# Patient Record
Sex: FEMALE | Race: BLACK OR AFRICAN AMERICAN | Employment: OTHER | URBAN - METROPOLITAN AREA
[De-identification: names, ages, dates, MRNs, and addresses within clinical notes are randomized per-mention and may not be internally consistent; named-entity substitution may affect disease eponyms.]

---

## 2017-01-17 ENCOUNTER — OFFICE VISIT (OUTPATIENT)
Dept: INTERNAL MEDICINE CLINIC | Age: 77
End: 2017-01-17

## 2017-01-17 VITALS
WEIGHT: 132 LBS | BODY MASS INDEX: 21.99 KG/M2 | OXYGEN SATURATION: 98 % | DIASTOLIC BLOOD PRESSURE: 68 MMHG | HEART RATE: 64 BPM | RESPIRATION RATE: 16 BRPM | SYSTOLIC BLOOD PRESSURE: 123 MMHG | HEIGHT: 65 IN | TEMPERATURE: 97.9 F

## 2017-01-17 DIAGNOSIS — G89.4 CHRONIC PAIN SYNDROME: ICD-10-CM

## 2017-01-17 DIAGNOSIS — I87.2 VENOUS INSUFFICIENCY OF BOTH LOWER EXTREMITIES: ICD-10-CM

## 2017-01-17 DIAGNOSIS — I48.0 PAROXYSMAL ATRIAL FIBRILLATION (HCC): Primary | ICD-10-CM

## 2017-01-17 DIAGNOSIS — I51.89 DIASTOLIC DYSFUNCTION: ICD-10-CM

## 2017-01-17 LAB
INR BLD: 2
PT POC: 23.9 SEC
VALID INTERNAL CONTROL?: YES

## 2017-01-17 RX ORDER — HYDROCODONE BITARTRATE AND ACETAMINOPHEN 5; 325 MG/1; MG/1
1 TABLET ORAL
Qty: 60 TAB | Refills: 0 | Status: SHIPPED | OUTPATIENT
Start: 2017-01-17 | End: 2017-03-13 | Stop reason: SDUPTHER

## 2017-01-17 NOTE — PROGRESS NOTES
Chief Complaint   Patient presents with    Anticoagulation     Coumadin 1 mg Monday-Saturday and 2 mg on Sunday    Headache     began early this AM     1. Have you been to the ER, urgent care clinic since your last visit? Hospitalized since your last visit? No    2. Have you seen or consulted any other health care providers outside of the [address] [place] [address] since your last visit? Include any pap smears or colon screening. No  \"Reviewed record in preparation for visit and have obtained necessary documentation. \"  Results for orders placed or performed in visit on 01/17/17   AMB POC PT/INR   Result Value Ref Range    VALID INTERNAL CONTROL POC Yes     Prothrombin time (POC) 23.9 sec    INR POC 2.0

## 2017-01-17 NOTE — PROGRESS NOTES
[facility]  Bismark Fernandez MD, [address] [address]  Phone:  [phone]  Fax: [phone]      Chief Complaint   Patient presents with    Anticoagulation     Coumadin 1 mg Monday-Saturday and 2 mg on Sunday    Headache     began early this AM         SUBECTIVE:    Arsalan Gamez is a 67 y.o. female Patient returns today alert and appropriate and has the capacity to give an accurate history. She is ambulatory with a known history of atrial fibrillation, anemia, chronic pain, diastolic dysfunction, prediabetes, mitral valve replacement and is seen for evaluation. She remains on Coumadin. Patient comes in today complaining of ankle swelling. The swelling was of such magnitude that she became frightened and started taking the fluid pill on a daily basis. The swelling is better this morning and it is usually down in the morning when she first gets up but as the day progresses the swelling gets worse. It also becomes somewhat painful. Patient denies other specific complaints and is seen for evaluation. Current Outpatient Prescriptions   Medication Sig Dispense Refill    HYDROcodone-acetaminophen (NORCO) 5-325 mg per tablet Take 1 Tab by mouth every six (6) hours as needed for Pain. Max Daily Amount: 4 Tabs. 60 Tab 0    pravastatin (PRAVACHOL) 20 mg tablet TAKE ONE TABLET BY MOUTH NIGHTLY 30 Tab 10    furosemide (LASIX) 20 mg tablet TAKE 1 TABLET BY MOUTH EVERY MONDAY, WEDNESDAY AND FRIDAY (Patient taking differently: taking only as needed for swelling ) 45 Tab 11    CARTIA  mg ER capsule TAKE ONE CAPSULE BY MOUTH DAILY 30 Cap 9    metoprolol (LOPRESSOR) 25 mg tablet Take 0.5 Tabs by mouth two (2) times a day.  180 Tab 2    warfarin (COUMADIN) 2 mg tablet TAKE ONE TABLET BY MOUTH DAILY (Patient taking differently: takes 2 mg on Sun only and 1mg all other days ) 90 Tab 2    omeprazole (PRILOSEC) 40 mg capsule Take 1 capsule by mouth daily. 30 capsule 3     Past Medical History   Diagnosis Date    AF (atrial fibrillation) (HCC)      RHEUMATIC FEVER AS CHILD, AFIB    Anemia     Arthritis     Chronic pain     Coagulation defects      COUMADIN    Diastolic dysfunction      grade 4    Encounter for Hemoccult screening 2016    H/O colonoscopy     Hand pain, right     Prediabetes     PUD (peptic ulcer disease)     Rheumatic fever     S/P MVR (mitral valve replacement)     Shoulder pain, right     SOB (shortness of breath)     Thyroid disease     Venous insufficiency of both lower extremities 2017    Warfarin-induced coagulopathy ([facility] [address]      Past Surgical History   Procedure Laterality Date    Hx heart catheterization      Hx  section      Hx sergio and bso      Hx cataract removal       BILATERAL    Hx colonoscopy      Hx hysterectomy       No Known Allergies    REVIEW OF SYSTEMS:   No chest pain or shortness of breath.          Social History     Social History    Marital status: SINGLE     Spouse name: N/A    Number of children: N/A    Years of education: N/A     Social History Main Topics    Smoking status: Former Smoker     Packs/day: 0.50     Years: 30.00     Quit date: 2012    Smokeless tobacco: Never Used    Alcohol use No    Drug use: No    Sexual activity: Not Asked     Other Topics Concern    None     Social History Narrative   r  Family History   Problem Relation Age of Onset    Heart Attack Sister     Hypertension Sister     Kidney Disease Mother     Hypertension Mother     Hypertension Father     Cancer Brother     Heart Failure Brother     Arthritis-osteo Sister     Cancer Sister     Cancer Brother      PROSTATE       OBJECTIVE:  Visit Vitals    /68 (BP 1 Location: Right arm, BP Patient Position: Sitting)    Pulse 64    Temp 97.9 °F (36.6 °C) (Oral)    Resp 16    Ht 5' 5\" (1.651 m)    Wt 132 lb (59.9 kg)    SpO2 98%    BMI 21.97 kg/m2     ENT: whit chambers intact  NECK: supple. Thyroid normal  CHEST: clear to ascultation and percussion   HEART: regular rate and rhythm  ABD: soft, bowel sounds active  EXTREMITIES: no edema, pulse 1+     Office Visit on 01/17/2017   Component Date Value Ref Range Status    VALID INTERNAL CONTROL POC 01/17/2017 Yes   Final    Prothrombin time (POC) 01/17/2017 23.9  sec Final    INR POC 01/17/2017 2.0   Final   Clinical Support on 12/27/2016   Component Date Value Ref Range Status    VALID INTERNAL CONTROL POC 12/27/2016 Yes   Final    Prothrombin time (POC) 12/27/2016 27.2  sec Final    INR POC 12/27/2016 2.3   Final   Clinical Support on 12/06/2016   Component Date Value Ref Range Status    VALID INTERNAL CONTROL POC 12/06/2016 Yes   Final    Prothrombin time (POC) 12/06/2016 35.0  sec Final    INR POC 12/06/2016 2.9   Final   Clinical Support on 11/15/2016   Component Date Value Ref Range Status    VALID INTERNAL CONTROL POC 11/15/2016 Yes   Final    Prothrombin time (POC) 11/15/2016 35.7  sec Final    INR POC 11/15/2016 3.0   Final          ASSESSMENT:  1. Paroxysmal atrial fibrillation (HCC)    2. Venous insufficiency of both lower extremities    3. Diastolic dysfunction    4. Chronic pain syndrome      The findings are consistent with chronic venous insufficiency. We do not suggest she continue the Lasix three times daily. I suggest she go back to basics, Monday, Wednesday and Friday. If the swelling becomes worse again however she can start taking it daily until it subsides and then when it subsides she will go back to Monday, Wednesday and Friday. We will check electrolytes today. INR is 2.0 which is okay. She complains of headaches and had the same complaint on her last visit. We are reluctant to give her analgesics because of the Coumadin. She takes Hydrocodone for pain periodically. That does not work very well for headaches however. BMI is at ideal body weight. Blood pressure control is adequate. She will return to the office in three weeks for a PT check. PLAN:  .  Orders Placed This Encounter    CBC WITH AUTOMATED DIFF    METABOLIC PANEL, BASIC    BNP    AMB POC PT/INR    HYDROcodone-acetaminophen (NORCO) 5-325 mg per tablet       Follow-up Disposition:  Return in about 4 weeks (around 2/14/2017) for bp check, pt/inr. ATTENTION:   This medical record was transcribed using an electronic medical records system. Although proofread, it may and can contain electronic and spelling errors. Other human spelling and other errors may be present. Corrections may be executed at a later time. Please feel free to contact us for any clarifications as needed.

## 2017-01-17 NOTE — MR AVS SNAPSHOT
Visit Information Date & Time Provider Department Dept. Phone Encounter #  
 1/17/2017  9:30 AM Jhonathan Chowrichardlauryn Page [facility] [address] [id number] Follow-up Instructions Return in about 4 weeks (around 2/14/2017) for bp check, pt/inr. Follow-up and Disposition History Your Appointments 4/3/2017  2:00 PM  
ESTABLISHED PATIENT with Kiley Cotto MD  
[facility] ([facility]) Appt Note: 6 month follow up  
 [phone]  
[address] [address] Upcoming Health Maintenance Date Due  
 GLAUCOMA SCREENING Q2Y 4/21/2017 Pneumococcal 65+ Low/Medium Risk (2 of 2 - PPSV23) 5/7/2017 MEDICARE YEARLY EXAM 6/14/2017 DTaP/Tdap/Td series (2 - Td) 1/17/2027 Allergies as of 1/17/2017  Review Complete On: 1/17/2017 By: Brandon Rendon MD  
 No Known Allergies Current Immunizations  Reviewed on 5/4/2012 Name Date Pneumococcal Vaccine (Unspecified Type) 5/7/2012  8:12 AM  
  
 Not reviewed this visit You Were Diagnosed With   
  
 Codes Comments Paroxysmal atrial fibrillation (HCC)    -  Primary ICD-10-CM: I48.0 ICD-9-CM: 427.31 Venous insufficiency of both lower extremities     ICD-10-CM: I87.2 ICD-9-CM: 459.81 Diastolic dysfunction     AJT-58-ME: I51.9 ICD-9-CM: 429.9 Chronic pain syndrome     ICD-10-CM: G89.4 ICD-9-CM: 338. 4 Vitals BP Pulse Temp Resp Height(growth percentile) Weight(growth percentile) 123/68 (BP 1 Location: Right arm, BP Patient Position: Sitting) 64 97.9 °F (36.6 °C) (Oral) 16 5' 5\" (1.651 m) 132 lb (59.9 kg) SpO2 BMI OB Status Smoking Status 98% 21.97 kg/m2 Hysterectomy Former Smoker Vitals History BMI and BSA Data Body Mass Index Body Surface Area  
 21.97 kg/m 2 1.66 m 2 Preferred Pharmacy Pharmacy Name Phone [facility] [address] [facility] [phone] Your Updated Medication List  
  
   
This list is accurate as of: 1/17/17 10:47 AM.  Always use your most recent med list.  
  
  
  
  
 CARTIA  mg ER capsule Generic drug:  dilTIAZem CD  
TAKE ONE CAPSULE BY MOUTH DAILY  
  
 furosemide 20 mg tablet Commonly known as:  LASIX TAKE 1 TABLET BY MOUTH EVERY MONDAY, [address] [facility] HYDROcodone-acetaminophen 5-325 mg per tablet Commonly known as:  Daniel Maxim Take 1 Tab by mouth every six (6) hours as needed for Pain. Max Daily Amount: 4 Tabs. metoprolol tartrate 25 mg tablet Commonly known as:  LOPRESSOR Take 0.5 Tabs by mouth two (2) times a day. omeprazole 40 mg capsule Commonly known as:  PRILOSEC Take 1 capsule by mouth daily. pravastatin 20 mg tablet Commonly known as:  PRAVACHOL  
TAKE ONE TABLET BY MOUTH NIGHTLY  
  
 warfarin 2 mg tablet Commonly known as:  COUMADIN  
TAKE ONE TABLET BY MOUTH DAILY Prescriptions Printed Refills HYDROcodone-acetaminophen (NORCO) 5-325 mg per tablet 0 Sig: Take 1 Tab by mouth every six (6) hours as needed for Pain. Max Daily Amount: 4 Tabs. Class: Print Route: Oral  
  
We Performed the Following AMB POC PT/INR [64944 CPT(R)] BNP [id number] CPT(R)] CBC WITH AUTOMATED DIFF [22327 CPT(R)] METABOLIC PANEL, BASIC [35221 CPT(R)] Follow-up Instructions Return in about 4 weeks (around 2/14/2017) for bp check, pt/inr. Introducing [facility] & HEALTH SERVICES! [employer] Kimbrough introduces [employer] patient portal. Now you can access parts of your medical record, email your doctor's office, and request medication refills online. 1. In your internet browser, go to [email or web address] 2. Click on the First Time User? Click Here link in the Sign In box. You will see the New Member Sign Up page. 3. Enter your [employer] Access Code exactly as it appears below. You will not need to use this code after youve completed the sign-up process. If you do not sign up before the expiration date, you must request a new code. · [employer] Access Code: [email or web address] Expires: 2/13/2017 11:29 AM 
 
4. Enter the last four digits of your Social Security Number (xxxx) and Date of Birth (mm/dd/yyyy) as indicated and click Submit. You will be taken to the next sign-up page. 5. Create a [employer] ID. This will be your [employer] login ID and cannot be changed, so think of one that is secure and easy to remember. 6. Create a [employer] password. You can change your password at any time. 7. Enter your Password Reset Question and Answer. This can be used at a later time if you forget your password. 8. Enter your e-mail address. You will receive e-mail notification when new information is available in [address] 9. Click Sign Up. You can now view and download portions of your medical record. 10. Click the Download Summary menu link to download a portable copy of your medical information. If you have questions, please visit the Frequently Asked Questions section of the [employer] website. Remember, [employer] is NOT to be used for urgent needs. For medical emergencies, dial 911. Now available from your iPhone and Android! Please provide this summary of care documentation to your next provider. Your primary care clinician is listed as Josi Ponce. If you have any questions after today's visit, please call [phone].

## 2017-01-18 LAB
BASOPHILS # BLD AUTO: 0 X10E3/UL (ref 0–0.2)
BASOPHILS NFR BLD AUTO: 0 %
BNP SERPL-MCNC: 303.1 PG/ML (ref 0–100)
BUN SERPL-MCNC: 17 MG/DL (ref 8–27)
BUN/CREAT SERPL: 17 (ref 11–26)
CALCIUM SERPL-MCNC: 9.7 MG/DL (ref 8.7–10.3)
CHLORIDE SERPL-SCNC: 104 MMOL/L (ref 96–106)
CO2 SERPL-SCNC: 22 MMOL/L (ref 18–29)
CREAT SERPL-MCNC: 0.98 MG/DL (ref 0.57–1)
EOSINOPHIL # BLD AUTO: 0.1 X10E3/UL (ref 0–0.4)
EOSINOPHIL NFR BLD AUTO: 2 %
ERYTHROCYTE [DISTWIDTH] IN BLOOD BY AUTOMATED COUNT: 14.6 % (ref 12.3–15.4)
GLUCOSE SERPL-MCNC: 95 MG/DL (ref 65–99)
HCT VFR BLD AUTO: 37.7 % (ref 34–46.6)
HGB BLD-MCNC: 13 G/DL (ref 11.1–15.9)
IMM GRANULOCYTES # BLD: 0 X10E3/UL (ref 0–0.1)
IMM GRANULOCYTES NFR BLD: 0 %
LYMPHOCYTES # BLD AUTO: 2.9 X10E3/UL (ref 0.7–3.1)
LYMPHOCYTES NFR BLD AUTO: 41 %
MCH RBC QN AUTO: 29.1 PG (ref 26.6–33)
MCHC RBC AUTO-ENTMCNC: 34.5 G/DL (ref 31.5–35.7)
MCV RBC AUTO: 84 FL (ref 79–97)
MONOCYTES # BLD AUTO: 0.6 X10E3/UL (ref 0.1–0.9)
MONOCYTES NFR BLD AUTO: 9 %
NEUTROPHILS # BLD AUTO: 3.5 X10E3/UL (ref 1.4–7)
NEUTROPHILS NFR BLD AUTO: 48 %
PLATELET # BLD AUTO: 316 X10E3/UL (ref 150–379)
POTASSIUM SERPL-SCNC: 5.4 MMOL/L (ref 3.5–5.2)
RBC # BLD AUTO: 4.47 X10E6/UL (ref 3.77–5.28)
SODIUM SERPL-SCNC: 144 MMOL/L (ref 134–144)
WBC # BLD AUTO: 7.1 X10E3/UL (ref 3.4–10.8)

## 2017-01-19 ENCOUNTER — TELEPHONE (OUTPATIENT)
Dept: INTERNAL MEDICINE CLINIC | Age: 77
End: 2017-01-19

## 2017-01-24 ENCOUNTER — LAB ONLY (OUTPATIENT)
Dept: INTERNAL MEDICINE CLINIC | Age: 77
End: 2017-01-24

## 2017-01-24 DIAGNOSIS — I10 ESSENTIAL HYPERTENSION, BENIGN: Primary | ICD-10-CM

## 2017-01-26 LAB
BUN SERPL-MCNC: 19 MG/DL (ref 8–27)
BUN/CREAT SERPL: 21 (ref 11–26)
CALCIUM SERPL-MCNC: 10.3 MG/DL (ref 8.7–10.3)
CHLORIDE SERPL-SCNC: 101 MMOL/L (ref 96–106)
CO2 SERPL-SCNC: 24 MMOL/L (ref 18–29)
CREAT SERPL-MCNC: 0.91 MG/DL (ref 0.57–1)
GLUCOSE SERPL-MCNC: 147 MG/DL (ref 65–99)
POTASSIUM SERPL-SCNC: 4.8 MMOL/L (ref 3.5–5.2)
SODIUM SERPL-SCNC: 143 MMOL/L (ref 134–144)

## 2017-02-14 ENCOUNTER — OFFICE VISIT (OUTPATIENT)
Dept: INTERNAL MEDICINE CLINIC | Age: 77
End: 2017-02-14

## 2017-02-14 DIAGNOSIS — I48.91 ATRIAL FIBRILLATION, UNSPECIFIED TYPE (HCC): Primary | ICD-10-CM

## 2017-02-14 LAB
INR BLD: 2.6
PT POC: 31.3 SEC
VALID INTERNAL CONTROL?: YES

## 2017-02-14 NOTE — MR AVS SNAPSHOT
Visit Information Date & Time Provider Department Dept. Phone Encounter #  
 2/14/2017 10:15 AM Moises Em  [facility] and [address] [id number] Your Appointments 2/14/2017 10:15 AM  
Any with Moises Em MD  
[address] [facility] [address] [facility]) Appt Note: 1 month for pt check [address] [place]  
  
   
 [address] [phone]  
  
    
 4/3/2017  2:00 PM  
ESTABLISHED PATIENT with Abdi Sewell MD  
[facility] ([address]) Appt Note: 6 month follow up  
 [phone]  
[address] [address] Upcoming Health Maintenance Date Due  
 GLAUCOMA SCREENING Q2Y 4/21/2017 Pneumococcal 65+ Low/Medium Risk (2 of 2 - PPSV23) 5/7/2017 MEDICARE YEARLY EXAM 6/14/2017 DTaP/Tdap/Td series (2 - Td) 1/17/2027 Allergies as of 2/14/2017  Review Complete On: 1/17/2017 By: Moises Em MD  
 No Known Allergies Current Immunizations  Reviewed on 5/4/2012 Name Date Pneumococcal Vaccine (Unspecified Type) 5/7/2012  8:12 AM  
  
 Not reviewed this visit Vitals OB Status Smoking Status Hysterectomy Former Smoker Preferred Pharmacy Pharmacy Name Phone Jarvis Garcia [address] [facility] [phone] Your Updated Medication List  
  
   
This list is accurate as of: 2/14/17 10:04 AM.  Always use your most recent med list.  
  
  
  
  
 CARTIA  mg ER capsule Generic drug:  dilTIAZem CD  
TAKE ONE CAPSULE BY MOUTH DAILY  
  
 furosemide 20 mg tablet Commonly known as:  LASIX TAKE 1 TABLET BY MOUTH EVERY MONDAY, [facility] HYDROcodone-acetaminophen 5-325 mg per tablet Commonly known as:  Allison Oyster Take 1 Tab by mouth every six (6) hours as needed for Pain. Max Daily Amount: 4 Tabs. metoprolol tartrate 25 mg tablet Commonly known as:  LOPRESSOR Take 0.5 Tabs by mouth two (2) times a day. omeprazole 40 mg capsule Commonly known as:  PRILOSEC Take 1 capsule by mouth daily. pravastatin 20 mg tablet Commonly known as:  PRAVACHOL  
TAKE ONE TABLET BY MOUTH NIGHTLY  
  
 warfarin 2 mg tablet Commonly known as:  COUMADIN  
TAKE ONE TABLET BY MOUTH DAILY Introducing [facility] & HEALTH SERVICES! [employer] introduces [employer] patient portal. Now you can access parts of your medical record, email your doctor's office, and request medication refills online. 1. In your internet browser, go to [email or web address] 2. Click on the First Time User? Click Here link in the Sign In box. You will see the New Member Sign Up page. 3. Enter your [employer] Access Code exactly as it appears below. You will not need to use this code after youve completed the sign-up process. If you do not sign up before the expiration date, you must request a new code. · [employer] Access Code: [email or web address] Expires: 5/15/2017  9:31 AM 
 
4. Enter the last four digits of your Social Security Number (xxxx) and Date of Birth (mm/dd/yyyy) as indicated and click Submit. You will be taken to the next sign-up page. 5. Create a [employer] ID. This will be your [employer] login ID and cannot be changed, so think of one that is secure and easy to remember. 6. Create a [employer] password. You can change your password at any time. 7. Enter your Password Reset Question and Answer. This can be used at a later time if you forget your password. 8. Enter your e-mail address. You will receive e-mail notification when new information is available in [address] 9. Click Sign Up. You can now view and download portions of your medical record. 10. Click the Download Summary menu link to download a portable copy of your medical information. If you have questions, please visit the Frequently Asked Questions section of the [employer] website. Remember, [employer] is NOT to be used for urgent needs. For medical emergencies, dial 911. Now available from your iPhone and Android! Please provide this summary of care documentation to your next provider. Your primary care clinician is listed as Wendy Finley. If you have any questions after today's visit, please call [phone].

## 2017-02-14 NOTE — PROGRESS NOTES
Patient comes into the office for pt/inr check up she states she is taking Coumadin 2 mg 1/2 tablet daily and 1 tablet on Sundays. Per Dr. Gilma Crenshaw patient is to stay on same dose of medication and return to the office in 1 month for pt/inr check up.  Pt 31.3 inr 2.6

## 2017-03-02 ENCOUNTER — HOSPITAL ENCOUNTER (OUTPATIENT)
Age: 77
Setting detail: OBSERVATION
Discharge: HOME | End: 2017-03-03
Attending: STUDENT IN AN ORGANIZED HEALTH CARE EDUCATION/TRAINING PROGRAM | Admitting: INTERNAL MEDICINE
Payer: MEDICARE

## 2017-03-02 ENCOUNTER — APPOINTMENT (OUTPATIENT)
Dept: ULTRASOUND IMAGING | Age: 77
End: 2017-03-02
Attending: STUDENT IN AN ORGANIZED HEALTH CARE EDUCATION/TRAINING PROGRAM
Payer: MEDICARE

## 2017-03-02 ENCOUNTER — APPOINTMENT (OUTPATIENT)
Dept: GENERAL RADIOLOGY | Age: 77
End: 2017-03-02
Attending: STUDENT IN AN ORGANIZED HEALTH CARE EDUCATION/TRAINING PROGRAM
Payer: MEDICARE

## 2017-03-02 DIAGNOSIS — N17.9 ACUTE KIDNEY INJURY (HCC): Primary | ICD-10-CM

## 2017-03-02 DIAGNOSIS — M79.89 LEG SWELLING: ICD-10-CM

## 2017-03-02 PROBLEM — I82.4Y9 DVT, LOWER EXTREMITY, PROXIMAL, ACUTE (HCC): Status: ACTIVE | Noted: 2017-03-02

## 2017-03-02 PROBLEM — I82.511 CHRONIC DEEP VEIN THROMBOSIS (DVT) OF FEMORAL VEIN OF RIGHT LOWER EXTREMITY (HCC): Status: ACTIVE | Noted: 2017-03-02

## 2017-03-02 LAB
ALBUMIN SERPL BCP-MCNC: 3.5 G/DL (ref 3.5–5)
ALBUMIN/GLOB SERPL: 0.8 {RATIO} (ref 1.1–2.2)
ALP SERPL-CCNC: 77 U/L (ref 45–117)
ALT SERPL-CCNC: 16 U/L (ref 12–78)
ANION GAP BLD CALC-SCNC: 8 MMOL/L (ref 5–15)
APPEARANCE UR: CLEAR
AST SERPL W P-5'-P-CCNC: 8 U/L (ref 15–37)
ATRIAL RATE: 65 BPM
BACTERIA URNS QL MICRO: NEGATIVE /HPF
BASOPHILS # BLD AUTO: 0 K/UL (ref 0–0.1)
BASOPHILS # BLD: 0 % (ref 0–1)
BILIRUB SERPL-MCNC: 0.3 MG/DL (ref 0.2–1)
BILIRUB UR QL: NEGATIVE
BNP SERPL-MCNC: 81 PG/ML (ref 0–100)
BUN SERPL-MCNC: 36 MG/DL (ref 6–20)
BUN/CREAT SERPL: 13 (ref 12–20)
CALCIUM SERPL-MCNC: 9.2 MG/DL (ref 8.5–10.1)
CALCULATED P AXIS, ECG09: 33 DEGREES
CALCULATED R AXIS, ECG10: 51 DEGREES
CALCULATED T AXIS, ECG11: 54 DEGREES
CHLORIDE SERPL-SCNC: 104 MMOL/L (ref 97–108)
CO2 SERPL-SCNC: 28 MMOL/L (ref 21–32)
COLOR UR: ABNORMAL
CREAT SERPL-MCNC: 2.68 MG/DL (ref 0.55–1.02)
DIAGNOSIS, 93000: NORMAL
EOSINOPHIL # BLD: 0.3 K/UL (ref 0–0.4)
EOSINOPHIL NFR BLD: 4 % (ref 0–7)
EPITH CASTS URNS QL MICRO: ABNORMAL /LPF
ERYTHROCYTE [DISTWIDTH] IN BLOOD BY AUTOMATED COUNT: 14.5 % (ref 11.5–14.5)
GLOBULIN SER CALC-MCNC: 4.4 G/DL (ref 2–4)
GLUCOSE SERPL-MCNC: 108 MG/DL (ref 65–100)
GLUCOSE UR STRIP.AUTO-MCNC: NEGATIVE MG/DL
HCT VFR BLD AUTO: 32.8 % (ref 35–47)
HGB BLD-MCNC: 11.2 G/DL (ref 11.5–16)
HGB UR QL STRIP: NEGATIVE
HYALINE CASTS URNS QL MICRO: ABNORMAL /LPF (ref 0–5)
INR PPP: 2.4 (ref 0.9–1.1)
KETONES UR QL STRIP.AUTO: NEGATIVE MG/DL
LEUKOCYTE ESTERASE UR QL STRIP.AUTO: ABNORMAL
LYMPHOCYTES # BLD AUTO: 34 % (ref 12–49)
LYMPHOCYTES # BLD: 2.4 K/UL (ref 0.8–3.5)
MCH RBC QN AUTO: 28.8 PG (ref 26–34)
MCHC RBC AUTO-ENTMCNC: 34.1 G/DL (ref 30–36.5)
MCV RBC AUTO: 84.3 FL (ref 80–99)
MONOCYTES # BLD: 0.6 K/UL (ref 0–1)
MONOCYTES NFR BLD AUTO: 9 % (ref 5–13)
NEUTS SEG # BLD: 3.7 K/UL (ref 1.8–8)
NEUTS SEG NFR BLD AUTO: 53 % (ref 32–75)
NITRITE UR QL STRIP.AUTO: NEGATIVE
P-R INTERVAL, ECG05: 158 MS
PH UR STRIP: 5 [PH] (ref 5–8)
PLATELET # BLD AUTO: 252 K/UL (ref 150–400)
POTASSIUM SERPL-SCNC: 3.8 MMOL/L (ref 3.5–5.1)
PROT SERPL-MCNC: 7.9 G/DL (ref 6.4–8.2)
PROT UR STRIP-MCNC: NEGATIVE MG/DL
PROTHROMBIN TIME: 24.1 SEC (ref 9–11.1)
Q-T INTERVAL, ECG07: 424 MS
QRS DURATION, ECG06: 82 MS
QTC CALCULATION (BEZET), ECG08: 440 MS
RBC # BLD AUTO: 3.89 M/UL (ref 3.8–5.2)
RBC #/AREA URNS HPF: ABNORMAL /HPF (ref 0–5)
SODIUM SERPL-SCNC: 140 MMOL/L (ref 136–145)
SP GR UR REFRACTOMETRY: 1.01 (ref 1–1.03)
UROBILINOGEN UR QL STRIP.AUTO: 0.2 EU/DL (ref 0.2–1)
VENTRICULAR RATE, ECG03: 65 BPM
WBC # BLD AUTO: 7 K/UL (ref 3.6–11)
WBC URNS QL MICRO: ABNORMAL /HPF (ref 0–4)

## 2017-03-02 PROCEDURE — 93971 EXTREMITY STUDY: CPT

## 2017-03-02 PROCEDURE — 93005 ELECTROCARDIOGRAM TRACING: CPT

## 2017-03-02 PROCEDURE — 76770 US EXAM ABDO BACK WALL COMP: CPT

## 2017-03-02 PROCEDURE — 96361 HYDRATE IV INFUSION ADD-ON: CPT

## 2017-03-02 PROCEDURE — 81001 URINALYSIS AUTO W/SCOPE: CPT

## 2017-03-02 PROCEDURE — 71010 XR CHEST PORT: CPT

## 2017-03-02 PROCEDURE — 65390000012 HC CONDITION CODE 44 OBSERVATION

## 2017-03-02 PROCEDURE — 85610 PROTHROMBIN TIME: CPT

## 2017-03-02 PROCEDURE — 65660000000 HC RM CCU STEPDOWN

## 2017-03-02 PROCEDURE — 80053 COMPREHEN METABOLIC PANEL: CPT | Performed by: STUDENT IN AN ORGANIZED HEALTH CARE EDUCATION/TRAINING PROGRAM

## 2017-03-02 PROCEDURE — 36415 COLL VENOUS BLD VENIPUNCTURE: CPT

## 2017-03-02 PROCEDURE — 83880 ASSAY OF NATRIURETIC PEPTIDE: CPT | Performed by: STUDENT IN AN ORGANIZED HEALTH CARE EDUCATION/TRAINING PROGRAM

## 2017-03-02 PROCEDURE — 74011250636 HC RX REV CODE- 250/636: Performed by: STUDENT IN AN ORGANIZED HEALTH CARE EDUCATION/TRAINING PROGRAM

## 2017-03-02 PROCEDURE — 85025 COMPLETE CBC W/AUTO DIFF WBC: CPT | Performed by: STUDENT IN AN ORGANIZED HEALTH CARE EDUCATION/TRAINING PROGRAM

## 2017-03-02 PROCEDURE — 74011250637 HC RX REV CODE- 250/637: Performed by: INTERNAL MEDICINE

## 2017-03-02 PROCEDURE — 74011250636 HC RX REV CODE- 250/636: Performed by: INTERNAL MEDICINE

## 2017-03-02 PROCEDURE — 96360 HYDRATION IV INFUSION INIT: CPT

## 2017-03-02 PROCEDURE — 99284 EMERGENCY DEPT VISIT MOD MDM: CPT

## 2017-03-02 RX ORDER — LOSARTAN POTASSIUM 50 MG/1
50 TABLET ORAL DAILY
Status: DISCONTINUED | OUTPATIENT
Start: 2017-03-03 | End: 2017-03-03

## 2017-03-02 RX ORDER — SODIUM CHLORIDE 0.9 % (FLUSH) 0.9 %
5-10 SYRINGE (ML) INJECTION EVERY 8 HOURS
Status: DISCONTINUED | OUTPATIENT
Start: 2017-03-02 | End: 2017-03-03 | Stop reason: HOSPADM

## 2017-03-02 RX ORDER — SODIUM CHLORIDE 9 MG/ML
150 INJECTION, SOLUTION INTRAVENOUS ONCE
Status: COMPLETED | OUTPATIENT
Start: 2017-03-02 | End: 2017-03-03

## 2017-03-02 RX ORDER — DILTIAZEM HYDROCHLORIDE 120 MG/1
120 CAPSULE, COATED, EXTENDED RELEASE ORAL DAILY
Status: DISCONTINUED | OUTPATIENT
Start: 2017-03-03 | End: 2017-03-03 | Stop reason: HOSPADM

## 2017-03-02 RX ORDER — SODIUM CHLORIDE 9 MG/ML
50 INJECTION, SOLUTION INTRAVENOUS CONTINUOUS
Status: DISCONTINUED | OUTPATIENT
Start: 2017-03-02 | End: 2017-03-03 | Stop reason: HOSPADM

## 2017-03-02 RX ORDER — HYDROCODONE BITARTRATE AND ACETAMINOPHEN 5; 325 MG/1; MG/1
1 TABLET ORAL
Status: DISCONTINUED | OUTPATIENT
Start: 2017-03-02 | End: 2017-03-03 | Stop reason: HOSPADM

## 2017-03-02 RX ORDER — METOPROLOL TARTRATE 25 MG/1
12.5 TABLET, FILM COATED ORAL 2 TIMES DAILY
Status: DISCONTINUED | OUTPATIENT
Start: 2017-03-03 | End: 2017-03-03 | Stop reason: HOSPADM

## 2017-03-02 RX ORDER — LOSARTAN POTASSIUM 50 MG/1
50 TABLET ORAL DAILY
COMMUNITY
End: 2017-04-04 | Stop reason: SDUPTHER

## 2017-03-02 RX ORDER — ACETAMINOPHEN 325 MG/1
650 TABLET ORAL
Status: DISCONTINUED | OUTPATIENT
Start: 2017-03-02 | End: 2017-03-03 | Stop reason: HOSPADM

## 2017-03-02 RX ORDER — GUAIFENESIN 100 MG/5ML
81 LIQUID (ML) ORAL DAILY
Status: DISCONTINUED | OUTPATIENT
Start: 2017-03-03 | End: 2017-03-03 | Stop reason: HOSPADM

## 2017-03-02 RX ORDER — SODIUM CHLORIDE 0.9 % (FLUSH) 0.9 %
5-10 SYRINGE (ML) INJECTION AS NEEDED
Status: DISCONTINUED | OUTPATIENT
Start: 2017-03-02 | End: 2017-03-03 | Stop reason: HOSPADM

## 2017-03-02 RX ORDER — PRAVASTATIN SODIUM 20 MG/1
20 TABLET ORAL
Status: DISCONTINUED | OUTPATIENT
Start: 2017-03-02 | End: 2017-03-03 | Stop reason: HOSPADM

## 2017-03-02 RX ORDER — FUROSEMIDE 20 MG/1
20 TABLET ORAL DAILY
Status: DISCONTINUED | OUTPATIENT
Start: 2017-03-03 | End: 2017-03-03

## 2017-03-02 RX ORDER — GUAIFENESIN 100 MG/5ML
81 LIQUID (ML) ORAL DAILY
COMMUNITY

## 2017-03-02 RX ORDER — ACETAMINOPHEN 500 MG
500-1000 TABLET ORAL
COMMUNITY
End: 2017-03-13 | Stop reason: SDUPTHER

## 2017-03-02 RX ORDER — WARFARIN 1 MG/1
1 TABLET ORAL
Status: COMPLETED | OUTPATIENT
Start: 2017-03-02 | End: 2017-03-02

## 2017-03-02 RX ADMIN — SODIUM CHLORIDE 150 ML/HR: 900 INJECTION, SOLUTION INTRAVENOUS at 16:22

## 2017-03-02 RX ADMIN — WARFARIN SODIUM 1 MG: 1 TABLET ORAL at 23:17

## 2017-03-02 RX ADMIN — PRAVASTATIN SODIUM 20 MG: 20 TABLET ORAL at 23:17

## 2017-03-02 RX ADMIN — Medication 10 ML: at 23:17

## 2017-03-02 RX ADMIN — HYDROCODONE BITARTRATE AND ACETAMINOPHEN 1 TABLET: 5; 325 TABLET ORAL at 23:17

## 2017-03-02 RX ADMIN — SODIUM CHLORIDE 50 ML/HR: 900 INJECTION, SOLUTION INTRAVENOUS at 23:17

## 2017-03-02 NOTE — PROGRESS NOTES
Admission Medication Reconciliation:    Information obtained from:  Patient/RxQuery    Comments/Recommendations: Updated PTA meds/reviewed patient's allergies. 1)  Patient is very clear about how she takes warfarin. Patient splits her 2mg \"purple tablet\" and takes 0.5 tabs (1 mg) daily, except 2 mg on Sundays. She takes these in the PM; last dose yesterday. Indication:  AFib w/MVR    2)  Added Losartan. Patient reports that her normal MD was on maternity leave and her covering MD took the patient off Losartan. She felt \"lousy\" for 2 days and then self-restarted. 3)  Added ASA 81 mg daily and PRN APAP    4)  Removed old Rx for omeprazole       Significant PMH/Disease States:   Past Medical History:   Diagnosis Date    AF (atrial fibrillation) (HCC)     RHEUMATIC FEVER AS CHILD, AFIB    Anemia     Arthritis     Chronic pain     Coagulation defects     COUMADIN    Diastolic dysfunction     grade 4    Encounter for Hemoccult screening 06/22/2016    H/O colonoscopy 2007    Hand pain, right     Heart failure (HCC)     Prediabetes     PUD (peptic ulcer disease)     Rheumatic fever     S/P MVR (mitral valve replacement)     Shoulder pain, right     SOB (shortness of breath)     Thyroid disease     Venous insufficiency of both lower extremities 01/17/2017    Warfarin-induced coagulopathy [facility])      Chief Complaint for this Admission:    Chief Complaint   Patient presents with    Leg Swelling     Allergies:  Review of patient's allergies indicates no known allergies. Prior to Admission Medications:   Prior to Admission Medications   Prescriptions Last Dose Informant Patient Reported? Taking? CARTIA  mg ER capsule 3/2/2017 at Unknown time  No Yes   Sig: TAKE ONE CAPSULE BY MOUTH DAILY   HYDROcodone-acetaminophen (NORCO) 5-325 mg per tablet 3/1/2017 at Unknown time  No Yes   Sig: Take 1 Tab by mouth every six (6) hours as needed for Pain. Max Daily Amount: 4 Tabs.    acetaminophen (TYLENOL) 500 mg tablet   Yes Yes   Sig: Take 500-1,000 mg by mouth every six (6) hours as needed for Pain. aspirin 81 mg chewable tablet 3/2/2017 at Unknown time  Yes Yes   Sig: Take 81 mg by mouth daily. furosemide (LASIX) 20 mg tablet 3/2/2017 at Unknown time  No Yes   Sig: TAKE 1 TABLET BY MOUTH EVERY MONDAY, WEDNESDAY AND FRIDAY   losartan (COZAAR) 50 mg tablet 3/2/2017 at Unknown time  Yes Yes   Sig: Take 50 mg by mouth daily. metoprolol (LOPRESSOR) 25 mg tablet 3/2/2017 at Unknown time  No Yes   Sig: Take 0.5 Tabs by mouth two (2) times a day.    pravastatin (PRAVACHOL) 20 mg tablet 3/1/2017 at Unknown time  No Yes   Sig: TAKE ONE TABLET BY MOUTH NIGHTLY   warfarin (COUMADIN) 2 mg tablet 3/1/2017 at 1 mg PM  No Yes   Sig: TAKE ONE TABLET BY MOUTH DAILY   Patient taking differently: takes 2 mg on Sun only and 1mg all other days       Facility-Administered Medications: None

## 2017-03-02 NOTE — ED PROVIDER NOTES
HPI Comments: 65 y.o. female with extensive past medical history, please see list, significant for anemia, PUD, a-fib, chronic pain, diastolic dysfunction, heart failure, and is s/p MVR, and heart catheterization who presents from home via private vehicle with chief complaint of leg swelling. Pt reports intermittent right leg swelling > left leg swelling for the last week. Niece reports that the pt's gait is more labored than normal and that the pt lives alone in a house with multiple levels. Pt denies any injuries or falls. Pt takes 20 mg furosemide 3 days per week (M/W/) and is on coumadin s/p valve replacement. Pt has had no recent changes to her medications. No h/o blood clots or problems with kidney function in the past. Pt reports that she feels well other than having the leg swelling. There are no other acute medical concerns at this time. Social hx: Former smoker (Quit ). No alcohol use. PCP: Thien Johnson MD    Note written by Elsi Lane, as dictated by Familia Pedraza MD 3:02 PM    The history is provided by the patient and a relative.         Past Medical History:   Diagnosis Date    AF (atrial fibrillation) (HCC)     RHEUMATIC FEVER AS CHILD, AFIB    Anemia     Arthritis     Chronic pain     Coagulation defects     COUMADIN    Diastolic dysfunction     grade 4    Encounter for Hemoccult screening 2016    H/O colonoscopy     Hand pain, right     Heart failure (HCC)     Prediabetes     PUD (peptic ulcer disease)     Rheumatic fever     S/P MVR (mitral valve replacement)     Shoulder pain, right     SOB (shortness of breath)     Thyroid disease     Venous insufficiency of both lower extremities 2017    Warfarin-induced coagulopathy ([facility] [address]        Past Surgical History:   Procedure Laterality Date    HX CATARACT REMOVAL      BILATERAL    HX  SECTION      HX COLONOSCOPY      HX HEART CATHETERIZATION      HX HYSTERECTOMY      HX MITRAL VALVE REPLACEMENT      HX LUIS ENRIQUE AND BSO           Family History:   Problem Relation Age of Onset    Heart Attack Sister     Hypertension Sister     Kidney Disease Mother     Hypertension Mother     Hypertension Father     Cancer Brother     Heart Failure Brother     Arthritis-osteo Sister     Cancer Sister     Cancer Brother      PROSTATE       Social History     Social History    Marital status: SINGLE     Spouse name: N/A    Number of children: N/A    Years of education: N/A     Occupational History    Not on file. Social History Main Topics    Smoking status: Former Smoker     Packs/day: 0.50     Years: 30.00     Quit date: 2012    Smokeless tobacco: Never Used    Alcohol use No    Drug use: No    Sexual activity: Not on file     Other Topics Concern    Not on file     Social History Narrative    Family History: Mother:  80 yrs, High Blood PressureFather:  52 yrs, High Blood PressureSister(s): alive, High Blood    Pressure, colon cancer, type II diabetesBrother(s): , High Blood Pressure, colon cancer, type II diabetesSon(s): alive 48 yrs1 sister(s)    . 1 son(s) . Social History: Alcohol Use Patient does not use alcohol. Smoking Status Patient is a former smoker. Marital Status: Single. Lives w ith:    alone. Children: sons. Occupation/W ork: retired - for [employer]. Education/School: has highschool diploma. Worship: [address] [employer].         ALLERGIES: Review of patient's allergies indicates no known allergies. Review of Systems   Constitutional: Negative for chills and fever. Respiratory: Negative for cough and shortness of breath. Cardiovascular: Positive for leg swelling. Negative for chest pain. Gastrointestinal: Negative for diarrhea, nausea and vomiting. Musculoskeletal: Negative for back pain and neck pain. All other systems reviewed and are negative.       Vitals:    17 1321   BP: 120/60   Pulse: 76   Resp: 16   Temp: 98.2 °F (36.8 °C)   SpO2: 99%   Weight: 60.9 kg (134 lb 4 oz)   Height: 5' 5\" (1.651 m)            Physical Exam   Constitutional: She is oriented to person, place, and time. She appears well-developed and well-nourished. HENT:   Head: Normocephalic and atraumatic. Eyes: Conjunctivae and EOM are normal. Pupils are equal, round, and reactive to light. Neck: Normal range of motion. Neck supple. Cardiovascular: Normal rate, regular rhythm and normal heart sounds. No murmur heard. Pulmonary/Chest: Effort normal and breath sounds normal. No respiratory distress. Abdominal: Soft. Bowel sounds are normal. She exhibits no distension. There is no tenderness. There is no rebound. Musculoskeletal: Normal range of motion. 1+ pitting edema RLE. Trace edema LLE. Neurological: She is alert and oriented to person, place, and time. No cranial nerve deficit. She exhibits normal muscle tone. Coordination normal.   Skin: Skin is warm and dry. No rash noted. Psychiatric: She has a normal mood and affect. Her behavior is normal.   Nursing note and vitals reviewed. Note written by Elsi Crocker, as dictated by Javier Deutsch MD 3:09 PM        MDM  Number of Diagnoses or Management Options  Acute kidney injury [facility]): new and requires workup  Leg swelling: new and requires workup     Amount and/or Complexity of Data Reviewed  Clinical lab tests: ordered and reviewed  Tests in the radiology section of CPT®: ordered and reviewed    Risk of Complications, Morbidity, and/or Mortality  Presenting problems: moderate  Diagnostic procedures: moderate  Management options: moderate    Patient Progress  Patient progress: stable    ED Course   EKG at 1619 shows normal sinus rhythm, rate 65, no stenting, no acute ischemia, normal axis, normal intervals. Procedures      CONSULT NOTE:  3:23 PM Javier Deutsch MD spoke with Dr. Gema Glez, Consult for PCP.   Discussed available diagnostic tests and clinical findings. He is in agreement with care plans as outlined. Dr. Tamar Acosta agrees with admission.

## 2017-03-02 NOTE — IP AVS SNAPSHOT
Current Discharge Medication List  
  
Take these medications at their scheduled times Dose & Instructions Dispensing Information Comments Morning Noon Evening Bedtime  
 aspirin 81 mg chewable tablet Your next dose is: Today, Tomorrow Other:  ____________ Dose:  81 mg Take 81 mg by mouth daily. Refills:  0  
     
   
   
   
  
 losartan 50 mg tablet Commonly known as:  COZAAR Your next dose is: Today, Tomorrow Other:  ____________ Dose:  50 mg Take 50 mg by mouth daily. Refills:  0  
     
   
   
   
  
 metoprolol tartrate 25 mg tablet Commonly known as:  LOPRESSOR Your next dose is: Today, Tomorrow Other:  ____________ Dose:  12.5 mg Take 0.5 Tabs by mouth two (2) times a day. Quantity:  180 Tab Refills:  2 Take these medications as needed Dose & Instructions Dispensing Information Comments Morning Noon Evening Bedtime  
 acetaminophen 500 mg tablet Commonly known as:  TYLENOL Your next dose is: Today, Tomorrow Other:  ____________ Dose:  500-1000 mg Take 500-1,000 mg by mouth every six (6) hours as needed for Pain. Refills:  0 HYDROcodone-acetaminophen 5-325 mg per tablet Commonly known as:  Lynnetta Tatum Your next dose is: Today, Tomorrow Other:  ____________ Dose:  1 Tab Take 1 Tab by mouth every six (6) hours as needed for Pain. Max Daily Amount: 4 Tabs. Quantity:  60 Tab Refills:  0 Take these medications as directed Dose & Instructions Dispensing Information Comments Morning Noon Evening Bedtime CARTIA  mg ER capsule Generic drug:  dilTIAZem CD Your next dose is: Today, Tomorrow Other:  ____________ TAKE ONE CAPSULE BY MOUTH DAILY Quantity:  30 Cap Refills:  9 pravastatin 20 mg tablet Commonly known as:  PRAVACHOL Your next dose is: Today, Tomorrow Other:  ____________ TAKE ONE TABLET BY MOUTH NIGHTLY Quantity:  30 Tab Refills:  10  
     
   
   
   
  
 warfarin 2 mg tablet Commonly known as:  COUMADIN Your next dose is: Today, Tomorrow Other:  ____________ TAKE ONE TABLET BY MOUTH DAILY Quantity:  90 Tab Refills:  2

## 2017-03-02 NOTE — IP AVS SNAPSHOT
[address] 
[phone] Patient: Danish Fregoso MRN: [id number] ALB:[birth year] You are allergic to the following No active allergies Recent Documentation Height Weight BMI OB Status Smoking Status 1.651 m 59 kg 21.64 kg/m2 Hysterectomy Former Smoker Emergency Contacts Name Discharge Info Relation Home Work Mobile Mercedes Angulo (Niece)  Other Relative [6] [phone] Kelly Silva  Sister [23] [phone] Kelly Silva  Other Relative [6] [phone] About your hospitalization You were admitted on:  March 2, 2017 You last received care in the:  [facility] 4 SURG/BARIATRICS You were discharged on:  March 3, 2017 Unit phone number:  [phone] Why you were hospitalized Your primary diagnosis was:  Chronic Deep Vein Thrombosis (Dvt) Of Femoral Vein Of Right Lower Extremity (Hcc) Your diagnoses also included:  Af (Atrial Fibrillation) (Hcc), Coronary Atherosclerosis Of Native Coronary Artery, Diastolic Dysfunction, Essential Hypertension, Benign, Mitral Valve Disorder, Hypothyroid, Prediabetes, Pud (Peptic Ulcer Disease), Pulmonary Hypertension (Hcc), Warfarin-Induced Coagulopathy (Hcc) Providers Seen During Your Hospitalizations Provider Role Specialty Primary office phone Jagjit Gill MD Attending Provider Emergency Medicine [phone] Amador Littlejohn MD Attending Provider Internal Medicine [phone] Rosalinda Kahn MD Attending Provider Internal Medicine [phone] Your Primary Care Physician (PCP) Primary Care Physician Office Phone Office Fax [phone] Follow-up Information Follow up With Details Comments Contact Info Rosalinda Kahn MD   [facility] [address] [place] [address] 
[phone] In 3 days Your Appointments Wednesday March 08, 2017  9:30 AM EST Any with Ayaka Edmond MD  
[address] [place] [address] and Primary Care [facility]) Jyoti Jones [phone]  
[phone] Tuesday March 21, 2017 10:00 AM EDT Nurse Visit with Ayaka Edmond MD  
[address] [place] [address] and Primary Care [facility]) [phone]  
[phone] Monday April 03, 2017  2:00 PM EDT  
ESTABLISHED PATIENT with Devon De La Rosa MD  
[facility] ([facility]) [address]  [address] [place] [address]  
[phone] Current Discharge Medication List  
  
CONTINUE these medications which have CHANGED Dose & Instructions Dispensing Information Comments Morning Noon Evening Bedtime  
 warfarin 2 mg tablet Commonly known as:  COUMADIN What changed:  See the new instructions. Your next dose is: Today, Tomorrow Other:  _________ TAKE ONE TABLET BY MOUTH DAILY Quantity:  90 Tab Refills:  2 CONTINUE these medications which have NOT CHANGED Dose & Instructions Dispensing Information Comments Morning Noon Evening Bedtime  
 acetaminophen 500 mg tablet Commonly known as:  TYLENOL Your next dose is: Today, Tomorrow Other:  _________ Dose:  500-1000 mg Take 500-1,000 mg by mouth every six (6) hours as needed for Pain. Refills:  0  
     
   
   
   
  
 aspirin 81 mg chewable tablet Your next dose is: Today, Tomorrow Other:  _________ Dose:  81 mg Take 81 mg by mouth daily. Refills:  0  
     
   
   
   
  
 CARTIA  mg ER capsule Generic drug:  dilTIAZem CD Your next dose is: Today, Tomorrow Other:  _________ TAKE ONE CAPSULE BY MOUTH DAILY Quantity:  30 Cap Refills:  9 HYDROcodone-acetaminophen 5-325 mg per tablet Commonly known as:  Lenon Gauze Your next dose is: Today, Tomorrow Other:  _________ Dose:  1 Tab Take 1 Tab by mouth every six (6) hours as needed for Pain. Max Daily Amount: 4 Tabs. Quantity:  60 Tab Refills:  0  
     
   
   
   
  
 losartan 50 mg tablet Commonly known as:  COZAAR Your next dose is: Today, Tomorrow Other:  _________ Dose:  50 mg Take 50 mg by mouth daily. Refills:  0  
     
   
   
   
  
 metoprolol tartrate 25 mg tablet Commonly known as:  LOPRESSOR Your next dose is: Today, Tomorrow Other:  _________ Dose:  12.5 mg Take 0.5 Tabs by mouth two (2) times a day. Quantity:  180 Tab Refills:  2  
     
   
   
   
  
 pravastatin 20 mg tablet Commonly known as:  PRAVACHOL Your next dose is: Today, Tomorrow Other:  _________ TAKE ONE TABLET BY MOUTH NIGHTLY Quantity:  30 Tab Refills:  10 STOP taking these medications   
 furosemide 20 mg tablet Commonly known as:  LASIX Discharge Instructions Patient Discharge Instructions Wyatt Wise / [id number] : 1940 Admitted 3/2/2017 Discharged: 3/3/2017 Patient Active Problem List  
Diagnosis Code  Chest pain R07.9  Other dyspnea and respiratory abnormality R06.09, R09.89  
 Mitral valve disorder I05.9  Palpitations R00.2  Atrial fibrillation (HCC) I48.91  
 Essential hypertension, benign I5  
 Hypothyroid E03.9  Tricuspid regurgitation I07.1  Rheumatic heart disease I09.9  S/P MVR (mitral valve replacement) Z95.2  
 S/P Maze operation for atrial fibrillation Z98.890, Z86.79  
 Anemia associated with acute blood loss D62  Atrial flutter with controlled response (HCC) I48.92  
 Aortic valve disorders I35.9  Pulmonary hypertension (HCC) I27.2  Neck pain M54.2  Coronary atherosclerosis of native coronary artery I25.10  Warfarin-induced coagulopathy ([facility] [address] T45.511A, D68.9  Arthritis M19.90  Prediabetes R73.03  
 Arrhythmia I49.9  PUD (peptic ulcer disease) K27.9  Anemia D64.9  Thyroid disease E07.9  Rheumatic fever I00  AF (atrial fibrillation) ([facility]) I48.91  
 H/O colonoscopy Z98.890  Chronic pain G89.29  Shoulder pain, right M25.511  
 Hand pain, right M79.641  ACP (advance care planning) Z71.89  
 SOB (shortness of breath) I76.75  
 Diastolic dysfunction J63.7  
 Encounter for Hemoccult screening Z12.11  Venous insufficiency of both lower extremities I87.2  Chronic deep vein thrombosis (DVT) of femoral vein of right lower extremity ([facility]) I82.511 Take Home Medications · It is important that you take the medication exactly as they are prescribed. · Keep your medication in the bottles provided by the pharmacist and keep a list of the medication names, dosages, and times to be taken in your wallet. · Do not take other medications without consulting your doctor. What to do at [facility] Recommended diet: Cardiac Diet, Recommended activity: Activity as tolerated, Follow-up with Reddy Donnelly MD  in 3 day Information obtained by : 
I understand that if any problems occur once I am at home I am to contact my physician. I understand and acknowledge receipt of the instructions indicated above. Physician's or R.N.'s Signature                                                                  Date/Time Patient or Representative Signature                                                          Date/Time Discharge Orders None TrueView Announcement We are excited to announce that we are making your provider's discharge notes available to you in TrueView. You will see these notes when they are completed and signed by the physician that discharged you from your recent hospital stay. If you have any questions or concerns about any information you see in TrueView, please call the Health Information Department where you were seen or reach out to your Primary Care Provider for more information about your plan of care. Introducing [facility]! Sorayatera Soliman introduces TrueView patient portal. Now you can access parts of your medical record, email your doctor's office, and request medication refills online. 1. In your internet browser, go to [email or web address] 2. Click on the First Time User? Click Here link in the Sign In box. You will see the New Member Sign Up page. 3. Enter your TrueView Access Code exactly as it appears below. You will not need to use this code after youve completed the sign-up process. If you do not sign up before the expiration date, you must request a new code. · TrueView Access Code: [email or web address] Expires: 5/15/2017  9:31 AM 
 
4. Enter the last four digits of your Social Security Number (xxxx) and Date of Birth (mm/dd/yyyy) as indicated and click Submit. You will be taken to the next sign-up page. 5. Create a TrueView ID. This will be your TrueView login ID and cannot be changed, so think of one that is secure and easy to remember. 6. Create a TrueView password. You can change your password at any time. 7. Enter your Password Reset Question and Answer. This can be used at a later time if you forget your password. 8. Enter your e-mail address. You will receive e-mail notification when new information is available in [address] 9. Click Sign Up. You can now view and download portions of your medical record. 10. Click the Download Summary menu link to download a portable copy of your medical information. If you have questions, please visit the Frequently Asked Questions section of the [employer] website. Remember, MyChart is NOT to be used for urgent needs. For medical emergencies, dial 911. Now available from your iPhone and Android! General Information Please provide this summary of care documentation to your next provider. Patient Signature:  ____________________________________________________________ Date:  ____________________________________________________________  
  
[facility] Provider Signature:  ____________________________________________________________ Date:  ____________________________________________________________

## 2017-03-02 NOTE — ED TRIAGE NOTES
Pt states that her right leg is swollen, pt states that it has been swollen for about 4-5 days. Pt states that she has a hx of this, but usually the swelling will go down this time it has not. Pt denies chest pain, SOB, and NVD.

## 2017-03-02 NOTE — PROCEDURES
[facility]  *** FINAL REPORT ***    Name: Chantelle Davis  MRN: [id number]  : 1940  HIS Order #: [id number]  [address] [facility] Visit #: [id number]  Date: 02 Mar 2017    TYPE OF TEST: Peripheral Venous Testing    REASON FOR TEST  Pain in limb, Limb swelling    Right Leg:-  Deep venous thrombosis:           Yes  Proximal extent of thrombus:      Deep Femoral  Superficial venous thrombosis:    No  Deep venous insufficiency:        Not examined  Superficial venous insufficiency: Not examined      INTERPRETATION/FINDINGS  PROCEDURE:  Color duplex ultrasound imaging of lower extremity veins. FINDINGS:       Right:Consistent with thrombosis involving the deep femoral vein  as demonstrated by vein partial compressibility, and by a narrowing or   occlusion of the flow channel on color Doppler imaging. The common  femoral, femoral, popliteal, posterior tibial, peroneal, and great  saphenous are patent and without evidence of thrombus; each is is  fully compressible and there is no narrowing of the flow channel on  color Doppler imaging. Pulsatile  flow is observed in the common  femoral vein. Left:   The common femoral vein is patent and without evidence of   thrombus. Phasic flow is observed. This extremity was not otherwise   evaluated. IMPRESSION:  There is evidence of vein thrombosis, as described above. The ultrasound appearance is more consistent with a chronic than an  acute process. ADDITIONAL COMMENTS    I have personally reviewed the data relevant to the interpretation of  this  study.     TECHNOLOGIST: Franchesca Jones RVT  Signed: 2017 04:23 PM    PHYSICIAN: Tanna Perez MD  Signed: 2017 12:34 PM

## 2017-03-03 ENCOUNTER — APPOINTMENT (OUTPATIENT)
Dept: INTERVENTIONAL RADIOLOGY/VASCULAR | Age: 77
End: 2017-03-03
Attending: INTERNAL MEDICINE
Payer: MEDICARE

## 2017-03-03 VITALS
OXYGEN SATURATION: 96 % | HEIGHT: 65 IN | BODY MASS INDEX: 21.67 KG/M2 | DIASTOLIC BLOOD PRESSURE: 64 MMHG | TEMPERATURE: 97.6 F | HEART RATE: 65 BPM | RESPIRATION RATE: 16 BRPM | WEIGHT: 130.07 LBS | SYSTOLIC BLOOD PRESSURE: 153 MMHG

## 2017-03-03 LAB
ANION GAP BLD CALC-SCNC: 8 MMOL/L (ref 5–15)
BASOPHILS # BLD AUTO: 0 K/UL (ref 0–0.1)
BASOPHILS # BLD: 0 % (ref 0–1)
BUN SERPL-MCNC: 27 MG/DL (ref 6–20)
BUN/CREAT SERPL: 19 (ref 12–20)
CALCIUM SERPL-MCNC: 8.5 MG/DL (ref 8.5–10.1)
CHLORIDE SERPL-SCNC: 108 MMOL/L (ref 97–108)
CO2 SERPL-SCNC: 25 MMOL/L (ref 21–32)
CREAT SERPL-MCNC: 1.41 MG/DL (ref 0.55–1.02)
CREAT UR-MCNC: 79.8 MG/DL
DIFFERENTIAL METHOD BLD: ABNORMAL
EOSINOPHIL # BLD: 0.2 K/UL (ref 0–0.4)
EOSINOPHIL #/AREA URNS HPF: NEGATIVE /[HPF]
EOSINOPHIL NFR BLD: 3 % (ref 0–7)
ERYTHROCYTE [DISTWIDTH] IN BLOOD BY AUTOMATED COUNT: 14.3 % (ref 11.5–14.5)
GLUCOSE SERPL-MCNC: 98 MG/DL (ref 65–100)
HCT VFR BLD AUTO: 29 % (ref 35–47)
HGB BLD-MCNC: 10 G/DL (ref 11.5–16)
INR PPP: 2.5 (ref 0.9–1.1)
LYMPHOCYTES # BLD AUTO: 52 % (ref 12–49)
LYMPHOCYTES # BLD: 2.7 K/UL (ref 0.8–3.5)
MAGNESIUM SERPL-MCNC: 2.2 MG/DL (ref 1.6–2.4)
MCH RBC QN AUTO: 28.6 PG (ref 26–34)
MCHC RBC AUTO-ENTMCNC: 34.5 G/DL (ref 30–36.5)
MCV RBC AUTO: 82.9 FL (ref 80–99)
MONOCYTES # BLD: 0.4 K/UL (ref 0–1)
MONOCYTES NFR BLD AUTO: 7 % (ref 5–13)
NEUTS SEG # BLD: 2.1 K/UL (ref 1.8–8)
NEUTS SEG NFR BLD AUTO: 38 % (ref 32–75)
PHOSPHATE SERPL-MCNC: 2.9 MG/DL (ref 2.6–4.7)
PLATELET # BLD AUTO: 222 K/UL (ref 150–400)
PLATELET COMMENTS,PCOM: ABNORMAL
POTASSIUM SERPL-SCNC: 4 MMOL/L (ref 3.5–5.1)
PROT UR-MCNC: 9 MG/DL (ref 0–11.9)
PROTHROMBIN TIME: 26 SEC (ref 9–11.1)
RBC # BLD AUTO: 3.5 M/UL (ref 3.8–5.2)
RBC MORPH BLD: ABNORMAL
RBC MORPH BLD: ABNORMAL
SODIUM SERPL-SCNC: 141 MMOL/L (ref 136–145)
SODIUM UR-SCNC: 83 MMOL/L
WBC # BLD AUTO: 5.4 K/UL (ref 3.6–11)
WBC MORPH BLD: ABNORMAL

## 2017-03-03 PROCEDURE — 96361 HYDRATE IV INFUSION ADD-ON: CPT

## 2017-03-03 PROCEDURE — 99218 HC RM OBSERVATION: CPT

## 2017-03-03 PROCEDURE — 80048 BASIC METABOLIC PNL TOTAL CA: CPT | Performed by: INTERNAL MEDICINE

## 2017-03-03 PROCEDURE — 84100 ASSAY OF PHOSPHORUS: CPT | Performed by: INTERNAL MEDICINE

## 2017-03-03 PROCEDURE — 84156 ASSAY OF PROTEIN URINE: CPT | Performed by: INTERNAL MEDICINE

## 2017-03-03 PROCEDURE — 84300 ASSAY OF URINE SODIUM: CPT | Performed by: INTERNAL MEDICINE

## 2017-03-03 PROCEDURE — 87205 SMEAR GRAM STAIN: CPT | Performed by: INTERNAL MEDICINE

## 2017-03-03 PROCEDURE — 83735 ASSAY OF MAGNESIUM: CPT | Performed by: INTERNAL MEDICINE

## 2017-03-03 PROCEDURE — 85610 PROTHROMBIN TIME: CPT | Performed by: INTERNAL MEDICINE

## 2017-03-03 PROCEDURE — 36415 COLL VENOUS BLD VENIPUNCTURE: CPT | Performed by: INTERNAL MEDICINE

## 2017-03-03 PROCEDURE — 74011250637 HC RX REV CODE- 250/637: Performed by: INTERNAL MEDICINE

## 2017-03-03 PROCEDURE — 82570 ASSAY OF URINE CREATININE: CPT | Performed by: INTERNAL MEDICINE

## 2017-03-03 PROCEDURE — 65390000012 HC CONDITION CODE 44 OBSERVATION

## 2017-03-03 PROCEDURE — 85025 COMPLETE CBC W/AUTO DIFF WBC: CPT | Performed by: INTERNAL MEDICINE

## 2017-03-03 RX ORDER — LIDOCAINE HYDROCHLORIDE 20 MG/ML
20 INJECTION, SOLUTION INFILTRATION; PERINEURAL
Status: CANCELLED | OUTPATIENT
Start: 2017-03-03 | End: 2017-03-03

## 2017-03-03 RX ORDER — WARFARIN 1 MG/1
1 TABLET ORAL ONCE
Status: COMPLETED | OUTPATIENT
Start: 2017-03-03 | End: 2017-03-03

## 2017-03-03 RX ADMIN — HYDROCODONE BITARTRATE AND ACETAMINOPHEN 1 TABLET: 5; 325 TABLET ORAL at 16:15

## 2017-03-03 RX ADMIN — WARFARIN SODIUM 1 MG: 1 TABLET ORAL at 16:16

## 2017-03-03 RX ADMIN — ASPIRIN 81 MG: 81 TABLET, CHEWABLE ORAL at 08:31

## 2017-03-03 RX ADMIN — METOPROLOL TARTRATE 12.5 MG: 25 TABLET ORAL at 08:30

## 2017-03-03 RX ADMIN — NIFEDIPINE 120 MG: 10 CAPSULE ORAL at 08:37

## 2017-03-03 RX ADMIN — METOPROLOL TARTRATE 12.5 MG: 25 TABLET ORAL at 18:07

## 2017-03-03 RX ADMIN — Medication 10 ML: at 05:50

## 2017-03-03 RX ADMIN — HYDROCODONE BITARTRATE AND ACETAMINOPHEN 1 TABLET: 5; 325 TABLET ORAL at 07:22

## 2017-03-03 NOTE — PROGRESS NOTES
CM informed of patient's status being downgraded to observation. CM met with patient to explain the reason for the Code 40 Letter. Patient's questions answered. Code 40 Letter signed and placed in patient's chart. Patient will be discharged today.  Luetta Aase, MSA, RN, CRM

## 2017-03-03 NOTE — H&P
Sonia Reece M.D. [place]: [phone]  Call physician on-call through the  7pm-7am        Date of admission:  3/2/2017    Primary Care Provider: Boaz Zaapta MD  Source of Information: Patient and chart review    History of Presenting Illness:   Chantal Luciano is a 65 y.o. female who presents with chief complaint of R leg swelling and painful spasms. Patient has an extensive past medical history of HTN, chronic Atrial Fibrillation, on chronic anticoagulation with Coumadin, valvular disorders, HTN, chronic CHF with diastolic dysfunction, s/p MVR, who reports intermittent right leg swelling for the past week. The swelling is associated with pain and difficulty ambulating. Patient is compliant with her medications regimen and follows-up with Dr. Lobo Sandoval on a regular basis to check her INR. She says her INR is usually therapeutic. She may occasionally skip a dose (if she is not at home that evening, she forgets to take it), but usually takes it as prescribed. Patient denies any recent hospitalizations, recent travel, fevers, chest pain, SOB/ DEMARCO or heart palpitations. She has not noticed any episodes of bleeding or bruising. She denies any abdominal pain, n/ v/ d or any urinary sx. No recent changes to her medications. No h/o blood clots. No prior history of renal disease. Review of Systems:  A comprehensive review of systems was negative.      Past Medical History:   Diagnosis Date    AF (atrial fibrillation) (HCC)     RHEUMATIC FEVER AS CHILD, AFIB    Anemia     Arthritis     Chronic pain     Coagulation defects     COUMADIN    Diastolic dysfunction     grade 4    Encounter for Hemoccult screening 06/22/2016    H/O colonoscopy 2007    Hand pain, right     Heart failure (HCC)     Prediabetes     PUD (peptic ulcer disease)     Rheumatic fever     S/P MVR (mitral valve replacement)     Shoulder pain, right     SOB (shortness of breath)     Thyroid disease     Venous insufficiency of both lower extremities 2017    Warfarin-induced coagulopathy ([facility] [address]       Past Surgical History:   Procedure Laterality Date    HX CATARACT REMOVAL      BILATERAL    HX  SECTION      HX COLONOSCOPY      HX HEART CATHETERIZATION      HX HYSTERECTOMY      HX MITRAL VALVE REPLACEMENT      HX LUIS ENRIQUE AND BSO       Prior to Admission medications    Medication Sig Start Date End Date Taking? Authorizing Provider   losartan (COZAAR) 50 mg tablet Take 50 mg by mouth daily. Yes Historical Provider   aspirin 81 mg chewable tablet Take 81 mg by mouth daily. Yes Historical Provider   acetaminophen (TYLENOL) 500 mg tablet Take 500-1,000 mg by mouth every six (6) hours as needed for Pain. Yes Historical Provider   HYDROcodone-acetaminophen (NORCO) 5-325 mg per tablet Take 1 Tab by mouth every six (6) hours as needed for Pain. Max Daily Amount: 4 Tabs. 17  Yes Kathryn Melchor MD   pravastatin (PRAVACHOL) 20 mg tablet TAKE ONE TABLET BY MOUTH NIGHTLY 16  Yes Kathryn Melchor MD   furosemide (LASIX) 20 mg tablet TAKE 1 TABLET BY MOUTH EVERY MONDAY, [facility] AND 16  Yes Kathryn Melchor MD   CARTIA  mg ER capsule TAKE ONE CAPSULE BY MOUTH DAILY 16  Yes Kathryn Melchor MD   metoprolol (LOPRESSOR) 25 mg tablet Take 0.5 Tabs by mouth two (2) times a day.  16  Yes Samuel Jose MD   warfarin (COUMADIN) 2 mg tablet TAKE ONE TABLET BY MOUTH DAILY  Patient taking differently: takes 2 mg on Sun only and 1mg all other days  16  Yes Kathryn Melchor MD     No Known Allergies   Family History   Problem Relation Age of Onset    Heart Attack Sister     Hypertension Sister     Kidney Disease Mother     Hypertension Mother     Hypertension Father     Cancer Brother     Heart Failure Brother     Arthritis-osteo Sister     Cancer Sister     Cancer Brother      PROSTATE        SOCIAL HISTORY:  Patient resides:  Independently x   Assisted Living    SNF    With family care       Smoking history:   None x   Former    Chronic      Alcohol history:   None x   Social    Chronic      Ambulates:   Independently x   w/cane    w/walker    w/wc    CODE STATUS:  DNR    Full x   Other      Objective:     Physical Exam:     Visit Vitals    /64 (BP 1 Location: Right arm, BP Patient Position: At rest)    Pulse 72    Temp 98.2 °F (36.8 °C)    Resp 16    Ht 5' 5\" (1.651 m)    Wt 60.9 kg (134 lb 4 oz)    SpO2 100%    BMI 22.34 kg/m2      O2 Device: Room air    General:  Alert, cooperative, no distress, appears stated age. Head:  Normocephalic, without obvious abnormality, atraumatic. Eyes:  Conjunctivae/corneas clear. EOMs intact. Neck: Supple, symmetrical, trachea midline, no JVD. Lungs:   Clear to auscultation bilaterally. Heart:  Irregularly irregular rate and rhythm, S1, S2 normal, no murmur, click, rub or gallop. Abdomen:   Soft, non-tender. Bowel sounds normal.    Extremities: R LE edema. Skin: Skin color, texture, turgor normal. No rashes or lesions   Neurologic: CNII-XII intact. Data Review:     Recent Days:  Recent Labs      03/02/17   1343   WBC  7.0   HGB  11.2*   HCT  32.8*   PLT  252     Recent Labs      03/02/17   1343   NA  140   K  3.8   CL  104   CO2  28   GLU  108*   BUN  36*   CREA  2.68*   CA  9.2   ALB  3.5   SGOT  8*   ALT  16   INR  2.4*     No results for input(s): PH, PCO2, PO2, HCO3, FIO2 in the last 72 hours.     24 Hour Results:  Recent Results (from the past 24 hour(s))   CBC WITH AUTOMATED DIFF    Collection Time: 03/02/17  1:43 PM   Result Value Ref Range    WBC 7.0 3.6 - 11.0 K/uL    RBC 3.89 3.80 - 5.20 M/uL    HGB 11.2 (L) 11.5 - 16.0 g/dL    HCT 32.8 (L) 35.0 - 47.0 %    MCV 84.3 80.0 - 99.0 FL    MCH 28.8 26.0 - 34.0 PG    MCHC 34.1 30.0 - 36.5 g/dL    RDW 14.5 11.5 - 14.5 %    PLATELET 685 038 - 134 K/uL    NEUTROPHILS 53 32 - 75 %    LYMPHOCYTES 34 12 - 49 %    MONOCYTES 9 5 - 13 %    EOSINOPHILS 4 0 - 7 %    BASOPHILS 0 0 - 1 %    ABS. NEUTROPHILS 3.7 1.8 - 8.0 K/UL    ABS. LYMPHOCYTES 2.4 0.8 - 3.5 K/UL    ABS. MONOCYTES 0.6 0.0 - 1.0 K/UL    ABS. EOSINOPHILS 0.3 0.0 - 0.4 K/UL    ABS. BASOPHILS 0.0 0.0 - 0.1 K/UL   METABOLIC PANEL, COMPREHENSIVE    Collection Time: 03/02/17  1:43 PM   Result Value Ref Range    Sodium 140 136 - 145 mmol/L    Potassium 3.8 3.5 - 5.1 mmol/L    Chloride 104 97 - 108 mmol/L    CO2 28 21 - 32 mmol/L    Anion gap 8 5 - 15 mmol/L    Glucose 108 (H) 65 - 100 mg/dL    BUN 36 (H) 6 - 20 MG/DL    Creatinine 2.68 (H) 0.55 - 1.02 MG/DL    BUN/Creatinine ratio 13 12 - 20      GFR est AA 21 (L) >60 ml/min/1.73m2    GFR est non-AA 17 (L) >60 ml/min/1.73m2    Calcium 9.2 8.5 - 10.1 MG/DL    Bilirubin, total 0.3 0.2 - 1.0 MG/DL    ALT (SGPT) 16 12 - 78 U/L    AST (SGOT) 8 (L) 15 - 37 U/L    Alk.  phosphatase 77 45 - 117 U/L    Protein, total 7.9 6.4 - 8.2 g/dL    Albumin 3.5 3.5 - 5.0 g/dL    Globulin 4.4 (H) 2.0 - 4.0 g/dL    A-G Ratio 0.8 (L) 1.1 - 2.2     BNP    Collection Time: 03/02/17  1:43 PM   Result Value Ref Range    BNP 81 0 - 100 pg/mL   PROTHROMBIN TIME + INR    Collection Time: 03/02/17  1:43 PM   Result Value Ref Range    INR 2.4 (H) 0.9 - 1.1      Prothrombin time 24.1 (H) 9.0 - 11.1 sec   EKG, 12 LEAD, INITIAL    Collection Time: 03/02/17  4:19 PM   Result Value Ref Range    Ventricular Rate 65 BPM    Atrial Rate 65 BPM    P-R Interval 158 ms    QRS Duration 82 ms    Q-T Interval 424 ms    QTC Calculation (Bezet) 440 ms    Calculated P Axis 33 degrees    Calculated R Axis 51 degrees    Calculated T Axis 54 degrees    Diagnosis       Normal sinus rhythm  When compared with ECG of 26-MAY-2014 12:29,  No significant change was found  Confirmed by Mansoor Stoner MD, Martha Ascencio ([id number]) on 3/2/2017 4:38:52 PM     URINALYSIS W/ RFLX MICROSCOPIC    Collection Time: 03/02/17  5:33 PM   Result Value Ref Range    Color YELLOW/STRAW      Appearance CLEAR CLEAR      Specific gravity 1.011 1.003 - 1.030      pH (UA) 5.0 5.0 - 8.0      Protein NEGATIVE  NEG mg/dL    Glucose NEGATIVE  NEG mg/dL    Ketone NEGATIVE  NEG mg/dL    Bilirubin NEGATIVE  NEG      Blood NEGATIVE  NEG      Urobilinogen 0.2 0.2 - 1.0 EU/dL    Nitrites NEGATIVE  NEG      Leukocyte Esterase TRACE (A) NEG      WBC 0-4 0 - 4 /hpf    RBC 0-5 0 - 5 /hpf    Epithelial cells FEW FEW /lpf    Bacteria NEGATIVE  NEG /hpf    Hyaline cast 0-2 0 - 5 /lpf         Imaging:   Renal US:   Stable chronic cortical thinning in the upper right kidney. Otherwise, normal  appearance of the kidneys without hydronephrosis. Normal urinary bladder. Doppler Lower extremities:   Right:  Consistent with thrombosis involving the deep femoral vein  as demonstrated by vein partial compressibility, and by a narrowing or  occlusion of the flow channel on color Doppler imaging. The common  femoral, femoral, popliteal, posterior tibial, peroneal, and great  saphenous are patent and without evidence of thrombus; each is is  fully compressible and there is no narrowing of the flow channel on  color Doppler imaging. Pulsatile flow is observed in the common  femoral vein. The ultrasound appearance is more consistent with a chronic than an  acute process. Assessment and Plan:     DVT, lower extremity, proximal, chronic ([address] (3/2/2017):  - patient on chronic anticoagulation with Coumadin for Atrial Fibrillation;   - INR on admission 2.4;  - may need to switch to Lovenox. - Pharmacy consulted for Coumadin dosing;   - Consulted IR for a possible IVC filter;   - ? Hematology consult: will defer to PCP. Acute Kidney Injury:  - Cr on admission 2.68, up from 0.91 on 1/25/17;   - start gentle hydration with IVFs;  - holding Lasix and Cozaar.  - Renal US: no obstruction.   - consulted Nephrology;     HTN:  - continue home medications; Hypothyroidism:  - continue Synthroid; Extensive coronary history (CAD) and valvular disorders:  - continue home medications. Chronic Diastolic CHF:  - compensated on admission, no evidence of fluid overload; Anemia of chronic disease:  - monitor H&H.     DVT prophylaxis: patient fully anticoagulated with Coumadin.               Signed By: Audi Franco MD     March 2, 2017

## 2017-03-03 NOTE — PROGRESS NOTES
Hospital Progress Note    NAME:  Rosmery Guerra   :   1940   MRN:  [id number]     Date/Time:  3/3/2017 8:00 AM    Plan:   1. Home later today if renal agrees  2.  Continue coumadin  Risk of Deterioration: Low  [x]           Moderate  []           High  []                 Assessment:   Principal Problem:    Chronic deep vein thrombosis (DVT) of femoral vein of right lower extremity (HCC) (3/2/2017) - discussed with vascular this am - no change in current treatment recommended    Active Problems:    Mitral valve disorder (2012)      Essential hypertension, benign (2012)      Hypothyroid (2012)      Pulmonary hypertension ([address] (10/19/2012)      Coronary atherosclerosis of native coronary artery (2013)      Warfarin-induced coagulopathy (HCC) ()      Prediabetes ()      PUD (peptic ulcer disease) ()      AF (atrial fibrillation) (HCC) ()      Overview: RHEUMATIC FEVER AS CHILD, AFIB      Diastolic dysfunction ()      Overview: grade 4      Pre renal azotemia likely diuretic induced - renal fct improving - will plan d/c this pm if renal agrees     Subjective:     Feeling better - swelling improved  11 Point Review of Systems:   Negative except no cp/sob    []            Unable to obtain ROS due to:       []            mental status change []            sedated []            intubated     Social History   Substance Use Topics    Smoking status: Former Smoker     Packs/day: 0.50     Years: 30.00     Quit date: 2012    Smokeless tobacco: Never Used    Alcohol use No     Medications reviewed:  Current Facility-Administered Medications   Medication Dose Route Frequency    sodium chloride (NS) flush 5-10 mL  5-10 mL IntraVENous Q8H    sodium chloride (NS) flush 5-10 mL  5-10 mL IntraVENous PRN    acetaminophen (TYLENOL) tablet 650 mg  650 mg Oral Q4H PRN    metoprolol tartrate (LOPRESSOR) tablet 12.5 mg  12.5 mg Oral BID    dilTIAZem CD (CARDIZEM CD) capsule 120 mg  120 mg Oral DAILY  pravastatin (PRAVACHOL) tablet 20 mg  20 mg Oral QHS    HYDROcodone-acetaminophen (NORCO) 5-325 mg per tablet 1 Tab  1 Tab Oral Q4H PRN    aspirin chewable tablet 81 mg  81 mg Oral DAILY    0.9% sodium chloride infusion  50 mL/hr IntraVENous CONTINUOUS    Warfarin- pharmacy to dose   Other Rx Dosing/Monitoring        Objective:   Vitals:  Visit Vitals    /62 (BP 1 Location: Left arm, BP Patient Position: At rest)    Pulse 68    Temp 97.5 °F (36.4 °C)    Resp 14    Ht 5' 5\" (1.651 m)    Wt 130 lb 1.1 oz (59 kg)    SpO2 100%    BMI 21.64 kg/m2     Temp (24hrs), Av.1 °F (36.7 °C), Min:97.5 °F (36.4 °C), Max:98.4 °F (36.9 °C)      O2 Device: Room air    Last 24hr Input/Output:    Intake/Output Summary (Last 24 hours) at 17 0800  Last data filed at 17 0302   Gross per 24 hour   Intake            427.5 ml   Output              550 ml   Net           -122.5 ml        PHYSICAL EXAM:  General:    Alert, cooperative, no distress, appears stated age. Head:   Normocephalic, without obvious abnormality, atraumatic. Eyes:   Conjunctivae/corneas clear. PERRLA  Nose:  Nares normal. No drainage or sinus tenderness. Throat:    Lips, mucosa, and tongue normal.  No Thrush  Neck:  Supple, symmetrical,  no adenopathy, thyroid: non tender    no carotid bruit and no JVD. Back:    Symmetric,  No CVA tenderness. Lungs:   Clear to auscultation bilaterally. No Wheezing or Rhonchi. No rales. Chest wall:  No tenderness or deformity. No Accessory muscle use. Heart:   Regular rate and rhythm,  no murmur, rub or gallop. Abdomen:   Soft, non-tender. Not distended.   Bowel sounds normal. No masses        Lab Data Reviewed:    Recent Labs      17   0352  17   1343   WBC  5.4  7.0   HGB  10.0*  11.2*   HCT  29.0*  32.8*   PLT  222  252     Recent Labs      17   0352  17   1343   NA  141  140   K  4.0  3.8   CL  108  104   CO2  25  28   GLU  98  108*   BUN  27*  36*   CREA  1.41* 2.68*   CA  8.5  9.2   MG  2.2   --    PHOS  2.9   --    ALB   --   3.5   TBILI   --   0.3   SGOT   --   8*   ALT   --   16   INR  2.5*  2.4*     Lab Results   Component Value Date/Time    Glucose (POC) 106 05/07/2012 06:33 AM    Glucose (POC) 146 05/06/2012 09:27 PM    Glucose (POC) 119 05/06/2012 05:19 PM    Glucose (POC) 185 05/06/2012 11:41 AM    Glucose (POC) 112 05/06/2012 07:12 AM       Recent Labs      03/03/17   0352  03/02/17   1343   INR  2.5*  2.4*     ___________________________________________________  ___________________________________________________    Attending Physician: Sita Argueta MD

## 2017-03-03 NOTE — PROGRESS NOTES
Problem: Falls - Risk of  Goal: *Absence of falls  Outcome: Progressing Towards Goal  Call bell is with in reach, side rails are up x 3, bed wheels are locked and bed is in its lowest position. Hourly rounds performed for patient safety. Goal: *Knowledge of fall prevention  Outcome: Progressing Towards Goal  Call bell is with in reach, side rails are up x 3, bed wheels are locked and bed is in its lowest position. Hourly rounds performed for patient safety. Problem: Pressure Ulcer - Risk of  Goal: *Prevention of pressure ulcer  Outcome: Progressing Towards Goal  Will continue to monitor skin integrity during shift and encourage patient to turn every two hours.

## 2017-03-03 NOTE — PROGRESS NOTES
CM reviewed chart. Patient was admitted with complaints of right leg swelling and painful spasms for the past week. She has a past medical history of HTN, chronic Atrial Fibrillation, on chronic anticoagulation with Coumadin, valvular disorders, HTN, chronic CHF with diastolic dysfunction, s/p MVR. CM met with patient to discuss discharge planning. She lives in her own home with a male friend. She is independent without any assistive devices. She sees Dr. Sugey Don regularly for lab work for her INR as she is on Coumadin. Patient uses her local [address] [place] [address] for prescriptions. Her sister or niece will provide transportation home. She did not voice any discharge barriers. Patient will be discharged home in care of her male friend and family. CM will continue to follow. Rosalva Gilliland MSA, RN, CRM. Care Management Interventions  PCP Verified by CM: Yes (Dr. Samia Ervin)  Mode of Transport at Discharge: Other (see comment)  Transition of Care Consult (CM Consult): Discharge Planning  MyChart Signup: No  Discharge Durable Medical Equipment: No  Health Maintenance Reviewed: Yes  Physical Therapy Consult: No  Occupational Therapy Consult: No  Speech Therapy Consult: No  Current Support Network:  Other (Lives with a male friend)  Confirm Follow Up Transport: Family  Plan discussed with Pt/Family/Caregiver: Yes  Discharge Location  Discharge Placement: Home with family assistance

## 2017-03-03 NOTE — DISCHARGE INSTRUCTIONS
Patient Discharge Instructions    Catherine Ricardo / [id number] : 1940    Admitted 3/2/2017 Discharged: 3/3/2017     Patient Active Problem List   Diagnosis Code    Chest pain R07.9    Other dyspnea and respiratory abnormality R06.09, R09.89    Mitral valve disorder I05.9    Palpitations R00.2    Atrial fibrillation (HCC) I48.91    Essential hypertension, benign I10    Hypothyroid E03.9    Tricuspid regurgitation I07.1    Rheumatic heart disease I09.9    S/P MVR (mitral valve replacement) Z95.2    S/P Maze operation for atrial fibrillation Z98.890, Z86.79    Anemia associated with acute blood loss D62    Atrial flutter with controlled response (HCC) I48.92    Aortic valve disorders I35.9    Pulmonary hypertension (HCC) I27.2    Neck pain M54.2    Coronary atherosclerosis of native coronary artery I25.10    Warfarin-induced coagulopathy ([facility] [address] T45.511A, D68.9    Arthritis M19.90    Prediabetes R73.03    Arrhythmia I49.9    PUD (peptic ulcer disease) K27.9    Anemia D64.9    Thyroid disease E07.9    Rheumatic fever I00    AF (atrial fibrillation) ([facility] [address] I48.91    H/O colonoscopy Z98.890    Chronic pain G89.29    Shoulder pain, right M25.511    Hand pain, right M79.641    ACP (advance care planning) Z71.89    SOB (shortness of breath) J98.67    Diastolic dysfunction F76.5    Encounter for Hemoccult screening Z12.11    Venous insufficiency of both lower extremities I87.2    Chronic deep vein thrombosis (DVT) of femoral vein of right lower extremity (HCC) I82.511       Take Home Medications        · It is important that you take the medication exactly as they are prescribed. · Keep your medication in the bottles provided by the pharmacist and keep a list of the medication names, dosages, and times to be taken in your wallet. · Do not take other medications without consulting your doctor.        What to do at Home    Recommended diet: Cardiac Diet,   Recommended activity: Activity as tolerated,     Follow-up with Lydia Murphy MD  in 3 day        Information obtained by :  I understand that if any problems occur once I am at home I am to contact my physician. I understand and acknowledge receipt of the instructions indicated above.                                                                                                                                            Physician's or R.N.'s Signature                                                                  Date/Time                                                                                                                                              Patient or Representative Signature                                                          Date/Time

## 2017-03-03 NOTE — CONSULTS
[address] [address], [address] [place], [place] [address]   0 [facility]       Name:  Demetria Elaine   MR#:  [id number]   :  1940   Account #:  [de-identified]    Date of Consultation:  2017   Date of Adm:  2017       REFERRING PHYSICIAN: Dr. Roby Tilley. REASON FOR CONSULTATION: Increased serum creatinine. HISTORY OF PRESENT ILLNESS: The patient is a very pleasant 65  year-old [facility] [employer] woman who has extensive past medical   history including chronic DVT and mitral valve replacement. The   patient is on Coumadin. She skipped an occasional few doses and she   noticed that her right leg became swollen. It was not tender. She did   not have any other problems. The patient denies any knowledge of   kidney disease. She denies any dysuric symptoms. She was never   diagnosed with kidney stones. The patient is not taking nonsteroidal   anti-inflammatory medications. At the time of her admission, her   creatinine was 2.68. Her baseline creatinine is 1. By the time when I   saw her, it was already 1.41. The patient again denies any symptoms,   she was told that if she is cleared by our service, she can go home. The patient is eager to go home. PAST MEDICAL HISTORY: Rheumatic heart disease, mitral   regurgitation, replacement of mitral valve, coagulopathy, arthritis,   anemia, atrial fibrillation, pre-diabetes, diastolic heart failure, venous   insufficiency. PAST SURGICAL HISTORY: Bilateral cataract removal,    section, colonoscopy, hysterectomy, mitral valve replacement. MEDICATIONS PRIOR TO THIS ADMISSION: Include   1. Cozaar 50 once a day. 2. Aspirin 81 once a day. 3. Tylenol 500.   4. Norco.   5. Pravachol. 6. Lasix 20 mg.   7. Cartia 120.   8. Lopressor 25.   9. Coumadin 2. FAMILY HISTORY: Sister with hypertension, mother with kidney   disease and hypertension, brother with heart failure and cancer. Sister   with cancer.      SOCIAL HISTORY: The patient lives independently. She denies   alcohol, tobacco or illicit drug abuse. REVIEW OF SYSTEMS   Negative at present. PHYSICAL EXAMINATION   GENERAL: Pleasant, well-appearing, elderly black woman who is   awake, alert, oriented. She has no evidence of any acute distress. She   is sitting up in chair and having lunch. VITAL SIGNS: Blood pressure is 120/37, heart rate is 62. Temperature   is 97.9. HEENT: Eyes with anicteric sclerae. Mouth with moist oral mucosa. NECK: Supple with no increased JVP, no carotid bruit or thyromegaly. LUNGS: Clear to auscultation with vesicular breath sounds. HEART: With S1 and S2. Regular rate and rhythm. ABDOMEN: Flat. EXTREMITIES: Right leg has nonpitting edema. Left leg with no   edema. NEUROLOGIC: Nonfocal.    LABORATORY DATA: Today sodium is 141, potassium is 4, CO2 is   25, BUN is 27, creatinine is 1.4, down from 2.68. The patient is   nonoliguric. IMPRESSION: Acute kidney injury, unclear etiology. The patient's GFR   is already recovering. She has no fluid overload. Her blood pressure is   well controlled. The patient has no electrolyte or acid-based disorders. RECOMMENDATIONS: There is no need for prolonging the patient's   hospitalization for a kidney workup. The patient's renal function is   already recovering. Should she need any further attention to her kidney   status, all that can be done as an outpatient. The patient is cleared to   be discharged from renal standpoint. She is advised to follow up with   Dr. Krista Badillo and if her creatinine remains abnormal. the patient can   be sent to our office for further evaluation and management. Thank you very much for the opportunity to be part of this patient's   care.         MD Winston Acuna / [facility]   D:  03/03/2017   12:04   T:  03/03/2017   12:33   Job #:  [id number]

## 2017-03-03 NOTE — PROGRESS NOTES
64 Dr. Rin Coon called patient and unit Advising he did not want the patient to have the IVC filter; Dr. Rin White has spoken with Vascular. Patient is to be discharged home. Stephen Villanueva RN    16:01 spoke with Hospitalist advising the Team the order procedure Dr. Tom Mtz has been discontinued by Dr. Rin White and will be discharged home today.   Jaret Vasquez

## 2017-03-03 NOTE — PROGRESS NOTES
Pharmacist Note  Warfarin Dosing  Consult provided for this 65 y. o.female to manage warfarin for Venous Thrombosis    INR Goal: 2 - 3    Home regimen/ tablet size: 2 mg on Sunday; 1 mg all other days of the week    Drugs that may increase INR: None  Drugs that may decrease INR: None  Other current anticoagulants/ drugs that may increase bleeding risk: None  Risk factors: Age > 65  Daily INR ordered: YES    Recent Labs      03/03/17   0352  03/02/17   1343   HGB  10.0*  11.2*   INR  2.5*  2.4*     Date               INR                  Dose  3/2                  2.4                    1 mg   3/3            2.5   1 mg                                                                                Assessment/ Plan: Will order warfarin 1 mg PO x 1 dose. Pharmacy will continue to monitor daily and adjust therapy as indicated. Please contact the pharmacist at k [phone] or  for outpatient recommendations if needed.      Merrill Lima, PharmD

## 2017-03-03 NOTE — ED NOTES
Paged Dr Malia Booth office for admission orders. Dr Wesley Arrington responded stating they don't admit and the hospitalist needs to be consulted.  MD made aware and will consult hospitalist.

## 2017-03-03 NOTE — PROGRESS NOTES
[phone] patient discharge instruction given; medications reviewed; follow up appointments reviewed. Patient  Niece and Niece's son here to  patient.   Bethel Wisdom

## 2017-03-03 NOTE — ADVANCED PRACTICE NURSE
Patient seen by Dr. Henderson Betters this AM, full note to follow  Hx of MVR, AFib on Coumadin, INR 2.4  Admitted with DVT  Continue with Coumadin    Liang Murphy NP

## 2017-03-03 NOTE — PROGRESS NOTES
Spiritual Care Partner Volunteer visited patient in Surg/ Bariatircs unit on 3/3/17.   Documented by:  Sophia Francisco [address] [place] ([id number])

## 2017-03-03 NOTE — ROUTINE PROCESS
Bedside shift change report given to Paresh Zaragoza (oncoming nurse) by Armando Chen (offgoing nurse). Report included the following information SBAR, Kardex, ED Summary, Procedure Summary, Intake/Output, MAR and Recent Results.

## 2017-03-03 NOTE — CONSULTS
Patient was examined, chart reviewed. Full consult- dictated. Patient is stable from kidney stand point to be discharged.

## 2017-03-03 NOTE — DISCHARGE SUMMARY
[address], [address] [place] [address] SUMMARY       Name:  Felecia Ferreira   MR#:  [id number]   :  1940   Account #:  [de-identified]        Date of Adm:  2017                HISTORY: A 76-year-old black female presented complaining of right   leg swelling and painful spasm. She had no progressive increasing   swelling over the past week and now difficulty with walking, presented   to the emergency room where she was evaluated and subsequently   admitted by the hospitalist for care. PAST MEDICAL HISTORY, REVIEW OF SYSTEMS, AND PHYSICAL   EXAMINATION: Are noted in the HPI. CONSULTATIONS   1. Ward Navarrete MD, Nephrology. Impression: Acute kidney injury. Her GFR is already in recovery, no fluid overload. Blood pressure is   well controlled in the patient, no electrolyte or acid bases started. There is no need for prolonging this hospital stay for a kidney workup. Her renal function is already recovering. Should she need any further   attention to her kidney status, this can be done as an outpatient. 2. Mai Tristan, Cardiology. Assessment: Coronary artery   disease, 100% distal RCA disease,   not amenable to PCI or bypass,   no exertional chest pain, but continue aspirin and pravastatin. American Heart disease, status post mitral valve disease, status post   replacement, 2012 by di Zamudio. Recommendations were   followed. RESULTS REVIEW: Hemoglobin 10, hematocrit 29.0, WBC 5.4. Urinalysis unremarkable. Sodium 141, potassium 4.0, chloride 108,   CO2 25, glucose 98, BUN 27, creatinine 1.41. On admission, the   creatinine was 2.68 and BUN 36. IMAGING STUDIES: Chest x-ray: No acute process. Doppler exam:   There is evidence of vein thrombosis consistent with chronic   thrombosis involving the deep femoral vein on the right. HOSPITAL COURSE: The patient was admitted as stated above.  She   was found to have progressive kidney disease and was found to be   prerenal. She was treated with IV fluids and at the time of discharge,   her renal function is improving and approaching baseline. This will be   continued as an outpatient. The diuretic will be discontinued. She was found to have chronic deep venous thrombosis on Doppler   exam. Discussion with Vascular Surgery suggested no further   evaluation, particularly since the patient was on Coumadin. She will be   maintained on therapeutic doses of Coumadin for the DVT, but   predominantly for mitral valve disease. Blood pressure control remained adequate during the hospital stay. She was seen in consultation by Cardiology, whose recommendations   were followed. All medical issues have been addressed. The patient's medical status   is now stable for discharge and she is being discharged home   ambulatory, in satisfactory condition, improved. FINAL DIAGNOSES   1. Acute renal failure. 2. Prerenal azotemia. 3. Coronary artery disease (100% distal RCA disease  not amenable   to PCI or bypass). 4. Rheumatic heart disease. 5. Status post mitral valve replacement, 03/2012. 6. Atrial fibrillation, status post Cryomaze and AA closure at time of   MVR. 7. Primary hypertension. 8. Aortic atherosclerosis. 9. Mild pulmonary artery hypertension. 10. Chronic obstructive pulmonary disease. 11. Pre-diabetes. DISPOSITION: Discharged to home ambulatory. DISCHARGE MEDICATIONS   1. Warfarin 2 mg daily. 2. Acetaminophen 500, 24-hours p.r.n.   3. Aspirin 81 mg daily. 4. Diltiazem  daily. 5. Hydrocodone 5/325 q. 6h hours p.r.n.   6. Losartan 50 mg daily. 7. Metoprolol 25 mg b.i.d.   8. Pravastatin 20 mg daily. FOLLOWUP: Return to the office in 3-5 days for followup care. Up as   tolerated. DIET: Heart healthy diet.         Michael Seo MD      [facility] / Kerri Saenz   D:  03/03/2017   15:44   T:  03/03/2017   16:41   Job #:  [id number]

## 2017-03-03 NOTE — PROGRESS NOTES
Problem: Falls - Risk of  Goal: *Absence of falls  Outcome: Progressing Towards Goal  Patient is a one person assist; she has DVT in the RLE;      Problem: Patient Education: Go to Patient Education Activity  Goal: Patient/Family Education  Outcome: Progressing Towards Goal  Patient demonstrates proper usage of call bell    Problem: Pressure Ulcer - Risk of  Goal: *Prevention of pressure ulcer  Outcome: Progressing Towards Goal  Patient shows no signs of skin breakdown from pressure

## 2017-03-03 NOTE — PROGRESS NOTES
[facility] [place]: Heather Funes  [phone]    HPI: Ej Wallace is a 65y.o. year-old female who presents for follow up regarding her complex valve disease, CAD, Atrial Fib and HTN. She complains of a week of worsening le edema. Took extra diuretics without relief. No dyspnea, no chest pain. Says that she did mis her coumadin a few times but did not think it was enough to do this. Valve sounds crisp today. Edema noted. INR is therapeutic, continue coumadin given afib, MVR. A/P:  1. CAD- 100% distal RCA disease on cath 3/11 not amenable to PCI or bypass, no exertional chest pain, continue aspirin and pravastatin  2. Rheumatic heart disease/MV disease- s/p replacement 3/12 Dr. Elliot Swain, di   3. Afib- s/p cryomaze and ALIYAH closure at time of MVR, on Coumadin, rate controlled with Metoprolol and Diltiazem  -Aflutter and afib returned post-op, seen by Dr. Linda Williamson and offered aflutter ablation if it persisted, but gone, NSR now. Occasional palpitations now. Not bad, does not want further treatment at this time. 4. HTN- at goal today on metoprolol and diltiazem, losartan  5. Thyroid- off meds now s/p methimazole  6. Aortic Atherosclerosis- Aspirin and Pravastatin   7. Mild PAHTN-will reassess with TTE next appt  8. Tobacco use- continue cessation, stopped completely  9. COPD - seen on CT chest, not on inhalers at this time, denies progression of dyspnea  10. Right sided chest pain/back pain/neck pain - continue Percocet PRN pain, as per Dr. Marcel De Los Santos  11. DVT on coumadin, continue. 11/2015 Echo EF 65%, mild TR, PAP 45, mild PI, gr4dd. Echo 10/14 EF 65%, NAVI, nl functioning MVR bioprosthesis, mild-mod TR PAP 45  12/13 EF 60% mildly increased MV gradient, no MR.    Echo 2/12: EF55% mildCLVH, mod MR, sev MS 4.5/15 peak gradient, LAE, mild AS, mild PAHTN, mod Tr  Echo 7/10 EF 55%, gr1DD, mod AS, JUAN 1.1, Mod MS, mild TR, mild paHTN PA 40  Severe MR cath 4/10, with mild CAD    Fhx 2bro w CHF, +Dm +HTN  Soc quit tobacco    She  has a past medical history of AF (atrial fibrillation) ([facility] [address] Anemia; Arthritis; Chronic pain; Coagulation defects; Diastolic dysfunction; Encounter for Hemoccult screening (06/22/2016); H/O colonoscopy (2007); Hand pain, right; [facility] Prediabetes; PUD (peptic ulcer disease); Rheumatic fever; S/P MVR (mitral valve replacement); Shoulder pain, right; SOB (shortness of breath); Thyroid disease; Venous insufficiency of both lower extremities (01/17/2017); and Warfarin-induced coagulopathy ([facility] Cardiovascular ROS: positive for chest pain, negative for progression of dyspnea or palpitations   Respiratory ROS: positive for - shortness of breath  Neurological ROS: no TIA or stroke symptoms  All other systems negative except as above. PE  Vitals:    03/03/17 0030 03/03/17 0303 03/03/17 0711 03/03/17 0831   BP:  141/56 153/62    Pulse:  64 68    Resp:  14 14    Temp:  98.4 °F (36.9 °C) 97.5 °F (36.4 °C)    SpO2:  100% 100%    Weight: 130 lb 1.1 oz (59 kg)   130 lb 1.1 oz (59 kg)   Height:    5' 5\" (1.651 m)    Body mass index is 21.64 kg/(m^2).    General appearance - alert, well appearing, and in no distress  Mental status - affect appropriate to mood  Eyes - sclera anicteric, moist mucous membranes  Neck - supple, no significant adenopathy  Lymphatics - no  lymphadenopathy  Chest - clear to auscultation, no wheezes, rales or rhonchi  Heart - normal rate, regular rhythm, normal S1, S2, 2/6 BUTCH   Abdomen - soft, nontender, nondistended, no masses or organomegaly  Back exam - full range of motion, no tenderness  Neurological - cranial nerves II through XII grossly intact, no focal deficit  Musculoskeletal - no muscular tenderness noted, normal strength  Extremities - peripheral pulses normal, no pedal edema  Skin - normal coloration  no rashes    12 lead ECG: NSR 60bpm    Recent Labs:  Lab Results   Component Value Date/Time    Cholesterol, total 153 06/13/2016 01:39 PM No results found for: NINA  Lab Results   Component Value Date/Time    BUN 27 03/03/2017 03:52 AM     Lab Results   Component Value Date/Time    Potassium 4.0 03/03/2017 03:52 AM     Lab Results   Component Value Date/Time    Hemoglobin A1c 5.9 06/13/2016 01:39 PM     No components found for: HGBI,  IHGB,  HGB,  HGBP,  WBHGB  Lab Results   Component Value Date/Time    PLATELET 535 38/94/5309 03:52 AM       Reviewed:  Past Medical History:   Diagnosis Date    AF (atrial fibrillation) (HCC)     RHEUMATIC FEVER AS CHILD, AFIB    Anemia     Arthritis     Chronic pain     Coagulation defects     COUMADIN    Diastolic dysfunction     grade 4    Encounter for Hemoccult screening 06/22/2016    H/O colonoscopy 2007    Hand pain, right     Heart failure (HCC)     Prediabetes     PUD (peptic ulcer disease)     Rheumatic fever     S/P MVR (mitral valve replacement)     Shoulder pain, right     SOB (shortness of breath)     Thyroid disease     Venous insufficiency of both lower extremities 01/17/2017    Warfarin-induced coagulopathy ([facility] [facility]      History   Smoking Status    Former Smoker    Packs/day: 0.50    Years: 30.00    Quit date: 1/20/2012   Smokeless Tobacco    Never Used     History   Alcohol Use No     No Known Allergies    Current Facility-Administered Medications   Medication Dose Route Frequency    sodium chloride (NS) flush 5-10 mL  5-10 mL IntraVENous Q8H    sodium chloride (NS) flush 5-10 mL  5-10 mL IntraVENous PRN    acetaminophen (TYLENOL) tablet 650 mg  650 mg Oral Q4H PRN    metoprolol tartrate (LOPRESSOR) tablet 12.5 mg  12.5 mg Oral BID    dilTIAZem CD (CARDIZEM CD) capsule 120 mg  120 mg Oral DAILY    pravastatin (PRAVACHOL) tablet 20 mg  20 mg Oral QHS    HYDROcodone-acetaminophen (NORCO) 5-325 mg per tablet 1 Tab  1 Tab Oral Q4H PRN    aspirin chewable tablet 81 mg  81 mg Oral DAILY    0.9% sodium chloride infusion  50 mL/hr IntraVENous CONTINUOUS    Warfarin- pharmacy to dose   Other Rx Dosing/Monitoring       MD [facility] [place]  [address] [facility] [address]  [facility], [address]

## 2017-03-03 NOTE — PROGRESS NOTES
Primary Nurse Quinton Nicole RN and Connie Mcrae RN performed a dual skin assessment on this patient No impairment noted

## 2017-03-03 NOTE — ROUTINE PROCESS
TRANSFER - OUT REPORT:    Verbal report given to Enma RN(name) on Virgen Arias  being transferred to [facility](unit) for routine progression of care       Report consisted of patients Situation, Background, Assessment and   Recommendations(SBAR). Information from the following report(s) SBAR, ED Summary, Intake/Output, MAR and Recent Results was reviewed with the receiving nurse. Lines:   Peripheral IV 03/02/17 Right Antecubital (Active)   Site Assessment Clean, dry, & intact 3/2/2017  1:47 PM   Phlebitis Assessment 0 3/2/2017  1:47 PM   Infiltration Assessment 0 3/2/2017  1:47 PM   Dressing Status Clean, dry, & intact 3/2/2017  1:47 PM   Dressing Type Transparent 3/2/2017  1:47 PM   Hub Color/Line Status Pink;Capped;Flushed;Patent 3/2/2017  1:47 PM   Action Taken Blood drawn 3/2/2017  1:47 PM   Alcohol Cap Used Yes 3/2/2017  1:47 PM        Opportunity for questions and clarification was provided.       Patient transported with:   transport

## 2017-03-03 NOTE — ED NOTES
I was asked by the nursing staff to consult the hospitalist for admission. It was unclear if they had been paged previously for this patient. Spoke with Dr. Ulices Correia and he will have the patient seen and admitted.

## 2017-03-08 ENCOUNTER — OFFICE VISIT (OUTPATIENT)
Dept: INTERNAL MEDICINE CLINIC | Age: 77
End: 2017-03-08

## 2017-03-08 VITALS
BODY MASS INDEX: 22.14 KG/M2 | SYSTOLIC BLOOD PRESSURE: 157 MMHG | OXYGEN SATURATION: 98 % | TEMPERATURE: 97.8 F | WEIGHT: 132.9 LBS | HEIGHT: 65 IN | HEART RATE: 65 BPM | DIASTOLIC BLOOD PRESSURE: 78 MMHG

## 2017-03-08 DIAGNOSIS — I10 ESSENTIAL HYPERTENSION, BENIGN: ICD-10-CM

## 2017-03-08 DIAGNOSIS — T45.515A WARFARIN-INDUCED COAGULOPATHY (HCC): ICD-10-CM

## 2017-03-08 DIAGNOSIS — I82.511 CHRONIC DEEP VEIN THROMBOSIS (DVT) OF FEMORAL VEIN OF RIGHT LOWER EXTREMITY (HCC): ICD-10-CM

## 2017-03-08 DIAGNOSIS — Z95.2 S/P MVR (MITRAL VALVE REPLACEMENT): Primary | ICD-10-CM

## 2017-03-08 DIAGNOSIS — D68.32 WARFARIN-INDUCED COAGULOPATHY (HCC): ICD-10-CM

## 2017-03-08 DIAGNOSIS — I51.89 DIASTOLIC DYSFUNCTION: ICD-10-CM

## 2017-03-08 NOTE — PROGRESS NOTES
SPORTS MEDICINE AND PRIMARY CARE  Samia Ervin MD, [address] [address]  Phone:  [phone]  Fax: [phone]      Chief Complaint   Patient presents with   [facility] Follow Up     swelling in R leg         SUBECTIVE:    Danish Fregoso is a 65 y.o. female Patient returns today ambulatory, alert and appropriate and has the capacity to give an accurate history. She has a known history of atrial fibrillation, diastolic dysfunction, Warfarin induced coagulopathy, chronic venous insufficiency of both legs, status-post mitral valve replacement on Coumadin. Patient returns today following admission to [facility] on 3/2 and a discharge on 3/3 where she presented with acute renal failure secondary to prerenal azotemia related to diuresis and the question of acute DVT was raised during the hospital stay because the Doppler was positive. However we reviewed the Doppler exam with Dr. Lay Shields and there was indeed evidence of vein thrombosis consistent with chronic thrombosis involving the deep femoral vein on the right. No other recommendations were made except to continue the Coumadin. Patient is advised that that was a contribution to her swelling. We discontinue the diuretic and her renal function was returning towards normal at the time of discharge. We ask her to come in today to we can follow-up with her renal function with repeat BMP. Other new complaints are denied. She still notes a trace of swelling on the right leg but no pain and patient is seen for evaluation. Current Outpatient Prescriptions   Medication Sig Dispense Refill    losartan (COZAAR) 50 mg tablet Take 50 mg by mouth daily.  aspirin 81 mg chewable tablet Take 81 mg by mouth daily.  acetaminophen (TYLENOL) 500 mg tablet Take 500-1,000 mg by mouth every six (6) hours as needed for Pain.       HYDROcodone-acetaminophen (NORCO) 5-325 mg per tablet Take 1 Tab by mouth every six (6) hours as needed for Pain. Max Daily Amount: 4 Tabs. 60 Tab 0    pravastatin (PRAVACHOL) 20 mg tablet TAKE ONE TABLET BY MOUTH NIGHTLY 30 Tab 10    CARTIA  mg ER capsule TAKE ONE CAPSULE BY MOUTH DAILY 30 Cap 9    metoprolol (LOPRESSOR) 25 mg tablet Take 0.5 Tabs by mouth two (2) times a day. 180 Tab 2    warfarin (COUMADIN) 2 mg tablet TAKE ONE TABLET BY MOUTH DAILY (Patient taking differently: takes 2 mg on Sun only and 1mg all other days ) 90 Tab 2     Past Medical History:   Diagnosis Date    AF (atrial fibrillation) (HCC)     RHEUMATIC FEVER AS CHILD, AFIB    Anemia     Arthritis     Chronic pain     Coagulation defects     COUMADIN    Diastolic dysfunction     grade 4    Encounter for Hemoccult screening 2016    H/O colonoscopy     Hand pain, right     Heart failure (HCC)     Prediabetes     PUD (peptic ulcer disease)     Rheumatic fever     S/P MVR (mitral valve replacement)     Shoulder pain, right     SOB (shortness of breath)     Thyroid disease     Venous insufficiency of both lower extremities 2017    Warfarin-induced coagulopathy ([facility] [address]      Past Surgical History:   Procedure Laterality Date    HX CATARACT REMOVAL      BILATERAL    HX  SECTION      HX COLONOSCOPY      HX HEART CATHETERIZATION      HX HYSTERECTOMY      HX MITRAL VALVE REPLACEMENT      HX LUIS ENRIQUE AND BSO       No Known Allergies    REVIEW OF SYSTEMS:   No chest pain. No shortness of breath. Social History     Social History    Marital status: SINGLE     Spouse name: N/A    Number of children: N/A    Years of education: N/A     Social History Main Topics    Smoking status: Former Smoker     Packs/day: 0.50     Years: 30.00     Quit date: 2012    Smokeless tobacco: Never Used    Alcohol use No    Drug use: No    Sexual activity: No     Other Topics Concern    None     Social History Narrative    Family History:  Mother:  80 yrs, High Blood PressureFather:  52 yrs, High Blood PressureSister(s): alive, High Blood    Pressure, colon cancer, type II diabetesBrother(s): , High Blood Pressure, colon cancer, type II diabetesSon(s): alive 48 yrs1 sister(s)    . 1 son(s) . Social History: Alcohol Use Patient does not use alcohol. Smoking Status Patient is a former smoker. Marital Status: Single. Lives w ith:    alone. Children: sons. Occupation/W ork: retired - for [employer]. Education/School: has highschool diploma. Rastafari: [address] [employer].   r  Family History   Problem Relation Age of Onset    Heart Attack Sister     Hypertension Sister     Kidney Disease Mother     Hypertension Mother     Hypertension Father     Cancer Brother     Heart Failure Brother     Arthritis-osteo Sister     Cancer Sister     Cancer Brother      PROSTATE       OBJECTIVE:  Visit Vitals    /78 (BP 1 Location: Left arm, BP Patient Position: Sitting)    Pulse 65    Temp 97.8 °F (36.6 °C) (Oral)    Ht 5' 5\" (1.651 m)    Wt 132 lb 14.4 oz (60.3 kg)    SpO2 98%    BMI 22.12 kg/m2     ENT: perrla,  eom intact  NECK: supple.  Thyroid normal  CHEST: clear to ascultation and percussion   HEART: regular rate and rhythm  ABD: soft, bowel sounds active  EXTREMITIES: no edema, pulse 1+     Admission on 2017, Discharged on 2017   Component Date Value Ref Range Status    WBC 2017 7.0  3.6 - 11.0 K/uL Final    RBC 2017 3.89  3.80 - 5.20 M/uL Final    HGB 2017 11.2* 11.5 - 16.0 g/dL Final    HCT 2017 32.8* 35.0 - 47.0 % Final    MCV 2017 84.3  80.0 - 99.0 FL Final    MCH 2017 28.8  26.0 - 34.0 PG Final    MCHC 2017 34.1  30.0 - 36.5 g/dL Final    RDW 2017 14.5  11.5 - 14.5 % Final    PLATELET  020  150 - 400 K/uL Final    NEUTROPHILS 2017 53  32 - 75 % Final    LYMPHOCYTES 2017 34  12 - 49 % Final    MONOCYTES 2017 9  5 - 13 % Final    EOSINOPHILS 03/02/2017 4  0 - 7 % Final    BASOPHILS 03/02/2017 0  0 - 1 % Final    ABS. NEUTROPHILS 03/02/2017 3.7  1.8 - 8.0 K/UL Final    ABS. LYMPHOCYTES 03/02/2017 2.4  0.8 - 3.5 K/UL Final    ABS. MONOCYTES 03/02/2017 0.6  0.0 - 1.0 K/UL Final    ABS. EOSINOPHILS 03/02/2017 0.3  0.0 - 0.4 K/UL Final    ABS. BASOPHILS 03/02/2017 0.0  0.0 - 0.1 K/UL Final    Sodium 03/02/2017 140  136 - 145 mmol/L Final    Potassium 03/02/2017 3.8  3.5 - 5.1 mmol/L Final    Chloride 03/02/2017 104  97 - 108 mmol/L Final    CO2 03/02/2017 28  21 - 32 mmol/L Final    Anion gap 03/02/2017 8  5 - 15 mmol/L Final    Glucose 03/02/2017 108* 65 - 100 mg/dL Final    BUN 03/02/2017 36* 6 - 20 MG/DL Final    Creatinine 03/02/2017 2.68* 0.55 - 1.02 MG/DL Final    BUN/Creatinine ratio 03/02/2017 13  12 - 20   Final    GFR est AA 03/02/2017 21* >60 ml/min/1.73m2 Final    GFR est non-AA 03/02/2017 17* >60 ml/min/1.73m2 Final    Comment: Estimated GFR is calculated using the IDMS-traceable Modification of Diet in Renal Disease (MDRD) Study equation, reported for both  Americans (GFRAA) and non- Americans (GFRNA), and normalized to 1.73m2 body surface area. The physician must decide which value applies to the patient. The MDRD study equation should only be used in individuals age 25 or older. It has not been validated for the following: pregnant women, patients with serious comorbid conditions, or on certain medications, or persons with extremes of body size, muscle mass, or nutritional status.  Calcium 03/02/2017 9.2  8.5 - 10.1 MG/DL Final    Bilirubin, total 03/02/2017 0.3  0.2 - 1.0 MG/DL Final    ALT (SGPT) 03/02/2017 16  12 - 78 U/L Final    AST (SGOT) 03/02/2017 8* 15 - 37 U/L Final    Alk.  phosphatase 03/02/2017 77  45 - 117 U/L Final    Protein, total 03/02/2017 7.9  6.4 - 8.2 g/dL Final    Albumin 03/02/2017 3.5  3.5 - 5.0 g/dL Final    Globulin 03/02/2017 4.4* 2.0 - 4.0 g/dL Final    A-G Ratio 03/02/2017 0.8* 1.1 - 2.2   Final    BNP 03/02/2017 81  0 - 100 pg/mL Final    INR 03/02/2017 2.4* 0.9 - 1.1   Final    A single therapeutic range for Vit K antagonists may not be optimal for all indications - see June, 2008 issue of Chest, American College of Chest Physicians Evidence-Based Clinical Practice Guidelines, 8th Edition.  Prothrombin time 03/02/2017 24.1* 9.0 - 11.1 sec Final    Ventricular Rate 03/02/2017 65  BPM Final    Atrial Rate 03/02/2017 65  BPM Final    P-R Interval 03/02/2017 158  ms Final    QRS Duration 03/02/2017 82  ms Final    Q-T Interval 03/02/2017 424  ms Final    QTC Calculation (Bezet) 03/02/2017 440  ms Final    Calculated P Axis 03/02/2017 33  degrees Final    Calculated R Axis 03/02/2017 51  degrees Final    Calculated T Axis 03/02/2017 54  degrees Final    Diagnosis 03/02/2017    Final                    Value:Normal sinus rhythm  When compared with ECG of 26-MAY-2014 12:29,  No significant change was found  Confirmed by Brooke Martinez MD, [place] ([id number]) on 3/2/2017 4:38:52 PM      Color 03/02/2017 YELLOW/STRAW    Final    Color Reference Range: Straw, Yellow or Dark Yellow    Appearance 03/02/2017 CLEAR  CLEAR   Final    Specific gravity 03/02/2017 1.011  1.003 - 1.030   Final    pH (UA) 03/02/2017 5.0  5.0 - 8.0   Final    Protein 03/02/2017 NEGATIVE   NEG mg/dL Final    Glucose 03/02/2017 NEGATIVE   NEG mg/dL Final    Ketone 03/02/2017 NEGATIVE   NEG mg/dL Final    Bilirubin 03/02/2017 NEGATIVE   NEG   Final    Blood 03/02/2017 NEGATIVE   NEG   Final    Urobilinogen 03/02/2017 0.2  0.2 - 1.0 EU/dL Final    Nitrites 03/02/2017 NEGATIVE   NEG   Final    Leukocyte Esterase 03/02/2017 TRACE* NEG   Final    WBC 03/02/2017 0-4  0 - 4 /hpf Final    RBC 03/02/2017 0-5  0 - 5 /hpf Final    Epithelial cells 03/02/2017 FEW  FEW /lpf Final    Epithelial cell category consists of squamous cells and /or transitional urothelial cells.  Renal tubular cells, if present, are separately identified as such.  Bacteria 03/02/2017 NEGATIVE   NEG /hpf Final    Hyaline cast 03/02/2017 0-2  0 - 5 /lpf Final    Magnesium 03/03/2017 2.2  1.6 - 2.4 mg/dL Final    Phosphorus 03/03/2017 2.9  2.6 - 4.7 MG/DL Final    WBC 03/03/2017 5.4  3.6 - 11.0 K/uL Final    RBC 03/03/2017 3.50* 3.80 - 5.20 M/uL Final    HGB 03/03/2017 10.0* 11.5 - 16.0 g/dL Final    HCT 03/03/2017 29.0* 35.0 - 47.0 % Final    MCV 03/03/2017 82.9  80.0 - 99.0 FL Final    MCH 03/03/2017 28.6  26.0 - 34.0 PG Final    MCHC 03/03/2017 34.5  30.0 - 36.5 g/dL Final    RDW 03/03/2017 14.3  11.5 - 14.5 % Final    PLATELET 84/10/9318 703  150 - 400 K/uL Final    NEUTROPHILS 03/03/2017 38  32 - 75 % Final    LYMPHOCYTES 03/03/2017 52* 12 - 49 % Final    MONOCYTES 03/03/2017 7  5 - 13 % Final    EOSINOPHILS 03/03/2017 3  0 - 7 % Final    BASOPHILS 03/03/2017 0  0 - 1 % Final    ABS. NEUTROPHILS 03/03/2017 2.1  1.8 - 8.0 K/UL Final    ABS. LYMPHOCYTES 03/03/2017 2.7  0.8 - 3.5 K/UL Final    ABS. MONOCYTES 03/03/2017 0.4  0.0 - 1.0 K/UL Final    ABS. EOSINOPHILS 03/03/2017 0.2  0.0 - 0.4 K/UL Final    ABS.  BASOPHILS 03/03/2017 0.0  0.0 - 0.1 K/UL Final    DF 03/03/2017 MANUAL    Final    PLATELET COMMENTS 40/31/8159 LARGE PLATELETS    Final    PRESENT    RBC COMMENTS 03/03/2017     Final                    Value:TARGET CELLS  PRESENT      RBC COMMENTS 03/03/2017     Final                    Value:ANISOCYTOSIS  1+      WBC COMMENTS 03/03/2017 ATYPICAL LYMPHOCYTES PRESENT    Final    Sodium 03/03/2017 141  136 - 145 mmol/L Final    Potassium 03/03/2017 4.0  3.5 - 5.1 mmol/L Final    Chloride 03/03/2017 108  97 - 108 mmol/L Final    CO2 03/03/2017 25  21 - 32 mmol/L Final    Anion gap 03/03/2017 8  5 - 15 mmol/L Final    Glucose 03/03/2017 98  65 - 100 mg/dL Final    BUN 03/03/2017 27* 6 - 20 MG/DL Final    Creatinine 03/03/2017 1.41* 0.55 - 1.02 MG/DL Final    INVESTIGATED PER DELTA CHECK PROTOCOL    BUN/Creatinine ratio 03/03/2017 19  12 - 20   Final    GFR est AA 03/03/2017 44* >60 ml/min/1.73m2 Final    GFR est non-AA 03/03/2017 36* >60 ml/min/1.73m2 Final    Calcium 03/03/2017 8.5  8.5 - 10.1 MG/DL Final    INR 03/03/2017 2.5* 0.9 - 1.1   Final    A single therapeutic range for Vit K antagonists may not be optimal for all indications - see June, 2008 issue of Chest, American College of Chest Physicians Evidence-Based Clinical Practice Guidelines, 8th Edition.  Prothrombin time 03/03/2017 26.0* 9.0 - 11.1 sec Final    Sodium urine, random 03/03/2017 83  MMOL/L Final    No reference range has been established.  Creatinine, urine 03/03/2017 79.80  mg/dL Final    No reference range has been established.     Protein, urine random 03/03/2017 9  0.0 - 11.9 mg/dL Final    Eosinophils,urine 03/03/2017 NEGATIVE     Final   Office Visit on 02/14/2017   Component Date Value Ref Range Status    VALID INTERNAL CONTROL POC 02/14/2017 Yes   Final    Prothrombin time (POC) 02/14/2017 31.3  sec Final    INR POC 02/14/2017 2.6   Final   Lab Only on 01/24/2017   Component Date Value Ref Range Status    Glucose 01/25/2017 147* 65 - 99 mg/dL Final    BUN 01/25/2017 19  8 - 27 mg/dL Final    Creatinine 01/25/2017 0.91  0.57 - 1.00 mg/dL Final    GFR est non-AA 01/25/2017 61  >59 mL/min/1.73 Final    GFR est AA 01/25/2017 70  >59 mL/min/1.73 Final    BUN/Creatinine ratio 01/25/2017 21  11 - 26 Final    Sodium 01/25/2017 143  134 - 144 mmol/L Final    Potassium 01/25/2017 4.8  3.5 - 5.2 mmol/L Final    Chloride 01/25/2017 101  96 - 106 mmol/L Final    CO2 01/25/2017 24  18 - 29 mmol/L Final    Calcium 01/25/2017 10.3  8.7 - 10.3 mg/dL Final   Office Visit on 01/17/2017   Component Date Value Ref Range Status    VALID INTERNAL CONTROL POC 01/17/2017 Yes   Final    Prothrombin time (POC) 01/17/2017 23.9  sec Final    INR POC 01/17/2017 2.0   Final    WBC 01/17/2017 7.1  3.4 - 10.8 x10E3/uL Final    RBC 01/17/2017 4.47  3.77 - 5.28 x10E6/uL Final    HGB 01/17/2017 13.0  11.1 - 15.9 g/dL Final    HCT 01/17/2017 37.7  34.0 - 46.6 % Final    MCV 01/17/2017 84  79 - 97 fL Final    MCH 01/17/2017 29.1  26.6 - 33.0 pg Final    MCHC 01/17/2017 34.5  31.5 - 35.7 g/dL Final    RDW 01/17/2017 14.6  12.3 - 15.4 % Final    PLATELET 15/20/1549 094  150 - 379 x10E3/uL Final    NEUTROPHILS 01/17/2017 48  % Final    Lymphocytes 01/17/2017 41  % Final    MONOCYTES 01/17/2017 9  % Final    EOSINOPHILS 01/17/2017 2  % Final    BASOPHILS 01/17/2017 0  % Final    ABS. NEUTROPHILS 01/17/2017 3.5  1.4 - 7.0 x10E3/uL Final    Abs Lymphocytes 01/17/2017 2.9  0.7 - 3.1 x10E3/uL Final    ABS. MONOCYTES 01/17/2017 0.6  0.1 - 0.9 x10E3/uL Final    ABS. EOSINOPHILS 01/17/2017 0.1  0.0 - 0.4 x10E3/uL Final    ABS. BASOPHILS 01/17/2017 0.0  0.0 - 0.2 x10E3/uL Final    IMMATURE GRANULOCYTES 01/17/2017 0  % Final    ABS. IMM. GRANS. 01/17/2017 0.0  0.0 - 0.1 x10E3/uL Final    Glucose 01/17/2017 95  65 - 99 mg/dL Final    BUN 01/17/2017 17  8 - 27 mg/dL Final    Creatinine 01/17/2017 0.98  0.57 - 1.00 mg/dL Final    GFR est non-AA 01/17/2017 56* >59 mL/min/1.73 Final    GFR est AA 01/17/2017 64  >59 mL/min/1.73 Final    BUN/Creatinine ratio 01/17/2017 17  11 - 26 Final    Sodium 01/17/2017 144  134 - 144 mmol/L Final    Potassium 01/17/2017 5.4* 3.5 - 5.2 mmol/L Final    Chloride 01/17/2017 104  96 - 106 mmol/L Final    CO2 01/17/2017 22  18 - 29 mmol/L Final    Calcium 01/17/2017 9.7  8.7 - 10.3 mg/dL Final    B-type Natriuretic Peptide 01/17/2017 303.1* 0.0 - 100.0 pg/mL Final   Clinical Support on 12/27/2016   Component Date Value Ref Range Status    VALID INTERNAL CONTROL POC 12/27/2016 Yes   Final    Prothrombin time (POC) 12/27/2016 27.2  sec Final    INR POC 12/27/2016 2.3   Final          ASSESSMENT:  1. S/P MVR (mitral valve replacement)    2. Diastolic dysfunction    3. Warfarin-induced coagulopathy ([facility] [address]    4. Chronic deep vein thrombosis (DVT) of femoral vein of right lower extremity (HCC)    5. Essential hypertension, benign      Patient has chronic deep venous thrombosis of the right lower extremity for which no other recommendations will be made. She will continue her current dose of Coumadin. We will confirm that with a PT/INR today. Blood pressure is a little higher than we would like to see it and we will wait until her next PT check before change is made. I suspect the prerenal azotemia has resolved now and we will confirm that with a BMP. She will return to the office for a PT check on a regular basis and she will see me in the next four months. PLAN:  .  Orders Placed This Encounter    CBC WITH AUTOMATED DIFF    METABOLIC PANEL, BASIC    REFERRAL TO OPHTHALMOLOGY       Follow-up Disposition:  Return in about 3 days (around 3/11/2017) for bp check, pt/inr. ATTENTION:   This medical record was transcribed using an electronic medical records system. Although proofread, it may and can contain electronic and spelling errors. Other human spelling and other errors may be present. Corrections may be executed at a later time. Please feel free to contact us for any clarifications as needed.

## 2017-03-08 NOTE — PROGRESS NOTES
Chief Complaint   Patient presents with   [facility] Follow Up     swelling in R leg   1. Have you been to the ER, urgent care clinic since your last visit? Hospitalized since your last visit? Yes Reason for visit: swelling in leg    2. Have you seen or consulted any other health care providers outside of the [address] [place] [address] since your last visit? Include any pap smears or colon screening.  No

## 2017-03-08 NOTE — MR AVS SNAPSHOT
Visit Information Date & Time Provider Department Dept. Phone Encounter #  
 3/8/2017  9:30 AM Ayaz James [facility] [phone] [id number] Follow-up Instructions Return in about 3 days (around 3/11/2017) for bp check, pt/inr. Follow-up and Disposition History Your Appointments 3/21/2017 10:00 AM  
Nurse Visit with Renato Duenas MD  
[address] [place] [address] and Primary Care [facility]) Appt Note: pt check [address] [facility] [place]  
  
   
 [address] [phone]  
  
    
 4/3/2017  2:00 PM  
ESTABLISHED PATIENT with Donna Davis MD  
[facility] ([facility]) Appt Note: 6 month follow up  
 [address]  
[address] [facility] [address] Upcoming Health Maintenance Date Due  
 GLAUCOMA SCREENING Q2Y 4/21/2017 Pneumococcal 65+ Low/Medium Risk (2 of 2 - PPSV23) 5/7/2017 MEDICARE YEARLY EXAM 6/14/2017 DTaP/Tdap/Td series (2 - Td) 1/17/2027 Allergies as of 3/8/2017  Review Complete On: 3/8/2017 By: Renato Duenas MD  
 No Known Allergies Current Immunizations  Reviewed on 5/4/2012 Name Date Pneumococcal Vaccine (Unspecified Type) 5/7/2012  8:12 AM  
  
 Not reviewed this visit You Were Diagnosed With   
  
 Codes Comments S/P MVR (mitral valve replacement)    -  Primary ICD-10-CM: F48.3 ICD-9-CM: V43.3 Diastolic dysfunction     IMG-02-EJ: I51.9 ICD-9-CM: 429.9 Warfarin-induced coagulopathy (HCC)     ICD-10-CM: T45.511A, D68.9 ICD-9-CM: 286.9, R973.0 Chronic deep vein thrombosis (DVT) of femoral vein of right lower extremity (HCC)     ICD-10-CM: L87.659 ICD-9-CM: 453.51 Essential hypertension, benign     ICD-10-CM: I10 
ICD-9-CM: 401.1 Vitals BP Pulse Temp Height(growth percentile) Weight(growth percentile) SpO2  
 157/78 (BP 1 Location: Left arm, BP Patient Position: Sitting) 65 97.8 °F (36.6 °C) (Oral) 5' 5\" (1.651 m) 132 lb 14.4 oz (60.3 kg) 98% BMI OB Status Smoking Status 22.12 kg/m2 Hysterectomy Former Smoker BMI and BSA Data Body Mass Index Body Surface Area  
 22.12 kg/m 2 1.66 m 2 Preferred Pharmacy Pharmacy Name Phone [facility] [address] [facility], [phone] Your Updated Medication List  
  
   
This list is accurate as of: 3/8/17 10:54 AM.  Always use your most recent med list.  
  
  
  
  
 acetaminophen 500 mg tablet Commonly known as:  TYLENOL Take 500-1,000 mg by mouth every six (6) hours as needed for Pain. aspirin 81 mg chewable tablet Take 81 mg by mouth daily. CARTIA  mg ER capsule Generic drug:  dilTIAZem CD  
TAKE ONE CAPSULE BY MOUTH DAILY HYDROcodone-acetaminophen 5-325 mg per tablet Commonly known as:  Julaine Kava Take 1 Tab by mouth every six (6) hours as needed for Pain. Max Daily Amount: 4 Tabs. losartan 50 mg tablet Commonly known as:  COZAAR Take 50 mg by mouth daily. metoprolol tartrate 25 mg tablet Commonly known as:  LOPRESSOR Take 0.5 Tabs by mouth two (2) times a day. pravastatin 20 mg tablet Commonly known as:  PRAVACHOL  
TAKE ONE TABLET BY MOUTH NIGHTLY  
  
 warfarin 2 mg tablet Commonly known as:  COUMADIN  
TAKE ONE TABLET BY MOUTH DAILY We Performed the Following CBC WITH AUTOMATED DIFF [20866 CPT(R)] METABOLIC PANEL, BASIC [34909 CPT(R)] REFERRAL TO OPHTHALMOLOGY [REF57 Custom] Follow-up Instructions Return in about 3 days (around 3/11/2017) for bp check, pt/inr. Introducing [facility]!    
 [place] [facility] introduces [employer] patient portal. Now you can access parts of your medical record, email your doctor's office, and request medication refills online. 1. In your internet browser, go to [email or web address]. [email or web address] 2. Click on the First Time User? Click Here link in the Sign In box. You will see the New Member Sign Up page. 3. Enter your [employer] Access Code exactly as it appears below. You will not need to use this code after youve completed the sign-up process. If you do not sign up before the expiration date, you must request a new code. · [employer] Access Code: [email or web address] Expires: 5/15/2017  9:31 AM 
 
4. Enter the last four digits of your Social Security Number (xxxx) and Date of Birth (mm/dd/yyyy) as indicated and click Submit. You will be taken to the next sign-up page. 5. Create a [employer] ID. This will be your [employer] login ID and cannot be changed, so think of one that is secure and easy to remember. 6. Create a [employer] password. You can change your password at any time. 7. Enter your Password Reset Question and Answer. This can be used at a later time if you forget your password. 8. Enter your e-mail address. You will receive e-mail notification when new information is available in [address] 9. Click Sign Up. You can now view and download portions of your medical record. 10. Click the Download Summary menu link to download a portable copy of your medical information. If you have questions, please visit the Frequently Asked Questions section of the [employer] website. Remember, [employer] is NOT to be used for urgent needs. For medical emergencies, dial 911. Now available from your iPhone and Android! Please provide this summary of care documentation to your next provider. Your primary care clinician is listed as Dustin Simons. If you have any questions after today's visit, please call [phone].

## 2017-03-09 LAB
BASOPHILS # BLD AUTO: 0 X10E3/UL (ref 0–0.2)
BASOPHILS NFR BLD AUTO: 0 %
BUN SERPL-MCNC: 16 MG/DL (ref 8–27)
BUN/CREAT SERPL: 16 (ref 11–26)
CALCIUM SERPL-MCNC: 9.6 MG/DL (ref 8.7–10.3)
CHLORIDE SERPL-SCNC: 101 MMOL/L (ref 96–106)
CO2 SERPL-SCNC: 24 MMOL/L (ref 18–29)
CREAT SERPL-MCNC: 0.97 MG/DL (ref 0.57–1)
EOSINOPHIL # BLD AUTO: 0.2 X10E3/UL (ref 0–0.4)
EOSINOPHIL NFR BLD AUTO: 3 %
ERYTHROCYTE [DISTWIDTH] IN BLOOD BY AUTOMATED COUNT: 14.9 % (ref 12.3–15.4)
GLUCOSE SERPL-MCNC: 122 MG/DL (ref 65–99)
HCT VFR BLD AUTO: 36.6 % (ref 34–46.6)
HGB BLD-MCNC: 12.3 G/DL (ref 11.1–15.9)
IMM GRANULOCYTES # BLD: 0 X10E3/UL (ref 0–0.1)
IMM GRANULOCYTES NFR BLD: 0 %
LYMPHOCYTES # BLD AUTO: 1.9 X10E3/UL (ref 0.7–3.1)
LYMPHOCYTES NFR BLD AUTO: 31 %
MCH RBC QN AUTO: 28.7 PG (ref 26.6–33)
MCHC RBC AUTO-ENTMCNC: 33.6 G/DL (ref 31.5–35.7)
MCV RBC AUTO: 85 FL (ref 79–97)
MONOCYTES # BLD AUTO: 0.3 X10E3/UL (ref 0.1–0.9)
MONOCYTES NFR BLD AUTO: 6 %
NEUTROPHILS # BLD AUTO: 3.6 X10E3/UL (ref 1.4–7)
NEUTROPHILS NFR BLD AUTO: 60 %
PLATELET # BLD AUTO: 292 X10E3/UL (ref 150–379)
POTASSIUM SERPL-SCNC: 4.3 MMOL/L (ref 3.5–5.2)
RBC # BLD AUTO: 4.29 X10E6/UL (ref 3.77–5.28)
SODIUM SERPL-SCNC: 141 MMOL/L (ref 134–144)
WBC # BLD AUTO: 6 X10E3/UL (ref 3.4–10.8)

## 2017-03-13 ENCOUNTER — HOSPITAL ENCOUNTER (OUTPATIENT)
Dept: CT IMAGING | Age: 77
Discharge: HOME | End: 2017-03-13
Attending: INTERNAL MEDICINE
Payer: MEDICARE

## 2017-03-13 ENCOUNTER — OFFICE VISIT (OUTPATIENT)
Dept: INTERNAL MEDICINE CLINIC | Age: 77
End: 2017-03-13

## 2017-03-13 VITALS
HEIGHT: 65 IN | BODY MASS INDEX: 21.96 KG/M2 | SYSTOLIC BLOOD PRESSURE: 138 MMHG | HEART RATE: 66 BPM | TEMPERATURE: 98.3 F | OXYGEN SATURATION: 96 % | RESPIRATION RATE: 17 BRPM | DIASTOLIC BLOOD PRESSURE: 72 MMHG | WEIGHT: 131.8 LBS

## 2017-03-13 DIAGNOSIS — T45.515A WARFARIN-INDUCED COAGULOPATHY (HCC): Primary | ICD-10-CM

## 2017-03-13 DIAGNOSIS — S09.8XXA BLUNT HEAD TRAUMA, INITIAL ENCOUNTER: ICD-10-CM
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
## 2017-03-13 DIAGNOSIS — I48.0 PAROXYSMAL ATRIAL FIBRILLATION (HCC): ICD-10-CM

## 2017-03-13 DIAGNOSIS — D68.32 WARFARIN-INDUCED COAGULOPATHY (HCC): Primary | ICD-10-CM

## 2017-03-13 LAB
INR BLD: 3.2
PT POC: 38.3 SEC
VALID INTERNAL CONTROL?: YES

## 2017-03-13 PROCEDURE — 70450 CT HEAD/BRAIN W/O DYE: CPT

## 2017-03-13 RX ORDER — HYDROCODONE BITARTRATE AND ACETAMINOPHEN 5; 325 MG/1; MG/1
1 TABLET ORAL
Qty: 60 TAB | Refills: 0 | Status: SHIPPED | OUTPATIENT
Start: 2017-03-13 | End: 2017-04-17 | Stop reason: SDUPTHER

## 2017-03-13 RX ORDER — FUROSEMIDE 20 MG/1
TABLET ORAL AS NEEDED
COMMUNITY
Start: 2017-03-02 | End: 2018-07-06 | Stop reason: SDUPTHER

## 2017-03-13 NOTE — PROGRESS NOTES
SPORTS MEDICINE AND PRIMARY CARE  Sunil Enrique MD, [facility] [address] [address]  Phone:  [phone]  Fax: [phone]      Chief Complaint   Patient presents with    Follow-up     Fall this morning          SUBECTIVE:    Kirstie Garcia is a 65 y.o. female Patient returns today ambulatory, alert and appropriate and has the capacity to give an accurate history. She has a known history of atrial fibrillation, diastolic dysfunction, Warfarin induced coagulopathy, chronic venous insufficiency status-post mitral valve replacement on Coumadin. Patient returns today stating that this morning she fell off the sofa. She was sleeping and had a bad dream and fell on the floor. She hit her face and noted swelling of her right leg. Patient comes in for its evaluation. Current Outpatient Prescriptions   Medication Sig Dispense Refill    furosemide (LASIX) 20 mg tablet       losartan (COZAAR) 50 mg tablet Take 50 mg by mouth daily.  aspirin 81 mg chewable tablet Take 81 mg by mouth daily.  acetaminophen (TYLENOL) 500 mg tablet Take 500-1,000 mg by mouth every six (6) hours as needed for Pain.  HYDROcodone-acetaminophen (NORCO) 5-325 mg per tablet Take 1 Tab by mouth every six (6) hours as needed for Pain. Max Daily Amount: 4 Tabs. 60 Tab 0    pravastatin (PRAVACHOL) 20 mg tablet TAKE ONE TABLET BY MOUTH NIGHTLY 30 Tab 10    CARTIA  mg ER capsule TAKE ONE CAPSULE BY MOUTH DAILY 30 Cap 9    metoprolol (LOPRESSOR) 25 mg tablet Take 0.5 Tabs by mouth two (2) times a day.  180 Tab 2    warfarin (COUMADIN) 2 mg tablet TAKE ONE TABLET BY MOUTH DAILY (Patient taking differently: takes 2 mg on Sun only and 1mg all other days ) 90 Tab 2     Past Medical History:   Diagnosis Date    AF (atrial fibrillation) (HCC)     RHEUMATIC FEVER AS CHILD, AFIB    Anemia     Arthritis     Blunt head trauma 03/13/2017    Chronic pain     Coagulation defects     COUMADIN    Diastolic dysfunction     grade 4    Encounter for Hemoccult screening 2016    Fall 2017    H/O colonoscopy     Hand pain, right     Heart failure (HCC)     Prediabetes     PUD (peptic ulcer disease)     Rheumatic fever     S/P MVR (mitral valve replacement)     Shoulder pain, right     SOB (shortness of breath)     Thyroid disease     Venous insufficiency of both lower extremities 2017    Warfarin-induced coagulopathy ([facility] [address]      Past Surgical History:   Procedure Laterality Date    HX CATARACT REMOVAL      BILATERAL    HX  SECTION      HX COLONOSCOPY      HX HEART CATHETERIZATION      HX HYSTERECTOMY      HX MITRAL VALVE REPLACEMENT      HX LUIS ENRIQUE AND BSO       No Known Allergies    REVIEW OF SYSTEMS:   No known loss of consciousness. Social History     Social History    Marital status: SINGLE     Spouse name: N/A    Number of children: N/A    Years of education: N/A     Social History Main Topics    Smoking status: Former Smoker     Packs/day: 0.50     Years: 30.00     Quit date: 2012    Smokeless tobacco: Never Used    Alcohol use No    Drug use: No    Sexual activity: No     Other Topics Concern    None     Social History Narrative    Family History: Mother:  80 yrs, High Blood PressureFather:  52 yrs, High Blood PressureSister(s): alive, High Blood    Pressure, colon cancer, type II diabetesBrother(s): , High Blood Pressure, colon cancer, type II diabetesSon(s): alive 48 yrs1 sister(s)    . 1 son(s) . Social History: Alcohol Use Patient does not use alcohol. Smoking Status Patient is a former smoker. Marital Status: Single. Lives w ith:    alone. Children: sons. Occupation/W ork: retired - for [employer]. Education/School: has highschool diploma.     Congregation: [address] [employer].   r  Family History   Problem Relation Age of Onset    Heart Attack Sister     Hypertension Sister    Bernardo [place] Kidney Disease Mother     Hypertension Mother     Hypertension Father     Cancer Brother     Heart Failure Brother     Arthritis-osteo Sister     Cancer Sister     Cancer Brother      PROSTATE       OBJECTIVE:  Visit Vitals    /72 (BP 1 Location: Right arm, BP Patient Position: Sitting)    Pulse 66    Temp 98.3 °F (36.8 °C) (Oral)    Resp 17    Ht 5' 5\" (1.651 m)    Wt 131 lb 12.8 oz (59.8 kg)    SpO2 96%    BMI 21.93 kg/m2     ENT: perrla,  eom intact  NECK: supple. Thyroid normal  CHEST: clear to ascultation and percussion   HEART: regular rate and rhythm  ABD: soft, bowel sounds active  EXTREMITIES: no edema, pulse 1+     Office Visit on 03/13/2017   Component Date Value Ref Range Status    VALID INTERNAL CONTROL POC 03/13/2017 Yes   Final    Prothrombin time (POC) 03/13/2017 38.3  sec Final    INR POC 03/13/2017 3.2   Final   Office Visit on 03/08/2017   Component Date Value Ref Range Status    WBC 03/08/2017 6.0  3.4 - 10.8 x10E3/uL Final    RBC 03/08/2017 4.29  3.77 - 5.28 x10E6/uL Final    HGB 03/08/2017 12.3  11.1 - 15.9 g/dL Final    HCT 03/08/2017 36.6  34.0 - 46.6 % Final    MCV 03/08/2017 85  79 - 97 fL Final    MCH 03/08/2017 28.7  26.6 - 33.0 pg Final    MCHC 03/08/2017 33.6  31.5 - 35.7 g/dL Final    RDW 03/08/2017 14.9  12.3 - 15.4 % Final    PLATELET 94/91/7262 648  150 - 379 x10E3/uL Final    NEUTROPHILS 03/08/2017 60  % Final    Lymphocytes 03/08/2017 31  % Final    MONOCYTES 03/08/2017 6  % Final    EOSINOPHILS 03/08/2017 3  % Final    BASOPHILS 03/08/2017 0  % Final    ABS. NEUTROPHILS 03/08/2017 3.6  1.4 - 7.0 x10E3/uL Final    Abs Lymphocytes 03/08/2017 1.9  0.7 - 3.1 x10E3/uL Final    ABS. MONOCYTES 03/08/2017 0.3  0.1 - 0.9 x10E3/uL Final    ABS. EOSINOPHILS 03/08/2017 0.2  0.0 - 0.4 x10E3/uL Final    ABS. BASOPHILS 03/08/2017 0.0  0.0 - 0.2 x10E3/uL Final    IMMATURE GRANULOCYTES 03/08/2017 0  % Final    ABS. IMM.  GRANS. 03/08/2017 0.0  0.0 - 0.1 x10E3/uL Final    Glucose 03/08/2017 122* 65 - 99 mg/dL Final    BUN 03/08/2017 16  8 - 27 mg/dL Final    Creatinine 03/08/2017 0.97  0.57 - 1.00 mg/dL Final    GFR est non-AA 03/08/2017 56* >59 mL/min/1.73 Final    GFR est AA 03/08/2017 65  >59 mL/min/1.73 Final    BUN/Creatinine ratio 03/08/2017 16  11 - 26 Final    Sodium 03/08/2017 141  134 - 144 mmol/L Final    Potassium 03/08/2017 4.3  3.5 - 5.2 mmol/L Final    Chloride 03/08/2017 101  96 - 106 mmol/L Final    CO2 03/08/2017 24  18 - 29 mmol/L Final    Calcium 03/08/2017 9.6  8.7 - 10.3 mg/dL Final   Admission on 03/02/2017, Discharged on 03/03/2017   Component Date Value Ref Range Status    WBC 03/02/2017 7.0  3.6 - 11.0 K/uL Final    RBC 03/02/2017 3.89  3.80 - 5.20 M/uL Final    HGB 03/02/2017 11.2* 11.5 - 16.0 g/dL Final    HCT 03/02/2017 32.8* 35.0 - 47.0 % Final    MCV 03/02/2017 84.3  80.0 - 99.0 FL Final    MCH 03/02/2017 28.8  26.0 - 34.0 PG Final    MCHC 03/02/2017 34.1  30.0 - 36.5 g/dL Final    RDW 03/02/2017 14.5  11.5 - 14.5 % Final    PLATELET 56/64/2500 495  150 - 400 K/uL Final    NEUTROPHILS 03/02/2017 53  32 - 75 % Final    LYMPHOCYTES 03/02/2017 34  12 - 49 % Final    MONOCYTES 03/02/2017 9  5 - 13 % Final    EOSINOPHILS 03/02/2017 4  0 - 7 % Final    BASOPHILS 03/02/2017 0  0 - 1 % Final    ABS. NEUTROPHILS 03/02/2017 3.7  1.8 - 8.0 K/UL Final    ABS. LYMPHOCYTES 03/02/2017 2.4  0.8 - 3.5 K/UL Final    ABS. MONOCYTES 03/02/2017 0.6  0.0 - 1.0 K/UL Final    ABS. EOSINOPHILS 03/02/2017 0.3  0.0 - 0.4 K/UL Final    ABS.  BASOPHILS 03/02/2017 0.0  0.0 - 0.1 K/UL Final    Sodium 03/02/2017 140  136 - 145 mmol/L Final    Potassium 03/02/2017 3.8  3.5 - 5.1 mmol/L Final    Chloride 03/02/2017 104  97 - 108 mmol/L Final    CO2 03/02/2017 28  21 - 32 mmol/L Final    Anion gap 03/02/2017 8  5 - 15 mmol/L Final    Glucose 03/02/2017 108* 65 - 100 mg/dL Final    BUN 03/02/2017 36* 6 - 20 MG/DL Final    Creatinine 03/02/2017 2.68* 0.55 - 1.02 MG/DL Final    BUN/Creatinine ratio 03/02/2017 13  12 - 20   Final    GFR est AA 03/02/2017 21* >60 ml/min/1.73m2 Final    GFR est non-AA 03/02/2017 17* >60 ml/min/1.73m2 Final    Comment: Estimated GFR is calculated using the IDMS-traceable Modification of Diet in Renal Disease (MDRD) Study equation, reported for both  Americans (GFRAA) and non- Americans (GFRNA), and normalized to 1.73m2 body surface area. The physician must decide which value applies to the patient. The MDRD study equation should only be used in individuals age 25 or older. It has not been validated for the following: pregnant women, patients with serious comorbid conditions, or on certain medications, or persons with extremes of body size, muscle mass, or nutritional status.  Calcium 03/02/2017 9.2  8.5 - 10.1 MG/DL Final    Bilirubin, total 03/02/2017 0.3  0.2 - 1.0 MG/DL Final    ALT (SGPT) 03/02/2017 16  12 - 78 U/L Final    AST (SGOT) 03/02/2017 8* 15 - 37 U/L Final    Alk. phosphatase 03/02/2017 77  45 - 117 U/L Final    Protein, total 03/02/2017 7.9  6.4 - 8.2 g/dL Final    Albumin 03/02/2017 3.5  3.5 - 5.0 g/dL Final    Globulin 03/02/2017 4.4* 2.0 - 4.0 g/dL Final    A-G Ratio 03/02/2017 0.8* 1.1 - 2.2   Final    BNP 03/02/2017 81  0 - 100 pg/mL Final    INR 03/02/2017 2.4* 0.9 - 1.1   Final    A single therapeutic range for Vit K antagonists may not be optimal for all indications - see June, 2008 issue of Chest, American College of Chest Physicians Evidence-Based Clinical Practice Guidelines, 8th Edition.     Prothrombin time 03/02/2017 24.1* 9.0 - 11.1 sec Final    Ventricular Rate 03/02/2017 65  BPM Final    Atrial Rate 03/02/2017 65  BPM Final    P-R Interval 03/02/2017 158  ms Final    QRS Duration 03/02/2017 82  ms Final    Q-T Interval 03/02/2017 424  ms Final    QTC Calculation (Bezet) 03/02/2017 440  ms Final    Calculated P Axis 03/02/2017 33  degrees Final  Calculated R Axis 03/02/2017 51  degrees Final    Calculated T Axis 03/02/2017 54  degrees Final    Diagnosis 03/02/2017    Final                    Value:Normal sinus rhythm  When compared with ECG of 26-MAY-2014 12:29,  No significant change was found  Confirmed by Wesley Krishnamurthy MD, Eugene Wise ([id number]) on 3/2/2017 4:38:52 PM      Color 03/02/2017 YELLOW/STRAW    Final    Color Reference Range: Straw, Yellow or Dark Yellow    Appearance 03/02/2017 CLEAR  CLEAR   Final    Specific gravity 03/02/2017 1.011  1.003 - 1.030   Final    pH (UA) 03/02/2017 5.0  5.0 - 8.0   Final    Protein 03/02/2017 NEGATIVE   NEG mg/dL Final    Glucose 03/02/2017 NEGATIVE   NEG mg/dL Final    Ketone 03/02/2017 NEGATIVE   NEG mg/dL Final    Bilirubin 03/02/2017 NEGATIVE   NEG   Final    Blood 03/02/2017 NEGATIVE   NEG   Final    Urobilinogen 03/02/2017 0.2  0.2 - 1.0 EU/dL Final    Nitrites 03/02/2017 NEGATIVE   NEG   Final    Leukocyte Esterase 03/02/2017 TRACE* NEG   Final    WBC 03/02/2017 0-4  0 - 4 /hpf Final    RBC 03/02/2017 0-5  0 - 5 /hpf Final    Epithelial cells 03/02/2017 FEW  FEW /lpf Final    Epithelial cell category consists of squamous cells and /or transitional urothelial cells. Renal tubular cells, if present, are separately identified as such.     Bacteria 03/02/2017 NEGATIVE   NEG /hpf Final    Hyaline cast 03/02/2017 0-2  0 - 5 /lpf Final    Magnesium 03/03/2017 2.2  1.6 - 2.4 mg/dL Final    Phosphorus 03/03/2017 2.9  2.6 - 4.7 MG/DL Final    WBC 03/03/2017 5.4  3.6 - 11.0 K/uL Final    RBC 03/03/2017 3.50* 3.80 - 5.20 M/uL Final    HGB 03/03/2017 10.0* 11.5 - 16.0 g/dL Final    HCT 03/03/2017 29.0* 35.0 - 47.0 % Final    MCV 03/03/2017 82.9  80.0 - 99.0 FL Final    MCH 03/03/2017 28.6  26.0 - 34.0 PG Final    MCHC 03/03/2017 34.5  30.0 - 36.5 g/dL Final    RDW 03/03/2017 14.3  11.5 - 14.5 % Final    PLATELET 17/64/6688 221  150 - 400 K/uL Final    NEUTROPHILS 03/03/2017 38  32 - 75 % Final  LYMPHOCYTES 03/03/2017 52* 12 - 49 % Final    MONOCYTES 03/03/2017 7  5 - 13 % Final    EOSINOPHILS 03/03/2017 3  0 - 7 % Final    BASOPHILS 03/03/2017 0  0 - 1 % Final    ABS. NEUTROPHILS 03/03/2017 2.1  1.8 - 8.0 K/UL Final    ABS. LYMPHOCYTES 03/03/2017 2.7  0.8 - 3.5 K/UL Final    ABS. MONOCYTES 03/03/2017 0.4  0.0 - 1.0 K/UL Final    ABS. EOSINOPHILS 03/03/2017 0.2  0.0 - 0.4 K/UL Final    ABS. BASOPHILS 03/03/2017 0.0  0.0 - 0.1 K/UL Final    DF 03/03/2017 MANUAL    Final    PLATELET COMMENTS 74/77/4524 LARGE PLATELETS    Final    PRESENT    RBC COMMENTS 03/03/2017     Final                    Value:TARGET CELLS  PRESENT      RBC COMMENTS 03/03/2017     Final                    Value:ANISOCYTOSIS  1+      WBC COMMENTS 03/03/2017 ATYPICAL LYMPHOCYTES PRESENT    Final    Sodium 03/03/2017 141  136 - 145 mmol/L Final    Potassium 03/03/2017 4.0  3.5 - 5.1 mmol/L Final    Chloride 03/03/2017 108  97 - 108 mmol/L Final    CO2 03/03/2017 25  21 - 32 mmol/L Final    Anion gap 03/03/2017 8  5 - 15 mmol/L Final    Glucose 03/03/2017 98  65 - 100 mg/dL Final    BUN 03/03/2017 27* 6 - 20 MG/DL Final    Creatinine 03/03/2017 1.41* 0.55 - 1.02 MG/DL Final    INVESTIGATED PER DELTA CHECK PROTOCOL    BUN/Creatinine ratio 03/03/2017 19  12 - 20   Final    GFR est AA 03/03/2017 44* >60 ml/min/1.73m2 Final    GFR est non-AA 03/03/2017 36* >60 ml/min/1.73m2 Final    Calcium 03/03/2017 8.5  8.5 - 10.1 MG/DL Final    INR 03/03/2017 2.5* 0.9 - 1.1   Final    A single therapeutic range for Vit K antagonists may not be optimal for all indications - see June, 2008 issue of Chest, American College of Chest Physicians Evidence-Based Clinical Practice Guidelines, 8th Edition.  Prothrombin time 03/03/2017 26.0* 9.0 - 11.1 sec Final    Sodium urine, random 03/03/2017 83  MMOL/L Final    No reference range has been established.     Creatinine, urine 03/03/2017 79.80  mg/dL Final    No reference range has been established.  Protein, urine random 03/03/2017 9  0.0 - 11.9 mg/dL Final    Eosinophils,urine 03/03/2017 NEGATIVE     Final   Office Visit on 02/14/2017   Component Date Value Ref Range Status    VALID INTERNAL CONTROL POC 02/14/2017 Yes   Final    Prothrombin time (POC) 02/14/2017 31.3  sec Final    INR POC 02/14/2017 2.6   Final   Lab Only on 01/24/2017   Component Date Value Ref Range Status    Glucose 01/25/2017 147* 65 - 99 mg/dL Final    BUN 01/25/2017 19  8 - 27 mg/dL Final    Creatinine 01/25/2017 0.91  0.57 - 1.00 mg/dL Final    GFR est non-AA 01/25/2017 61  >59 mL/min/1.73 Final    GFR est AA 01/25/2017 70  >59 mL/min/1.73 Final    BUN/Creatinine ratio 01/25/2017 21  11 - 26 Final    Sodium 01/25/2017 143  134 - 144 mmol/L Final    Potassium 01/25/2017 4.8  3.5 - 5.2 mmol/L Final    Chloride 01/25/2017 101  96 - 106 mmol/L Final    CO2 01/25/2017 24  18 - 29 mmol/L Final    Calcium 01/25/2017 10.3  8.7 - 10.3 mg/dL Final   Office Visit on 01/17/2017   Component Date Value Ref Range Status    VALID INTERNAL CONTROL POC 01/17/2017 Yes   Final    Prothrombin time (POC) 01/17/2017 23.9  sec Final    INR POC 01/17/2017 2.0   Final    WBC 01/17/2017 7.1  3.4 - 10.8 x10E3/uL Final    RBC 01/17/2017 4.47  3.77 - 5.28 x10E6/uL Final    HGB 01/17/2017 13.0  11.1 - 15.9 g/dL Final    HCT 01/17/2017 37.7  34.0 - 46.6 % Final    MCV 01/17/2017 84  79 - 97 fL Final    MCH 01/17/2017 29.1  26.6 - 33.0 pg Final    MCHC 01/17/2017 34.5  31.5 - 35.7 g/dL Final    RDW 01/17/2017 14.6  12.3 - 15.4 % Final    PLATELET 75/80/2154 140  150 - 379 x10E3/uL Final    NEUTROPHILS 01/17/2017 48  % Final    Lymphocytes 01/17/2017 41  % Final    MONOCYTES 01/17/2017 9  % Final    EOSINOPHILS 01/17/2017 2  % Final    BASOPHILS 01/17/2017 0  % Final    ABS. NEUTROPHILS 01/17/2017 3.5  1.4 - 7.0 x10E3/uL Final    Abs Lymphocytes 01/17/2017 2.9  0.7 - 3.1 x10E3/uL Final    ABS.  MONOCYTES 01/17/2017 0.6  0.1 - 0.9 x10E3/uL Final    ABS. EOSINOPHILS 01/17/2017 0.1  0.0 - 0.4 x10E3/uL Final    ABS. BASOPHILS 01/17/2017 0.0  0.0 - 0.2 x10E3/uL Final    IMMATURE GRANULOCYTES 01/17/2017 0  % Final    ABS. IMM. GRANS. 01/17/2017 0.0  0.0 - 0.1 x10E3/uL Final    Glucose 01/17/2017 95  65 - 99 mg/dL Final    BUN 01/17/2017 17  8 - 27 mg/dL Final    Creatinine 01/17/2017 0.98  0.57 - 1.00 mg/dL Final    GFR est non-AA 01/17/2017 56* >59 mL/min/1.73 Final    GFR est AA 01/17/2017 64  >59 mL/min/1.73 Final    BUN/Creatinine ratio 01/17/2017 17  11 - 26 Final    Sodium 01/17/2017 144  134 - 144 mmol/L Final    Potassium 01/17/2017 5.4* 3.5 - 5.2 mmol/L Final    Chloride 01/17/2017 104  96 - 106 mmol/L Final    CO2 01/17/2017 22  18 - 29 mmol/L Final    Calcium 01/17/2017 9.7  8.7 - 10.3 mg/dL Final    B-type Natriuretic Peptide 01/17/2017 303.1* 0.0 - 100.0 pg/mL Final   Clinical Support on 12/27/2016   Component Date Value Ref Range Status    VALID INTERNAL CONTROL POC 12/27/2016 Yes   Final    Prothrombin time (POC) 12/27/2016 27.2  sec Final    INR POC 12/27/2016 2.3   Final          ASSESSMENT:  1. Warfarin-induced coagulopathy (HCC)    2. Blunt head trauma, initial encounter    3. Fall, initial encounter    4. Paroxysmal atrial fibrillation (HCC)      Patient is having pain not only in her head but also in her right leg where she hit the floor. She is on therapeutic doses of Warfarin and for that reason she needs to have a CT scan performed today and she is advised. She has chronic venous insufficiency related to chronic DVT of the right leg however her INR remains therapeutic. She will return to the office in one week or two, sooner if she needs to. PLAN:  .  Orders Placed This Encounter    CT HEAD WO CONT    AMB POC PT/INR    furosemide (LASIX) 20 mg tablet       Follow-up Disposition:  Return in about 2 weeks (around 3/27/2017) for pt/inr.       ATTENTION:   This medical record was transcribed using an electronic medical records system. Although proofread, it may and can contain electronic and spelling errors. Other human spelling and other errors may be present. Corrections may be executed at a later time. Please feel free to contact us for any clarifications as needed.

## 2017-03-13 NOTE — PROGRESS NOTES
1. Have you been to the ER, urgent care clinic since your last visit? Hospitalized since your last visit? No    2. Have you seen or consulted any other health care providers outside of the [address] [place] [address] since your last visit? Include any pap smears or colon screening.  No

## 2017-03-13 NOTE — MR AVS SNAPSHOT
Visit Information Date & Time Provider Department Dept. Phone Encounter #  
 3/13/2017 11:45 AM Sayra Cheung MD [facility] [phone] [id number] Follow-up Instructions Return in about 2 weeks (around 3/27/2017) for pt/inr. Follow-up and Disposition History Your Appointments 3/27/2017 11:30 AM  
Any with Sayra Cheung MD  
[address] [place] [address] and Primary Care [address] [place] [address]) Appt Note: 2 wk follow up  
 [address] [place]  
  
   
 [phone]  
  
    
 3/29/2017 10:30 AM  
Nurse Visit with Sayra Cheung MD  
[address] [place] [address] and Primary Care [address] [place] [address]) Appt Note: 3 week pt  
 [address] [place]  
  
   
 [address] [phone]  
  
    
 4/3/2017  2:00 PM  
ESTABLISHED PATIENT with Rosy Weems MD  
[facility] ([address] [place] [address]) Appt Note: 6 month follow up  
 [place]  [address] [place] [address] [address] [facility],[address] [facility] [phone] Upcoming Health Maintenance Date Due  
 GLAUCOMA SCREENING Q2Y 4/21/2017 Pneumococcal 65+ Low/Medium Risk (2 of 2 - PPSV23) 5/7/2017 MEDICARE YEARLY EXAM 6/14/2017 DTaP/Tdap/Td series (2 - Td) 1/17/2027 Allergies as of 3/13/2017  Review Complete On: 3/13/2017 By: Sayra Cheung MD  
 No Known Allergies Current Immunizations  Reviewed on 5/4/2012 Name Date Pneumococcal Vaccine (Unspecified Type) 5/7/2012  8:12 AM  
  
 Not reviewed this visit You Were Diagnosed With   
  
 Codes Comments Warfarin-induced coagulopathy (HCC)    -  Primary ICD-10-CM: T45.511A, D68.9 ICD-9-CM: 286.9, C216.6 Blunt head trauma, initial encounter     ICD-10-CM: S09. Parker Doll ICD-9-CM: 959.01 Fall, initial encounter     ICD-10-CM: W19. Chay Funes ICD-9-CM: E888.9 Paroxysmal atrial fibrillation (HCC)     ICD-10-CM: I48.0 ICD-9-CM: 427.31 Vitals BP Pulse Temp Resp Height(growth percentile) Weight(growth percentile) 138/72 (BP 1 Location: Right arm, BP Patient Position: Sitting) 66 98.3 °F (36.8 °C) (Oral) 17 5' 5\" (1.651 m) 131 lb 12.8 oz (59.8 kg) SpO2 BMI OB Status Smoking Status 96% 21.93 kg/m2 Hysterectomy Former Smoker BMI and BSA Data Body Mass Index Body Surface Area  
 21.93 kg/m 2 1.66 m 2 Preferred Pharmacy Pharmacy Name Phone [facility] [phone] Your Updated Medication List  
  
   
This list is accurate as of: 3/13/17  1:31 PM.  Always use your most recent med list.  
  
  
  
  
 aspirin 81 mg chewable tablet Take 81 mg by mouth daily. CARTIA  mg ER capsule Generic drug:  dilTIAZem CD  
TAKE ONE CAPSULE BY MOUTH DAILY  
  
 furosemide 20 mg tablet Commonly known as:  LASIX HYDROcodone-acetaminophen 5-325 mg per tablet Commonly known as:  Mara Mura Take 1 Tab by mouth every six (6) hours as needed for Pain. Max Daily Amount: 4 Tabs. losartan 50 mg tablet Commonly known as:  COZAAR Take 50 mg by mouth daily. metoprolol tartrate 25 mg tablet Commonly known as:  LOPRESSOR Take 0.5 Tabs by mouth two (2) times a day. pravastatin 20 mg tablet Commonly known as:  PRAVACHOL  
TAKE ONE TABLET BY MOUTH NIGHTLY  
  
 warfarin 2 mg tablet Commonly known as:  COUMADIN  
TAKE ONE TABLET BY MOUTH DAILY Prescriptions Printed Refills HYDROcodone-acetaminophen (NORCO) 5-325 mg per tablet 0 Sig: Take 1 Tab by mouth every six (6) hours as needed for Pain. Max Daily Amount: 4 Tabs. Class: Print Route: Oral  
  
We Performed the Following AMB POC PT/INR [53300 CPT(R)] Follow-up Instructions Return in about 2 weeks (around 3/27/2017) for pt/inr. To-Do List   
 03/13/2017 Imaging:  CT HEAD WO CONT Introducing [facility]! Lissette Howard introduces [employer] patient portal. Now you can access parts of your medical record, email your doctor's office, and request medication refills online. 1. In your internet browser, go to [email or web address]. [facility]/[employer] 2. Click on the First Time User? Click Here link in the Sign In box. You will see the New Member Sign Up page. 3. Enter your [employer] Access Code exactly as it appears below. You will not need to use this code after youve completed the sign-up process. If you do not sign up before the expiration date, you must request a new code. · [employer] Access Code: [email or web address] Expires: 5/15/2017 10:31 AM 
 
4. Enter the last four digits of your Social Security Number (xxxx) and Date of Birth (mm/dd/yyyy) as indicated and click Submit. You will be taken to the next sign-up page. 5. Create a [employer] ID. This will be your [employer] login ID and cannot be changed, so think of one that is secure and easy to remember. 6. Create a [employer] password. You can change your password at any time. 7. Enter your Password Reset Question and Answer. This can be used at a later time if you forget your password. 8. Enter your e-mail address. You will receive e-mail notification when new information is available in [address] 9. Click Sign Up. You can now view and download portions of your medical record. 10. Click the Download Summary menu link to download a portable copy of your medical information. If you have questions, please visit the Frequently Asked Questions section of the [employer] website. Remember, [employer] is NOT to be used for urgent needs. For medical emergencies, dial 911. Now available from your iPhone and Android! Please provide this summary of care documentation to your next provider. Your primary care clinician is listed as Randi Rodriguez. If you have any questions after today's visit, please call [phone].

## 2017-03-27 ENCOUNTER — OFFICE VISIT (OUTPATIENT)
Dept: INTERNAL MEDICINE CLINIC | Age: 77
End: 2017-03-27

## 2017-03-27 VITALS
BODY MASS INDEX: 21.77 KG/M2 | SYSTOLIC BLOOD PRESSURE: 132 MMHG | TEMPERATURE: 97.7 F | RESPIRATION RATE: 15 BRPM | DIASTOLIC BLOOD PRESSURE: 76 MMHG | HEART RATE: 64 BPM | HEIGHT: 65 IN | WEIGHT: 130.7 LBS | OXYGEN SATURATION: 100 %

## 2017-03-27 DIAGNOSIS — M17.11 PRIMARY OSTEOARTHRITIS OF RIGHT KNEE: ICD-10-CM

## 2017-03-27 DIAGNOSIS — I82.511 CHRONIC DEEP VEIN THROMBOSIS (DVT) OF FEMORAL VEIN OF RIGHT LOWER EXTREMITY (HCC): Primary | ICD-10-CM

## 2017-03-27 LAB
INR BLD: 4.2
PT POC: 50.8 SEC
VALID INTERNAL CONTROL?: YES

## 2017-03-27 RX ORDER — TRIAMCINOLONE ACETONIDE 40 MG/ML
40 INJECTION, SUSPENSION INTRA-ARTICULAR; INTRAMUSCULAR ONCE
Qty: 1 ML | Refills: 0
Start: 2017-03-27 | End: 2017-03-27

## 2017-03-27 NOTE — MR AVS SNAPSHOT
Visit Information Date & Time Provider Department Dept. Phone Encounter #  
 3/27/2017 11:30 AM Ayaz Preciado [facility] [phone] [id number] Your Appointments 3/29/2017 10:30 AM  
Nurse Visit with Teresita Mayo MD  
[facility]) Appt Note: 3 week pt  
 [address] [place]  
  
   
 [address] [id number]  
  
    
 4/3/2017 11:30 AM  
Nurse Visit with Teresita Mayo MD  
[facility] [address]) Appt Note: follow up 1wk PT/INR  
 [address]  
[phone]  
  
    
 4/3/2017  2:00 PM  
ESTABLISHED PATIENT with Alexsander Glover MD  
[facility] ([address]) Appt Note: 6 month follow up  
 [address] [place] [address] [address] [facility] [phone] Upcoming Health Maintenance Date Due  
 GLAUCOMA SCREENING Q2Y 4/21/2017 Pneumococcal 65+ Low/Medium Risk (2 of 2 - PPSV23) 5/7/2017 MEDICARE YEARLY EXAM 6/14/2017 DTaP/Tdap/Td series (2 - Td) 1/17/2027 Allergies as of 3/27/2017  Review Complete On: 3/27/2017 By: Teresita Mayo MD  
 No Known Allergies Current Immunizations  Reviewed on 5/4/2012 Name Date Pneumococcal Vaccine (Unspecified Type) 5/7/2012  8:12 AM  
  
 Not reviewed this visit You Were Diagnosed With   
  
 Codes Comments Chronic deep vein thrombosis (DVT) of femoral vein of right lower extremity (HCC)    -  Primary ICD-10-CM: M25.513 ICD-9-CM: 453.51 Primary osteoarthritis of right knee     ICD-10-CM: M17.11 ICD-9-CM: 715.16 Vitals BP Pulse Temp Resp Height(growth percentile) Weight(growth percentile)  132/76 (BP 1 Location: Right arm, BP Patient Position: Sitting) 64 97.7 °F (36.5 °C) (Oral) 15 5' 5\" (1.651 m) 130 lb 11.2 oz (59.3 kg) SpO2 BMI OB Status Smoking Status 100% 21.75 kg/m2 Hysterectomy Former Smoker Vitals History BMI and BSA Data Body Mass Index Body Surface Area 21.75 kg/m 2 1.65 m 2 Preferred Pharmacy Pharmacy Name Phone [facility] [address] [place] [address] [phone] Your Updated Medication List  
  
   
This list is accurate as of: 3/27/17  1:10 PM.  Always use your most recent med list.  
  
  
  
  
 aspirin 81 mg chewable tablet Take 81 mg by mouth daily. CARTIA  mg ER capsule Generic drug:  dilTIAZem CD  
TAKE ONE CAPSULE BY MOUTH DAILY  
  
 furosemide 20 mg tablet Commonly known as:  LASIX HYDROcodone-acetaminophen 5-325 mg per tablet Commonly known as:  [address] Take 1 Tab by mouth every six (6) hours as needed for Pain. Max Daily Amount: 4 Tabs. losartan 50 mg tablet Commonly known as:  COZAAR Take 50 mg by mouth daily. metoprolol tartrate 25 mg tablet Commonly known as:  LOPRESSOR Take 0.5 Tabs by mouth two (2) times a day. pravastatin 20 mg tablet Commonly known as:  PRAVACHOL  
TAKE ONE TABLET BY MOUTH NIGHTLY  
  
 triamcinolone acetonide 40 mg/mL injection Commonly known as:  KENALOG  
1 mL by IntraMUSCular route once for 1 dose. warfarin 2 mg tablet Commonly known as:  COUMADIN  
TAKE ONE TABLET BY MOUTH DAILY We Performed the Following AMB POC PT/INR [07151 CPT(R)] IN DRAIN/INJECT LARGE JOINT/BURSA [id number] CPT(R)] TRIAMCINOLONE ACETONIDE INJ [ [facility]] Introducing [facility]! Missy Matthew introduces [employer] patient portal. Now you can access parts of your medical record, email your doctor's office, and request medication refills online. 1. In your internet browser, go to [email or web address] 2. Click on the First Time User? Click Here link in the Sign In box. You will see the New Member Sign Up page. 3. Enter your MobSmith Access Code exactly as it appears below. You will not need to use this code after youve completed the sign-up process. If you do not sign up before the expiration date, you must request a new code. · MobSmith Access Code: [email or web address] Expires: 5/15/2017 10:31 AM 
 
4. Enter the last four digits of your Social Security Number (xxxx) and Date of Birth (mm/dd/yyyy) as indicated and click Submit. You will be taken to the next sign-up page. 5. Create a MobSmith ID. This will be your MobSmith login ID and cannot be changed, so think of one that is secure and easy to remember. 6. Create a MobSmith password. You can change your password at any time. 7. Enter your Password Reset Question and Answer. This can be used at a later time if you forget your password. 8. Enter your e-mail address. You will receive e-mail notification when new information is available in [address] 9. Click Sign Up. You can now view and download portions of your medical record. 10. Click the Download Summary menu link to download a portable copy of your medical information. If you have questions, please visit the Frequently Asked Questions section of the [employer] website. Remember, MobSmith is NOT to be used for urgent needs. For medical emergencies, dial 911. Now available from your iPhone and Android! Please provide this summary of care documentation to your next provider. Your primary care clinician is listed as Sabi Hudson. If you have any questions after today's visit, please call [phone].

## 2017-03-27 NOTE — PROGRESS NOTES
[facility]  Tonio Duffy MD, [address] [address]  Phone:  [phone]  Fax: [phone]      Chief Complaint   Patient presents with    Follow-up     2 week follow up         SUBECTIVE:    Ej Wallace is a 65 y.o. female Patient returns today still complaining of right leg discomfort. It continues to swell. It continues to be painful. There has been no change. We recall when she was in the hospital a venous doppler was performed and we actually reviewed the doppler with one of the vascular surgeons and was found to have thrombus involving the deep femoral vein with the ultrasound consistent with a chronic rather than acute process. However she continues to have discomfort. She is taking her Coumadin regularly as evidence by a super therapeutic Coumadin check today. Other new complaints are denied. Patient is seen for evaluation. Current Outpatient Prescriptions   Medication Sig Dispense Refill    triamcinolone acetonide (KENALOG) 40 mg/mL injection 1 mL by IntraMUSCular route once for 1 dose. 1 mL 0    furosemide (LASIX) 20 mg tablet       HYDROcodone-acetaminophen (NORCO) 5-325 mg per tablet Take 1 Tab by mouth every six (6) hours as needed for Pain. Max Daily Amount: 4 Tabs. 60 Tab 0    losartan (COZAAR) 50 mg tablet Take 50 mg by mouth daily.  aspirin 81 mg chewable tablet Take 81 mg by mouth daily.  pravastatin (PRAVACHOL) 20 mg tablet TAKE ONE TABLET BY MOUTH NIGHTLY 30 Tab 10    CARTIA  mg ER capsule TAKE ONE CAPSULE BY MOUTH DAILY 30 Cap 9    metoprolol (LOPRESSOR) 25 mg tablet Take 0.5 Tabs by mouth two (2) times a day.  180 Tab 2    warfarin (COUMADIN) 2 mg tablet TAKE ONE TABLET BY MOUTH DAILY (Patient taking differently: takes 2 mg on Sun only and 1mg all other days ) 90 Tab 2     Past Medical History:   Diagnosis Date    AF (atrial fibrillation) (HCC)     RHEUMATIC FEVER AS CHILD, AFIB    Anemia     Arthritis     Blunt head trauma 2017    Chronic pain     Coagulation defects     COUMADIN    Diastolic dysfunction     grade 4    Encounter for Hemoccult screening 2016    Fall 2017    H/O colonoscopy     Hand pain, right     Heart failure (HCC)     Prediabetes     PUD (peptic ulcer disease)     Rheumatic fever     S/P MVR (mitral valve replacement)     Shoulder pain, right     SOB (shortness of breath)     Thyroid disease     Venous insufficiency of both lower extremities 2017    Warfarin-induced coagulopathy ([facility] [address]      Past Surgical History:   Procedure Laterality Date    HX CATARACT REMOVAL      BILATERAL    HX  SECTION      HX COLONOSCOPY      HX HEART CATHETERIZATION      HX HYSTERECTOMY      HX MITRAL VALVE REPLACEMENT      HX LUIS ENRIQUE AND BSO       No Known Allergies    REVIEW OF SYSTEMS:   No chest pain. No shortness of breath. Social History     Social History    Marital status: SINGLE     Spouse name: N/A    Number of children: N/A    Years of education: N/A     Social History Main Topics    Smoking status: Former Smoker     Packs/day: 0.50     Years: 30.00     Quit date: 2012    Smokeless tobacco: Never Used    Alcohol use No    Drug use: No    Sexual activity: No     Other Topics Concern    None     Social History Narrative    Family History: Mother:  80 yrs, High Blood PressureFather:  52 yrs, High Blood PressureSister(s): alive, High Blood    Pressure, colon cancer, type II diabetesBrother(s): , High Blood Pressure, colon cancer, type II diabetesSon(s): alive 48 yrs1 sister(s)    . 1 son(s) . Social History: Alcohol Use Patient does not use alcohol. Smoking Status Patient is a former smoker. Marital Status: Single. Lives w ith:    alone. Children: sons. Occupation/W ork: retired - for [employer]. Education/School: has highschool diploma.     Spiritism: [address]   [place]  Family History   Problem Relation Age of Onset    Heart Attack Sister     Hypertension Sister     Kidney Disease Mother     Hypertension Mother     Hypertension Father     Cancer Brother     Heart Failure Brother     Arthritis-osteo Sister     Cancer Sister     Cancer Brother      PROSTATE       OBJECTIVE:  Visit Vitals    /76 (BP 1 Location: Right arm, BP Patient Position: Sitting)    Pulse 64    Temp 97.7 °F (36.5 °C) (Oral)    Resp 15    Ht 5' 5\" (1.651 m)    Wt 130 lb 11.2 oz (59.3 kg)    SpO2 100%    BMI 21.75 kg/m2     ENT: perrla,  eom intact  NECK: supple. Thyroid normal  CHEST: clear to ascultation and percussion   HEART: regular rate and rhythm  ABD: soft, bowel sounds active  EXTREMITIES: no edema, pulse 1+     Office Visit on 03/27/2017   Component Date Value Ref Range Status    VALID INTERNAL CONTROL POC 03/27/2017 Yes   Final    Prothrombin time (POC) 03/27/2017 50.8  sec Final    INR POC 03/27/2017 4.2   Final   Office Visit on 03/13/2017   Component Date Value Ref Range Status    VALID INTERNAL CONTROL POC 03/13/2017 Yes   Final    Prothrombin time (POC) 03/13/2017 38.3  sec Final    INR POC 03/13/2017 3.2   Final   Office Visit on 03/08/2017   Component Date Value Ref Range Status    WBC 03/08/2017 6.0  3.4 - 10.8 x10E3/uL Final    RBC 03/08/2017 4.29  3.77 - 5.28 x10E6/uL Final    HGB 03/08/2017 12.3  11.1 - 15.9 g/dL Final    HCT 03/08/2017 36.6  34.0 - 46.6 % Final    MCV 03/08/2017 85  79 - 97 fL Final    MCH 03/08/2017 28.7  26.6 - 33.0 pg Final    MCHC 03/08/2017 33.6  31.5 - 35.7 g/dL Final    RDW 03/08/2017 14.9  12.3 - 15.4 % Final    PLATELET 26/83/5810 825  150 - 379 x10E3/uL Final    NEUTROPHILS 03/08/2017 60  % Final    Lymphocytes 03/08/2017 31  % Final    MONOCYTES 03/08/2017 6  % Final    EOSINOPHILS 03/08/2017 3  % Final    BASOPHILS 03/08/2017 0  % Final    ABS.  NEUTROPHILS 03/08/2017 3.6  1.4 - 7.0 x10E3/uL Final    Abs Lymphocytes 03/08/2017 1.9  0.7 - 3.1 x10E3/uL Final    ABS. MONOCYTES 03/08/2017 0.3  0.1 - 0.9 x10E3/uL Final    ABS. EOSINOPHILS 03/08/2017 0.2  0.0 - 0.4 x10E3/uL Final    ABS. BASOPHILS 03/08/2017 0.0  0.0 - 0.2 x10E3/uL Final    IMMATURE GRANULOCYTES 03/08/2017 0  % Final    ABS. IMM. GRANS. 03/08/2017 0.0  0.0 - 0.1 x10E3/uL Final    Glucose 03/08/2017 122* 65 - 99 mg/dL Final    BUN 03/08/2017 16  8 - 27 mg/dL Final    Creatinine 03/08/2017 0.97  0.57 - 1.00 mg/dL Final    GFR est non-AA 03/08/2017 56* >59 mL/min/1.73 Final    GFR est AA 03/08/2017 65  >59 mL/min/1.73 Final    BUN/Creatinine ratio 03/08/2017 16  11 - 26 Final    Sodium 03/08/2017 141  134 - 144 mmol/L Final    Potassium 03/08/2017 4.3  3.5 - 5.2 mmol/L Final    Chloride 03/08/2017 101  96 - 106 mmol/L Final    CO2 03/08/2017 24  18 - 29 mmol/L Final    Calcium 03/08/2017 9.6  8.7 - 10.3 mg/dL Final   Admission on 03/02/2017, Discharged on 03/03/2017   Component Date Value Ref Range Status    WBC 03/02/2017 7.0  3.6 - 11.0 K/uL Final    RBC 03/02/2017 3.89  3.80 - 5.20 M/uL Final    HGB 03/02/2017 11.2* 11.5 - 16.0 g/dL Final    HCT 03/02/2017 32.8* 35.0 - 47.0 % Final    MCV 03/02/2017 84.3  80.0 - 99.0 FL Final    MCH 03/02/2017 28.8  26.0 - 34.0 PG Final    MCHC 03/02/2017 34.1  30.0 - 36.5 g/dL Final    RDW 03/02/2017 14.5  11.5 - 14.5 % Final    PLATELET 37/16/5135 407  150 - 400 K/uL Final    NEUTROPHILS 03/02/2017 53  32 - 75 % Final    LYMPHOCYTES 03/02/2017 34  12 - 49 % Final    MONOCYTES 03/02/2017 9  5 - 13 % Final    EOSINOPHILS 03/02/2017 4  0 - 7 % Final    BASOPHILS 03/02/2017 0  0 - 1 % Final    ABS. NEUTROPHILS 03/02/2017 3.7  1.8 - 8.0 K/UL Final    ABS. LYMPHOCYTES 03/02/2017 2.4  0.8 - 3.5 K/UL Final    ABS. MONOCYTES 03/02/2017 0.6  0.0 - 1.0 K/UL Final    ABS. EOSINOPHILS 03/02/2017 0.3  0.0 - 0.4 K/UL Final    ABS.  BASOPHILS 03/02/2017 0.0  0.0 - 0.1 K/UL Final    Sodium 03/02/2017 140  136 - 145 mmol/L Final    Potassium 03/02/2017 3.8  3.5 - 5.1 mmol/L Final    Chloride 03/02/2017 104  97 - 108 mmol/L Final    CO2 03/02/2017 28  21 - 32 mmol/L Final    Anion gap 03/02/2017 8  5 - 15 mmol/L Final    Glucose 03/02/2017 108* 65 - 100 mg/dL Final    BUN 03/02/2017 36* 6 - 20 MG/DL Final    Creatinine 03/02/2017 2.68* 0.55 - 1.02 MG/DL Final    BUN/Creatinine ratio 03/02/2017 13  12 - 20   Final    GFR est AA 03/02/2017 21* >60 ml/min/1.73m2 Final    GFR est non-AA 03/02/2017 17* >60 ml/min/1.73m2 Final    Comment: Estimated GFR is calculated using the IDMS-traceable Modification of Diet in Renal Disease (MDRD) Study equation, reported for both  Americans (GFRAA) and non- Americans (GFRNA), and normalized to 1.73m2 body surface area. The physician must decide which value applies to the patient. The MDRD study equation should only be used in individuals age 25 or older. It has not been validated for the following: pregnant women, patients with serious comorbid conditions, or on certain medications, or persons with extremes of body size, muscle mass, or nutritional status.  Calcium 03/02/2017 9.2  8.5 - 10.1 MG/DL Final    Bilirubin, total 03/02/2017 0.3  0.2 - 1.0 MG/DL Final    ALT (SGPT) 03/02/2017 16  12 - 78 U/L Final    AST (SGOT) 03/02/2017 8* 15 - 37 U/L Final    Alk. phosphatase 03/02/2017 77  45 - 117 U/L Final    Protein, total 03/02/2017 7.9  6.4 - 8.2 g/dL Final    Albumin 03/02/2017 3.5  3.5 - 5.0 g/dL Final    Globulin 03/02/2017 4.4* 2.0 - 4.0 g/dL Final    A-G Ratio 03/02/2017 0.8* 1.1 - 2.2   Final    BNP 03/02/2017 81  0 - 100 pg/mL Final    INR 03/02/2017 2.4* 0.9 - 1.1   Final    A single therapeutic range for Vit K antagonists may not be optimal for all indications - see June, 2008 issue of Chest, American College of Chest Physicians Evidence-Based Clinical Practice Guidelines, 8th Edition.     Prothrombin time 03/02/2017 24.1* 9.0 - 11.1 sec Final    Ventricular Rate 03/02/2017 65  BPM Final    Atrial Rate 03/02/2017 65  BPM Final    P-R Interval 03/02/2017 158  ms Final    QRS Duration 03/02/2017 82  ms Final    Q-T Interval 03/02/2017 424  ms Final    QTC Calculation (Bezet) 03/02/2017 440  ms Final    Calculated P Axis 03/02/2017 33  degrees Final    Calculated R Axis 03/02/2017 51  degrees Final    Calculated T Axis 03/02/2017 54  degrees Final    Diagnosis 03/02/2017    Final                    Value:Normal sinus rhythm  When compared with ECG of 26-MAY-2014 12:29,  No significant change was found  Confirmed by Mariya Ojeda MD, Trent Sams ([id number]) on 3/2/2017 4:38:52 PM      Color 03/02/2017 YELLOW/STRAW    Final    Color Reference Range: Straw, Yellow or Dark Yellow    Appearance 03/02/2017 CLEAR  CLEAR   Final    Specific gravity 03/02/2017 1.011  1.003 - 1.030   Final    pH (UA) 03/02/2017 5.0  5.0 - 8.0   Final    Protein 03/02/2017 NEGATIVE   NEG mg/dL Final    Glucose 03/02/2017 NEGATIVE   NEG mg/dL Final    Ketone 03/02/2017 NEGATIVE   NEG mg/dL Final    Bilirubin 03/02/2017 NEGATIVE   NEG   Final    Blood 03/02/2017 NEGATIVE   NEG   Final    Urobilinogen 03/02/2017 0.2  0.2 - 1.0 EU/dL Final    Nitrites 03/02/2017 NEGATIVE   NEG   Final    Leukocyte Esterase 03/02/2017 TRACE* NEG   Final    WBC 03/02/2017 0-4  0 - 4 /hpf Final    RBC 03/02/2017 0-5  0 - 5 /hpf Final    Epithelial cells 03/02/2017 FEW  FEW /lpf Final    Epithelial cell category consists of squamous cells and /or transitional urothelial cells. Renal tubular cells, if present, are separately identified as such.     Bacteria 03/02/2017 NEGATIVE   NEG /hpf Final    Hyaline cast 03/02/2017 0-2  0 - 5 /lpf Final    Magnesium 03/03/2017 2.2  1.6 - 2.4 mg/dL Final    Phosphorus 03/03/2017 2.9  2.6 - 4.7 MG/DL Final    WBC 03/03/2017 5.4  3.6 - 11.0 K/uL Final    RBC 03/03/2017 3.50* 3.80 - 5.20 M/uL Final    HGB 03/03/2017 10.0* 11.5 - 16.0 g/dL Final    HCT 03/03/2017 29.0* 35.0 - 47.0 % Final    MCV 03/03/2017 82.9  80.0 - 99.0 FL Final    MCH 03/03/2017 28.6  26.0 - 34.0 PG Final    MCHC 03/03/2017 34.5  30.0 - 36.5 g/dL Final    RDW 03/03/2017 14.3  11.5 - 14.5 % Final    PLATELET 49/33/7262 041  150 - 400 K/uL Final    NEUTROPHILS 03/03/2017 38  32 - 75 % Final    LYMPHOCYTES 03/03/2017 52* 12 - 49 % Final    MONOCYTES 03/03/2017 7  5 - 13 % Final    EOSINOPHILS 03/03/2017 3  0 - 7 % Final    BASOPHILS 03/03/2017 0  0 - 1 % Final    ABS. NEUTROPHILS 03/03/2017 2.1  1.8 - 8.0 K/UL Final    ABS. LYMPHOCYTES 03/03/2017 2.7  0.8 - 3.5 K/UL Final    ABS. MONOCYTES 03/03/2017 0.4  0.0 - 1.0 K/UL Final    ABS. EOSINOPHILS 03/03/2017 0.2  0.0 - 0.4 K/UL Final    ABS.  BASOPHILS 03/03/2017 0.0  0.0 - 0.1 K/UL Final    DF 03/03/2017 MANUAL    Final    PLATELET COMMENTS 47/92/1018 LARGE PLATELETS    Final    PRESENT    RBC COMMENTS 03/03/2017     Final                    Value:TARGET CELLS  PRESENT      RBC COMMENTS 03/03/2017     Final                    Value:ANISOCYTOSIS  1+      WBC COMMENTS 03/03/2017 ATYPICAL LYMPHOCYTES PRESENT    Final    Sodium 03/03/2017 141  136 - 145 mmol/L Final    Potassium 03/03/2017 4.0  3.5 - 5.1 mmol/L Final    Chloride 03/03/2017 108  97 - 108 mmol/L Final    CO2 03/03/2017 25  21 - 32 mmol/L Final    Anion gap 03/03/2017 8  5 - 15 mmol/L Final    Glucose 03/03/2017 98  65 - 100 mg/dL Final    BUN 03/03/2017 27* 6 - 20 MG/DL Final    Creatinine 03/03/2017 1.41* 0.55 - 1.02 MG/DL Final    INVESTIGATED PER DELTA CHECK PROTOCOL    BUN/Creatinine ratio 03/03/2017 19  12 - 20   Final    GFR est AA 03/03/2017 44* >60 ml/min/1.73m2 Final    GFR est non-AA 03/03/2017 36* >60 ml/min/1.73m2 Final    Calcium 03/03/2017 8.5  8.5 - 10.1 MG/DL Final    INR 03/03/2017 2.5* 0.9 - 1.1   Final    A single therapeutic range for Vit K antagonists may not be optimal for all indications - see June, 2008 issue of Chest, American College of Chest Physicians Evidence-Based Clinical Practice Guidelines, 8th Edition.  Prothrombin time 03/03/2017 26.0* 9.0 - 11.1 sec Final    Sodium urine, random 03/03/2017 83  MMOL/L Final    No reference range has been established.  Creatinine, urine 03/03/2017 79.80  mg/dL Final    No reference range has been established.     Protein, urine random 03/03/2017 9  0.0 - 11.9 mg/dL Final    Eosinophils,urine 03/03/2017 NEGATIVE     Final   Office Visit on 02/14/2017   Component Date Value Ref Range Status    VALID INTERNAL CONTROL POC 02/14/2017 Yes   Final    Prothrombin time (POC) 02/14/2017 31.3  sec Final    INR POC 02/14/2017 2.6   Final   Lab Only on 01/24/2017   Component Date Value Ref Range Status    Glucose 01/25/2017 147* 65 - 99 mg/dL Final    BUN 01/25/2017 19  8 - 27 mg/dL Final    Creatinine 01/25/2017 0.91  0.57 - 1.00 mg/dL Final    GFR est non-AA 01/25/2017 61  >59 mL/min/1.73 Final    GFR est AA 01/25/2017 70  >59 mL/min/1.73 Final    BUN/Creatinine ratio 01/25/2017 21  11 - 26 Final    Sodium 01/25/2017 143  134 - 144 mmol/L Final    Potassium 01/25/2017 4.8  3.5 - 5.2 mmol/L Final    Chloride 01/25/2017 101  96 - 106 mmol/L Final    CO2 01/25/2017 24  18 - 29 mmol/L Final    Calcium 01/25/2017 10.3  8.7 - 10.3 mg/dL Final   Office Visit on 01/17/2017   Component Date Value Ref Range Status    VALID INTERNAL CONTROL POC 01/17/2017 Yes   Final    Prothrombin time (POC) 01/17/2017 23.9  sec Final    INR POC 01/17/2017 2.0   Final    WBC 01/17/2017 7.1  3.4 - 10.8 x10E3/uL Final    RBC 01/17/2017 4.47  3.77 - 5.28 x10E6/uL Final    HGB 01/17/2017 13.0  11.1 - 15.9 g/dL Final    HCT 01/17/2017 37.7  34.0 - 46.6 % Final    MCV 01/17/2017 84  79 - 97 fL Final    MCH 01/17/2017 29.1  26.6 - 33.0 pg Final    MCHC 01/17/2017 34.5  31.5 - 35.7 g/dL Final    RDW 01/17/2017 14.6  12.3 - 15.4 % Final    PLATELET 77/91/6143 293  150 - 379 x10E3/uL Final    NEUTROPHILS 01/17/2017 48  % Final    Lymphocytes 01/17/2017 41  % Final    MONOCYTES 01/17/2017 9  % Final    EOSINOPHILS 01/17/2017 2  % Final    BASOPHILS 01/17/2017 0  % Final    ABS. NEUTROPHILS 01/17/2017 3.5  1.4 - 7.0 x10E3/uL Final    Abs Lymphocytes 01/17/2017 2.9  0.7 - 3.1 x10E3/uL Final    ABS. MONOCYTES 01/17/2017 0.6  0.1 - 0.9 x10E3/uL Final    ABS. EOSINOPHILS 01/17/2017 0.1  0.0 - 0.4 x10E3/uL Final    ABS. BASOPHILS 01/17/2017 0.0  0.0 - 0.2 x10E3/uL Final    IMMATURE GRANULOCYTES 01/17/2017 0  % Final    ABS. IMM. GRANS. 01/17/2017 0.0  0.0 - 0.1 x10E3/uL Final    Glucose 01/17/2017 95  65 - 99 mg/dL Final    BUN 01/17/2017 17  8 - 27 mg/dL Final    Creatinine 01/17/2017 0.98  0.57 - 1.00 mg/dL Final    GFR est non-AA 01/17/2017 56* >59 mL/min/1.73 Final    GFR est AA 01/17/2017 64  >59 mL/min/1.73 Final    BUN/Creatinine ratio 01/17/2017 17  11 - 26 Final    Sodium 01/17/2017 144  134 - 144 mmol/L Final    Potassium 01/17/2017 5.4* 3.5 - 5.2 mmol/L Final    Chloride 01/17/2017 104  96 - 106 mmol/L Final    CO2 01/17/2017 22  18 - 29 mmol/L Final    Calcium 01/17/2017 9.7  8.7 - 10.3 mg/dL Final    B-type Natriuretic Peptide 01/17/2017 303.1* 0.0 - 100.0 pg/mL Final          ASSESSMENT:  1. Chronic deep vein thrombosis (DVT) of femoral vein of right lower extremity (HCC)    2.  Primary osteoarthritis of right knee              PLAN:  .  Orders Placed This Encounter    AMB POC PT/INR    triamcinolone acetonide (KENALOG) 40 mg/mL injection       Follow-up Disposition:  Return in about 1 week (around 4/3/2017) for pt/inr.  TIME OUT performed immediately prior to start of procedure:   Mercedes JACKSON MD, have performed the following reviews on Denece Brunner prior to the start of the procedure:            * Patient was identified by name and date of birth   * Agreement on procedure being performed was verified  * Risks and Benefits explained to the patient  * Procedure site verified and marked as necessary  * Patient was positioned for comfort  * Consent was signed and verified     Time: 12:40p    Date of procedure: 3/27/2017     Procedure Note:  After cleansing with Betadine and using 1% Xylocaine plain, a needle was inserted into the lateral aspect of the right knee. Aspiration was attempted without success. One and a half cc's of Kenalog and an equal amount of Xylocaine was placed uneventfully. Patient tolerated the procedure well without complications. She was advised to call us if she noticed some swelling of her knee, particularly in view of the prolonged PT.                ATTENTION:   This medical record was transcribed using an electronic medical records system. Although proofread, it may and can contain electronic and spelling errors. Other human spelling and other errors may be present. Corrections may be executed at a later time. Please feel free to contact us for any clarifications as needed.

## 2017-03-27 NOTE — PROGRESS NOTES
1. Have you been to the ER, urgent care clinic since your last visit? Hospitalized since your last visit? No    2. Have you seen or consulted any other health care providers outside of the [address] [place] [address] since your last visit? Include any pap smears or colon screening.  No

## 2017-04-03 ENCOUNTER — CLINICAL SUPPORT (OUTPATIENT)
Dept: INTERNAL MEDICINE CLINIC | Age: 77
End: 2017-04-03

## 2017-04-03 DIAGNOSIS — I48.91 ATRIAL FIBRILLATION, UNSPECIFIED TYPE (HCC): Primary | ICD-10-CM

## 2017-04-03 LAB
INR BLD: 5.7
PT POC: 68.8 SEC
VALID INTERNAL CONTROL?: YES

## 2017-04-03 NOTE — MR AVS SNAPSHOT
Visit Information Date & Time Provider Department Dept. Phone Encounter #  
 4/3/2017 11:30 AM Ayaz Espinoza [facility] Sports Medicine and [address] [id number] Your Appointments 4/3/2017 11:30 AM  
Nurse Visit with Wilfredo Young MD  
[address] [place] [address] and Primary Care [facility]) Appt Note: follow up 1wk PT/INR  
 [address] [place]  
  
   
 [address] [phone]  
  
    
 4/3/2017  2:00 PM  
ESTABLISHED PATIENT with Efrain Jeff MD  
[facility] ([facility]) Appt Note: 6 month follow up  
 [address]  
[address] [facility] [phone] Upcoming Health Maintenance Date Due  
 GLAUCOMA SCREENING Q2Y 4/21/2017 Pneumococcal 65+ Low/Medium Risk (2 of 2 - PPSV23) 5/7/2017 MEDICARE YEARLY EXAM 6/14/2017 DTaP/Tdap/Td series (2 - Td) 1/17/2027 Allergies as of 4/3/2017  Review Complete On: 3/27/2017 By: Wilfredo Young MD  
 No Known Allergies Current Immunizations  Reviewed on 5/4/2012 Name Date Pneumococcal Vaccine (Unspecified Type) 5/7/2012  8:12 AM  
  
 Not reviewed this visit You Were Diagnosed With   
  
 Codes Comments Atrial fibrillation, unspecified type ([facility] [address]    -  Primary ICD-10-CM: I48.91 
ICD-9-CM: 427.31 Vitals OB Status Smoking Status Hysterectomy Former Smoker Preferred Pharmacy Pharmacy Name Phone [facility] [address] [facility] [phone] Your Updated Medication List  
  
   
This list is accurate as of: 4/3/17 11:24 AM.  Always use your most recent med list.  
  
  
  
  
 aspirin 81 mg chewable tablet Take 81 mg by mouth daily. CARTIA  mg ER capsule Generic drug:  dilTIAZem CD  
 TAKE ONE CAPSULE BY MOUTH DAILY  
  
 furosemide 20 mg tablet Commonly known as:  LASIX HYDROcodone-acetaminophen 5-325 mg per tablet Commonly known as:  Jerl Ards Take 1 Tab by mouth every six (6) hours as needed for Pain. Max Daily Amount: 4 Tabs. losartan 50 mg tablet Commonly known as:  COZAAR Take 50 mg by mouth daily. metoprolol tartrate 25 mg tablet Commonly known as:  LOPRESSOR Take 0.5 Tabs by mouth two (2) times a day. pravastatin 20 mg tablet Commonly known as:  PRAVACHOL  
TAKE ONE TABLET BY MOUTH NIGHTLY  
  
 warfarin 2 mg tablet Commonly known as:  COUMADIN  
TAKE ONE TABLET BY MOUTH DAILY We Performed the Following AMB POC PT/INR [93657 CPT(R)] Introducing [facility] & HEALTH SERVICES! Tanis Epley introduces [employer] patient portal. Now you can access parts of your medical record, email your doctor's office, and request medication refills online. 1. In your internet browser, go to [email or web address] 2. Click on the First Time User? Click Here link in the Sign In box. You will see the New Member Sign Up page. 3. Enter your [employer] Access Code exactly as it appears below. You will not need to use this code after youve completed the sign-up process. If you do not sign up before the expiration date, you must request a new code. · [employer] Access Code: [email or web address] Expires: 5/15/2017 10:31 AM 
 
4. Enter the last four digits of your Social Security Number (xxxx) and Date of Birth (mm/dd/yyyy) as indicated and click Submit. You will be taken to the next sign-up page. 5. Create a [employer] ID. This will be your [employer] login ID and cannot be changed, so think of one that is secure and easy to remember. 6. Create a [employer] password. You can change your password at any time. 7. Enter your Password Reset Question and Answer. This can be used at a later time if you forget your password. 8. Enter your e-mail address. You will receive e-mail notification when new information is available in [address] 9. Click Sign Up. You can now view and download portions of your medical record. 10. Click the Download Summary menu link to download a portable copy of your medical information. If you have questions, please visit the Frequently Asked Questions section of the [employer] website. Remember, [employer] is NOT to be used for urgent needs. For medical emergencies, dial 911. Now available from your iPhone and Android! Please provide this summary of care documentation to your next provider. Your primary care clinician is listed as Deetta Spurling. If you have any questions after today's visit, please call [phone].

## 2017-04-03 NOTE — PROGRESS NOTES
Patient comes into the office for pt/inr check up she states she is taking Coumadin 1 mg 1 tablet daily. Per Dr. Candy Cook Patient is to hold no Coumadin for 2 days and then start back on Coumadin 1 mg 1/2 tablet bon Monday Wednesday and Friday and 1 tablet all other days and return to the office in 1 week for repeat pt/inr check up.  Pt 68.8 inr 5.7

## 2017-04-04 ENCOUNTER — OFFICE VISIT (OUTPATIENT)
Dept: CARDIOLOGY CLINIC | Age: 77
End: 2017-04-04

## 2017-04-04 VITALS
BODY MASS INDEX: 23.13 KG/M2 | HEIGHT: 65 IN | WEIGHT: 138.8 LBS | SYSTOLIC BLOOD PRESSURE: 110 MMHG | DIASTOLIC BLOOD PRESSURE: 70 MMHG | RESPIRATION RATE: 16 BRPM | HEART RATE: 70 BPM

## 2017-04-04 DIAGNOSIS — I35.9 AORTIC VALVE DISORDERS: ICD-10-CM

## 2017-04-04 DIAGNOSIS — I10 ESSENTIAL HYPERTENSION, BENIGN: ICD-10-CM

## 2017-04-04 DIAGNOSIS — I07.1 TRICUSPID VALVE INSUFFICIENCY, UNSPECIFIED ETIOLOGY: ICD-10-CM

## 2017-04-04 DIAGNOSIS — I09.9 RHEUMATIC HEART DISEASE: ICD-10-CM

## 2017-04-04 DIAGNOSIS — I48.91 ATRIAL FIBRILLATION, UNSPECIFIED TYPE (HCC): Primary | ICD-10-CM

## 2017-04-04 DIAGNOSIS — I25.10 ATHEROSCLEROSIS OF NATIVE CORONARY ARTERY OF NATIVE HEART WITHOUT ANGINA PECTORIS: ICD-10-CM

## 2017-04-04 DIAGNOSIS — I48.92 ATRIAL FLUTTER WITH CONTROLLED RESPONSE (HCC): ICD-10-CM

## 2017-04-04 RX ORDER — LOSARTAN POTASSIUM 50 MG/1
50 TABLET ORAL DAILY
Qty: 90 TAB | Refills: 3 | Status: SHIPPED | OUTPATIENT
Start: 2017-04-04 | End: 2018-05-23 | Stop reason: SDUPTHER

## 2017-04-04 RX ORDER — DILTIAZEM HYDROCHLORIDE 120 MG/1
120 CAPSULE, COATED, EXTENDED RELEASE ORAL DAILY
Qty: 90 CAP | Refills: 3 | Status: SHIPPED | OUTPATIENT
Start: 2017-04-04 | End: 2018-03-27 | Stop reason: SDUPTHER

## 2017-04-04 RX ORDER — PRAVASTATIN SODIUM 20 MG/1
20 TABLET ORAL
Qty: 90 TAB | Refills: 10 | Status: SHIPPED | OUTPATIENT
Start: 2017-04-04 | End: 2018-06-20 | Stop reason: SDUPTHER

## 2017-04-04 NOTE — PROGRESS NOTES
[facility] [place]: Manan Lora  [phone]    HPI: Gerilyn Sicard is a 65y.o. year-old female who presents for follow up regarding her complex valve disease, CAD, Atrial Fib and HTN. Still having trouble with the right leg, swollen and painful since she got that DVT. Dr. Lilibeth Olivera gave her a shot i the knee and that has helped a bit but not the feet are hurting her. She is drinking Ensure because she is worried about putting on weight and has a poo appetite. She wants to take a vitamin and will try a b complex. She feels bad today. Pain, swelling, shoulder pain, leg pain. Has a headache, laying in the dark, sore throat, no fever or chills. Always in pain from the arthritis. She is breathing ok for the most part but it is short at times. Back on the losartan now and needs a new rx, but her Bp looks good on that now. Dr. Lilibeth Olivera is now following her INR and refilling her percocet. INR has been up and down. High yesterday. Goes back on 4/17. EKG NSR LVH today  May restart Lasix at QOD if swelling continues. She will discuss with Dr. Lilibeth Olivera. Had ARF on Lasix daily. A/P:  1. CAD- 100% distal RCA disease on cath 3/11 not amenable to PCI or bypass, no exertional chest pain, continue aspirin and pravastatin  2. Rheumatic heart disease/MV disease- s/p replacement 3/12 Dr. Kishore Bond, stable, next echo 12/16  3. Afib- s/p cryomaze and ALIYAH closure at time of MVR, on Coumadin, rate controlled with Metoprolol and Diltiazem  -Aflutter and afib returned post-op, seen by Dr. Janis Chavis and offered aflutter ablation if it persisted, but gone, NSR now. Occasional palpitations now. Not bad, does not want further treatment at this time. 4. HTN- at goal today on metoprolol and diltiazem, losartan  5. Thyroid- off meds now s/p methimazole  6. Aortic Atherosclerosis- Aspirin and Pravastatin   7. Mild PAHTN-will reassess with TTE next appt  8. Tobacco use- continue cessation, stopped completely  9.  COPD - seen on CT chest, not on inhalers at this time, denies progression of dyspnea  10. Right sided chest pain/back pain/neck pain - continue Percocet PRN pain, as per Dr. Yessica Clark  . 10/16 EF 60%, gr3 dd, NAVI mild TR/PI  /2015 Echo EF 65%, mild TR, PAP 45, mild PI, gr4dd. Echo 10/14 EF 65%, NAVI, nl functioning MVR bioprosthesis, mild-mod TR PAP 45  12/13 EF 60% mildly increased MV gradient, no MR. Echo 2/12: EF55% mildCLVH, mod MR, sev MS 4.5/15 peak gradient, LAE, mild AS, mild PAHTN, mod Tr  Echo 7/10 EF 55%, gr1DD, mod AS, JUAN 1.1, Mod MS, mild TR, mild paHTN PA 40  Severe MR cath 4/10, with mild CAD    Fhx 2bro w CHF, +Dm +HTN  Soc quit tobacco    She  has a past medical history of AF (atrial fibrillation) ([address] Anemia; Arthritis; Blunt head trauma (03/13/2017); Chronic pain; Coagulation defects; Diastolic dysfunction; Encounter for Hemoccult screening (06/22/2016); Fall (03/13/2017); H/O colonoscopy (2007); Hand pain, right; Heart failure ([address] Prediabetes; PUD (peptic ulcer disease); Rheumatic fever; S/P MVR (mitral valve replacement); Shoulder pain, right; SOB (shortness of breath); Thyroid disease; Venous insufficiency of both lower extremities (01/17/2017); and Warfarin-induced coagulopathy ([address] Cardiovascular ROS: positive for chest pain, negative for progression of dyspnea or palpitations   Respiratory ROS: positive for - shortness of breath  Neurological ROS: no TIA or stroke symptoms  All other systems negative except as above. PE  Vitals:    04/04/17 1051   BP: 110/70   Pulse: 70   Resp: 16   Weight: 138 lb 12.8 oz (63 kg)   Height: 5' 5\" (1.651 m)    Body mass index is 23.1 kg/(m^2).    General appearance - alert, well appearing, and in no distress  Mental status - affect appropriate to mood  Eyes - sclera anicteric, moist mucous membranes  Neck - supple, no significant adenopathy  Lymphatics - no  lymphadenopathy  Chest - clear to auscultation, no wheezes, rales or rhonchi  Heart - normal rate, regular rhythm, normal S1, S2, 2/6 BUTCH   Abdomen - soft, nontender, nondistended, no masses or organomegaly  Back exam - full range of motion, no tenderness  Neurological - cranial nerves II through XII grossly intact, no focal deficit  Musculoskeletal - no muscular tenderness noted, normal strength  Extremities - peripheral pulses normal, no pedal edema  Skin - normal coloration  no rashes    12 lead ECG: NSR 60bpm    Recent Labs:  Lab Results   Component Value Date/Time    Cholesterol, total 153 06/13/2016 01:39 PM     No results found for: NINA  Lab Results   Component Value Date/Time    BUN 16 03/08/2017 10:49 AM     Lab Results   Component Value Date/Time    Potassium 4.3 03/08/2017 10:49 AM     Lab Results   Component Value Date/Time    Hemoglobin A1c 5.9 06/13/2016 01:39 PM     No components found for: HGBI,  IHGB,  HGB,  HGBP,  WBHGB  Lab Results   Component Value Date/Time    PLATELET 823 63/09/2140 10:49 AM       Reviewed:  Past Medical History:   Diagnosis Date    AF (atrial fibrillation) (HCC)     RHEUMATIC FEVER AS CHILD, AFIB    Anemia     Arthritis     Blunt head trauma 03/13/2017    Chronic pain     Coagulation defects     COUMADIN    Diastolic dysfunction     grade 4    Encounter for Hemoccult screening 06/22/2016    Fall 03/13/2017    H/O colonoscopy 2007    Hand pain, right     Heart failure (HCC)     Prediabetes     PUD (peptic ulcer disease)     Rheumatic fever     S/P MVR (mitral valve replacement)     Shoulder pain, right     SOB (shortness of breath)     Thyroid disease     Venous insufficiency of both lower extremities 01/17/2017    Warfarin-induced coagulopathy ([facility] [address]      History   Smoking Status    Former Smoker    Packs/day: 0.50    Years: 30.00    Quit date: 1/20/2012   Smokeless Tobacco    Never Used     History   Alcohol Use No     No Known Allergies    Current Outpatient Prescriptions   Medication Sig    HYDROcodone-acetaminophen (NORCO) 5-325 mg per tablet Take 1 Tab by mouth every six (6) hours as needed for Pain. Max Daily Amount: 4 Tabs.  losartan (COZAAR) 50 mg tablet Take 50 mg by mouth daily.  aspirin 81 mg chewable tablet Take 81 mg by mouth daily.  pravastatin (PRAVACHOL) 20 mg tablet TAKE ONE TABLET BY MOUTH NIGHTLY    CARTIA  mg ER capsule TAKE ONE CAPSULE BY MOUTH DAILY    metoprolol (LOPRESSOR) 25 mg tablet Take 0.5 Tabs by mouth two (2) times a day.  warfarin (COUMADIN) 2 mg tablet TAKE ONE TABLET BY MOUTH DAILY (Patient taking differently: takes 2 mg on Sun only and 1mg all other days )    furosemide (LASIX) 20 mg tablet      No current facility-administered medications for this visit.         MD Luciano Jordan [facility] [place]  [address] [facility] [address]  [facility], [address]

## 2017-04-17 ENCOUNTER — CLINICAL SUPPORT (OUTPATIENT)
Dept: INTERNAL MEDICINE CLINIC | Age: 77
End: 2017-04-17

## 2017-04-17 DIAGNOSIS — D68.32 WARFARIN-INDUCED COAGULOPATHY (HCC): Primary | ICD-10-CM

## 2017-04-17 DIAGNOSIS — G89.29 OTHER CHRONIC PAIN: ICD-10-CM

## 2017-04-17 DIAGNOSIS — T45.515A WARFARIN-INDUCED COAGULOPATHY (HCC): Primary | ICD-10-CM

## 2017-04-17 LAB
INR BLD: 1.5
PT POC: 18.6 SEC
VALID INTERNAL CONTROL?: YES

## 2017-04-17 RX ORDER — HYDROCODONE BITARTRATE AND ACETAMINOPHEN 5; 325 MG/1; MG/1
1 TABLET ORAL
Qty: 60 TAB | Refills: 0 | Status: SHIPPED | OUTPATIENT
Start: 2017-04-17 | End: 2017-06-26 | Stop reason: SDUPTHER

## 2017-04-17 RX ORDER — HYDROCODONE BITARTRATE AND ACETAMINOPHEN 5; 325 MG/1; MG/1
1 TABLET ORAL
Qty: 60 TAB | Refills: 0 | Status: SHIPPED | OUTPATIENT
Start: 2017-04-17 | End: 2017-04-17 | Stop reason: SDUPTHER

## 2017-04-17 NOTE — MR AVS SNAPSHOT
Visit Information Date & Time Provider Department Dept. Phone Encounter #  
 4/17/2017 10:45 AM Mera Baker MD [facility] Sports Medicine and [address] [id number] Your Appointments 5/1/2017 11:45 AM  
Nurse Visit with Mera Baker MD  
[address] [place] [address] and Primary Care [address]) Appt Note: pt  
 [address] [place]  
  
   
 [address] [phone] Upcoming Health Maintenance Date Due  
 GLAUCOMA SCREENING Q2Y 4/21/2017 Pneumococcal 65+ Low/Medium Risk (2 of 2 - PPSV23) 5/7/2017 MEDICARE YEARLY EXAM 6/14/2017 DTaP/Tdap/Td series (2 - Td) 1/17/2027 Allergies as of 4/17/2017  Review Complete On: 4/4/2017 By: Lambert Saleh No Known Allergies Current Immunizations  Reviewed on 5/4/2012 Name Date Pneumococcal Vaccine (Unspecified Type) 5/7/2012  8:12 AM  
  
 Not reviewed this visit You Were Diagnosed With   
  
 Codes Comments Warfarin-induced coagulopathy (HCC)    -  Primary ICD-10-CM: T45.511A, D68.9 ICD-9-CM: 286.9, Q365.5 Other chronic pain     ICD-10-CM: G89.29 ICD-9-CM: 338.29 Vitals OB Status Smoking Status Hysterectomy Former Smoker Preferred Pharmacy Pharmacy Name Phone [facility] [address] [address] [facility] [phone] Your Updated Medication List  
  
   
This list is accurate as of: 4/17/17 12:02 PM.  Always use your most recent med list.  
  
  
  
  
 aspirin 81 mg chewable tablet Take 81 mg by mouth daily. dilTIAZem  mg ER capsule Commonly known as:  CARTIA XT Take 1 Cap by mouth daily. furosemide 20 mg tablet Commonly known as:  LASIX HYDROcodone-acetaminophen 5-325 mg per tablet Commonly known as:  Yasmin Manjit Take 1 Tab by mouth every six (6) hours as needed for Pain. Max Daily Amount: 4 Tabs. losartan 50 mg tablet Commonly known as:  COZAAR Take 1 Tab by mouth daily. metoprolol tartrate 25 mg tablet Commonly known as:  LOPRESSOR Take 0.5 Tabs by mouth two (2) times a day. pravastatin 20 mg tablet Commonly known as:  PRAVACHOL Take 1 Tab by mouth nightly. warfarin 2 mg tablet Commonly known as:  COUMADIN  
TAKE ONE TABLET BY MOUTH DAILY Prescriptions Printed Refills HYDROcodone-acetaminophen (NORCO) 5-325 mg per tablet 0 Sig: Take 1 Tab by mouth every six (6) hours as needed for Pain. Max Daily Amount: 4 Tabs. Class: Print Route: Oral  
  
We Performed the Following AMB POC PT/INR [39624 CPT(R)] PAIN MGMT PANEL W/REFL, UR [[id number] Custom] Introducing [facility] & HEALTH SERVICES! [employer] introduces [employer] patient portal. Now you can access parts of your medical record, email your doctor's office, and request medication refills online. 1. In your internet browser, go to [email or web address] 2. Click on the First Time User? Click Here link in the Sign In box. You will see the New Member Sign Up page. 3. Enter your [employer] Access Code exactly as it appears below. You will not need to use this code after youve completed the sign-up process. If you do not sign up before the expiration date, you must request a new code. · [email or web address] Access Code: [email or web address] Expires: 5/15/2017 10:31 AM 
 
4. Enter the last four digits of your Social Security Number (xxxx) and Date of Birth (mm/dd/yyyy) as indicated and click Submit. You will be taken to the next sign-up page. 5. Create a GHash[employer]IO ID. This will be your [employer] login ID and cannot be changed, so think of one that is secure and easy to remember. 6. Create a G[employer] password. You can change your password at any time. 7. Enter your Password Reset Question and Answer. This can be used at a later time if you forget your password. 8. Enter your e-mail address. You will receive e-mail notification when new information is available in [address] 9. Click Sign Up. You can now view and download portions of your medical record. 10. Click the Download Summary menu link to download a portable copy of your medical information. If you have questions, please visit the Frequently Asked Questions section of the [employer] website. Remember, [employer] is NOT to be used for urgent needs. For medical emergencies, dial 911. Now available from your iPhone and Android! Please provide this summary of care documentation to your next provider. Your primary care clinician is listed as Delroy Tavares. If you have any questions after today's visit, please call [phone].

## 2017-04-17 NOTE — PROGRESS NOTES
Patient comes into the office for pt/inr check up she states she is taking Coumadin 2 mg  1/2 tablet daily. Per Dr. Abigail Leger patient is to start taking Coumadin 2 mg on sundays and thursdays and 1/2 tablets on all other days and return to the office in 2 weeks for repeat pt check up.  Pt 18.6 inr 1.5

## 2017-04-25 LAB
AMPHETAMINES UR QL SCN: NEGATIVE NG/ML
BARBITURATES UR QL SCN: NEGATIVE NG/ML
BENZODIAZ UR QL SCN: NEGATIVE NG/ML
BZE UR QL SCN: NEGATIVE NG/ML
CANNABINOIDS UR QL SCN: NEGATIVE NG/ML
CREAT UR-MCNC: 89.6 MG/DL (ref 20–300)
FENTANYL+NORFENTANYL UR QL CFM: POSITIVE
MEPERIDINE UR QL: NEGATIVE NG/ML
METHADONE UR QL SCN: NEGATIVE NG/ML
OPIATES UR QL SCN: POSITIVE NG/ML
OXYCODONE+OXYMORPHONE UR QL SCN: NEGATIVE NG/ML
PCP UR QL: NEGATIVE NG/ML
PH UR: 6.3 [PH] (ref 4.5–8.9)
PLEASE NOTE:, 733157: ABNORMAL
PROPOXYPH UR QL SCN: NEGATIVE NG/ML
SP GR UR: 1.01
TRAMADOL UR QL SCN: NEGATIVE NG/ML

## 2017-05-01 ENCOUNTER — CLINICAL SUPPORT (OUTPATIENT)
Dept: INTERNAL MEDICINE CLINIC | Age: 77
End: 2017-05-01

## 2017-05-01 DIAGNOSIS — T45.515A WARFARIN-INDUCED COAGULOPATHY (HCC): Primary | ICD-10-CM

## 2017-05-01 DIAGNOSIS — D68.32 WARFARIN-INDUCED COAGULOPATHY (HCC): Primary | ICD-10-CM

## 2017-05-01 LAB
INR BLD: 2.4
PT POC: 28.5 SEC
VALID INTERNAL CONTROL?: YES

## 2017-05-01 NOTE — MR AVS SNAPSHOT
Visit Information Date & Time Provider Department Dept. Phone Encounter #  
 5/1/2017 11:45 AM MD Rosaline Das [facility] Sports Medicine and [place] [phone] [id number] Upcoming Health Maintenance Date Due  
 GLAUCOMA SCREENING Q2Y 4/21/2017 Pneumococcal 65+ Low/Medium Risk (2 of 2 - PPSV23) 5/7/2017 MEDICARE YEARLY EXAM 6/14/2017 INFLUENZA AGE 9 TO ADULT 8/1/2017 DTaP/Tdap/Td series (2 - Td) 1/17/2027 Allergies as of 5/1/2017  Review Complete On: 4/4/2017 By: Lambert Saleh No Known Allergies Current Immunizations  Reviewed on 5/4/2012 Name Date Pneumococcal Vaccine (Unspecified Type) 5/7/2012  8:12 AM  
  
 Not reviewed this visit You Were Diagnosed With   
  
 Codes Comments Warfarin-induced coagulopathy (HCC)    -  Primary ICD-10-CM: T45.511A, D68.9 ICD-9-CM: 286.9, Q626.1 Vitals OB Status Smoking Status Hysterectomy Former Smoker Preferred Pharmacy Pharmacy Name Phone [address] [phone] Your Updated Medication List  
  
   
This list is accurate as of: 5/1/17 12:20 PM.  Always use your most recent med list.  
  
  
  
  
 aspirin 81 mg chewable tablet Take 81 mg by mouth daily. dilTIAZem  mg ER capsule Commonly known as:  CARTIA XT Take 1 Cap by mouth daily. furosemide 20 mg tablet Commonly known as:  LASIX HYDROcodone-acetaminophen 5-325 mg per tablet Commonly known as:  Yasmin Manjit Take 1 Tab by mouth every six (6) hours as needed for Pain. Max Daily Amount: 4 Tabs. losartan 50 mg tablet Commonly known as:  COZAAR Take 1 Tab by mouth daily. metoprolol tartrate 25 mg tablet Commonly known as:  LOPRESSOR Take 0.5 Tabs by mouth two (2) times a day. pravastatin 20 mg tablet Commonly known as:  PRAVACHOL Take 1 Tab by mouth nightly. warfarin 2 mg tablet Commonly known as:  COUMADIN  
TAKE ONE TABLET BY MOUTH DAILY We Performed the Following AMB POC PT/INR [28586 CPT(R)] Introducing [facility] & HEALTH SERVICES! [facility] introduces [employer] patient portal. Now you can access parts of your medical record, email your doctor's office, and request medication refills online. 1. In your internet browser, go to [email or web address] 2. Click on the First Time User? Click Here link in the Sign In box. You will see the New Member Sign Up page. 3. Enter your [employer] Access Code exactly as it appears below. You will not need to use this code after youve completed the sign-up process. If you do not sign up before the expiration date, you must request a new code. · [employer] Access Code: [email or web address] Expires: 5/15/2017 10:31 AM 
 
4. Enter the last four digits of your Social Security Number (xxxx) and Date of Birth (mm/dd/yyyy) as indicated and click Submit. You will be taken to the next sign-up page. 5. Create a [employer] ID. This will be your [employer] login ID and cannot be changed, so think of one that is secure and easy to remember. 6. Create a [employer] password. You can change your password at any time. 7. Enter your Password Reset Question and Answer. This can be used at a later time if you forget your password. 8. Enter your e-mail address. You will receive e-mail notification when new information is available in [address] 9. Click Sign Up. You can now view and download portions of your medical record. 10. Click the Download Summary menu link to download a portable copy of your medical information. If you have questions, please visit the Frequently Asked Questions section of the [employer] website. Remember, [employer] is NOT to be used for urgent needs. For medical emergencies, dial 911. Now available from your iPhone and Android! Please provide this summary of care documentation to your next provider. Your primary care clinician is listed as David Hale. If you have any questions after today's visit, please call [phone].

## 2017-05-01 NOTE — PROGRESS NOTES
Patient comes into the office for pt/inr check up she states she is taking Coumadin 2 mg on Thursday and Sunday and 1 mg on all other days. Per Dr. Cata Morillo patient is to stay on the same dose of medication and return to the office in 3 weeks for repeat pt/inr check up.  Pt 28.5 inr 2.4

## 2017-05-22 ENCOUNTER — CLINICAL SUPPORT (OUTPATIENT)
Dept: INTERNAL MEDICINE CLINIC | Age: 77
End: 2017-05-22

## 2017-05-22 DIAGNOSIS — D68.32 WARFARIN-INDUCED COAGULOPATHY (HCC): Primary | ICD-10-CM

## 2017-05-22 DIAGNOSIS — T45.515A WARFARIN-INDUCED COAGULOPATHY (HCC): Primary | ICD-10-CM

## 2017-05-22 LAB
INR BLD: 3.5
PT POC: 41.5 SECONDS
VALID INTERNAL CONTROL?: YES

## 2017-05-22 NOTE — MR AVS SNAPSHOT
Visit Information Date & Time Provider Department Dept. Phone Encounter #  
 5/22/2017 11:45 AM Marleen Portillo MD [facility] Medicine and Primary Care [phone] [id number] Upcoming Health Maintenance Date Due  
 GLAUCOMA SCREENING Q2Y 4/21/2017 Pneumococcal 65+ Low/Medium Risk (2 of 2 - PPSV23) 5/7/2017 MEDICARE YEARLY EXAM 6/14/2017 INFLUENZA AGE 9 TO ADULT 8/1/2017 DTaP/Tdap/Td series (2 - Td) 1/17/2027 Allergies as of 5/22/2017  Review Complete On: 4/4/2017 By: Katlyn Hernandez No Known Allergies Current Immunizations  Reviewed on 5/4/2012 Name Date Pneumococcal Vaccine (Unspecified Type) 5/7/2012  8:12 AM  
  
 Not reviewed this visit You Were Diagnosed With   
  
 Codes Comments Warfarin-induced coagulopathy (HCC)    -  Primary ICD-10-CM: T45.511A, D68.9 ICD-9-CM: 286.9, S389.9 Vitals OB Status Smoking Status Hysterectomy Former Smoker Preferred Pharmacy Pharmacy Name Phone [place] [address] [facility] [phone] Your Updated Medication List  
  
   
This list is accurate as of: 5/22/17  1:42 PM.  Always use your most recent med list.  
  
  
  
  
 aspirin 81 mg chewable tablet Take 81 mg by mouth daily. dilTIAZem  mg ER capsule Commonly known as:  CARTIA XT Take 1 Cap by mouth daily. furosemide 20 mg tablet Commonly known as:  LASIX HYDROcodone-acetaminophen 5-325 mg per tablet Commonly known as:  Sherine Safe Take 1 Tab by mouth every six (6) hours as needed for Pain. Max Daily Amount: 4 Tabs. losartan 50 mg tablet Commonly known as:  COZAAR Take 1 Tab by mouth daily. metoprolol tartrate 25 mg tablet Commonly known as:  LOPRESSOR Take 0.5 Tabs by mouth two (2) times a day. pravastatin 20 mg tablet Commonly known as:  PRAVACHOL Take 1 Tab by mouth nightly. warfarin 2 mg tablet Commonly known as:  COUMADIN  
TAKE ONE TABLET BY MOUTH DAILY We Performed the Following AMB POC PT/INR [50949 CPT(R)] Introducing [facility] & HEALTH SERVICES! [facility] introduces Hastify patient portal. Now you can access parts of your medical record, email your doctor's office, and request medication refills online. 1. In your internet browser, go to [email or web address] 2. Click on the First Time User? Click Here link in the Sign In box. You will see the New Member Sign Up page. 3. Enter your Hastify Access Code exactly as it appears below. You will not need to use this code after youve completed the sign-up process. If you do not sign up before the expiration date, you must request a new code. · Hastify Access Code: [email or web address] Expires: 8/20/2017  1:42 PM 
 
4. Enter the last four digits of your Social Security Number (xxxx) and Date of Birth (mm/dd/yyyy) as indicated and click Submit. You will be taken to the next sign-up page. 5. Create a Hastify ID. This will be your Hastify login ID and cannot be changed, so think of one that is secure and easy to remember. 6. Create a Hastify password. You can change your password at any time. 7. Enter your Password Reset Question and Answer. This can be used at a later time if you forget your password. 8. Enter your e-mail address. You will receive e-mail notification when new information is available in [address] 9. Click Sign Up. You can now view and download portions of your medical record. 10. Click the Download Summary menu link to download a portable copy of your medical information. If you have questions, please visit the Frequently Asked Questions section of the Hastify website. Remember, Hastify is NOT to be used for urgent needs. For medical emergencies, dial 911. Now available from your iPhone and Android! Please provide this summary of care documentation to your next provider. Your primary care clinician is listed as Alicja Hogue. If you have any questions after today's visit, please call [phone].

## 2017-06-05 ENCOUNTER — CLINICAL SUPPORT (OUTPATIENT)
Dept: INTERNAL MEDICINE CLINIC | Age: 77
End: 2017-06-05

## 2017-06-05 DIAGNOSIS — T45.515A WARFARIN-INDUCED COAGULOPATHY (HCC): Primary | ICD-10-CM

## 2017-06-05 DIAGNOSIS — D68.32 WARFARIN-INDUCED COAGULOPATHY (HCC): Primary | ICD-10-CM

## 2017-06-05 LAB
INR BLD: 4.9
PT POC: 59.2 SECONDS
VALID INTERNAL CONTROL?: YES

## 2017-06-05 NOTE — PROGRESS NOTES
Patient comes into the office for pt/inr check up she states she is taking Coumadin 2 mg tablets. Sundays 1 tablet and 1/2 tablet on all other days. Per Dr. Shaw Gerardo patient is to hold Coumadin for 2 days start back taking 1 mg daily and return to the office in 1 week for repeat pt/inr check up.  Pt 59.2 inr 4.9

## 2017-06-05 NOTE — MR AVS SNAPSHOT
Visit Information Date & Time Provider Department Dept. Phone Encounter #  
 6/5/2017 10:00 AM [facility] [phone] [id number] Upcoming Health Maintenance Date Due  
 GLAUCOMA SCREENING Q2Y 4/21/2017 Pneumococcal 65+ Low/Medium Risk (2 of 2 - PPSV23) 5/7/2017 MEDICARE YEARLY EXAM 6/14/2017 INFLUENZA AGE 9 TO ADULT 8/1/2017 DTaP/Tdap/Td series (2 - Td) 1/17/2027 Allergies as of 6/5/2017  Review Complete On: 4/4/2017 By: Thomas Blum No Known Allergies Current Immunizations  Reviewed on 5/4/2012 Name Date Pneumococcal Vaccine (Unspecified Type) 5/7/2012  8:12 AM  
  
 Not reviewed this visit You Were Diagnosed With   
  
 Codes Comments Warfarin-induced coagulopathy (HCC)    -  Primary ICD-10-CM: T45.511A, D68.9 ICD-9-CM: 286.9, X892.8 Vitals OB Status Smoking Status Hysterectomy Former Smoker Preferred Pharmacy Pharmacy Name Phone [facility] [phone] [place] [address] [phone] Your Updated Medication List  
  
   
This list is accurate as of: 6/5/17 10:12 AM.  Always use your most recent med list.  
  
  
  
  
 aspirin 81 mg chewable tablet Take 81 mg by mouth daily. dilTIAZem  mg ER capsule Commonly known as:  CARTIA XT Take 1 Cap by mouth daily. furosemide 20 mg tablet Commonly known as:  LASIX HYDROcodone-acetaminophen 5-325 mg per tablet Commonly known as:  Milinda Copier Take 1 Tab by mouth every six (6) hours as needed for Pain. Max Daily Amount: 4 Tabs. losartan 50 mg tablet Commonly known as:  COZAAR Take 1 Tab by mouth daily. metoprolol tartrate 25 mg tablet Commonly known as:  LOPRESSOR  
TAKE 1/2 TABLET BY MOUTH TWICE DAILY pravastatin 20 mg tablet Commonly known as:  PRAVACHOL Take 1 Tab by mouth nightly. warfarin 2 mg tablet Commonly known as:  COUMADIN  
TAKE ONE TABLET BY MOUTH DAILY We Performed the Following AMB POC PT/INR [38360 CPT(R)] Introducing [facility] & HEALTH SERVICES! Jelani Tilley introduces Familio patient portal. Now you can access parts of your medical record, email your doctor's office, and request medication refills online. 1. In your internet browser, go to [email or web address] 2. Click on the First Time User? Click Here link in the Sign In box. You will see the New Member Sign Up page. 3. Enter your Familio Access Code exactly as it appears below. You will not need to use this code after youve completed the sign-up process. If you do not sign up before the expiration date, you must request a new code. · Familio Access Code: [email or web address] Expires: 8/20/2017  1:42 PM 
 
4. Enter the last four digits of your Social Security Number (xxxx) and Date of Birth (mm/dd/yyyy) as indicated and click Submit. You will be taken to the next sign-up page. 5. Create a Familio ID. This will be your Familio login ID and cannot be changed, so think of one that is secure and easy to remember. 6. Create a Familio password. You can change your password at any time. 7. Enter your Password Reset Question and Answer. This can be used at a later time if you forget your password. 8. Enter your e-mail address. You will receive e-mail notification when new information is available in [address] 9. Click Sign Up. You can now view and download portions of your medical record. 10. Click the Download Summary menu link to download a portable copy of your medical information. If you have questions, please visit the Frequently Asked Questions section of the Familio website. Remember, Familio is NOT to be used for urgent needs. For medical emergencies, dial 911. Now available from your iPhone and Android! Please provide this summary of care documentation to your next provider. Your primary care clinician is listed as Fabi Valdes. If you have any questions after today's visit, please call [phone].

## 2017-06-12 ENCOUNTER — CLINICAL SUPPORT (OUTPATIENT)
Dept: INTERNAL MEDICINE CLINIC | Age: 77
End: 2017-06-12

## 2017-06-12 DIAGNOSIS — I48.91 ATRIAL FIBRILLATION, UNSPECIFIED TYPE (HCC): Primary | ICD-10-CM

## 2017-06-12 LAB
INR BLD: 3.6
PT POC: 42.9 SECONDS
VALID INTERNAL CONTROL?: YES

## 2017-06-12 NOTE — PROGRESS NOTES
Patient comes into the office for pt/inr check up she states she is taking Coumadin 2 mg on sundays and 1 mg on all other days. Per Dr. Sukhdeep Church patient is to stay on current dose of Coumadin and return to the office in 2 weeks for repeat pt/inr check up.  Pt 42.9 inr 3.6

## 2017-06-12 NOTE — MR AVS SNAPSHOT
Visit Information Date & Time Provider Department Dept. Phone Encounter #  
 6/12/2017 10:00 AM [facility] Sin [facility] [phone] [id number] Upcoming Health Maintenance Date Due  
 GLAUCOMA SCREENING Q2Y 4/21/2017 Pneumococcal 65+ Low/Medium Risk (2 of 2 - PPSV23) 5/7/2017 MEDICARE YEARLY EXAM 6/14/2017 INFLUENZA AGE 9 TO ADULT 8/1/2017 DTaP/Tdap/Td series (2 - Td) 1/17/2027 Allergies as of 6/12/2017  Review Complete On: 4/4/2017 By: Vahe Ko No Known Allergies Current Immunizations  Reviewed on 5/4/2012 Name Date Pneumococcal Vaccine (Unspecified Type) 5/7/2012  8:12 AM  
  
 Not reviewed this visit You Were Diagnosed With   
  
 Codes Comments [facility], unspecified [facility]    -  Primary ICD-10-CM: I48.91 
ICD-9-CM: 427.31 Vitals OB Status Smoking Status Hysterectomy Former Smoker Preferred Pharmacy Pharmacy Name Phone [address] [phone] [address] [phone] Your Updated Medication List  
  
   
This list is accurate as of: 6/12/17 10:19 AM.  Always use your most recent med list.  
  
  
  
  
 aspirin 81 mg chewable tablet Take 81 mg by mouth daily. dilTIAZem  mg ER capsule Commonly known as:  CARTIA XT Take 1 Cap by mouth daily. furosemide 20 mg tablet Commonly known as:  LASIX HYDROcodone-acetaminophen 5-325 mg per tablet Commonly known as:  Gillie Breed Take 1 Tab by mouth every six (6) hours as needed for Pain. Max Daily Amount: 4 Tabs. losartan 50 mg tablet Commonly known as:  COZAAR Take 1 Tab by mouth daily. metoprolol tartrate 25 mg tablet Commonly known as:  LOPRESSOR  
TAKE 1/2 TABLET BY MOUTH TWICE DAILY pravastatin 20 mg tablet Commonly known as:  PRAVACHOL Take 1 Tab by mouth nightly. warfarin 2 mg tablet Commonly known as:  COUMADIN  
TAKE ONE TABLET BY MOUTH DAILY We Performed the Following AMB POC PT/INR [44830 CPT(R)] Introducing [facility] & HEALTH SERVICES! Chuyita Rod introduces Crysalin patient portal. Now you can access parts of your medical record, email your doctor's office, and request medication refills online. 1. In your internet browser, go to [email or web address] 2. Click on the First Time User? Click Here link in the Sign In box. You will see the New Member Sign Up page. 3. Enter your Crysalin Access Code exactly as it appears below. You will not need to use this code after youve completed the sign-up process. If you do not sign up before the expiration date, you must request a new code. · Crysalin Access Code: [email or web address] Expires: 8/20/2017  1:42 PM 
 
4. Enter the last four digits of your Social Security Number (xxxx) and Date of Birth (mm/dd/yyyy) as indicated and click Submit. You will be taken to the next sign-up page. 5. Create a Crysalin ID. This will be your Crysalin login ID and cannot be changed, so think of one that is secure and easy to remember. 6. Create a Crysalin password. You can change your password at any time. 7. Enter your Password Reset Question and Answer. This can be used at a later time if you forget your password. 8. Enter your e-mail address. You will receive e-mail notification when new information is available in [address] 9. Click Sign Up. You can now view and download portions of your medical record. 10. Click the Download Summary menu link to download a portable copy of your medical information. If you have questions, please visit the Frequently Asked Questions section of the Crysalin website. Remember, Crysalin is NOT to be used for urgent needs. For medical emergencies, dial 911. Now available from your iPhone and Android! Please provide this summary of care documentation to your next provider. Your primary care clinician is listed as Andria Madrigal. If you have any questions after today's visit, please call [phone].

## 2017-06-20 NOTE — PROGRESS NOTES
Patient comes into the office for pt/inr check up she states she is taking Coumadin 2 mg 1 tablet on thursdays and sundays and 1/2 tablet on all other days. Per Dr. Juan Paul patient is to start taking 1/2 tablet daily and 1 tablet on sundays only and return to the office in 2 weeks for blood pressure check up.  Pt 41.5 inr 3.5 (3) adequate

## 2017-06-23 ENCOUNTER — APPOINTMENT (OUTPATIENT)
Dept: GENERAL RADIOLOGY | Age: 77
End: 2017-06-23
Attending: EMERGENCY MEDICINE
Payer: MEDICARE

## 2017-06-23 ENCOUNTER — HOSPITAL ENCOUNTER (EMERGENCY)
Age: 77
Discharge: HOME | End: 2017-06-23
Attending: EMERGENCY MEDICINE | Admitting: EMERGENCY MEDICINE
Payer: MEDICARE

## 2017-06-23 VITALS
WEIGHT: 130.8 LBS | OXYGEN SATURATION: 98 % | HEIGHT: 65 IN | DIASTOLIC BLOOD PRESSURE: 56 MMHG | RESPIRATION RATE: 20 BRPM | TEMPERATURE: 97.8 F | BODY MASS INDEX: 21.79 KG/M2 | SYSTOLIC BLOOD PRESSURE: 141 MMHG | HEART RATE: 84 BPM

## 2017-06-23 DIAGNOSIS — M25.561 RIGHT KNEE PAIN, UNSPECIFIED CHRONICITY: Primary | ICD-10-CM

## 2017-06-23 PROCEDURE — 73562 X-RAY EXAM OF KNEE 3: CPT

## 2017-06-23 PROCEDURE — 99283 EMERGENCY DEPT VISIT LOW MDM: CPT

## 2017-06-23 PROCEDURE — 74011250637 HC RX REV CODE- 250/637: Performed by: EMERGENCY MEDICINE

## 2017-06-23 RX ORDER — TRAMADOL HYDROCHLORIDE 50 MG/1
50 TABLET ORAL
Qty: 15 TAB | Refills: 0 | Status: SHIPPED | OUTPATIENT
Start: 2017-06-23 | End: 2017-06-26

## 2017-06-23 RX ORDER — TRAMADOL HYDROCHLORIDE 50 MG/1
50 TABLET ORAL
Status: COMPLETED | OUTPATIENT
Start: 2017-06-23 | End: 2017-06-23

## 2017-06-23 RX ADMIN — TRAMADOL HYDROCHLORIDE 50 MG: 50 TABLET, FILM COATED ORAL at 12:49

## 2017-06-23 NOTE — ED NOTES
Ace Wrap applied to RIGHT knee. Pt instructed on use of wrap. Pt verbalized understanding. Pt given discharge instructions at this time. Pt had no questions regarding instructions. Pt ambulatory with slow, steady gait at he time of discharge.

## 2017-06-23 NOTE — ED TRIAGE NOTES
Pt arrives via EMS from home c/o RIGHT knee pain. Pt reports she woke up and  Noticed her RIGHT knee is swollen and painful when weight bearing.  Denies injury/trauma

## 2017-06-23 NOTE — ED PROVIDER NOTES
HPI Pt states that she awoke with right knee pain and swelling this morning. She denies any known injury or direct trauma. Skin integrity is intact. There is no obvious bony deformity, bruising or erythema. Good neurovascular sensation. No obvious joint effusion or joint instability. Pain increases with weight bearing; flexion and extension. She has not had any medications today prior to arrival.   Old charts reviewed. Past Medical History:   Diagnosis Date    AF (atrial fibrillation) (HCC)     RHEUMATIC FEVER AS CHILD, AFIB    Anemia     Arthritis     Blunt head trauma 2017    Chronic pain     Coagulation defects     COUMADIN    Diastolic dysfunction     grade 4    Encounter for Hemoccult screening 2016    Fall 2017    H/O colonoscopy     Hand pain, right     Heart failure (HCC)     Prediabetes     PUD (peptic ulcer disease)     Rheumatic fever     S/P MVR (mitral valve replacement)     Shoulder pain, right     SOB (shortness of breath)     Thyroid disease     Venous insufficiency of both lower extremities 2017    Warfarin-induced coagulopathy ([facility]        Past Surgical History:   Procedure Laterality Date    HX CATARACT REMOVAL      BILATERAL    HX  SECTION      HX COLONOSCOPY      HX HEART CATHETERIZATION      HX HYSTERECTOMY      HX MITRAL VALVE REPLACEMENT      HX LUIS ENRIQUE AND BSO           Family History:   Problem Relation Age of Onset    Heart Attack Sister     Hypertension Sister     Kidney Disease Mother     Hypertension Mother     Hypertension Father     Cancer Brother     Heart Failure Brother     Arthritis-osteo Sister     Cancer Sister     Cancer Brother      PROSTATE       Social History     Social History    Marital status: SINGLE     Spouse name: N/A    Number of children: N/A    Years of education: N/A     Occupational History    Not on file.      Social History Main Topics    Smoking status: Former Smoker     Packs/day: 0.50     Years: 30.00     Quit date: 2012    Smokeless tobacco: Never Used    Alcohol use No    Drug use: No    Sexual activity: No     Other Topics Concern    Not on file     Social History Narrative    Family History: Mother:  80 yrs, High Blood PressureFather:  52 yrs, High Blood PressureSister(s): alive, High Blood    Pressure, colon cancer, type II diabetesBrother(s): , High Blood Pressure, colon cancer, type II diabetesSon(s): alive 48 yrs1 sister(s)    . 1 son(s) . Social History: Alcohol Use Patient does not use alcohol. Smoking Status Patient is a former smoker. Marital Status: Single. Lives w ith:    alone. Children: sons. Occupation/W ork: retired - for [employer]. Education/School: has highschool diploma. Synagogue: [address] [employer].         ALLERGIES: Review of patient's allergies indicates no known allergies. Review of Systems   Constitutional: Negative for activity change and appetite change. HENT: Negative for facial swelling, sore throat and trouble swallowing. Eyes: Negative. Respiratory: Negative for shortness of breath. Cardiovascular: Negative. Gastrointestinal: Negative for abdominal pain, diarrhea and vomiting. Genitourinary: Negative for dysuria. Musculoskeletal: Positive for joint swelling. Negative for back pain and neck pain. Skin: Negative for color change. Neurological: Negative for headaches. Psychiatric/Behavioral: Negative. Vitals:    17 1231 17 1236   BP: 145/60 145/60   Pulse: 67    Resp: 16    Temp: 98 °F (36.7 °C)    SpO2: 100%    Weight: 59.3 kg (130 lb 12.8 oz)    Height: 5' 5\" (1.651 m)             Physical Exam   Constitutional: She is oriented to person, place, and time. She appears well-nourished. Elderly black female; non smoker   HENT:   Head: Normocephalic. Eyes: Pupils are equal, round, and reactive to light. Neck: Normal range of motion.  Neck supple. Cardiovascular: Normal rate and regular rhythm. Pulmonary/Chest: Breath sounds normal.   Abdominal: Bowel sounds are normal.   Musculoskeletal: Normal range of motion. She exhibits edema and tenderness. She exhibits no deformity. Right anterior knee is tender, swollen; Skin integrity is intact. There is no obvious deformity, bruising or erythema. Good neurovascular sensation. No obvious joint effusion or joint instability. Pain increases with weight bearing; flexion and extension. Neurological: She is alert and oriented to person, place, and time. Skin: Skin is warm and dry. Nursing note and vitals reviewed. MDM  ED Course       Procedures    Pt ambulated to the bathroom and back without difficulty; without assistance. An ace wrap was applied for comfort and support by the RN; good neurovascular sensation before and after the ace wrap was applied. 2:46 PM  Patient's results and plan of care have been reviewed with her. Patient has verbally conveyed her understanding and agreement of the patient's signs, symptoms, diagnosis, treatment and prognosis and additionally agrees to follow up as recommended or return to the Emergency Room should her condition change prior to follow-up. Discharge instructions have also been provided to the patient with some educational information regarding her diagnosis as well a list of reasons why she would want to return to the ER prior to her follow-up appointment should her condition change. Alberto Drew NP

## 2017-06-23 NOTE — PROGRESS NOTES
NUTRITION       Nutrition screening referral was triggered based on results obtained during nursing admission assessment (Enteral or Parenteral Nutrition Support PTA; otherwise, MST negative). The patient's chart was reviewed and nutrition assessment is not indicated at this time. No indication in EHR history that pt is/was on tube feedings or TPN prior to admission. Noted weight is \"pt stated\". Please obtain actual weight as able to best assess trends. Will plan to see patient for rescreen as indicated. Thank you.      [address]

## 2017-06-23 NOTE — DISCHARGE INSTRUCTIONS
We hope that we have addressed all of your medical concerns. The examination and treatment you received in the Emergency Department were for an emergent problem and were not intended as complete care. It is important that you follow up with your healthcare provider(s) for ongoing care. If your symptoms worsen or do not improve as expected, and you are unable to reach your usual health care provider(s), you should return to the Emergency Department. Today's healthcare is undergoing tremendous change, and patient satisfaction surveys are one of the many tools to assess the quality of medical care. You may receive a survey from the [employer] regarding your experience in the Emergency Department. I hope that your experience has been completely positive, particularly the medical care that I provided. As such, please participate in the survey; anything less than excellent does not meet my expectations or intentions. [facility] and [address] [place] [address] participate in nationally recognized quality of care measures. If your blood pressure is greater than 120/80, as reported below, we urge that you seek medical care to address the potential of high blood pressure, commonly known as hypertension. Hypertension can be hereditary or can be caused by certain medical conditions, pain, stress, or \"white coat syndrome. \"       Please make an appointment with your health care provider(s) for follow up of your Emergency Department visit. VITALS:   Patient Vitals for the past 8 hrs:   Temp Pulse Resp BP SpO2   06/23/17 1236 - - - 145/60 -   06/23/17 1231 98 °F (36.7 °C) 67 16 145/60 100 %          Thank you for allowing us to provide you with medical care today. We realize that you have many choices for your emergency care needs. Please choose us in the future for any continued health care needs.       Regards,           [facility] [place]: [phone]            No results found for this or any previous visit (from the past 24 hour(s)). Xr Knee Rt 3 V    Result Date: 6/23/2017  EXAM:  XR KNEE RT 3 V INDICATION:   pain and swelling with weight bearing. COMPARISON: Nelly 15, 2016. FINDINGS: Three views of the right knee demonstrate no fracture or other acute osseous or articular abnormality. There is no effusion. Mild narrowing of the medial joint compartment with subtle spurring     IMPRESSION:  Mild degenerative changes medial joint compartment. No acute fracture. Knee Pain or Injury: Care Instructions  Your Care Instructions    Injuries are a common cause of knee problems. Sudden (acute) injuries may be caused by a direct blow to the knee. They can also be caused by abnormal twisting, bending, or falling on the knee. Pain, bruising, or swelling may be severe, and may start within minutes of the injury. Overuse is another cause of knee pain. Other causes are climbing stairs, kneeling, and other activities that use the knee. Everyday wear and tear, especially as you get older, also can cause knee pain. Rest, along with home treatment, often relieves pain and allows your knee to heal. If you have a serious knee injury, you may need tests and treatment. Follow-up care is a key part of your treatment and safety. Be sure to make and go to all appointments, and call your doctor if you are having problems. It's also a good idea to know your test results and keep a list of the medicines you take. How can you care for yourself at home? · Be safe with medicines. Read and follow all instructions on the label. ¨ If the doctor gave you a prescription medicine for pain, take it as prescribed. ¨ If you are not taking a prescription pain medicine, ask your doctor if you can take an over-the-counter medicine. · Rest and protect your knee. Take a break from any activity that may cause pain.   · Put ice or a cold pack on your knee for 10 to 20 minutes at a time. Put a thin cloth between the ice and your skin. · Prop up a sore knee on a pillow when you ice it or anytime you sit or lie down for the next 3 days. Try to keep it above the level of your heart. This will help reduce swelling. · If your knee is not swollen, you can put moist heat, a heating pad, or a warm cloth on your knee. · If your doctor recommends an elastic bandage, sleeve, or other type of support for your knee, wear it as directed. · Follow your doctor's instructions about how much weight you can put on your leg. Use a cane, crutches, or a walker as instructed. · Follow your doctor's instructions about activity during your healing process. If you can do mild exercise, slowly increase your activity. · Reach and stay at a healthy weight. Extra weight can strain the joints, especially the knees and hips, and make the pain worse. Losing even a few pounds may help. When should you call for help? Call 911 anytime you think you may need emergency care. For example, call if:  · You have symptoms of a blood clot in your lung (called a pulmonary embolism). These may include:  ¨ Sudden chest pain. ¨ Trouble breathing. ¨ Coughing up blood. Call your doctor now or seek immediate medical care if:  · You have severe or increasing pain. · Your leg or foot turns cold or changes color. · You cannot stand or put weight on your knee. · Your knee looks twisted or bent out of shape. · You cannot move your knee. · You have signs of infection, such as:  ¨ Increased pain, swelling, warmth, or redness. ¨ Red streaks leading from the knee. ¨ Pus draining from a place on your knee. ¨ A fever. · You have signs of a blood clot in your leg (called a deep vein thrombosis), such as:  ¨ Pain in your calf, back of the knee, thigh, or groin. ¨ Redness and swelling in your leg or groin.   Watch closely for changes in your health, and be sure to contact your doctor if:  · You have tingling, weakness, or numbness in your knee. · You have any new symptoms, such as swelling. · You have bruises from a knee injury that last longer than 2 weeks. · You do not get better as expected. Where can you learn more? Go to http://dedra-juan manuel.info/. Enter K195 in the search box to learn more about \"Knee Pain or Injury: Care Instructions. \"  Current as of: March 20, 2017  Content Version: 11.3  © 3545-0941 [employer]. Care instructions adapted under license by [employer] (which disclaims liability or warranty for this information). If you have questions about a medical condition or this instruction, always ask your healthcare professional. [address] any warranty or liability for your use of this information. Joint Pain: Care Instructions  Your Care Instructions  Many people have small aches and pains from overuse or injury to muscles and joints. Joint injuries often happen during sports or recreation, work tasks, or projects around the home. An overuse injury can happen when you put too much stress on a joint or when you do an activity that stresses the joint over and over, such as using the computer or rowing a boat. You can take action at home to help your muscles and joints get better. You should feel better in 1 to 2 weeks, but it can take 3 months or more to heal completely. Follow-up care is a key part of your treatment and safety. Be sure to make and go to all appointments, and call your doctor if you are having problems. It's also a good idea to know your test results and keep a list of the medicines you take. How can you care for yourself at home? · Do not put weight on the injured joint for at least a day or two. · For the first day or two after an injury, do not take hot showers or baths, and do not use hot packs. The heat could make swelling worse.   · Put ice or a cold pack on the sore joint for 10 to 20 minutes at a time. Try to do this every 1 to 2 hours for the next 3 days (when you are awake) or until the swelling goes down. Put a thin cloth between the ice and your skin. · Wrap the injury in an elastic bandage. Do not wrap it too tightly because this can cause more swelling. · Prop up the sore joint on a pillow when you ice it or anytime you sit or lie down during the next 3 days. Try to keep it above the level of your heart. This will help reduce swelling. · Take an over-the-counter pain medicine, such as acetaminophen (Tylenol), ibuprofen (Advil, Motrin), or naproxen (Aleve). Read and follow all instructions on the label. · After 1 or 2 days of rest, begin moving the joint gently. While the joint is still healing, you can begin to exercise using activities that do not strain or hurt the painful joint. When should you call for help? Call your doctor now or seek immediate medical care if:  · You have signs of infection, such as:  ¨ Increased pain, swelling, warmth, and redness. ¨ Red streaks leading from the joint. ¨ A fever. Watch closely for changes in your health, and be sure to contact your doctor if:  · Your movement or symptoms are not getting better after 1 to 2 weeks of home treatment. Where can you learn more? Go to http://dedra-juan manuel.info/. Enter P205 in the search box to learn more about \"Joint Pain: Care Instructions. \"  Current as of: March 21, 2017  Content Version: 11.3  © 7997-0936 [employer]. Care instructions adapted under license by [employer] (which disclaims liability or warranty for this information). If you have questions about a medical condition or this instruction, always ask your healthcare professional. [place] [address] any warranty or liability for your use of this information.

## 2017-06-26 ENCOUNTER — OFFICE VISIT (OUTPATIENT)
Dept: INTERNAL MEDICINE CLINIC | Age: 77
End: 2017-06-26

## 2017-06-26 VITALS
OXYGEN SATURATION: 98 % | SYSTOLIC BLOOD PRESSURE: 131 MMHG | WEIGHT: 128 LBS | BODY MASS INDEX: 21.33 KG/M2 | TEMPERATURE: 96.1 F | HEART RATE: 73 BPM | HEIGHT: 65 IN | RESPIRATION RATE: 17 BRPM | DIASTOLIC BLOOD PRESSURE: 74 MMHG

## 2017-06-26 DIAGNOSIS — I48.0 PAROXYSMAL ATRIAL FIBRILLATION (HCC): Primary | ICD-10-CM

## 2017-06-26 DIAGNOSIS — T45.515A WARFARIN-INDUCED COAGULOPATHY (HCC): ICD-10-CM

## 2017-06-26 DIAGNOSIS — D68.32 WARFARIN-INDUCED COAGULOPATHY (HCC): ICD-10-CM

## 2017-06-26 DIAGNOSIS — M19.90 ARTHRITIS: ICD-10-CM

## 2017-06-26 LAB
INR BLD: 3.2
PT POC: 38.2 SECONDS
VALID INTERNAL CONTROL?: YES

## 2017-06-26 RX ORDER — HYDROCODONE BITARTRATE AND ACETAMINOPHEN 5; 325 MG/1; MG/1
1 TABLET ORAL
Qty: 60 TAB | Refills: 0 | Status: SHIPPED | OUTPATIENT
Start: 2017-06-26 | End: 2017-08-11 | Stop reason: SDUPTHER

## 2017-06-26 NOTE — MR AVS SNAPSHOT
Visit Information Date & Time Provider Department Dept. Phone Encounter #  
 6/26/2017 10:30 AM Rosalinda Murray MD [facility] and [place] [id number] Follow-up Instructions Return in about 3 weeks (around 7/17/2017) for pt/inr. Follow-up and Disposition History Upcoming Health Maintenance Date Due  
 GLAUCOMA SCREENING Q2Y 4/21/2017 Pneumococcal 65+ Low/Medium Risk (2 of 2 - PPSV23) 5/7/2017 MEDICARE YEARLY EXAM 6/14/2017 INFLUENZA AGE 9 TO ADULT 8/1/2017 DTaP/Tdap/Td series (2 - Td) 1/17/2027 Allergies as of 6/26/2017  Review Complete On: 6/26/2017 By: Rosalinda Murray MD  
 No Known Allergies Current Immunizations  Reviewed on 5/4/2012 Name Date Pneumococcal Vaccine (Unspecified Type) 5/7/2012  8:12 AM  
  
 Not reviewed this visit You Were Diagnosed With   
  
 Codes Comments Paroxysmal atrial fibrillation (HCC)    -  Primary ICD-10-CM: I48.0 ICD-9-CM: 427.31 Warfarin-induced coagulopathy (HCC)     ICD-10-CM: T45.511A, D68.9 ICD-9-CM: 286.9, L666.6 Arthritis     ICD-10-CM: M19.90 ICD-9-CM: 716.90 Vitals BP Pulse Temp Resp Height(growth percentile) Weight(growth percentile) 131/74 (BP 1 Location: Right arm, BP Patient Position: Sitting) 73 96.1 °F (35.6 °C) (Oral) 17 5' 5\" (1.651 m) 128 lb (58.1 kg) SpO2 BMI OB Status Smoking Status 98% 21.3 kg/m2 Hysterectomy Former Smoker BMI and BSA Data Body Mass Index Body Surface Area  
 21.3 kg/m 2 1.63 m 2 Preferred Pharmacy Pharmacy Name Phone [facility] [phone] Your Updated Medication List  
  
   
This list is accurate as of: 6/26/17 11:39 AM.  Always use your most recent med list.  
  
  
  
  
 aspirin 81 mg chewable tablet Take 81 mg by mouth daily. dilTIAZem  mg ER capsule Commonly known as:  CARTIA XT Take 1 Cap by mouth daily. furosemide 20 mg tablet Commonly known as:  LASIX HYDROcodone-acetaminophen 5-325 mg per tablet Commonly known as:  Marlaine Jordi Take 1 Tab by mouth every six (6) hours as needed for Pain. Max Daily Amount: 4 Tabs. losartan 50 mg tablet Commonly known as:  COZAAR Take 1 Tab by mouth daily. metoprolol tartrate 25 mg tablet Commonly known as:  LOPRESSOR  
TAKE 1/2 TABLET BY MOUTH TWICE DAILY pravastatin 20 mg tablet Commonly known as:  PRAVACHOL Take 1 Tab by mouth nightly. warfarin 2 mg tablet Commonly known as:  COUMADIN  
TAKE ONE TABLET BY MOUTH DAILY Prescriptions Printed Refills HYDROcodone-acetaminophen (NORCO) 5-325 mg per tablet 0 Sig: Take 1 Tab by mouth every six (6) hours as needed for Pain. Max Daily Amount: 4 Tabs. Class: Print Route: Oral  
  
We Performed the Following AMB POC PT/INR [48858 CPT(R)] REFERRAL TO ORTHOPEDIC SURGERY [REF62 Custom] Comments:  
 Please evaluate patient for r knee pain. Follow-up Instructions Return in about 3 weeks (around 7/17/2017) for pt/inr. Referral Information Referral ID Referred By Referred To  
  
 [id number] Joanne Mathew   
   [address] [place], [address] Visits Status Start Date End Date 1 New Request 6/26/17 6/26/18 If your referral has a status of pending review or denied, additional information will be sent to support the outcome of this decision. Introducing [facility] & HEALTH SERVICES! [facility] introduces [employer] patient portal. Now you can access parts of your medical record, email your doctor's office, and request medication refills online. 1. In your internet browser, go to [email or web address] 2. Click on the First Time User? Click Here link in the Sign In box. You will see the New Member Sign Up page. 3. Enter your [employer] Access Code exactly as it appears below. You will not need to use this code after youve completed the sign-up process. If you do not sign up before the expiration date, you must request a new code. · [employer] Access Code: [email or web address] Expires: 8/20/2017  1:42 PM 
 
4. Enter the last four digits of your Social Security Number (xxxx) and Date of Birth (mm/dd/yyyy) as indicated and click Submit. You will be taken to the next sign-up page. 5. Create a [employer] ID. This will be your [employer] login ID and cannot be changed, so think of one that is secure and easy to remember. 6. Create a [employer] password. You can change your password at any time. 7. Enter your Password Reset Question and Answer. This can be used at a later time if you forget your password. 8. Enter your e-mail address. You will receive e-mail notification when new information is available in [address]. 9. Click Sign Up. You can now view and download portions of your medical record. 10. Click the Download Summary menu link to download a portable copy of your medical information. If you have questions, please visit the Frequently Asked Questions section of the [employer] website. Remember, [employer] is NOT to be used for urgent needs. For medical emergencies, dial 911. Now available from your iPhone and Android! Please provide this summary of care documentation to your next provider. Your primary care clinician is listed as Fabi Valdes. If you have any questions after today's visit, please call [phone].

## 2017-06-26 NOTE — PROGRESS NOTES
SPORTS MEDICINE AND PRIMARY CARE  Kiera Guadalupe MD, [address] [place] [address],[address] [address]  Phone:  [phone]  Fax: [phone]       Chief Complaint   Patient presents with    Follow-up     3 month visit    . SUBJECTIVE:    Marleen Curiel is a 65 y.o. female Patient returns today ambulatory, alert and appropriate and has the capacity to give an accurate history. Since we last saw her she was seen by her cardiologist, Miguelito Locke MD for follow-up regarding her complex valve disease. She noted that she has 100% distal RCA disease in cath March 2011 not amendable to PCI or bypass and recommended continuing aspirin and Pravastatin. They noted atrial fibrillation status-post cryomaze and ALIYAH closer at time of MBR on Coumadin, rate controlled with Metoprolol and Diltiazem. She was subsequently seen in the emergency room because of right knee pain. Patient comes in for further evaluation. Patient complains bitterly of her right knee. She did not get the Tramadol filled because it really did not work and it also interfered with the Coumadin. She still has the prescription. She states something has to be done for her knee. Patient is seen for evaluation. Current Outpatient Prescriptions   Medication Sig Dispense Refill    HYDROcodone-acetaminophen (NORCO) 5-325 mg per tablet Take 1 Tab by mouth every six (6) hours as needed for Pain. Max Daily Amount: 4 Tabs. 60 Tab 0    metoprolol tartrate (LOPRESSOR) 25 mg tablet TAKE 1/2 TABLET BY MOUTH TWICE DAILY 180 Tab 0    losartan (COZAAR) 50 mg tablet Take 1 Tab by mouth daily. 90 Tab 3    pravastatin (PRAVACHOL) 20 mg tablet Take 1 Tab by mouth nightly. 90 Tab 10    dilTIAZem CD (CARTIA XT) 120 mg ER capsule Take 1 Cap by mouth daily. 90 Cap 3    furosemide (LASIX) 20 mg tablet       aspirin 81 mg chewable tablet Take 81 mg by mouth daily.       warfarin (COUMADIN) 2 mg tablet TAKE ONE TABLET BY MOUTH DAILY (Patient taking differently: takes 2 mg on Sun only and 1mg all other days ) 90 Tab 2     Past Medical History:   Diagnosis Date    AF (atrial fibrillation) (HCC)     RHEUMATIC FEVER AS CHILD, AFIB    Anemia     Arthritis     Blunt head trauma 2017    Chronic pain     Coagulation defects     COUMADIN    Diastolic dysfunction     grade 4    Encounter for Hemoccult screening 2016    Fall 2017    H/O colonoscopy     Hand pain, right     Heart failure (HCC)     Prediabetes     PUD (peptic ulcer disease)     Rheumatic fever     S/P MVR (mitral valve replacement)     Shoulder pain, right     SOB (shortness of breath)     Thyroid disease     Venous insufficiency of both lower extremities 2017    Warfarin-induced coagulopathy ([facility] [address]      Past Surgical History:   Procedure Laterality Date    HX CATARACT REMOVAL      BILATERAL    HX  SECTION      HX COLONOSCOPY      HX HEART CATHETERIZATION      HX HYSTERECTOMY      HX MITRAL VALVE REPLACEMENT      HX LUIS ENRIQUE AND BSO       No Known Allergies      REVIEW OF SYSTEMS:  General: negative for - chills or fever  ENT: negative for - headaches, nasal congestion or tinnitus  Respiratory: negative for - cough, hemoptysis, shortness of breath or wheezing  Cardiovascular : negative for - chest pain, edema, palpitations or shortness of breath  Gastrointestinal: negative for - abdominal pain, blood in stools, heartburn or nausea/vomiting  Genito-Urinary: no dysuria, trouble voiding, or hematuria  Musculoskeletal: negative for - gait disturbance, joint pain, joint stiffness or joint swelling  Neurological: no TIA or stroke symptoms  Hematologic: no bruises, no bleeding, no swollen glands  Integument: no lumps, mole changes, nail changes or rash  Endocrine: no malaise/lethargy or unexpected weight changes      Social History     Social History    Marital status: SINGLE     Spouse name: N/A    Number of children: N/A    Years of education: N/A     Social History Main Topics    Smoking status: Former Smoker     Packs/day: 0.50     Years: 30.00     Quit date: 2012    Smokeless tobacco: Never Used    Alcohol use No    Drug use: No    Sexual activity: No     Other Topics Concern    None     Social History Narrative    Family History: Mother:  80 yrs, High Blood PressureFather:  52 yrs, High Blood PressureSister(s): alive, High Blood    Pressure, colon cancer, type II diabetesBrother(s): , High Blood Pressure, colon cancer, type II diabetesSon(s): alive 48 yrs1 sister(s)    . 1 son(s) . Social History: Alcohol Use Patient does not use alcohol. Smoking Status Patient is a former smoker. Marital Status: Single. Lives w ith:    alone. Children: sons. Occupation/W ork: retired - for [employer]. Education/School: has highschool diploma. [employer]ism: [address] [employer].     Family History   Problem Relation Age of Onset    Heart Attack Sister     Hypertension Sister     Kidney Disease Mother     Hypertension Mother     Hypertension Father     Cancer Brother     Heart Failure Brother     Arthritis-osteo Sister     Cancer Sister     Cancer Brother      PROSTATE       OBJECTIVE:    Visit Vitals    /74 (BP 1 Location: Right arm, BP Patient Position: Sitting)    Pulse 73    Temp 96.1 °F (35.6 °C) (Oral)    Resp 17    Ht 5' 5\" (1.651 m)    Wt 128 lb (58.1 kg)    SpO2 98%    BMI 21.3 kg/m2     CONSTITUTIONAL: well , well nourished, appears age appropriate  EYES: perrla, eom intact  ENMT:moist mucous membranes, pharynx clear  NECK: supple. Thyroid normal  RESPIRATORY: Chest: clear bilaterally   CARDIOVASCULAR: Heart: regular rate and rhythm  GASTROINTESTINAL: Abdomen: soft, bowel sounds active  HEMATOLOGIC: no pathological lymph nodes palpated  MUSCULOSKELETAL: Extremities: no edema, pulse 1+   INTEGUMENT: No unusual rashes or suspicious skin lesions noted.  Nails appear normal.  NEUROLOGIC: non-focal exam   MENTAL STATUS: alert and oriented, appropriate affect           ASSESSMENT:  1. Paroxysmal atrial fibrillation (HCC)    2. Warfarin-induced coagulopathy ([address]    3. Arthritis      PT/INR is therapeutic and no adjustment will be made. She has a flare of degenerative joint disease of the knee. We will refer her to orthopedics for an opinion. Blood pressure control is adequate. BMI is at her ideal body weight. She will return to the office then in six weeks if there is no improvement with regards to her knee. She will see us in about three weeks for a PT check. PLAN:  .  Orders Placed This Encounter    REFERRAL TO ORTHOPEDIC SURGERY    AMB POC PT/INR    HYDROcodone-acetaminophen (NORCO) 5-325 mg per tablet       Follow-up Disposition:  Return in about 3 weeks (around 7/17/2017) for pt/inr. ATTENTION:   This medical record was transcribed using an electronic medical records system. Although proofread, it may and can contain electronic and spelling errors. Other human spelling and other errors may be present. Corrections may be executed at a later time. Please feel free to contact us for any clarifications as needed.

## 2017-06-26 NOTE — PROGRESS NOTES
1. Have you been to the ER, urgent care clinic since your last visit? Hospitalized since your last visit? Yes Where: [facility]     2. Have you seen or consulted any other health care providers outside of the [address] [place] [address] since your last visit? Include any pap smears or colon screening.  Yes Reason for visit: Swollen right leg

## 2017-07-13 ENCOUNTER — HOSPITAL ENCOUNTER (OUTPATIENT)
Dept: MRI IMAGING | Age: 77
Discharge: HOME | End: 2017-07-13
Attending: ORTHOPAEDIC SURGERY
Payer: MEDICARE

## 2017-07-13 DIAGNOSIS — M25.561 PAIN AND SWELLING OF KNEE, RIGHT: ICD-10-CM

## 2017-07-13 DIAGNOSIS — M25.461 PAIN AND SWELLING OF KNEE, RIGHT: ICD-10-CM

## 2017-07-13 PROCEDURE — 73721 MRI JNT OF LWR EXTRE W/O DYE: CPT

## 2017-07-14 RX ORDER — WARFARIN 2 MG/1
TABLET ORAL
Qty: 90 TAB | Refills: 1 | Status: SHIPPED | OUTPATIENT
Start: 2017-07-14 | End: 2018-07-24 | Stop reason: SDUPTHER

## 2017-07-21 ENCOUNTER — CLINICAL SUPPORT (OUTPATIENT)
Dept: INTERNAL MEDICINE CLINIC | Age: 77
End: 2017-07-21

## 2017-07-21 NOTE — MR AVS SNAPSHOT
Visit Information Date & Time Provider Department Dept. Phone Encounter #  
 7/21/2017 10:00 AM Ayaz Love [facility] [address] [id number] Upcoming Health Maintenance Date Due  
 GLAUCOMA SCREENING Q2Y 4/21/2017 Pneumococcal 65+ Low/Medium Risk (2 of 2 - PPSV23) 5/7/2017 MEDICARE YEARLY EXAM 6/14/2017 INFLUENZA AGE 9 TO ADULT 8/1/2017 DTaP/Tdap/Td series (2 - Td) 1/17/2027 Allergies as of 7/21/2017  Review Complete On: 6/26/2017 By: Allie Barros MD  
 No Known Allergies Current Immunizations  Reviewed on 5/4/2012 Name Date Pneumococcal Vaccine (Unspecified Type) 5/7/2012  8:12 AM  
  
 Not reviewed this visit Vitals OB Status Smoking Status Hysterectomy Former Smoker Preferred Pharmacy Pharmacy Name Phone [facility] [address] [facility] [phone] Your Updated Medication List  
  
   
This list is accurate as of: 7/21/17 11:13 AM.  Always use your most recent med list.  
  
  
  
  
 aspirin 81 mg chewable tablet Take 81 mg by mouth daily. dilTIAZem  mg ER capsule Commonly known as:  CARTIA XT Take 1 Cap by mouth daily. furosemide 20 mg tablet Commonly known as:  LASIX HYDROcodone-acetaminophen 5-325 mg per tablet Commonly known as:  Emily [place] Take 1 Tab by mouth every six (6) hours as needed for Pain. Max Daily Amount: 4 Tabs. losartan 50 mg tablet Commonly known as:  COZAAR Take 1 Tab by mouth daily. metoprolol tartrate 25 mg tablet Commonly known as:  LOPRESSOR  
TAKE 1/2 TABLET BY MOUTH TWICE DAILY pravastatin 20 mg tablet Commonly known as:  PRAVACHOL Take 1 Tab by mouth nightly. warfarin 2 mg tablet Commonly known as:  COUMADIN  
TAKE ONE TABLET BY MOUTH DAILY Introducing [facility] & HEALTH SERVICES! Rosalia Hernández introduces Collective IP patient portal. Now you can access parts of your medical record, email your doctor's office, and request medication refills online. 1. In your internet browser, go to [email or web address] 2. Click on the First Time User? Click Here link in the Sign In box. You will see the New Member Sign Up page. 3. Enter your Collective IP Access Code exactly as it appears below. You will not need to use this code after youve completed the sign-up process. If you do not sign up before the expiration date, you must request a new code. · Collective IP Access Code: [email or web address] Expires: 8/20/2017  1:42 PM 
 
4. Enter the last four digits of your Social Security Number (xxxx) and Date of Birth (mm/dd/yyyy) as indicated and click Submit. You will be taken to the next sign-up page. 5. Create a Collective IP ID. This will be your Collective IP login ID and cannot be changed, so think of one that is secure and easy to remember. 6. Create a Collective IP password. You can change your password at any time. 7. Enter your Password Reset Question and Answer. This can be used at a later time if you forget your password. 8. Enter your e-mail address. You will receive e-mail notification when new information is available in [address] 9. Click Sign Up. You can now view and download portions of your medical record. 10. Click the Download Summary menu link to download a portable copy of your medical information. If you have questions, please visit the Frequently Asked Questions section of the Collective IP website. Remember, Collective IP is NOT to be used for urgent needs. For medical emergencies, dial 911. Now available from your iPhone and Android! Please provide this summary of care documentation to your next provider. Your primary care clinician is listed as Ana M Rey. If you have any questions after today's visit, please call [phone].

## 2017-08-11 ENCOUNTER — CLINICAL SUPPORT (OUTPATIENT)
Dept: INTERNAL MEDICINE CLINIC | Age: 77
End: 2017-08-11

## 2017-08-11 DIAGNOSIS — Z95.2 S/P MVR (MITRAL VALVE REPLACEMENT): ICD-10-CM

## 2017-08-11 DIAGNOSIS — I48.92 ATRIAL FLUTTER WITH CONTROLLED RESPONSE (HCC): ICD-10-CM

## 2017-08-11 DIAGNOSIS — I48.0 PAROXYSMAL ATRIAL FIBRILLATION (HCC): Primary | ICD-10-CM

## 2017-08-11 DIAGNOSIS — Z79.891 CHRONICALLY ON OPIATE THERAPY: ICD-10-CM

## 2017-08-11 LAB
INR BLD: 3.3
PT POC: 40 SECONDS
VALID INTERNAL CONTROL?: YES

## 2017-08-11 RX ORDER — HYDROCODONE BITARTRATE AND ACETAMINOPHEN 5; 325 MG/1; MG/1
1 TABLET ORAL
Qty: 60 TAB | Refills: 0 | Status: SHIPPED | OUTPATIENT
Start: 2017-08-11 | End: 2017-09-22 | Stop reason: SDUPTHER

## 2017-08-11 NOTE — MR AVS SNAPSHOT
Visit Information Date & Time Provider Department Dept. Phone Encounter #  
 8/11/2017 10:00 AM Ayaz Nichols [facility] [address] [id number] Follow-up Instructions Return in about 3 weeks (around 9/1/2017) for pt/inr. Upcoming Health Maintenance Date Due  
 GLAUCOMA SCREENING Q2Y 4/21/2017 Pneumococcal 65+ Low/Medium Risk (2 of 2 - PPSV23) 5/7/2017 MEDICARE YEARLY EXAM 6/14/2017 INFLUENZA AGE 9 TO ADULT 8/1/2017 DTaP/Tdap/Td series (2 - Td) 1/17/2027 Allergies as of 8/11/2017  Review Complete On: 6/26/2017 By: Susana Murray MD  
 No Known Allergies Current Immunizations  Reviewed on 5/4/2012 Name Date ZZZ-RETIRED (DO NOT USE) Pneumococcal Vaccine (Unspecified Type) 5/7/2012  8:12 AM  
  
 Not reviewed this visit You Were Diagnosed With   
  
 Codes Comments Paroxysmal atrial fibrillation (HCC)    -  Primary ICD-10-CM: I48.0 ICD-9-CM: 427.31 S/P MVR (mitral valve replacement)     ICD-10-CM: T58.8 ICD-9-CM: V43.3 Chronically on opiate therapy     ICD-10-CM: Z79.891 ICD-9-CM: V58.69 Atrial flutter with controlled response ([facility] [address]     ICD-10-CM: I48.92 
ICD-9-CM: 427.32 Vitals OB Status Smoking Status Hysterectomy Former Smoker Preferred Pharmacy Pharmacy Name Phone [facility] [address] [facility] [phone] Your Updated Medication List  
  
   
This list is accurate as of: 8/11/17 11:00 AM.  Always use your most recent med list.  
  
  
  
  
 aspirin 81 mg chewable tablet Take 81 mg by mouth daily. dilTIAZem  mg ER capsule Commonly known as:  CARTIA XT Take 1 Cap by mouth daily. furosemide 20 mg tablet Commonly known as:  LASIX HYDROcodone-acetaminophen 5-325 mg per tablet Commonly known as:  Yenni De Luna  
 Take 1 Tab by mouth every six (6) hours as needed for Pain. Max Daily Amount: 4 Tabs. losartan 50 mg tablet Commonly known as:  COZAAR Take 1 Tab by mouth daily. metoprolol tartrate 25 mg tablet Commonly known as:  LOPRESSOR  
TAKE 1/2 TABLET BY MOUTH TWICE DAILY pravastatin 20 mg tablet Commonly known as:  PRAVACHOL Take 1 Tab by mouth nightly. warfarin 2 mg tablet Commonly known as:  COUMADIN  
TAKE ONE TABLET BY MOUTH DAILY Prescriptions Printed Refills HYDROcodone-acetaminophen (NORCO) 5-325 mg per tablet 0 Sig: Take 1 Tab by mouth every six (6) hours as needed for Pain. Max Daily Amount: 4 Tabs. Class: Print Route: Oral  
  
We Performed the Following AMB POC PT/INR [77659 CPT(R)] PAIN MGMT PANEL W/REFL, UR [[id number] Custom] Follow-up Instructions Return in about 3 weeks (around 9/1/2017) for pt/inr. Introducing [facility] & HEALTH SERVICES! Omega Price introduces Leapfrog Online patient portal. Now you can access parts of your medical record, email your doctor's office, and request medication refills online. 1. In your internet browser, go to [email or web address]. [email or web address]/Pro Stream + 2. Click on the First Time User? Click Here link in the Sign In box. You will see the New Member Sign Up page. 3. Enter your Leapfrog Online Access Code exactly as it appears below. You will not need to use this code after youve completed the sign-up process. If you do not sign up before the expiration date, you must request a new code. · Leapfrog Online Access Code: [email or web address] Expires: 8/20/2017  1:42 PM 
 
4. Enter the last four digits of your Social Security Number (xxxx) and Date of Birth (mm/dd/yyyy) as indicated and click Submit. You will be taken to the next sign-up page. 5. Create a Leapfrog Online ID. This will be your Leapfrog Online login ID and cannot be changed, so think of one that is secure and easy to remember. 6. Create a Altar password. You can change your password at any time. 7. Enter your Password Reset Question and Answer. This can be used at a later time if you forget your password. 8. Enter your e-mail address. You will receive e-mail notification when new information is available in [address] 9. Click Sign Up. You can now view and download portions of your medical record. 10. Click the Download Summary menu link to download a portable copy of your medical information. If you have questions, please visit the Frequently Asked Questions section of the Altar website. Remember, Altar is NOT to be used for urgent needs. For medical emergencies, dial 911. Now available from your iPhone and Android! Please provide this summary of care documentation to your next provider. Your primary care clinician is listed as Jones Garrison. If you have any questions after today's visit, please call [phone].

## 2017-08-11 NOTE — PROGRESS NOTES
Patient comes into the office for pt/inr check up she states she is taking Coumadin 2 mg 1/2 tablet daily. Per Dr. Mann Dudley patient is to take 1 mg daily and none on sundays  and return to the office in 3 weeks for repeat pt/inr check up.  Pt 40.0 ine 3.3

## 2017-08-17 LAB
AMPHETAMINES UR QL SCN: NEGATIVE NG/ML
BARBITURATES UR QL SCN: NEGATIVE NG/ML
BENZODIAZ UR QL SCN: NEGATIVE NG/ML
BZE UR QL SCN: NEGATIVE NG/ML
CANNABINOIDS UR QL SCN: NEGATIVE NG/ML
CREAT UR-MCNC: 103.3 MG/DL (ref 20–300)
FENTANYL+NORFENTANYL UR QL CFM: POSITIVE
MEPERIDINE UR QL: NEGATIVE NG/ML
METHADONE UR QL SCN: NEGATIVE NG/ML
OPIATES UR QL SCN: POSITIVE NG/ML
OXYCODONE+OXYMORPHONE UR QL SCN: POSITIVE
PCP UR QL: NEGATIVE NG/ML
PH UR: 5.7 [PH] (ref 4.5–8.9)
PLEASE NOTE:, 733157: ABNORMAL
PROPOXYPH UR QL SCN: NEGATIVE NG/ML
SP GR UR: 1.01
TRAMADOL UR QL SCN: NEGATIVE NG/ML

## 2017-09-01 ENCOUNTER — OFFICE VISIT (OUTPATIENT)
Dept: INTERNAL MEDICINE CLINIC | Age: 77
End: 2017-09-01

## 2017-09-01 VITALS
RESPIRATION RATE: 18 BRPM | WEIGHT: 135.1 LBS | OXYGEN SATURATION: 96 % | SYSTOLIC BLOOD PRESSURE: 134 MMHG | HEART RATE: 67 BPM | DIASTOLIC BLOOD PRESSURE: 73 MMHG | TEMPERATURE: 97.8 F | BODY MASS INDEX: 22.51 KG/M2 | HEIGHT: 65 IN

## 2017-09-01 DIAGNOSIS — Z13.39 SCREENING FOR ALCOHOLISM: ICD-10-CM

## 2017-09-01 DIAGNOSIS — T45.515A WARFARIN-INDUCED COAGULOPATHY (HCC): ICD-10-CM

## 2017-09-01 DIAGNOSIS — I48.0 PAROXYSMAL ATRIAL FIBRILLATION (HCC): ICD-10-CM

## 2017-09-01 DIAGNOSIS — I27.20 PULMONARY HYPERTENSION (HCC): ICD-10-CM

## 2017-09-01 DIAGNOSIS — Z98.890 S/P MAZE OPERATION FOR ATRIAL FIBRILLATION: ICD-10-CM

## 2017-09-01 DIAGNOSIS — Z13.31 SCREENING FOR DEPRESSION: ICD-10-CM

## 2017-09-01 DIAGNOSIS — Z95.2 S/P MVR (MITRAL VALVE REPLACEMENT): ICD-10-CM

## 2017-09-01 DIAGNOSIS — D68.32 WARFARIN-INDUCED COAGULOPATHY (HCC): ICD-10-CM

## 2017-09-01 DIAGNOSIS — R73.03 PREDIABETES: ICD-10-CM

## 2017-09-01 DIAGNOSIS — I10 ESSENTIAL HYPERTENSION, BENIGN: ICD-10-CM

## 2017-09-01 DIAGNOSIS — Z86.79 S/P MAZE OPERATION FOR ATRIAL FIBRILLATION: ICD-10-CM

## 2017-09-01 DIAGNOSIS — D62 ANEMIA ASSOCIATED WITH ACUTE BLOOD LOSS: ICD-10-CM

## 2017-09-01 DIAGNOSIS — Z00.00 MEDICARE ANNUAL WELLNESS VISIT, SUBSEQUENT: Primary | ICD-10-CM

## 2017-09-01 NOTE — PROGRESS NOTES
SP[facility]ICINE [facility]  Adarsh Campbell MD, [address] [address]  Phone:  [phone]  Fax: [phone]       Chief Complaint   Patient presents with   [facility]    Leg Pain     right leg pain     Ankle swelling     bilateral   .      SUBJECTIVE:    Yanci Denise is a 65 y.o. female Patient returns today alert, ambulatory, and has the capacity to give an accurate history. She has a known history of rheumatic fever as a child, chronic atrial fibrillation, status post mitral valve replacement, on Warfarin therapy, and chronic venous insufficiency of lower extremities. She has chronic pain and is receiving narcotics for management. Patient ambulates with assistance of a cane. Currently complains of swelling of her legs. The swelling is less in the morning when she gets up and as the day progresses it becomes more severe. She does wear support stockings. Patient is seen for evaluation. Current Outpatient Prescriptions   Medication Sig Dispense Refill    pneumococcal 13 joana conj dip (PREVNAR-13) 0.5 mL syrg injection 0.5 mL by IntraMUSCular route once for 1 dose. 0.5 mL 0    metoprolol tartrate (LOPRESSOR) 25 mg tablet TAKE 1/2 TABLET BY MOUTH TWICE DAILY 90 Tab 3    HYDROcodone-acetaminophen (NORCO) 5-325 mg per tablet Take 1 Tab by mouth every six (6) hours as needed for Pain. Max Daily Amount: 4 Tabs. 60 Tab 0    warfarin (COUMADIN) 2 mg tablet TAKE ONE TABLET BY MOUTH DAILY 90 Tab 1    losartan (COZAAR) 50 mg tablet Take 1 Tab by mouth daily. 90 Tab 3    pravastatin (PRAVACHOL) 20 mg tablet Take 1 Tab by mouth nightly. 90 Tab 10    dilTIAZem CD (CARTIA XT) 120 mg ER capsule Take 1 Cap by mouth daily. 90 Cap 3    furosemide (LASIX) 20 mg tablet       aspirin 81 mg chewable tablet Take 81 mg by mouth daily.        Past Medical History:   Diagnosis Date    AF (atrial fibrillation) (HCC)     RHEUMATIC FEVER AS CHILD, AFIB    Anemia  Arthritis     Blunt head trauma 2017    Chronic pain     Chronically on opiate therapy     Coagulation defects     COUMADIN    Diastolic dysfunction     grade 4    Encounter for Hemoccult screening 2016    Fall 2017    H/O colonoscopy     Hand pain, right     Heart failure (HCC)     Prediabetes     PUD (peptic ulcer disease)     Rheumatic fever     S/P MVR (mitral valve replacement)     Shoulder pain, right     SOB (shortness of breath)     Thyroid disease     Venous insufficiency of both lower extremities 2017    Warfarin-induced coagulopathy ([facility] [address]      Past Surgical History:   Procedure Laterality Date    HX CATARACT REMOVAL      BILATERAL    HX  SECTION      HX COLONOSCOPY      HX HEART CATHETERIZATION      HX HYSTERECTOMY      HX MITRAL VALVE REPLACEMENT      HX LUIS ENRIQUE AND BSO       No Known Allergies      REVIEW OF SYSTEMS:  General: negative for - chills or fever  ENT: negative for - headaches, nasal congestion or tinnitus  Respiratory: negative for - cough, hemoptysis, shortness of breath or wheezing  Cardiovascular : negative for - chest pain, edema, palpitations or shortness of breath  Gastrointestinal: negative for - abdominal pain, blood in stools, heartburn or nausea/vomiting  Genito-Urinary: no dysuria, trouble voiding, or hematuria  Musculoskeletal: negative for - gait disturbance, joint pain, joint stiffness or joint swelling  Neurological: no TIA or stroke symptoms  Hematologic: no bruises, no bleeding, no swollen glands  Integument: no lumps, mole changes, nail changes or rash  Endocrine: no malaise/lethargy or unexpected weight changes      Social History     Social History    Marital status: SINGLE     Spouse name: N/A    Number of children: N/A    Years of education: N/A     Social History Main Topics    Smoking status: Former Smoker     Packs/day: 0.50     Years: 30.00     Quit date: 2012    Smokeless tobacco: Never Used  Alcohol use No    Drug use: No    Sexual activity: No     Other Topics Concern    None     Social History Narrative    Family History: Mother:  80 yrs, High Blood PressureFather:  52 yrs, High Blood PressureSister(s): alive, High Blood    Pressure, colon cancer, type II diabetesBrother(s): , High Blood Pressure, colon cancer, type II diabetesSon(s): alive 48 yrs1 sister(s)    . 1 son(s) . Social History: Alcohol Use Patient does not use alcohol. Smoking Status Patient is a former smoker. Marital Status: Single. Lives w ith:    alone. Children: sons. Occupation/W ork: retired - for [employer]. Education/School: has highschool diploma. [employer]: [address] [employer].     Family History   Problem Relation Age of Onset    Heart Attack Sister     Hypertension Sister     Kidney Disease Mother     Hypertension Mother     Hypertension Father     Cancer Brother     Heart Failure Brother     Arthritis-osteo Sister     Cancer Sister     Cancer Brother      PROSTATE       OBJECTIVE:    Visit Vitals    /73 (BP 1 Location: Right arm, BP Patient Position: Sitting)    Pulse 67    Temp 97.8 °F (36.6 °C) (Oral)    Resp 18    Ht 5' 5\" (1.651 m)    Wt 135 lb 1.6 oz (61.3 kg)    SpO2 96%    BMI 22.48 kg/m2     CONSTITUTIONAL: well , well nourished, appears age appropriate  EYES: perrla, eom intact  ENMT:moist mucous membranes, pharynx clear  NECK: supple. Thyroid normal  RESPIRATORY: Chest: clear bilaterally   CARDIOVASCULAR: Heart: regular rate and rhythm  GASTROINTESTINAL: Abdomen: soft, bowel sounds active  HEMATOLOGIC: no pathological lymph nodes palpated  MUSCULOSKELETAL: Extremities: no edema, pulse 1+   INTEGUMENT: No unusual rashes or suspicious skin lesions noted.  Nails appear normal.  NEUROLOGIC: non-focal exam   MENTAL STATUS: alert and oriented, appropriate affect           ASSESSMENT:  1. Medicare annual wellness visit, subsequent 2. Paroxysmal atrial fibrillation (HCC)    3. Pulmonary hypertension ([facility] [address]    4. Warfarin-induced coagulopathy ([facility] [address]    5. Essential hypertension, benign    6. S/P MVR (mitral valve replacement)    7. S/P Maze operation for atrial fibrillation    8. Anemia associated with acute blood loss    9. Prediabetes    10. Screening for alcoholism    11. Screening for depression      Patient has chronic pain, managed with narcotics, primarily of the knees and to a lesser extent the back. She continues to receive the analgesics on a regular basis, which only partially helps the discomfort. She is on chronic Warfarin for her mitral valve replacement. INR today is at 2.3, no change will be made in the Coumadin dosage. She is taking a half a tablet, a 2 mg tablet of Coumadin, Monday through Saturday, none on Sunday. Blood pressure control is at goal.    BMI is at ideal body weight. She'll return to the office in three weeks for a PT check. PLAN:  .  Orders Placed This Encounter    Depression Screen Annual    REFERRAL TO OPHTHALMOLOGY    pneumococcal 13 joana conj dip (PREVNAR-13) 0.5 mL syrg injection       Follow-up Disposition:  Return in about 3 weeks (around 9/22/2017). ATTENTION:   This medical record was transcribed using an electronic medical records system. Although proofread, it may and can contain electronic and spelling errors. Other human spelling and other errors may be present. Corrections may be executed at a later time. Please feel free to contact us for any clarifications as needed. This is a Subsequent Medicare Annual Wellness Exam (AWV) (Performed 12 months after IPPE or effective date of Medicare Part B enrollment, Once in a lifetime)    I have reviewed the patient's medical history in detail and updated the computerized patient record.      History     Past Medical History:   Diagnosis Date    AF (atrial fibrillation) (HCC)     RHEUMATIC FEVER AS CHILD, AFIB    Anemia     Arthritis     Blunt head trauma 2017    Chronic pain     Chronically on opiate therapy     Coagulation defects     COUMADIN    Diastolic dysfunction     grade 4    Encounter for Hemoccult screening 2016    Fall 2017    H/O colonoscopy     Hand pain, right     Heart failure (HCC)     Prediabetes     PUD (peptic ulcer disease)     Rheumatic fever     S/P MVR (mitral valve replacement)     Shoulder pain, right     SOB (shortness of breath)     Thyroid disease     Venous insufficiency of both lower extremities 2017    Warfarin-induced coagulopathy ([facility] [address]       Past Surgical History:   Procedure Laterality Date    HX CATARACT REMOVAL      BILATERAL    HX  SECTION      HX COLONOSCOPY      HX HEART CATHETERIZATION      HX HYSTERECTOMY      HX MITRAL VALVE REPLACEMENT      HX LUIS ENRIQUE AND BSO       Current Outpatient Prescriptions   Medication Sig Dispense Refill    pneumococcal 13 joana conj dip (PREVNAR-13) 0.5 mL syrg injection 0.5 mL by IntraMUSCular route once for 1 dose. 0.5 mL 0    metoprolol tartrate (LOPRESSOR) 25 mg tablet TAKE 1/2 TABLET BY MOUTH TWICE DAILY 90 Tab 3    HYDROcodone-acetaminophen (NORCO) 5-325 mg per tablet Take 1 Tab by mouth every six (6) hours as needed for Pain. Max Daily Amount: 4 Tabs. 60 Tab 0    warfarin (COUMADIN) 2 mg tablet TAKE ONE TABLET BY MOUTH DAILY 90 Tab 1    losartan (COZAAR) 50 mg tablet Take 1 Tab by mouth daily. 90 Tab 3    pravastatin (PRAVACHOL) 20 mg tablet Take 1 Tab by mouth nightly. 90 Tab 10    dilTIAZem CD (CARTIA XT) 120 mg ER capsule Take 1 Cap by mouth daily. 90 Cap 3    furosemide (LASIX) 20 mg tablet       aspirin 81 mg chewable tablet Take 81 mg by mouth daily.        No Known Allergies  Family History   Problem Relation Age of Onset    Heart Attack Sister     Hypertension Sister     Kidney Disease Mother     Hypertension Mother     Hypertension Father     Cancer Brother     Heart Failure Brother    Ivan Redmond Sister     Cancer Sister     Cancer Brother      PROSTATE     Social History   Substance Use Topics    Smoking status: Former Smoker     Packs/day: 0.50     Years: 30.00     Quit date: 1/20/2012    Smokeless tobacco: Never Used    Alcohol use No     Patient Active Problem List   Diagnosis Code    Chest pain R07.9    Other dyspnea and respiratory abnormality R06.09, R09.89    Mitral valve disorder I05.9    Palpitations R00.2    Atrial fibrillation (HCC) I48.91    Essential hypertension, benign I10    Hypothyroid E03.9    Tricuspid regurgitation I07.1    Rheumatic heart disease I09.9    S/P MVR (mitral valve replacement) Z95.2    S/P Maze operation for atrial fibrillation Z98.890, Z86.79    Anemia associated with acute blood loss D62    Atrial flutter with controlled response (HCC) I48.92    Aortic valve disorders I35.9    Pulmonary hypertension (HCC) I27.2    Neck pain M54.2    Coronary atherosclerosis of native coronary artery I25.10    Warfarin-induced coagulopathy ([facility] [address] T45.511A, D68.9    Arthritis M19.90    Prediabetes R73.03    Arrhythmia I49.9    PUD (peptic ulcer disease) K27.9    Anemia D64.9    Thyroid disease E07.9    Rheumatic fever I00    AF (atrial fibrillation) ([facility]) I48.91    H/O colonoscopy Z98.890    Chronic pain G89.29    Shoulder pain, right M25.511    Hand pain, right M79.641    ACP (advance care planning) Z71.89    SOB (shortness of breath) M90.32    Diastolic dysfunction N66.5    Encounter for Hemoccult screening Z12.11    Venous insufficiency of both lower extremities I87.2    Chronic deep vein thrombosis (DVT) of femoral vein of right lower extremity (HCC) I82.511    Fall W19. XXXA    Blunt head trauma S09. 8XXA    Chronically on opiate therapy Z79.891       Depression Risk Factor Screening:     PHQ over the last two weeks 9/1/2017   PHQ Not Done -   Little interest or pleasure in doing things Not at all   Feeling down, depressed or hopeless Not at all   Total Score PHQ 2 0     Alcohol Risk Factor Screening: You do not drink alcohol or very rarely. Functional Ability and Level of Safety:   Hearing Loss  Hearing is good. Activities of Daily Living  The home contains: no safety equipment  Patient does total self care    Fall Risk  Fall Risk Assessment, last 12 mths 9/1/2017   Able to walk? Yes   Fall in past 12 months? No   Fall with injury? -   Number of falls in past 12 months -   Fall Risk Score -       Abuse Screen  Patient is not abused    Cognitive Screening   Evaluation of Cognitive Function:  Has your family/caregiver stated any concerns about your memory: no  Normal    Patient Care Team   Patient Care Team:  Yasmani Arora MD as PCP - General (Internal Medicine)  Rolan Davies MD as Referring Provider (Cardiology)  Manuel Griffith MD (Cardiology)  Van Ruiz RN as Ambulatory Care Navigator    Assessment/Plan   Education and counseling provided:  Are appropriate based on today's review and evaluation    Health Maintenance Due   Topic Date Due    GLAUCOMA SCREENING Q2Y  04/21/2017    Pneumococcal 65+ Low/Medium Risk (2 of 2 - PPSV23) 05/07/2017    MEDICARE YEARLY EXAM  06/14/2017     Advance Care Planning (ACP) Provider Conversation Snapshot    Date of ACP Conversation: 09/01/17  Persons included in Conversation:  patient  Length of ACP Conversation in minutes:  <16 minutes (Non-Billable)    Authorized Decision Maker (if patient is incapable of making informed decisions): This person is:   Healthcare Agent/Medical Power of  under Advance Directive  sister        For Patients with Decision Making Capacity:   Values/Goals: Exploration of values, goals, and preferences if recovery is not expected, even with continued medical treatment in the event of:  Imminent death  Severe, permanent brain injury  \"In these circumstances, what matters most to you? \"  Care focused more on comfort or quality of life.     Conversation Outcomes / Follow-Up Plan:   Recommended completion of Advance Directive form after review of ACP materials and conversation with prospective healthcare agent

## 2017-09-01 NOTE — MR AVS SNAPSHOT
Visit Information Date & Time Provider Department Dept. Phone Encounter #  
 9/1/2017 10:00 AM Ay[facility] Bello  Sports Medicine and Primary Care [phone] [id number] Follow-up Instructions Return in about 3 weeks (around 9/22/2017). Follow-up and Disposition History Upcoming Health Maintenance Date Due  
 GLAUCOMA SCREENING Q2Y 4/21/2017 Pneumococcal 65+ Low/Medium Risk (2 of 2 - PPSV23) 5/7/2017 MEDICARE YEARLY EXAM 6/14/2017 DTaP/Tdap/Td series (2 - Td) 1/17/2027 Allergies as of 9/1/2017  Review Complete On: 9/1/2017 By: Shiva Delong MD  
 No Known Allergies Current Immunizations  Reviewed on 5/4/2012 Name Date ZZZ-RETIRED (DO NOT USE) Pneumococcal Vaccine (Unspecified Type) 5/7/2012  8:12 AM  
  
 Not reviewed this visit You Were Diagnosed With   
  
 Codes Comments Medicare annual wellness visit, subsequent    -  Primary ICD-10-CM: Z00.00 ICD-9-CM: V70.0 Paroxysmal atrial fibrillation (HCC)     ICD-10-CM: I48.0 ICD-9-CM: 427.31 Pulmonary hypertension ([facility] [address]     ICD-10-CM: I27.2 ICD-9-CM: 416.8 Warfarin-induced coagulopathy (HCC)     ICD-10-CM: T45.511A, D68.9 ICD-9-CM: 286.9, G913.2 Essential hypertension, benign     ICD-10-CM: I10 
ICD-9-CM: 401.1 S/P MVR (mitral valve replacement)     ICD-10-CM: K56.9 ICD-9-CM: V43.3 S/P Maze operation for atrial fibrillation     ICD-10-CM: Z98.890, Z86.79 
ICD-9-CM: V45.89 Anemia associated with acute blood loss     ICD-10-CM: D62 
ICD-9-CM: 285.1 Prediabetes     ICD-10-CM: R73.03 
ICD-9-CM: 790.29 Screening for alcoholism     ICD-10-CM: Z13.89 ICD-9-CM: V79.1 Screening for depression     ICD-10-CM: Z13.89 ICD-9-CM: V79.0 Vitals BP Pulse Temp Resp Height(growth percentile) Weight(growth percentile)  134/73 (BP 1 Location: Right arm, BP Patient Position: Sitting) 67 97.8 °F (36.6 °C) (Oral) 18 5' 5\" (1.651 m) 135 lb 1.6 oz (61.3 kg) SpO2 BMI OB Status Smoking Status 96% 22.48 kg/m2 Hysterectomy Former Smoker BMI and BSA Data Body Mass Index Body Surface Area  
 22.48 kg/m 2 1.68 m 2 Preferred Pharmacy Pharmacy Name Phone [facility] [address] [facility], [place] [phone] Your Updated Medication List  
  
   
This list is accurate as of: 17 11:30 AM.  Always use your most recent med list.  
  
  
  
  
 aspirin 81 mg chewable tablet Take 81 mg by mouth daily. dilTIAZem  mg ER capsule Commonly known as:  CARTIA XT Take 1 Cap by mouth daily. furosemide 20 mg tablet Commonly known as:  LASIX HYDROcodone-acetaminophen 5-325 mg per tablet Commonly known as:  Mao Paul Take 1 Tab by mouth every six (6) hours as needed for Pain. Max Daily Amount: 4 Tabs. losartan 50 mg tablet Commonly known as:  COZAAR Take 1 Tab by mouth daily. metoprolol tartrate 25 mg tablet Commonly known as:  LOPRESSOR  
TAKE 1/2 TABLET BY MOUTH TWICE DAILY pneumococcal 13 joana conj dip 0.5 mL Syrg injection Commonly known as:  PREVNAR-13  
0.5 mL by IntraMUSCular route once for 1 dose. pravastatin 20 mg tablet Commonly known as:  PRAVACHOL Take 1 Tab by mouth nightly. warfarin 2 mg tablet Commonly known as:  COUMADIN  
TAKE ONE TABLET BY MOUTH DAILY Prescriptions Sent to Pharmacy Refills  
 pneumococcal 13 joana conj dip (PREVNAR-13) 0.5 mL syrg injection 0 Si.5 mL by IntraMUSCular route once for 1 dose. Class: Normal  
 Pharmacy: [facility] [address] [facility], [place] Ph #: [phone] Route: IntraMUSCular We Performed the Following Florentin 68 [[id number] [facility]] LA ANNUAL ALCOHOL SCREEN 15 MIN [id number] [facility]] REFERRAL TO OPHTHALMOLOGY [REF57 Custom] Follow-up Instructions Return in about 3 weeks (around 9/22/2017). Referral Information Referral ID Referred By Referred To  
  
 [id number] [place] Fore [facility] [address] [place], [place] Alejandra Franklinrichard Visits Status Start Date End Date 1 New Request 9/1/17 9/1/18 If your referral has a status of pending review or denied, additional information will be sent to support the outcome of this decision. Patient Instructions Medicare Wellness Visit, Female The best way to live healthy is to have a healthy lifestyle by eating a well-balanced diet, exercising regularly, limiting alcohol and stopping smoking. Regular physical exams and screening tests are another way to keep healthy. Preventive exams provided by your health care provider can find health problems before they become diseases or illnesses. Preventive services including immunizations, screening tests, monitoring and exams can help you take care of your own health. All people over age 72 should have a pneumovax  and and a prevnar shot to prevent pneumonia. These are once in a lifetime unless you and your provider decide differently. All people over 65 should have a yearly flu shot and a tetanus vaccine every 10 years. A bone mass density to screen for osteoporosis or thinning of the bones should be done every 2 years after 65. Screening for diabetes mellitus with a blood sugar test should be done every year. Glaucoma is a disease of the eye due to increased ocular pressure that can lead to blindness and it should be done every year by an eye professional. 
 
Cardiovascular screening tests that check for elevated lipids (fatty part of blood) which can lead to heart disease and strokes should be done every 5 years.  
 
Colorectal screening that evaluates for blood or polyps in your colon should be done yearly as a stool test or every five years as a flexible sigmoidoscope or every 10 years as a colonoscopy up to age 76. Breast cancer screening with a mammogram is recommended biennially  for women age 54-69. Screening for cervical cancer with a pap smear and pelvic exam is recommended for women after age 72 years every 2 years up to age 79 or when the provider and patient decide to stop. If there is a history of cervical abnormalities or other increased risk for cancer then the test is recommended yearly. Hepatitis C screening is also recommended for anyone born between 80 through [address]. A shingles vaccine is also recommended once in a lifetime after age 61. Your Medicare Wellness Exam is recommended annually. Here is a list of your current Health Maintenance items with a due date: 
Health Maintenance Due Topic Date Due  Glaucoma Screening   04/21/2017  Pneumococcal Vaccine (2 of 2 - PPSV23) 05/07/2017 [facility] Annual Well Visit  06/14/2017 Introducing [facility]! [facility] introduces [employer] patient portal. Now you can access parts of your medical record, email your doctor's office, and request medication refills online. 1. In your internet browser, go to [email or web address] 2. Click on the First Time User? Click Here link in the Sign In box. You will see the New Member Sign Up page. 3. Enter your [employer] Access Code exactly as it appears below. You will not need to use this code after youve completed the sign-up process. If you do not sign up before the expiration date, you must request a new code. · [employer] Access Code: [email or web address] Expires: 11/30/2017 11:30 AM 
 
4. Enter the last four digits of your Social Security Number (xxxx) and Date of Birth (mm/dd/yyyy) as indicated and click Submit. You will be taken to the next sign-up page. 5. Create a [employer] ID.  This will be your [employer] login ID and cannot be changed, so think of one that is secure and easy to remember. 6. Create a [employer] password. You can change your password at any time. 7. Enter your Password Reset Question and Answer. This can be used at a later time if you forget your password. 8. Enter your e-mail address. You will receive e-mail notification when new information is available in [address] 9. Click Sign Up. You can now view and download portions of your medical record. 10. Click the Download Summary menu link to download a portable copy of your medical information. If you have questions, please visit the Frequently Asked Questions section of the [employer] website. Remember, [employer] is NOT to be used for urgent needs. For medical emergencies, dial 911. Now available from your iPhone and Android! Please provide this summary of care documentation to your next provider. Your primary care clinician is listed as Nelly Menezes. If you have any questions after today's visit, please call [phone].

## 2017-09-01 NOTE — PROGRESS NOTES
1. Have you been to the ER, urgent care clinic since your last visit? Hospitalized since your last visit? No    2. Have you seen or consulted any other health care providers outside of the [address] [place] [address] since your last visit? Include any pap smears or colon screening.  No

## 2017-09-01 NOTE — PATIENT INSTRUCTIONS

## 2017-09-22 ENCOUNTER — CLINICAL SUPPORT (OUTPATIENT)
Dept: INTERNAL MEDICINE CLINIC | Age: 77
End: 2017-09-22

## 2017-09-22 DIAGNOSIS — I48.0 PAROXYSMAL ATRIAL FIBRILLATION (HCC): Primary | ICD-10-CM

## 2017-09-22 DIAGNOSIS — I48.91 ATRIAL FIBRILLATION, UNSPECIFIED TYPE (HCC): ICD-10-CM

## 2017-09-22 LAB
INR BLD: 1.8
PT POC: 21 SECONDS
VALID INTERNAL CONTROL?: YES

## 2017-09-22 RX ORDER — HYDROCODONE BITARTRATE AND ACETAMINOPHEN 5; 325 MG/1; MG/1
1 TABLET ORAL
Qty: 60 TAB | Refills: 0 | Status: SHIPPED | OUTPATIENT
Start: 2017-09-22 | End: 2017-11-21 | Stop reason: SDUPTHER

## 2017-09-22 NOTE — PROGRESS NOTES
Patient came into the office today for a PT/INR.  Patient states that she is taking Coumadin 1mg 1 tablet Monday through Saturday and none on Sunday PT- 21.0 INR-1.8 Per Dr Jose Toscano no new orders return to the office in 3 weeks for repeat PT/INR

## 2017-10-12 ENCOUNTER — CLINICAL SUPPORT (OUTPATIENT)
Dept: INTERNAL MEDICINE CLINIC | Age: 77
End: 2017-10-12

## 2017-10-12 DIAGNOSIS — D68.32 WARFARIN-INDUCED COAGULOPATHY (HCC): Primary | ICD-10-CM

## 2017-10-12 DIAGNOSIS — T45.515A WARFARIN-INDUCED COAGULOPATHY (HCC): Primary | ICD-10-CM

## 2017-10-12 DIAGNOSIS — M17.11 PRIMARY OSTEOARTHRITIS OF RIGHT KNEE: ICD-10-CM

## 2017-10-12 LAB
INR BLD: 2.6
PT POC: 31 SECONDS
VALID INTERNAL CONTROL?: YES

## 2017-10-12 NOTE — PROGRESS NOTES
Ptienta in for PT/INR check. Patient states that she takes coumadin 2 mg.  Takes 1/2 tab PO every day except on sundays she doesn't  take anything  Results for orders placed or performed in visit on 10/12/17   AMB POC PT/INR   Result Value Ref Range    VALID INTERNAL CONTROL POC Yes     Prothrombin time (POC) 31.0 seconds    INR POC 2.6      No adjustments needed at this time RTO in 1 month per Dr. Tash Lopez LPN

## 2017-10-12 NOTE — MR AVS SNAPSHOT
Visit Information Date & Time Provider Department Dept. Phone Encounter #  
 10/12/2017  9:40 AM NURSE [address] Sports Medicine and [address] [place] [address] [phone] [id number] Follow-up Instructions Return in about 4 weeks (around 11/9/2017) for pt/inr, bp check. Follow-up and Disposition History Upcoming Health Maintenance Date Due  
 GLAUCOMA SCREENING Q2Y 4/21/2017 Pneumococcal 65+ Low/Medium Risk (2 of 2 - PPSV23) 5/7/2017 MEDICARE YEARLY EXAM 9/2/2018 DTaP/Tdap/Td series (2 - Td) 1/17/2027 Allergies as of 10/12/2017  Review Complete On: 9/1/2017 By: Michael Wilson MD  
 No Known Allergies Current Immunizations  Reviewed on 5/4/2012 Name Date ZZZ-RETIRED (DO NOT USE) Pneumococcal Vaccine (Unspecified Type) 5/7/2012  8:12 AM  
  
 Not reviewed this visit You Were Diagnosed With   
  
 Codes Comments Warfarin-induced coagulopathy ([facility] [address]    -  Primary ICD-10-CM: D68.9, I76.207X ICD-9-CM: 286.9, Z611.0 Primary osteoarthritis of right knee     ICD-10-CM: M17.11 ICD-9-CM: 715.16 Vitals OB Status Smoking Status Hysterectomy Former Smoker Preferred Pharmacy Pharmacy Name Phone [facility] [address] [phone] Your Updated Medication List  
  
   
This list is accurate as of: 10/12/17 10:09 AM.  Always use your most recent med list.  
  
  
  
  
 aspirin 81 mg chewable tablet Take 81 mg by mouth daily. dilTIAZem  mg ER capsule Commonly known as:  CARTIA XT Take 1 Cap by mouth daily. furosemide 20 mg tablet Commonly known as:  LASIX HYDROcodone-acetaminophen 5-325 mg per tablet Commonly known as:  Cornel Ill Take 1 Tab by mouth every six (6) hours as needed for Pain. Max Daily Amount: 4 Tabs. losartan 50 mg tablet Commonly known as:  COZAAR Take 1 Tab by mouth daily. metoprolol tartrate 25 mg tablet Commonly known as:  LOPRESSOR  
TAKE 1/2 TABLET BY MOUTH TWICE DAILY pravastatin 20 mg tablet Commonly known as:  PRAVACHOL Take 1 Tab by mouth nightly. warfarin 2 mg tablet Commonly known as:  COUMADIN  
TAKE ONE TABLET BY MOUTH DAILY We Performed the Following AMB POC PT/INR [70285 CPT(R)] REFERRAL TO ORTHOPEDICS [KQE512 Custom] Comments:  
 Please evaluate patient for knee pain. Follow-up Instructions Return in about 4 weeks (around 11/9/2017) for pt/inr, bp check. Referral Information Referral ID Referred By Referred To  
  
 [id number] Sandee Chase MD   
   [address] Phone: [phone] Fax: [phone] Visits Status Start Date End Date 1 New Request 10/12/17 10/12/18 If your referral has a status of pending review or denied, additional information will be sent to support the outcome of this decision. Introducing [facility]! Reuben Peterson introduces "[employer] patient portal. Now you can access parts of your medical record, email your doctor's office, and request medication refills online. 1. In your internet browser, go to [email or web address] 2. Click on the First Time User? Click Here link in the Sign In box. You will see the New Member Sign Up page. 3. Enter your "[employer] Access Code exactly as it appears below. You will not need to use this code after youve completed the sign-up process. If you do not sign up before the expiration date, you must request a new code. · "[employer] Access Code: [email or web address] Expires: 11/30/2017 11:30 AM 
 
4. Enter the last four digits of your Social Security Number (xxxx) and Date of Birth (mm/dd/yyyy) as indicated and click Submit. You will be taken to the next sign-up page. 5. Create a [employer] ID. This will be your [employer] login ID and cannot be changed, so think of one that is secure and easy to remember. 6. Create a [employer] password. You can change your password at any time. 7. Enter your Password Reset Question and Answer. This can be used at a later time if you forget your password. 8. Enter your e-mail address. You will receive e-mail notification when new information is available in [address] 9. Click Sign Up. You can now view and download portions of your medical record. 10. Click the Download Summary menu link to download a portable copy of your medical information. If you have questions, please visit the Frequently Asked Questions section of the [employer] website. Remember, [employer] is NOT to be used for urgent needs. For medical emergencies, dial 911. Now available from your iPhone and Android! Please provide this summary of care documentation to your next provider. Your primary care clinician is listed as Eve Recinos. If you have any questions after today's visit, please call [phone].

## 2017-10-12 NOTE — PROGRESS NOTES
Pt in for PT/INR check patient states that she takes coumadin 2 mg 1/2 tab Po every day except on sundays she doesn't take anything.

## 2017-10-13 NOTE — PROGRESS NOTES
Patient in for PT/INR check. Patient states that she takes Coumadin 2 mg.   Takes 1/2 tab PO mon-Sat and nothing on sundays  No adjustments needed at this time RTO in 1 month per Dr. Alysa No, ROSARIO

## 2017-10-13 NOTE — PROGRESS NOTES
Results for orders placed or performed in visit on 10/12/17   AMB POC PT/INR   Result Value Ref Range    VALID INTERNAL CONTROL POC Yes     Prothrombin time (POC) 31.0 seconds    INR POC 2.6

## 2017-11-02 ENCOUNTER — TELEPHONE (OUTPATIENT)
Dept: CARDIOLOGY CLINIC | Age: 77
End: 2017-11-02

## 2017-11-02 NOTE — TELEPHONE ENCOUNTER
Patient states that she is calling to find out when she needs to follow up with Dr Lisa Bryson. Requests a call back at [phone]. Thanks!

## 2017-11-03 NOTE — TELEPHONE ENCOUNTER
Returned call to patient, verified identity. Informed patient that as of now, she does not have a f/u scheduled. Patient denies any symptoms and states she would like a 1 yr f/u.  Scheduled 4/10/17 at 11:20 am.

## 2017-11-21 ENCOUNTER — CLINICAL SUPPORT (OUTPATIENT)
Dept: INTERNAL MEDICINE CLINIC | Age: 77
End: 2017-11-21

## 2017-11-21 DIAGNOSIS — Z79.01 ENCOUNTER FOR MONITORING COUMADIN THERAPY: Primary | ICD-10-CM

## 2017-11-21 DIAGNOSIS — Z51.81 ENCOUNTER FOR MONITORING COUMADIN THERAPY: Primary | ICD-10-CM

## 2017-11-21 DIAGNOSIS — Z79.891 CHRONICALLY ON OPIATE THERAPY: ICD-10-CM

## 2017-11-21 LAB
INR BLD: 1.8
PT POC: 21.9 SECONDS
VALID INTERNAL CONTROL?: YES

## 2017-11-21 RX ORDER — HYDROCODONE BITARTRATE AND ACETAMINOPHEN 5; 325 MG/1; MG/1
1 TABLET ORAL
Qty: 60 TAB | Refills: 0 | Status: SHIPPED | OUTPATIENT
Start: 2017-11-21 | End: 2017-12-21 | Stop reason: SDUPTHER

## 2017-11-21 NOTE — PROGRESS NOTES
Agustín Pratt is a 65 y.o. female who presents today for Anticoagulation monitoring. Current dose:  Coumadin 2 mg. 1/2 tab PO everyday except on Sunday she doesn't take anything  Medication Changes:  no  Dietary Changes:  no    Latest INR:  Results for orders placed or performed in visit on 11/21/17   AMB POC PT/INR   Result Value Ref Range    VALID INTERNAL CONTROL POC Yes     Prothrombin time (POC) 21.9 seconds    INR POC 1.8          New Coumadin dose:.current treatment plan is effective, no change in therapy. Next check to be scheduled for  4 weeks.     Oneida Castellanos LPN

## 2017-11-21 NOTE — MR AVS SNAPSHOT
Visit Information Date & Time Provider Department Dept. Phone Encounter #  
 11/21/2017 10:30 AM [facility] [phone] [id number] Your Appointments 4/10/2018 11:20 AM  
ESTABLISHED PATIENT with Austin Allen MD  
[facility] ([facility]) Appt Note: Patient request 1 yr f/u  
 [address] [place]  
[address] [facility],[address] [address] Upcoming Health Maintenance Date Due  
 GLAUCOMA SCREENING Q2Y 4/21/2017 Pneumococcal 65+ Low/Medium Risk (2 of 2 - PPSV23) 5/7/2017 MEDICARE YEARLY EXAM 9/2/2018 DTaP/Tdap/Td series (2 - Td) 1/17/2027 Allergies as of 11/21/2017  Review Complete On: 9/1/2017 By: Lydia Murphy MD  
 No Known Allergies Current Immunizations  Reviewed on 5/4/2012 Name Date ZZZ-RETIRED (DO NOT USE) Pneumococcal Vaccine (Unspecified Type) 5/7/2012  8:12 AM  
  
 Not reviewed this visit You Were Diagnosed With   
  
 Codes Comments Encounter for monitoring coumadin therapy    -  Primary ICD-10-CM: Z51.81, Z79.01 
ICD-9-CM: V58.83, V58.61 Chronically on opiate therapy     ICD-10-CM: Z79.891 ICD-9-CM: V58.69 Vitals OB Status Smoking Status Hysterectomy Former Smoker Preferred Pharmacy Pharmacy Name Phone [facility] [address] [facility] [phone] Your Updated Medication List  
  
   
This list is accurate as of: 11/21/17 11:02 AM.  Always use your most recent med list.  
  
  
  
  
 aspirin 81 mg chewable tablet Take 81 mg by mouth daily. dilTIAZem  mg ER capsule Commonly known as:  CARTIA XT Take 1 Cap by mouth daily. furosemide 20 mg tablet Commonly known as:  LASIX HYDROcodone-acetaminophen 5-325 mg per tablet Commonly known as:  Wesley Cash  
 Take 1 Tab by mouth every six (6) hours as needed for Pain. Max Daily Amount: 4 Tabs. losartan 50 mg tablet Commonly known as:  COZAAR Take 1 Tab by mouth daily. metoprolol tartrate 25 mg tablet Commonly known as:  LOPRESSOR  
TAKE 1/2 TABLET BY MOUTH TWICE DAILY pravastatin 20 mg tablet Commonly known as:  PRAVACHOL Take 1 Tab by mouth nightly. warfarin 2 mg tablet Commonly known as:  COUMADIN  
TAKE ONE TABLET BY MOUTH DAILY Prescriptions Printed Refills HYDROcodone-acetaminophen (NORCO) 5-325 mg per tablet 0 Sig: Take 1 Tab by mouth every six (6) hours as needed for Pain. Max Daily Amount: 4 Tabs. Class: Print Route: Oral  
  
We Performed the Following AMB POC PT/INR [94620 CPT(R)] PAIN MGMT PANEL W/REFL, UR [[id number] Custom] Introducing [facility]! Dejuan Ramirez introduces [employer] patient portal. Now you can access parts of your medical record, email your doctor's office, and request medication refills online. 1. In your internet browser, go to [email or web address] 2. Click on the First Time User? Click Here link in the Sign In box. You will see the New Member Sign Up page. 3. Enter your [employer] Access Code exactly as it appears below. You will not need to use this code after youve completed the sign-up process. If you do not sign up before the expiration date, you must request a new code. · [employer] Access Code: [email or web address] Expires: 11/30/2017 10:30 AM 
 
4. Enter the last four digits of your Social Security Number (xxxx) and Date of Birth (mm/dd/yyyy) as indicated and click Submit. You will be taken to the next sign-up page. 5. Create a [employer] ID. This will be your [employer] login ID and cannot be changed, so think of one that is secure and easy to remember. 6. Create a [employer] password. You can change your password at any time. 7. Enter your Password Reset Question and Answer. This can be used at a later time if you forget your password. 8. Enter your e-mail address. You will receive e-mail notification when new information is available in [address] 9. Click Sign Up. You can now view and download portions of your medical record. 10. Click the Download Summary menu link to download a portable copy of your medical information. If you have questions, please visit the Frequently Asked Questions section of the [employer] website. Remember, [employer] is NOT to be used for urgent needs. For medical emergencies, dial 911. Now available from your iPhone and Android! Please provide this summary of care documentation to your next provider. Your primary care clinician is listed as Joel Robbins. If you have any questions after today's visit, please call [phone].

## 2017-12-21 ENCOUNTER — CLINICAL SUPPORT (OUTPATIENT)
Dept: INTERNAL MEDICINE CLINIC | Age: 77
End: 2017-12-21

## 2017-12-21 DIAGNOSIS — Z79.891 CHRONICALLY ON OPIATE THERAPY: ICD-10-CM

## 2017-12-21 DIAGNOSIS — Z51.81 ENCOUNTER FOR MONITORING COUMADIN THERAPY: Primary | ICD-10-CM

## 2017-12-21 DIAGNOSIS — Z79.01 ENCOUNTER FOR MONITORING COUMADIN THERAPY: Primary | ICD-10-CM

## 2017-12-21 LAB
INR BLD: 2
PT POC: 24.1 SECONDS
VALID INTERNAL CONTROL?: YES

## 2017-12-21 RX ORDER — HYDROCODONE BITARTRATE AND ACETAMINOPHEN 5; 325 MG/1; MG/1
1 TABLET ORAL
Qty: 60 TAB | Refills: 0 | Status: SHIPPED | OUTPATIENT
Start: 2017-12-21 | End: 2018-01-24 | Stop reason: SDUPTHER

## 2017-12-21 NOTE — MR AVS SNAPSHOT
Visit Information Date & Time Provider Department Dept. Phone Encounter #  
 12/21/2017 10:00 AM NURSE 3000 [facility] [phone] [id number] Your Appointments 4/10/2018 11:20 AM  
ESTABLISHED PATIENT with Devon De La Rosa MD  
[facility] Appt Note: Patient request 1 yr f/u  
 [address] [place] [address]  
[address] [id number] Upcoming Health Maintenance Date Due  
 GLAUCOMA SCREENING Q2Y 4/21/2017 Pneumococcal 65+ Low/Medium Risk (2 of 2 - PPSV23) 5/7/2017 MEDICARE YEARLY EXAM 9/2/2018 DTaP/Tdap/Td series (2 - Td) 1/17/2027 Allergies as of 12/21/2017  Review Complete On: 9/1/2017 By: Ayaka Edmond MD  
 No Known Allergies Current Immunizations  Reviewed on 5/4/2012 Name Date ZZZ-RETIRED (DO NOT USE) Pneumococcal Vaccine (Unspecified Type) 5/7/2012  8:12 AM  
  
 Not reviewed this visit You Were Diagnosed With   
  
 Codes Comments Encounter for monitoring coumadin therapy    -  Primary ICD-10-CM: Z51.81, Z79.01 
ICD-9-CM: V58.83, V58.61 Vitals OB Status Smoking Status Hysterectomy Former Smoker Preferred Pharmacy Pharmacy Name Phone Glenna Villatoro [id number] [facility] [phone] Your Updated Medication List  
  
   
This list is accurate as of: 12/21/17 11:04 AM.  Always use your most recent med list.  
  
  
  
  
 aspirin 81 mg chewable tablet Take 81 mg by mouth daily. dilTIAZem  mg ER capsule Commonly known as:  CARTIA XT Take 1 Cap by mouth daily. furosemide 20 mg tablet Commonly known as:  LASIX HYDROcodone-acetaminophen 5-325 mg per tablet Commonly known as:  Wayna Glaze Take 1 Tab by mouth every six (6) hours as needed for Pain.  Max Daily Amount: 4 Tabs. losartan 50 mg tablet Commonly known as:  COZAAR Take 1 Tab by mouth daily. metoprolol tartrate 25 mg tablet Commonly known as:  LOPRESSOR  
TAKE 1/2 TABLET BY MOUTH TWICE DAILY pravastatin 20 mg tablet Commonly known as:  PRAVACHOL Take 1 Tab by mouth nightly. warfarin 2 mg tablet Commonly known as:  COUMADIN  
TAKE ONE TABLET BY MOUTH DAILY We Performed the Following AMB POC PT/INR [51493 CPT(R)] Introducing [facility]! [employer] introduces [employer] patient portal. Now you can access parts of your medical record, email your doctor's office, and request medication refills online. 1. In your internet browser, go to [email or web address] 2. Click on the First Time User? Click Here link in the Sign In box. You will see the New Member Sign Up page. 3. Enter your [employer] Access Code exactly as it appears below. You will not need to use this code after youve completed the sign-up process. If you do not sign up before the expiration date, you must request a new code. · [employer] Access Code: [email or web address] Expires: 3/21/2018 11:03 AM 
 
4. Enter the last four digits of your Social Security Number (xxxx) and Date of Birth (mm/dd/yyyy) as indicated and click Submit. You will be taken to the next sign-up page. 5. Create a Archive[employer] ID. This will be your ArchiveSocial login ID and cannot be changed, so think of one that is secure and easy to remember. 6. Create a [employer] password. You can change your password at any time. 7. Enter your Password Reset Question and Answer. This can be used at a later time if you forget your password. 8. Enter your e-mail address. You will receive e-mail notification when new information is available in [address] 9. Click Sign Up. You can now view and download portions of your medical record.  
10. Click the Download Summary menu link to download a portable copy of your medical information. If you have questions, please visit the Frequently Asked Questions section of the Fan[employer] website. Remember, [employer] is NOT to be used for urgent needs. For medical emergencies, dial 911. Now available from your iPhone and Android! Please provide this summary of care documentation to your next provider. Your primary care clinician is listed as Marletta Base. If you have any questions after today's visit, please call [phone].

## 2017-12-21 NOTE — PROGRESS NOTES
Joshua Robertson is a 65 y.o. female who presents today for Anticoagulation monitoring. Current dose:  Coumadin 2 mg takes 1/2 tab PO everyday except on Sundays she takes nothing  Medication Changes:  no  Dietary Changes:  no    Latest INR:  Results for orders placed or performed in visit on 12/21/17   AMB POC PT/INR   Result Value Ref Range    VALID INTERNAL CONTROL POC Yes     Prothrombin time (POC) 24.1 seconds    INR POC 2.0          New Coumadin dose:.current treatment plan is effective, no change in therapy. Next check to be scheduled for  4 weeks.     Dar Espinosa LPN

## 2017-12-29 LAB
AMPHETAMINES UR QL SCN: NEGATIVE NG/ML
BARBITURATES UR QL SCN: NEGATIVE NG/ML
BENZODIAZ UR QL SCN: NEGATIVE NG/ML
BZE UR QL SCN: NEGATIVE NG/ML
CANNABINOIDS UR QL SCN: NEGATIVE NG/ML
CREAT UR-MCNC: 121.9 MG/DL (ref 20–300)
FENTANYL+NORFENTANYL UR QL CFM: POSITIVE
MEPERIDINE UR QL: NEGATIVE NG/ML
METHADONE UR QL SCN: NEGATIVE NG/ML
OPIATES UR QL SCN: POSITIVE NG/ML
OXYCODONE+OXYMORPHONE UR QL SCN: NEGATIVE NG/ML
PCP UR QL: NEGATIVE NG/ML
PH UR: 6.1 [PH] (ref 4.5–8.9)
PLEASE NOTE:, 733157: ABNORMAL
PROPOXYPH UR QL SCN: NEGATIVE NG/ML
SP GR UR: 1.01
TRAMADOL UR QL SCN: NEGATIVE NG/ML

## 2018-01-24 ENCOUNTER — OFFICE VISIT (OUTPATIENT)
Dept: INTERNAL MEDICINE CLINIC | Age: 78
End: 2018-01-24

## 2018-01-24 VITALS
HEIGHT: 65 IN | BODY MASS INDEX: 22.71 KG/M2 | TEMPERATURE: 97.2 F | OXYGEN SATURATION: 96 % | WEIGHT: 136.3 LBS | RESPIRATION RATE: 16 BRPM | HEART RATE: 64 BPM

## 2018-01-24 DIAGNOSIS — D68.32 WARFARIN-INDUCED COAGULOPATHY (HCC): ICD-10-CM

## 2018-01-24 DIAGNOSIS — I82.511 CHRONIC DEEP VEIN THROMBOSIS (DVT) OF FEMORAL VEIN OF RIGHT LOWER EXTREMITY (HCC): ICD-10-CM

## 2018-01-24 DIAGNOSIS — T45.515A WARFARIN-INDUCED COAGULOPATHY (HCC): ICD-10-CM

## 2018-01-24 DIAGNOSIS — Z79.891 CHRONICALLY ON OPIATE THERAPY: ICD-10-CM

## 2018-01-24 DIAGNOSIS — I48.92 ATRIAL FLUTTER WITH CONTROLLED RESPONSE (HCC): ICD-10-CM

## 2018-01-24 DIAGNOSIS — Z51.81 ENCOUNTER FOR MONITORING COUMADIN THERAPY: ICD-10-CM

## 2018-01-24 DIAGNOSIS — I48.91 ATRIAL FIBRILLATION, UNSPECIFIED TYPE (HCC): Primary | ICD-10-CM

## 2018-01-24 DIAGNOSIS — Z79.01 ENCOUNTER FOR MONITORING COUMADIN THERAPY: ICD-10-CM

## 2018-01-24 LAB
INR BLD: 1.8
PT POC: 22.1 SECONDS
VALID INTERNAL CONTROL?: YES

## 2018-01-24 RX ORDER — HYDROCODONE BITARTRATE AND ACETAMINOPHEN 5; 325 MG/1; MG/1
1 TABLET ORAL
Qty: 60 TAB | Refills: 0 | Status: SHIPPED | OUTPATIENT
Start: 2018-01-24 | End: 2018-02-28 | Stop reason: SDUPTHER

## 2018-01-24 NOTE — ASSESSMENT & PLAN NOTE
Lab Results   Component Value Date/Time    WBC 6.0 03/08/2017 10:49 AM    HGB 12.3 03/08/2017 10:49 AM    HCT 36.6 03/08/2017 10:49 AM    PLATELET 808 75/59/1613 10:49 AM    Creatinine 0.97 03/08/2017 10:49 AM    BUN 16 03/08/2017 10:49 AM    Potassium 4.3 03/08/2017 10:49 AM    INR 2.5 03/03/2017 03:52 AM    INR POC 2.0 12/21/2017 03:50 PM    Prothrombin time 26.0 03/03/2017 03:52 AM

## 2018-01-24 NOTE — PROGRESS NOTES
Diagnoses and all orders for this visit:    1. Atrial fibrillation, unspecified type [facility])  Assessment & Plan:    Key CAD CHF Meds             metoprolol tartrate (LOPRESSOR) 25 mg tablet  (Taking) TAKE 1/2 TABLET BY MOUTH TWICE DAILY    losartan (COZAAR) 50 mg tablet  (Taking) Take 1 Tab by mouth daily. dilTIAZem CD (CARTIA XT) 120 mg ER capsule  (Taking) Take 1 Cap by mouth daily. aspirin 81 mg chewable tablet  (Taking) Take 81 mg by mouth daily. warfarin (COUMADIN) 2 mg tablet TAKE ONE TABLET BY MOUTH DAILY    furosemide (LASIX) 20 mg tablet         Key Antihyperlipidemia Meds             pravastatin (PRAVACHOL) 20 mg tablet  (Taking) Take 1 Tab by mouth nightly. Lab Results   Component Value Date/Time    BNP 81 03/02/2017 01:43 PM    B-type Natriuretic Peptide 303.1 01/17/2017 10:43 AM    Sodium 141 03/08/2017 10:49 AM    Potassium 4.3 03/08/2017 10:49 AM    INR 2.5 03/03/2017 03:52 AM    INR POC 2.0 12/21/2017 03:50 PM    Prothrombin time 26.0 03/03/2017 03:52 AM       Orders:  -     REFERRAL TO OPHTHALMOLOGY    2. Encounter for monitoring coumadin therapy  -     AMB POC PT/INR    3. Atrial flutter with controlled response (HCC)  Assessment & Plan:    Key CAD CHF Meds             metoprolol tartrate (LOPRESSOR) 25 mg tablet  (Taking) TAKE 1/2 TABLET BY MOUTH TWICE DAILY    losartan (COZAAR) 50 mg tablet  (Taking) Take 1 Tab by mouth daily. dilTIAZem CD (CARTIA XT) 120 mg ER capsule  (Taking) Take 1 Cap by mouth daily. aspirin 81 mg chewable tablet  (Taking) Take 81 mg by mouth daily. warfarin (COUMADIN) 2 mg tablet TAKE ONE TABLET BY MOUTH DAILY    furosemide (LASIX) 20 mg tablet         Key Antihyperlipidemia Meds             pravastatin (PRAVACHOL) 20 mg tablet  (Taking) Take 1 Tab by mouth nightly.         Lab Results   Component Value Date/Time    BNP 81 03/02/2017 01:43 PM    B-type Natriuretic Peptide 303.1 01/17/2017 10:43 AM    Sodium 141 03/08/2017 10:49 AM    Potassium 4.3 03/08/2017 10:49 AM    INR 2.5 03/03/2017 03:52 AM    INR POC 2.0 12/21/2017 03:50 PM    Prothrombin time 26.0 03/03/2017 03:52 AM         4. Warfarin-induced coagulopathy [facility])  Assessment & Plan:    Lab Results   Component Value Date/Time    WBC 6.0 03/08/2017 10:49 AM    HGB 12.3 03/08/2017 10:49 AM    HCT 36.6 03/08/2017 10:49 AM    PLATELET 984 80/16/8145 10:49 AM    Creatinine 0.97 03/08/2017 10:49 AM    BUN 16 03/08/2017 10:49 AM    Potassium 4.3 03/08/2017 10:49 AM    INR 2.5 03/03/2017 03:52 AM    INR POC 2.0 12/21/2017 03:50 PM    Prothrombin time 26.0 03/03/2017 03:52 AM         5. Chronic deep vein thrombosis (DVT) of femoral vein of right lower extremity (HCC)  Assessment & Plan:  Stable, based on history, physical exam and review of pertinent labs, studies and medications; meds reconciled; continue current treatment plan. Key Peripheral Vascular Disease Meds             pravastatin (PRAVACHOL) 20 mg tablet  (Taking) Take 1 Tab by mouth nightly. aspirin 81 mg chewable tablet  (Taking) Take 81 mg by mouth daily. warfarin (COUMADIN) 2 mg tablet TAKE ONE TABLET BY MOUTH DAILY        Lab Results   Component Value Date/Time    WBC 6.0 03/08/2017 10:49 AM    HGB 12.3 03/08/2017 10:49 AM    HCT 36.6 03/08/2017 10:49 AM    PLATELET 504 47/20/5682 10:49 AM    Creatinine 0.97 03/08/2017 10:49 AM    BUN 16 03/08/2017 10:49 AM    INR 2.5 03/03/2017 03:52 AM    INR POC 2.0 12/21/2017 03:50 PM    Prothrombin time 26.0 03/03/2017 03:52 AM         6. Chronically on opiate therapy  -     HYDROcodone-acetaminophen (NORCO) 5-325 mg per tablet; Take 1 Tab by mouth every six (6) hours as needed for Pain. Max Daily Amount: 4 Tabs.     [facility]  Nadine Caldwell MD, Devan Argueta, [address] [address]  Phone:  [phone]  Fax: [phone]      Chief Complaint   Patient presents with    Diabetes     f/u         SUBECTIVE:    Alf Boyd is a 65 y.o. female   The patient returns today with known history of chronic atrial fibrillation, dyslipidemia, chronic pain, chronic  therapy, diastolic dysfunction, peptic ulcer disease and is seen for evaluation. She states the pain medication just started helping her. The patient is seen for evaluation. Current Outpatient Prescriptions   Medication Sig Dispense Refill    HYDROcodone-acetaminophen (NORCO) 5-325 mg per tablet Take 1 Tab by mouth every six (6) hours as needed for Pain. Max Daily Amount: 4 Tabs. 60 Tab 0    metoprolol tartrate (LOPRESSOR) 25 mg tablet TAKE 1/2 TABLET BY MOUTH TWICE DAILY 90 Tab 3    losartan (COZAAR) 50 mg tablet Take 1 Tab by mouth daily. 90 Tab 3    pravastatin (PRAVACHOL) 20 mg tablet Take 1 Tab by mouth nightly. 90 Tab 10    dilTIAZem CD (CARTIA XT) 120 mg ER capsule Take 1 Cap by mouth daily. 90 Cap 3    aspirin 81 mg chewable tablet Take 81 mg by mouth daily.       warfarin (COUMADIN) 2 mg tablet TAKE ONE TABLET BY MOUTH DAILY 90 Tab 1    furosemide (LASIX) 20 mg tablet        Past Medical History:   Diagnosis Date    AF (atrial fibrillation) (HCC)     RHEUMATIC FEVER AS CHILD, AFIB    Anemia     Arthritis     Blunt head trauma 2017    Chronic pain     Chronically on opiate therapy     Coagulation defects     COUMADIN    Diastolic dysfunction     grade 4    Encounter for Hemoccult screening 2016    Fall 2017    H/O colonoscopy     Hand pain, right     Heart failure (HCC)     Prediabetes     PUD (peptic ulcer disease)     Rheumatic fever     S/P MVR (mitral valve replacement)     Shoulder pain, right     SOB (shortness of breath)     Thyroid disease     Venous insufficiency of both lower extremities 2017    Warfarin-induced coagulopathy ([facility] [address]      Past Surgical History:   Procedure Laterality Date    HX CATARACT REMOVAL      BILATERAL    HX  SECTION      HX COLONOSCOPY      HX HEART CATHETERIZATION      HX HYSTERECTOMY      HX MITRAL VALVE REPLACEMENT      HX LUIS ENRIQUE AND BSO       No Known Allergies    REVIEW OF SYSTEMS:   There is no chest pain, no shortness of breath. Social History     Social History    Marital status: SINGLE     Spouse name: N/A    Number of children: N/A    Years of education: N/A     Social History Main Topics    Smoking status: Former Smoker     Packs/day: 0.50     Years: 30.00     Quit date: 2012    Smokeless tobacco: Never Used    Alcohol use No    Drug use: No    Sexual activity: No     Other Topics Concern    None     Social History Narrative    Family History: Mother:  80 yrs, High Blood PressureFather:  52 yrs, High Blood PressureSister(s): alive, High Blood    Pressure, colon cancer, type II diabetesBrother(s): , High Blood Pressure, colon cancer, type II diabetesSon(s): alive 48 yrs1 sister(s)    . 1 son(s) . Social History: Alcohol Use Patient does not use alcohol. Smoking Status Patient is a former smoker. Marital Status: Single. Lives w ith:    alone. Children: sons. Occupation/W ork: retired - for [employer]. Education/School: has highschool diploma. [employer]: [address] [employer].   r  Family History   Problem Relation Age of Onset    Heart Attack Sister     Hypertension Sister     Kidney Disease Mother     Hypertension Mother     Hypertension Father     Cancer Brother     Heart Failure Brother     Arthritis-osteo Sister     Cancer Sister     Cancer Brother      PROSTATE       OBJECTIVE:  Visit Vitals    Pulse 64    Temp 97.2 °F (36.2 °C) (Oral)    Resp 16    Ht 5' 5\" (1.651 m)    Wt 136 lb 4.8 oz (61.8 kg)    SpO2 96%    BMI 22.68 kg/m2     ENT: perrla,  eom intact  NECK: supple.  Thyroid normal  CHEST: clear to ascultation and percussion   HEART: regular rate and rhythm  ABD: soft, bowel sounds active  EXTREMITIES: no edema, pulse 1+     Office Visit on 2018   Component Date Value Ref Range Status    VALID INTERNAL CONTROL POC 01/24/2018 Yes   Final    Prothrombin time (POC) 01/24/2018 22.1  seconds Final    INR POC 01/24/2018 1.8   Final   Clinical Support on 12/21/2017   Component Date Value Ref Range Status    VALID INTERNAL CONTROL POC 12/21/2017 Yes   Preliminary    Prothrombin time (POC) 12/21/2017 24.1  seconds Preliminary    INR POC 12/21/2017 2.0   Preliminary    Amphetamine Screen, urine 12/21/2017 Negative  Gqvkll=9354 ng/mL Final    Barbiturates Screen, urine 12/21/2017 Negative  Dpqnyp=863 ng/mL Final    Benzodiazepines Screen, urine 12/21/2017 Negative  Yvarsd=154 ng/mL Final    Cannabinoid Screen, urine 12/21/2017 Negative  Cutoff=20 ng/mL Final    Cocaine Metab. Screen, urine 12/21/2017 Negative  Sgqofv=977 ng/mL Final    Opiate Screen, urine 12/21/2017 Positive* Swuhkf=324 ng/mL Final    Comment: Opiate test includes Codeine, Morphine, Hydromorphone, Hydrocodone. Codeine                     Negative            Xivvrz=815            01  Morphine                    Positive        A                         01     Morphine Confirm         >3000              ng/mL Bfrihe=111       01  Morphine detected; this finding is consistent with use of medications  that include Duromorph, MS-Contin, Roxanol, Daniella, or drugs  containing Codeine, or generic formulations. This drug may also be  detected from use of Heroin. Drugs listed are representative of  common sources of the compound detected and are not intended to  include all possible sources. Hydromorphone               Negative            Wysrpm=495            01  Hydrocodone                 Positive        A                         01     Hydrocodone Confirm      266                ng/mL Absqqp=809       01  Hydrocodone detected; this finding is consistent with use of  medications that include Lortab, Lorcet, Vicodin, Vicoprofen,                             Tussionex, Norco, or generic formulations.  Drugs listed are  representative of common sources of the compound detected and are  not intended to include all possible sources.  Oxycodone/Oxymorphone, urine 12/21/2017 Negative  Fonmdm=456 ng/mL Final    Test includes Oxycodone and Oxymorphone    Phencyclidine Screen, urine 12/21/2017 Negative  Cutoff=25 ng/mL Final    Methadone Screen, urine 12/21/2017 Negative  Rapjaf=320 ng/mL Final    Propoxyphene Screen, urine 12/21/2017 Negative  Dudhcj=356 ng/mL Final    Meperidine screen 12/21/2017 Negative  Tkkszl=490 ng/mL Final    Comment: This test was developed and its performance characteristics  determined by [employer]. It has not been cleared or approved  by the Food and Drug Administration.  FENTANYL, URINE 12/21/2017 Positive* Dadgvk=3062 Final    Comment: Test includes Fentanyl and Norfentanyl  Fentanyl                    Positive        A                         01  Fentanyl Confirm            3420               pg/mL Ajahxs=372       01  Norfentanyl                 Positive        A                         01  Norfentanyl Confirm         80011              pg/mL Jjdudq=714       01  Norfentanyl detected; this finding can be consistent with the use of  medications that include Actiq, Duragesic, Fentora, Sublimaze, or  generic formulations. Drugs listed are representative of common  sources of the compound detected and are not intended to include all  possible sources.  Tramadol Screen, urine 12/21/2017 Negative  Axqcau=007 ng/mL Final    Creatinine, urine 12/21/2017 121.9  20.0 - 300.0 mg/dL Final    Specific Gravity 12/21/2017 1.014   Final    pH, urine 12/21/2017 6.1  4.5 - 8.9 Final    Please Note 12/21/2017 Comment   Final    Comment: Drug-test results should be interpreted in the context of clinical  information. Patient metabolic variables, specific drug chemistry, and  specimen characteristics can affect test outcome. Technical  consultation is available if a test result is inconsistent with an  expected outcome. ([email or web address] call toll-free  [phone])  Drug brands, if listed herein, are trademarks of their respective  owners. Clinical Support on 11/21/2017   Component Date Value Ref Range Status    VALID INTERNAL CONTROL POC 11/21/2017 Yes   Final    Prothrombin time (POC) 11/21/2017 21.9  seconds Final    INR POC 11/21/2017 1.8   Final          ASSESSMENT:  1. Atrial fibrillation, unspecified type ([address]    2. Encounter for monitoring coumadin therapy    3. Atrial flutter with controlled response ([address]    4. Warfarin-induced coagulopathy ([address]    5. Chronic deep vein thrombosis (DVT) of femoral vein of right lower extremity (HCC)    6. Chronically on opiate therapy        Pain control inadequate on Hydrocodone. We suggest referral to pain management. She prefers not. We advised that we will not increase the current analgesics as we do not normally do pain management. She is aware of this. She elects not to see a pain management physician. INR is therapeutic. BMI is at her ideal body weight. Blood pressure control is adequate. She will return to see me in three months, she will return monthly for a PT/INR and pain management refill. MedDATA/gwo           PLAN:  .  Orders Placed This Encounter    REFERRAL TO OPHTHALMOLOGY    AMB POC PT/INR    HYDROcodone-acetaminophen (NORCO) 5-325 mg per tablet       Follow-up Disposition:  Return in about 4 weeks (around 2/21/2018) for bp check, pt/inr. ATTENTION:   This medical record was transcribed using an electronic medical records system. Although proofread, it may and can contain electronic and spelling errors. Other human spelling and other errors may be present. Corrections may be executed at a later time. Please feel free to contact us for any clarifications as needed.

## 2018-01-24 NOTE — PROGRESS NOTES
1. Have you been to the ER, urgent care clinic since your last visit? Hospitalized since your last visit? No    2. Have you seen or consulted any other health care providers outside of the [address] [place] [address] since your last visit? Include any pap smears or colon screening.  No       Complaints of leg pain and sharp pain in left shoulder

## 2018-01-24 NOTE — ASSESSMENT & PLAN NOTE
Stable, based on history, physical exam and review of pertinent labs, studies and medications; meds reconciled; continue current treatment plan. Key Peripheral Vascular Disease Meds             pravastatin (PRAVACHOL) 20 mg tablet  (Taking) Take 1 Tab by mouth nightly. aspirin 81 mg chewable tablet  (Taking) Take 81 mg by mouth daily.     warfarin (COUMADIN) 2 mg tablet TAKE ONE TABLET BY MOUTH DAILY        Lab Results   Component Value Date/Time    WBC 6.0 03/08/2017 10:49 AM    HGB 12.3 03/08/2017 10:49 AM    HCT 36.6 03/08/2017 10:49 AM    PLATELET 671 63/38/7494 10:49 AM    Creatinine 0.97 03/08/2017 10:49 AM    BUN 16 03/08/2017 10:49 AM    INR 2.5 03/03/2017 03:52 AM    INR POC 2.0 12/21/2017 03:50 PM    Prothrombin time 26.0 03/03/2017 03:52 AM

## 2018-01-24 NOTE — ASSESSMENT & PLAN NOTE
Key CAD CHF Meds             metoprolol tartrate (LOPRESSOR) 25 mg tablet  (Taking) TAKE 1/2 TABLET BY MOUTH TWICE DAILY    losartan (COZAAR) 50 mg tablet  (Taking) Take 1 Tab by mouth daily. dilTIAZem CD (CARTIA XT) 120 mg ER capsule  (Taking) Take 1 Cap by mouth daily. aspirin 81 mg chewable tablet  (Taking) Take 81 mg by mouth daily. warfarin (COUMADIN) 2 mg tablet TAKE ONE TABLET BY MOUTH DAILY    furosemide (LASIX) 20 mg tablet         Key Antihyperlipidemia Meds             pravastatin (PRAVACHOL) 20 mg tablet  (Taking) Take 1 Tab by mouth nightly.         Lab Results   Component Value Date/Time    BNP 81 03/02/2017 01:43 PM    B-type Natriuretic Peptide 303.1 01/17/2017 10:43 AM    Sodium 141 03/08/2017 10:49 AM    Potassium 4.3 03/08/2017 10:49 AM    INR 2.5 03/03/2017 03:52 AM    INR POC 2.0 12/21/2017 03:50 PM    Prothrombin time 26.0 03/03/2017 03:52 AM

## 2018-01-24 NOTE — MR AVS SNAPSHOT
[address] [place] [address]. [address] [id number] 
[phone] Patient: Thomas Fajardo MRN: [id number] XNS:[id number] Visit Information Date & Time Provider Department Dept. Phone Encounter #  
 1/24/2018 12:30 PM Alvin Jones MD [facility] and [place] [address] [id number] Your Appointments 4/10/2018 11:20 AM  
ESTABLISHED PATIENT with Leta Potter MD  
[facility] ([address] [facility]) Appt Note: Patient request 1 yr f/u  
 [address] [facility] [address] [place] [address] [address] [place] [address],[address] [facility] 7 [address] Upcoming Health Maintenance Date Due  
 GLAUCOMA SCREENING Q2Y 4/21/2017 MEDICARE YEARLY EXAM 9/2/2018 DTaP/Tdap/Td series (2 - Td) 1/17/2027 Allergies as of 1/24/2018  Review Complete On: 1/24/2018 By: Mike Tilley LPN No Known Allergies Current Immunizations  Reviewed on 5/4/2012 Name Date ZZZ-RETIRED (DO NOT USE) Pneumococcal Vaccine (Unspecified Type) 5/7/2012  8:12 AM  
  
 Not reviewed this visit You Were Diagnosed With   
  
 Codes Comments Atrial fibrillation, unspecified type ([facility]    -  Primary ICD-10-CM: I48.91 
ICD-9-CM: 427.31 Encounter for monitoring coumadin therapy     ICD-10-CM: Z51.81, Z79.01 
ICD-9-CM: V58.83, V58.61 Atrial flutter with controlled response ([facility]     ICD-10-CM: I48.92 
ICD-9-CM: 427.32 Warfarin-induced coagulopathy ([facility]     ICD-10-CM: D68.9, T45.515A ICD-9-CM: 286.9, F935.8 Chronic deep vein thrombosis (DVT) of femoral vein of right lower extremity (HCC)     ICD-10-CM: Z76.319 ICD-9-CM: 453.51 Chronically on opiate therapy     ICD-10-CM: Z79.891 ICD-9-CM: V58.69 Vitals Pulse Temp Resp Height(growth percentile) Weight(growth percentile) SpO2  
 64 97.2 °F (36.2 °C) (Oral) 16 5' 5\" (1.651 m) 136 lb 4.8 oz (61.8 kg) 96% BMI OB Status Smoking Status 22.68 kg/m2 Hysterectomy Former Smoker Vitals History BMI and BSA Data Body Mass Index Body Surface Area  
 22.68 kg/m 2 1.68 m 2 Preferred Pharmacy Pharmacy Name Phone [facility] [address] [facility] [phone] Your Updated Medication List  
  
   
This list is accurate as of: 1/24/18  2:23 PM.  Always use your most recent med list.  
  
  
  
  
 aspirin 81 mg chewable tablet Take 81 mg by mouth daily. dilTIAZem  mg ER capsule Commonly known as:  CARTIA XT Take 1 Cap by mouth daily. furosemide 20 mg tablet Commonly known as:  LASIX HYDROcodone-acetaminophen 5-325 mg per tablet Commonly known as:  Clearnce Raina Take 1 Tab by mouth every six (6) hours as needed for Pain. Max Daily Amount: 4 Tabs. losartan 50 mg tablet Commonly known as:  COZAAR Take 1 Tab by mouth daily. metoprolol tartrate 25 mg tablet Commonly known as:  LOPRESSOR  
TAKE 1/2 TABLET BY MOUTH TWICE DAILY pravastatin 20 mg tablet Commonly known as:  PRAVACHOL Take 1 Tab by mouth nightly. warfarin 2 mg tablet Commonly known as:  COUMADIN  
TAKE ONE TABLET BY MOUTH DAILY Prescriptions Printed Refills HYDROcodone-acetaminophen (NORCO) 5-325 mg per tablet 0 Sig: Take 1 Tab by mouth every six (6) hours as needed for Pain. Max Daily Amount: 4 Tabs. Class: Print Route: Oral  
  
We Performed the Following AMB POC PT/INR [97084 CPT(R)] REFERRAL TO OPHTHALMOLOGY [REF57 Custom] Referral Information Referral ID Referred By Referred To  
  
 [id number] Zita VERGARA Not Available Visits Status Start Date End Date 1 New Request 1/24/18 1/24/19 If your referral has a status of pending review or denied, additional information will be sent to support the outcome of this decision. Introducing [facility]! Alf Moody introduces StudyApps patient portal. Now you can access parts of your medical record, email your doctor's office, and request medication refills online. 1. In your internet browser, go to [email or web address] 2. Click on the First Time User? Click Here link in the Sign In box. You will see the New Member Sign Up page. 3. Enter your StudyApps Access Code exactly as it appears below. You will not need to use this code after youve completed the sign-up process. If you do not sign up before the expiration date, you must request a new code. · StudyApps Access Code: [email or web address] Expires: 3/21/2018 11:03 AM 
 
4. Enter the last four digits of your Social Security Number (xxxx) and Date of Birth (mm/dd/yyyy) as indicated and click Submit. You will be taken to the next sign-up page. 5. Create a StudyApps ID. This will be your StudyApps login ID and cannot be changed, so think of one that is secure and easy to remember. 6. Create a StudyApps password. You can change your password at any time. 7. Enter your Password Reset Question and Answer. This can be used at a later time if you forget your password. 8. Enter your e-mail address. You will receive e-mail notification when new information is available in [address] 9. Click Sign Up. You can now view and download portions of your medical record. 10. Click the Download Summary menu link to download a portable copy of your medical information. If you have questions, please visit the Frequently Asked Questions section of the StudyApps website. Remember, StudyApps is NOT to be used for urgent needs. For medical emergencies, dial 911. Now available from your iPhone and Android! Please provide this summary of care documentation to your next provider. Your primary care clinician is listed as Mauricio Quiñones. If you have any questions after today's visit, please call [phone].

## 2018-02-28 ENCOUNTER — OFFICE VISIT (OUTPATIENT)
Dept: INTERNAL MEDICINE CLINIC | Age: 78
End: 2018-02-28

## 2018-02-28 VITALS
RESPIRATION RATE: 18 BRPM | WEIGHT: 137.7 LBS | DIASTOLIC BLOOD PRESSURE: 64 MMHG | OXYGEN SATURATION: 95 % | HEART RATE: 61 BPM | SYSTOLIC BLOOD PRESSURE: 168 MMHG | HEIGHT: 65 IN | TEMPERATURE: 97.7 F | BODY MASS INDEX: 22.94 KG/M2

## 2018-02-28 DIAGNOSIS — R06.02 SOB (SHORTNESS OF BREATH): ICD-10-CM

## 2018-02-28 DIAGNOSIS — Z51.81 ENCOUNTER FOR MONITORING COUMADIN THERAPY: Primary | ICD-10-CM

## 2018-02-28 DIAGNOSIS — Z79.01 ENCOUNTER FOR MONITORING COUMADIN THERAPY: Primary | ICD-10-CM

## 2018-02-28 DIAGNOSIS — I51.89 DIASTOLIC DYSFUNCTION: ICD-10-CM

## 2018-02-28 DIAGNOSIS — Z79.891 CHRONICALLY ON OPIATE THERAPY: ICD-10-CM

## 2018-02-28 DIAGNOSIS — R79.9 ABNORMAL FINDING OF BLOOD CHEMISTRY: ICD-10-CM

## 2018-02-28 DIAGNOSIS — I82.511 CHRONIC DEEP VEIN THROMBOSIS (DVT) OF FEMORAL VEIN OF RIGHT LOWER EXTREMITY (HCC): ICD-10-CM

## 2018-02-28 DIAGNOSIS — M10.9 GOUT, UNSPECIFIED CAUSE, UNSPECIFIED CHRONICITY, UNSPECIFIED SITE: ICD-10-CM

## 2018-02-28 DIAGNOSIS — I48.91 ATRIAL FIBRILLATION, UNSPECIFIED TYPE (HCC): ICD-10-CM

## 2018-02-28 DIAGNOSIS — E78.5 DYSLIPIDEMIA: ICD-10-CM

## 2018-02-28 LAB
INR BLD: 1.6
PT POC: 18.9 SECONDS
VALID INTERNAL CONTROL?: YES

## 2018-02-28 RX ORDER — HYDROCODONE BITARTRATE AND ACETAMINOPHEN 5; 325 MG/1; MG/1
1 TABLET ORAL
Qty: 60 TAB | Refills: 0 | Status: SHIPPED | OUTPATIENT
Start: 2018-02-28 | End: 2018-04-03 | Stop reason: SDUPTHER

## 2018-02-28 NOTE — PROGRESS NOTES
Diagnoses and all orders for this visit:    1. Encounter for monitoring coumadin therapy  -     AMB POC PT/INR    2. Chronic deep vein thrombosis (DVT) of femoral vein of right lower extremity (HCC)    3. Atrial fibrillation, unspecified type ([facility]  -     URINALYSIS W/ RFLX MICROSCOPIC  -     CBC WITH AUTOMATED DIFF  -     METABOLIC PANEL, COMPREHENSIVE  -     LIPID PANEL  -     TSH 3RD GENERATION  -     COLLECTION VENOUS BLOOD,VENIPUNCTURE  -     HEMOGLOBIN A1C WITH EAG    4. Diastolic dysfunction    5. SOB (shortness of breath)  -     BNP    6. Gout, unspecified cause, unspecified chronicity, unspecified site  -     URIC ACID    7. Dyslipidemia  -     LIPID PANEL    8. Abnormal finding of blood chemistry   -     HEMOGLOBIN A1C WITH EAG    9. Chronically on opiate therapy  -     HYDROcodone-acetaminophen (NORCO) 5-325 mg per tablet; Take 1 Tab by mouth every six (6) hours as needed for Pain. Max Daily Amount: 4 Tabs. [facility]  Eliz Gramajo MD, [facility] [address]  Phone:  [phone]  Fax: [phone]      Chief Complaint   Patient presents with    Leg Swelling     right         SUBECTIVE:    Castillo Lugo is a 77 y.o. female Patient returns today with known history of atrial fibrillation, DVT right lower extremity, chronic pain, dyslipidemia, prediabetes, status post mitral valve replacement, chronic venous insufficiency. She complains of bilateral discomfort with swelling in both legs and is seen for evaluation. She also complains of some shortness of breath. Current Outpatient Prescriptions   Medication Sig Dispense Refill    HYDROcodone-acetaminophen (NORCO) 5-325 mg per tablet Take 1 Tab by mouth every six (6) hours as needed for Pain. Max Daily Amount: 4 Tabs.  60 Tab 0    metoprolol tartrate (LOPRESSOR) 25 mg tablet TAKE 1/2 TABLET BY MOUTH TWICE DAILY 90 Tab 3    warfarin (COUMADIN) 2 mg tablet TAKE ONE TABLET BY MOUTH DAILY 90 Tab 1    losartan (COZAAR) 50 mg tablet Take 1 Tab by mouth daily. 90 Tab 3    pravastatin (PRAVACHOL) 20 mg tablet Take 1 Tab by mouth nightly. 90 Tab 10    dilTIAZem CD (CARTIA XT) 120 mg ER capsule Take 1 Cap by mouth daily. 90 Cap 3    furosemide (LASIX) 20 mg tablet       aspirin 81 mg chewable tablet Take 81 mg by mouth daily. Past Medical History:   Diagnosis Date    AF (atrial fibrillation) (HCC)     RHEUMATIC FEVER AS CHILD, AFIB    Anemia     Arthritis     Blunt head trauma 2017    Chronic pain     Chronically on opiate therapy     Coagulation defects     COUMADIN    Diastolic dysfunction     grade 4    Dyslipidemia     Encounter for Hemoccult screening 2016    Fall 2017    H/O colonoscopy     Hand pain, right     Heart failure (HCC)     Meniscus degeneration, right 2017    Extensive patellar chondral derangement as well as chondral derangement     Prediabetes     PUD (peptic ulcer disease)     Rheumatic fever     S/P MVR (mitral valve replacement)     Shoulder pain, right     SOB (shortness of breath)     Thyroid disease     Venous insufficiency of both lower extremities 2017    Warfarin-induced coagulopathy ([facility]      Past Surgical History:   Procedure Laterality Date    HX CATARACT REMOVAL      BILATERAL    HX  SECTION      HX COLONOSCOPY      HX HEART CATHETERIZATION      HX HYSTERECTOMY      HX MITRAL VALVE REPLACEMENT      HX LUIS ENRIQUE AND BSO       No Known Allergies    REVIEW OF SYSTEMS:   Some shortness of breath the other day, none today. No chest pain.         Social History     Social History    Marital status: SINGLE     Spouse name: N/A    Number of children: N/A    Years of education: N/A     Social History Main Topics    Smoking status: Former Smoker     Packs/day: 0.50     Years: 30.00     Quit date: 2012    Smokeless tobacco: Never Used    Alcohol use No    Drug use: No    Sexual activity: No Other Topics Concern    None     Social History Narrative    Family History: Mother:  80 yrs, High Blood PressureFather:  52 yrs, High Blood PressureSister(s): alive, High Blood    Pressure, colon cancer, type II diabetesBrother(s): , High Blood Pressure, colon cancer, type II diabetesSon(s): alive 48 yrs1 sister(s)    . 1 son(s) . Social History: Alcohol Use Patient does not use alcohol. Smoking Status Patient is a former smoker. Marital Status: Single. Lives w ith:    alone. Children: sons. Occupation/W ork: retired - for [employer]. Education/School: has highschool diploma. Sabianism: [address] [employer].   r  Family History   Problem Relation Age of Onset    Heart Attack Sister     Hypertension Sister     Kidney Disease Mother     Hypertension Mother     Hypertension Father     Cancer Brother     Heart Failure Brother     Arthritis-osteo Sister     Cancer Sister     Cancer Brother      PROSTATE       OBJECTIVE:  Visit Vitals    /64    Pulse 61    Temp 97.7 °F (36.5 °C) (Oral)    Resp 18    Ht 5' 5\" (1.651 m)    Wt 137 lb 11.2 oz (62.5 kg)    SpO2 95%    BMI 22.91 kg/m2     ENT: perrla,  eom intact  NECK: supple.  Thyroid normal  CHEST: clear to ascultation and percussion   HEART: regular rate and rhythm  ABD: soft, bowel sounds active  EXTREMITIES: no edema, pulse 1+     Office Visit on 2018   Component Date Value Ref Range Status    VALID INTERNAL CONTROL POC 2018 Yes   Final    Prothrombin time (POC) 2018 18.9  seconds Final    INR POC 2018 1.6   Final   Office Visit on 2018   Component Date Value Ref Range Status    VALID INTERNAL CONTROL POC 2018 Yes   Final    Prothrombin time (POC) 2018 22.1  seconds Final    INR POC 2018 1.8   Final   Clinical Support on 2017   Component Date Value Ref Range Status    VALID INTERNAL CONTROL POC 2017 Yes   Preliminary  Prothrombin time (POC) 12/21/2017 24.1  seconds Preliminary    INR POC 12/21/2017 2.0   Preliminary    Amphetamine Screen, urine 12/21/2017 Negative  Xbpbek=1614 ng/mL Final    Barbiturates Screen, urine 12/21/2017 Negative  Dphlgf=982 ng/mL Final    Benzodiazepines Screen, urine 12/21/2017 Negative  Kvtthz=271 ng/mL Final    Cannabinoid Screen, urine 12/21/2017 Negative  Cutoff=20 ng/mL Final    Cocaine Metab. Screen, urine 12/21/2017 Negative  Gncnfb=597 ng/mL Final    Opiate Screen, urine 12/21/2017 Positive* Nonjuv=055 ng/mL Final    Comment: Opiate test includes Codeine, Morphine, Hydromorphone, Hydrocodone. Codeine                     Negative            Pdymds=672            01  Morphine                    Positive        A                         01     Morphine Confirm         >3000              ng/mL Abcbhb=298       01  Morphine detected; this finding is consistent with use of medications  that include Duromorph, MS-Contin, Roxanol, Daniella, or drugs  containing Codeine, or generic formulations. This drug may also be  detected from use of Heroin. Drugs listed are representative of  common sources of the compound detected and are not intended to  include all possible sources. Hydromorphone               Negative            Btyuwi=282            01  Hydrocodone                 Positive        A                         01     Hydrocodone Confirm      266                ng/mL Vmpinb=133       01  Hydrocodone detected; this finding is consistent with use of  medications that include Lortab, Lorcet, Vicodin, Vicoprofen,                             Tussionex, Norco, or generic formulations. Drugs listed are  representative of common sources of the compound detected and are  not intended to include all possible sources.       Oxycodone/Oxymorphone, urine 12/21/2017 Negative  Kgzsnm=815 ng/mL Final    Test includes Oxycodone and Oxymorphone    Phencyclidine Screen, urine 12/21/2017 Negative  Cutoff=25 ng/mL Final    Methadone Screen, urine 12/21/2017 Negative  Sdovzg=082 ng/mL Final    Propoxyphene Screen, urine 12/21/2017 Negative  Edadfk=160 ng/mL Final    Meperidine screen 12/21/2017 Negative  Vfccel=411 ng/mL Final    Comment: This test was developed and its performance characteristics  determined by [facility]. It has not been cleared or approved  by the Food and Drug Administration.  FENTANYL, URINE 12/21/2017 Positive* Bapzys=0870 Final    Comment: Test includes Fentanyl and Norfentanyl  Fentanyl                    Positive        A                         01  Fentanyl Confirm            3420               pg/mL Zatbeg=972       01  Norfentanyl                 Positive        A                         01  Norfentanyl Confirm         806[address]              pg/mL Yhyvwt=951       01  Norfentanyl detected; this finding can be consistent with the use of  medications that include Actiq, Duragesic, Fentora, Sublimaze, or  generic formulations. Drugs listed are representative of common  sources of the compound detected and are not intended to include all  possible sources.  Tramadol Screen, urine 12/21/2017 Negative  Rgugjq=333 ng/mL Final    Creatinine, urine 12/21/2017 121.9  20.0 - 300.0 mg/dL Final    Specific Gravity 12/21/2017 1.014   Final    pH, urine 12/21/2017 6.1  4.5 - 8.9 Final    Please Note 12/21/2017 Comment   Final    Comment: Drug-test results should be interpreted in the context of clinical  information. Patient metabolic variables, specific drug chemistry, and  specimen characteristics can affect test outcome. Technical  consultation is available if a test result is inconsistent with an  expected outcome. ([email or web address] call toll-free  [phone])  Drug brands, if listed herein, are trademarks of their respective  owners. ASSESSMENT:  1. Encounter for monitoring coumadin therapy    2.  Chronic deep vein thrombosis (DVT) of femoral vein of right lower extremity ([address]    3. Atrial fibrillation, unspecified type ([address]    4. Diastolic dysfunction    5. SOB (shortness of breath)    6. Gout, unspecified cause, unspecified chronicity, unspecified site    7. Dyslipidemia    8. Abnormal finding of blood chemistry     9. Chronically on opiate therapy      The symptoms are compatible with her known history of chronic venous insufficiency. Her INR is subtherapeutic, but DVT is unlikely in view of her usual therapeutic Coumadin. She's taking Coumadin 1 mg daily, not on Sundays. Will ask her to increase to 1 mg daily including Sundays. She'll return to the office in three weeks for repeat PT/INR. BP is a little higher than we'd like to see it. On her next PT check will check her blood pressure. If it remains elevated will titrate her antihypertensives. For the chronic venous insufficiency, if the swellnig is significant will suggest diuretic only on the days it is significant. Today it is not. Will confirm she's not in heart failure with a BNP. PLAN:  .  Orders Placed This Encounter    URINALYSIS W/ RFLX MICROSCOPIC    CBC WITH AUTOMATED DIFF    METABOLIC PANEL, COMPREHENSIVE    LIPID PANEL    TSH 3RD GENERATION    HEMOGLOBIN A1C WITH EAG    URIC ACID    BNP    AMB POC PT/INR    HYDROcodone-acetaminophen (NORCO) 5-325 mg per tablet       Follow-up Disposition:  Return in about 2 weeks (around 3/14/2018) for pt/inr, bp check. ATTENTION:   This medical record was transcribed using an electronic medical records system. Although proofread, it may and can contain electronic and spelling errors. Other human spelling and other errors may be present. Corrections may be executed at a later time. Please feel free to contact us for any clarifications as needed.

## 2018-02-28 NOTE — PROGRESS NOTES
1. Have you been to the ER, urgent care clinic since your last visit? Hospitalized since your last visit? No    2. Have you seen or consulted any other health care providers outside of the [address] [place] [address] since your last visit? Include any pap smears or colon screening.  No     Right leg pain

## 2018-02-28 NOTE — ACP (ADVANCE CARE PLANNING)
Advance Care Planning (ACP) Provider Conversation Snapshot    Date of ACP Conversation: 02/28/18  Persons included in Conversation:  patient  Length of ACP Conversation in minutes:  <16 minutes (Non-Billable)    Authorized Decision Maker (if patient is incapable of making informed decisions):    This person is:   Healthcare Agent/Medical Power of  under Advance Directive  sister        For Patients with Decision Making Capacity:   Values/Goals: Exploration of values, goals, and preferences if recovery is not expected, even with continued medical treatment in the event of:  Imminent death    Conversation Outcomes / Follow-Up Plan:   Recommended completion of Advance Directive form after review of ACP materials and conversation with prospective healthcare agent

## 2018-02-28 NOTE — PATIENT INSTRUCTIONS
Meniscus Tear: Exercises  Your Care Instructions  Here are some examples of typical rehabilitation exercises for your condition. Start each exercise slowly. Ease off the exercise if you start to have pain. Your doctor or physical therapist will tell you when you can start these exercises and which ones will work best for you. How to do the exercises  Calf wall stretch    1. Stand facing a wall with your hands on the wall at about eye level. Put your affected leg about a step behind your other leg. 2. Keeping your back leg straight and your back heel on the floor, bend your front knee and gently bring your hip and chest toward the wall until you feel a stretch in the calf of your back leg. 3. Hold the stretch for at least 15 to 30 seconds. 4. Repeat 2 to 4 times. 5. Repeat steps 1 through 4, but this time keep your back knee bent. Hamstring wall stretch    1. Lie on your back in a doorway, with your good leg through the open door. 2. Slide your affected leg up the wall to straighten your knee. You should feel a gentle stretch down the back of your leg. 1. Do not arch your back. 2. Do not bend either knee. 3. Keep one heel touching the floor and the other heel touching the wall. Do not point your toes. 3. Hold the stretch for at least 1 minute. Then over time, try to lengthen the time you hold the stretch to as long as 6 minutes. 4. Repeat 2 to 4 times. 5. If you do not have a place to do this exercise in a doorway, there is another way to do it:  6. Lie on your back, and bend your affected leg. 7. Loop a towel under the ball and toes of that foot, and hold the ends of the towel in your hands. 8. Straighten your knee, and slowly pull back on the towel. You should feel a gentle stretch down the back of your leg. 9. Hold the stretch for at least 15 to 30 seconds. Or even better, hold the stretch for 1 minute if you can. 10. Repeat 2 to 4 times. Quad sets    1.  Sit with your leg straight and supported on the floor or a firm bed. (If you feel discomfort in the front or back of your knee, place a small towel roll under your knee.)  2. Tighten the muscles on top of your thigh by pressing the back of your knee flat down to the floor. (If you feel discomfort under your kneecap, place a small towel roll under your knee.)  3. Hold for about 6 seconds, then rest up to 10 seconds. 4. Do 8 to 12 repetitions several times a day. Straight-leg raises to the front    1. Lie on your back with your good knee bent so that your foot rests flat on the floor. Your injured leg should be straight. Make sure that your low back has a normal curve. You should be able to slip your flat hand in between the floor and the small of your back, with your palm touching the floor and your back touching the back of your hand. 2. Tighten the thigh muscles in the injured leg by pressing the back of your knee flat down to the floor. Hold your knee straight. 3. Keeping the thigh muscles tight, lift your injured leg up so that your heel is about 12 inches off the floor. Hold for 5 seconds and then lower slowly. 4. Do 8 to 12 repetitions. Straight-leg raises to the back    1. Lie on your stomach, and lift your leg straight up behind you (toward the ceiling). 2. Lift your toes about 6 inches off the floor, hold for about 6 seconds, then lower slowly. 3. Do 8 to 12 repetitions. Hamstring curls    1. Lie on your stomach with your knees straight. If your kneecap is uncomfortable, roll up a washcloth and put it under your leg just above your kneecap. 2. Lift the foot of your injured leg by bending the knee so that you bring the foot up toward your buttock. If this motion hurts, try it without bending your knee quite as far. This may help you avoid any painful motion. 3. Slowly lower your leg back to the floor. 4. Do 8 to 12 repetitions.   5. With permission from your doctor or physical therapist, you may also want to add a cuff weight to your ankle (not more than 5 pounds). With weight, you do not have to lift your leg more than 12 inches to get a hamstring workout. Wall slide with ball squeeze    1. Stand with your back against a wall and with your feet about shoulder-width apart. Your feet should be about 12 inches away from the wall. 2. Put a ball about the size of a soccer ball between your knees. Then slowly slide down the wall until your knees are bent about 20 to 30 degrees. 3. Tighten your thigh muscles by squeezing the ball between your knees. Hold that position for about 10 seconds, then stop squeezing. Rest for up to 10 seconds between repetitions. 4. Repeat 8 to 12 times. Heel raises    1. Stand with your feet a few inches apart, with your hands lightly resting on a counter or chair in front of you. 2. Slowly raise your heels off the floor while keeping your knees straight. 3. Hold for about 6 seconds, then slowly lower your heels to the floor. 4. Do 8 to 12 repetitions several times during the day. Heel dig bridging    Stop doing this exercise if it causes pain. 1. Lie on your back with both knees bent and your ankles bent so that only your heels are digging into the floor. Your knees should be bent about 90 degrees. 2. Then push your heels into the floor, squeeze your buttocks, and lift your hips off the floor until your shoulders, hips, and knees are all in a straight line. 3. Hold for about 6 seconds as you continue to breathe normally, and then slowly lower your hips back down to the floor and rest for up to 10 seconds. 4. Do 8 to 12 repetitions. Shallow standing knee bends    Do this exercise only if you have very little pain; if you have no clicking, locking, or giving way in the injured knee; and if it does not hurt while you are doing 8 to 12 repetitions. 1. Stand with your hands lightly resting on a counter or chair in front of you. Put your feet shoulder-width apart.   2. Slowly bend your knees so that you squat down like you are going to sit in a chair. Make sure your knees do not go in front of your toes. 3. Lower yourself about 6 inches. Your heels should remain on the floor at all times. 4. Rise slowly to a standing position. Follow-up care is a key part of your treatment and safety. Be sure to make and go to all appointments, and call your doctor if you are having problems. It's also a good idea to know your test results and keep a list of the medicines you take. Where can you learn more? Go to http://dedra-juan manuel.info/. Enter C183 in the search box to learn more about \"Meniscus Tear: Exercises. \"  Current as of: March 21, 2017  Content Version: 11.4  © 1292-7126 Healthwise, Incorporated. Care instructions adapted under license by [employer] (which disclaims liability or warranty for this information). If you have questions about a medical condition or this instruction, always ask your healthcare professional. [address] any warranty or liability for your use of this information.

## 2018-02-28 NOTE — MR AVS SNAPSHOT
[address] [facility] 
 
 
 [address] [id number] 
[phone] Patient: Billy Graf MRN: [id number] GXV:[birth year] Visit Information Date & Time Provider Department Dept. Phone Encounter #  
 2/28/2018 12:45 PM Saint Charles, MD [facility] [phone] [id number] Follow-up Instructions Return in about 2 weeks (around 3/14/2018) for pt/inr, bp check. Follow-up and Disposition History Your Appointments 3/16/2018 10:00 AM  
Nurse Visit with [address] [facility] (SRIDHAR Dumas) Appt Note: follow up  
 [address]  
[phone]  
  
   
 [id number]  
  
    
 4/10/2018 11:20 AM  
ESTABLISHED PATIENT with Mikaela Curiel MD  
[facility] ([facility]) Appt Note: Patient request 1 yr f/u  
 [address] [facility] [address]  
[address] [facility],[address] [address] Upcoming Health Maintenance Date Due  
 GLAUCOMA SCREENING Q2Y 4/21/2017 MEDICARE YEARLY EXAM 9/2/2018 DTaP/Tdap/Td series (2 - Td) 1/17/2027 Allergies as of 2/28/2018  Review Complete On: 2/28/2018 By: Saint Charles, MD  
 No Known Allergies Current Immunizations  Reviewed on 5/4/2012 Name Date ZZZ-RETIRED (DO NOT USE) Pneumococcal Vaccine (Unspecified Type) 5/7/2012  8:12 AM  
  
 Not reviewed this visit You Were Diagnosed With   
  
 Codes Comments Encounter for monitoring coumadin therapy    -  Primary ICD-10-CM: Z51.81, Z79.01 
ICD-9-CM: V58.83, V58.61 Chronic deep vein thrombosis (DVT) of femoral vein of right lower extremity (HCC)     ICD-10-CM: K68.037 ICD-9-CM: 453.51 Atrial fibrillation, unspecified type ([facility]     ICD-10-CM: I48.91 
ICD-9-CM: 427.31 Diastolic dysfunction     BWP-41-PM: I51.9 ICD-9-CM: 429.9 SOB (shortness of breath)     ICD-10-CM: R06.02 
ICD-9-CM: 786.05 Gout, unspecified cause, unspecified chronicity, unspecified site     ICD-10-CM: M10.9 ICD-9-CM: 274.9 Dyslipidemia     ICD-10-CM: E78.5 ICD-9-CM: 272.4 Abnormal finding of blood chemistry     ICD-10-CM: R79.9 ICD-9-CM: 790.6 Chronically on opiate therapy     ICD-10-CM: Z79.891 ICD-9-CM: V58.69 Vitals BP Pulse Temp Resp Height(growth percentile) Weight(growth percentile)  
 168/64 61 97.7 °F (36.5 °C) (Oral) 18 5' 5\" (1.651 m) 137 lb 11.2 oz (62.5 kg) SpO2 BMI OB Status Smoking Status 95% 22.91 kg/m2 Hysterectomy Former Smoker Vitals History BMI and BSA Data Body Mass Index Body Surface Area  
 22.91 kg/m 2 1.69 m 2 Preferred Pharmacy Pharmacy Name Phone Amina Tran [phone] [facility] [phone] Your Updated Medication List  
  
   
This list is accurate as of 2/28/18  2:39 PM.  Always use your most recent med list.  
  
  
  
  
 aspirin 81 mg chewable tablet Take 81 mg by mouth daily. dilTIAZem  mg ER capsule Commonly known as:  CARTIA XT Take 1 Cap by mouth daily. furosemide 20 mg tablet Commonly known as:  LASIX HYDROcodone-acetaminophen 5-325 mg per tablet Commonly known as:  Radonna Imnaha Take 1 Tab by mouth every six (6) hours as needed for Pain. Max Daily Amount: 4 Tabs. losartan 50 mg tablet Commonly known as:  COZAAR Take 1 Tab by mouth daily. metoprolol tartrate 25 mg tablet Commonly known as:  LOPRESSOR  
TAKE 1/2 TABLET BY MOUTH TWICE DAILY pravastatin 20 mg tablet Commonly known as:  PRAVACHOL Take 1 Tab by mouth nightly. warfarin 2 mg tablet Commonly known as:  COUMADIN  
TAKE ONE TABLET BY MOUTH DAILY Prescriptions Printed Refills  HYDROcodone-acetaminophen (NORCO) 5-325 mg per tablet 0  
 Sig: Take 1 Tab by mouth every six (6) hours as needed for Pain. Max Daily Amount: 4 Tabs. Class: Print Route: Oral  
  
We Performed the Following AMB POC PT/INR [10534 CPT(R)] BNP [id number] CPT(R)] CBC WITH AUTOMATED DIFF [72216 CPT(R)] COLLECTION VENOUS BLOOD,VENIPUNCTURE [id number] CPT(R)] HEMOGLOBIN A1C WITH EAG [97195 CPT(R)] LIPID PANEL [86582 CPT(R)] METABOLIC PANEL, COMPREHENSIVE [33885 CPT(R)] TSH 3RD GENERATION [96363 CPT(R)] URIC ACID [id number] CPT(R)] URINALYSIS W/ RFLX MICROSCOPIC [65400 CPT(R)] Follow-up Instructions Return in about 2 weeks (around 3/14/2018) for pt/inr, bp check. Patient Instructions Meniscus Tear: Exercises Your Care Instructions Here are some examples of typical rehabilitation exercises for your condition. Start each exercise slowly. Ease off the exercise if you start to have pain. Your doctor or physical therapist will tell you when you can start these exercises and which ones will work best for you. How to do the exercises Calf wall stretch 1. Stand facing a wall with your hands on the wall at about eye level. Put your affected leg about a step behind your other leg. 2. Keeping your back leg straight and your back heel on the floor, bend your front knee and gently bring your hip and chest toward the wall until you feel a stretch in the calf of your back leg. 3. Hold the stretch for at least 15 to 30 seconds. 4. Repeat 2 to 4 times. 5. Repeat steps 1 through 4, but this time keep your back knee bent. Hamstring wall stretch 1. Lie on your back in a doorway, with your good leg through the open door. 2. Slide your affected leg up the wall to straighten your knee. You should feel a gentle stretch down the back of your leg. 1. Do not arch your back. 2. Do not bend either knee. 3. Keep one heel touching the floor and the other heel touching the wall. Do not point your toes. 3. Hold the stretch for at least 1 minute. Then over time, try to lengthen the time you hold the stretch to as long as 6 minutes. 4. Repeat 2 to 4 times. 5. If you do not have a place to do this exercise in a doorway, there is another way to do it: 6. Lie on your back, and bend your affected leg. 7. Loop a towel under the ball and toes of that foot, and hold the ends of the towel in your hands. 8. Straighten your knee, and slowly pull back on the towel. You should feel a gentle stretch down the back of your leg. 9. Hold the stretch for at least 15 to 30 seconds. Or even better, hold the stretch for 1 minute if you can. 10. Repeat 2 to 4 times. [employer] 1. Sit with your leg straight and supported on the floor or a firm bed. (If you feel discomfort in the front or back of your knee, place a small towel roll under your knee.) 2. Tighten the muscles on top of your thigh by pressing the back of your knee flat down to the floor. (If you feel discomfort under your kneecap, place a small towel roll under your knee.) 3. Hold for about 6 seconds, then rest up to 10 seconds. 4. Do 8 to 12 repetitions several times a day. Straight-leg raises to the front 1. Lie on your back with your good knee bent so that your foot rests flat on the floor. Your injured leg should be straight. Make sure that your low back has a normal curve. You should be able to slip your flat hand in between the floor and the small of your back, with your palm touching the floor and your back touching the back of your hand. 2. Tighten the thigh muscles in the injured leg by pressing the back of your knee flat down to the floor. Hold your knee straight. 3. Keeping the thigh muscles tight, lift your injured leg up so that your heel is about 12 inches off the floor. Hold for 5 seconds and then lower slowly. 4. Do 8 to 12 repetitions. Straight-leg raises to the back 1.  Lie on your stomach, and lift your leg straight up behind you (toward the ceiling). 2. Lift your toes about 6 inches off the floor, hold for about 6 seconds, then lower slowly. 3. Do 8 to 12 repetitions. Hamstring curls 1. Lie on your stomach with your knees straight. If your kneecap is uncomfortable, roll up a washcloth and put it under your leg just above your kneecap. 2. Lift the foot of your injured leg by bending the knee so that you bring the foot up toward your buttock. If this motion hurts, try it without bending your knee quite as far. This may help you avoid any painful motion. 3. Slowly lower your leg back to the floor. 4. Do 8 to 12 repetitions. 5. With permission from your doctor or physical therapist, you may also want to add a cuff weight to your ankle (not more than 5 pounds). With weight, you do not have to lift your leg more than 12 inches to get a hamstring workout. Wall slide with ball squeeze 1. Stand with your back against a wall and with your feet about shoulder-width apart. Your feet should be about 12 inches away from the wall. 2. Put a ball about the size of a soccer ball between your knees. Then slowly slide down the wall until your knees are bent about 20 to 30 degrees. 3. Tighten your thigh muscles by squeezing the ball between your knees. Hold that position for about 10 seconds, then stop squeezing. Rest for up to 10 seconds between repetitions. 4. Repeat 8 to 12 times. Heel raises 1. Stand with your feet a few inches apart, with your hands lightly resting on a counter or chair in front of you. 2. Slowly raise your heels off the floor while keeping your knees straight. 3. Hold for about 6 seconds, then slowly lower your heels to the floor. 4. Do 8 to 12 repetitions several times during the day. Heel dig bridging Stop doing this exercise if it causes pain. 1. Lie on your back with both knees bent and your ankles bent so that only your heels are digging into the floor. Your knees should be bent about 90 degrees. 2. Then push your heels into the floor, squeeze your buttocks, and lift your hips off the floor until your shoulders, hips, and knees are all in a straight line. 3. Hold for about 6 seconds as you continue to breathe normally, and then slowly lower your hips back down to the floor and rest for up to 10 seconds. 4. Do 8 to 12 repetitions. Shallow standing knee bends Do this exercise only if you have very little pain; if you have no clicking, locking, or giving way in the injured knee; and if it does not hurt while you are doing 8 to 12 repetitions. 1. Stand with your hands lightly resting on a counter or chair in front of you. Put your feet shoulder-width apart. 2. Slowly bend your knees so that you squat down like you are going to sit in a chair. Make sure your knees do not go in front of your toes. 3. Lower yourself about 6 inches. Your heels should remain on the floor at all times. 4. Rise slowly to a standing position. Follow-up care is a key part of your treatment and safety. Be sure to make and go to all appointments, and call your doctor if you are having problems. It's also a good idea to know your test results and keep a list of the medicines you take. Where can you learn more? Go to http://dedra-juan manuel.info/. Enter C183 in the search box to learn more about \"Meniscus Tear: Exercises. \" Current as of: March 21, 2017 Content Version: 11.4 © 6744-5192 Healthwise, Incorporated. Care instructions adapted under license by [employer] (which disclaims liability or warranty for this information). If you have questions about a medical condition or this instruction, always ask your healthcare professional. [place] [address] any warranty or liability for your use of this information. Patient Instructions History Introducing \[facility]\"" & HEALTH SERVICES!    
 Tom Khan introduces iExplore patient portal. Now you can access parts of your medical record, email your doctor's office, and request medication refills online. 1. In your internet browser, go to [email or web address] 2. Click on the First Time User? Click Here link in the Sign In box. You will see the New Member Sign Up page. 3. Enter your HighRoads Access Code exactly as it appears below. You will not need to use this code after youve completed the sign-up process. If you do not sign up before the expiration date, you must request a new code. · HighRoads Access Code: [email or web address] Expires: 3/21/2018 11:03 AM 
 
4. Enter the last four digits of your Social Security Number (xxxx) and Date of Birth (mm/dd/yyyy) as indicated and click Submit. You will be taken to the next sign-up page. 5. Create a HighRoads ID. This will be your HighRoads login ID and cannot be changed, so think of one that is secure and easy to remember. 6. Create a HighRoads password. You can change your password at any time. 7. Enter your Password Reset Question and Answer. This can be used at a later time if you forget your password. 8. Enter your e-mail address. You will receive e-mail notification when new information is available in [address] 9. Click Sign Up. You can now view and download portions of your medical record. 10. Click the Download Summary menu link to download a portable copy of your medical information. If you have questions, please visit the Frequently Asked Questions section of the HighRoads website. Remember, HighRoads is NOT to be used for urgent needs. For medical emergencies, dial 911. Now available from your iPhone and Android! Please provide this summary of care documentation to your next provider. Your primary care clinician is listed as Sona Mcdermott. If you have any questions after today's visit, please call [phone].

## 2018-03-02 LAB — BNP SERPL-MCNC: 262.7 PG/ML (ref 0–100)

## 2018-03-03 LAB
ALBUMIN SERPL-MCNC: 4.4 G/DL (ref 3.5–4.8)
ALBUMIN/GLOB SERPL: 1.3 {RATIO} (ref 1.2–2.2)
ALP SERPL-CCNC: 86 IU/L (ref 39–117)
ALT SERPL-CCNC: 12 IU/L (ref 0–32)
APPEARANCE UR: CLEAR
AST SERPL-CCNC: 12 IU/L (ref 0–40)
BASOPHILS # BLD AUTO: 0 X10E3/UL (ref 0–0.2)
BASOPHILS NFR BLD AUTO: 1 %
BILIRUB SERPL-MCNC: 0.6 MG/DL (ref 0–1.2)
BILIRUB UR QL STRIP: NEGATIVE
BUN SERPL-MCNC: 20 MG/DL (ref 8–27)
BUN/CREAT SERPL: 20 (ref 12–28)
CALCIUM SERPL-MCNC: 9.5 MG/DL (ref 8.7–10.3)
CHLORIDE SERPL-SCNC: 103 MMOL/L (ref 96–106)
CHOLEST SERPL-MCNC: 145 MG/DL (ref 100–199)
CO2 SERPL-SCNC: 24 MMOL/L (ref 18–29)
COLOR UR: YELLOW
CREAT SERPL-MCNC: 1.01 MG/DL (ref 0.57–1)
EOSINOPHIL # BLD AUTO: 0.3 X10E3/UL (ref 0–0.4)
EOSINOPHIL NFR BLD AUTO: 4 %
ERYTHROCYTE [DISTWIDTH] IN BLOOD BY AUTOMATED COUNT: 15.1 % (ref 12.3–15.4)
EST. AVERAGE GLUCOSE BLD GHB EST-MCNC: 111 MG/DL
GFR SERPLBLD CREATININE-BSD FMLA CKD-EPI: 53 ML/MIN/1.73
GFR SERPLBLD CREATININE-BSD FMLA CKD-EPI: 61 ML/MIN/1.73
GLOBULIN SER CALC-MCNC: 3.4 G/DL (ref 1.5–4.5)
GLUCOSE SERPL-MCNC: 92 MG/DL (ref 65–99)
GLUCOSE UR QL: NEGATIVE
HBA1C MFR BLD: 5.5 % (ref 4.8–5.6)
HCT VFR BLD AUTO: 45.9 % (ref 34–46.6)
HDLC SERPL-MCNC: 47 MG/DL
HGB BLD-MCNC: 15.3 G/DL (ref 11.1–15.9)
HGB UR QL STRIP: NEGATIVE
IMM GRANULOCYTES # BLD: 0 X10E3/UL (ref 0–0.1)
IMM GRANULOCYTES NFR BLD: 0 %
KETONES UR QL STRIP: NEGATIVE
LDLC SERPL CALC-MCNC: 84 MG/DL (ref 0–99)
LEUKOCYTE ESTERASE UR QL STRIP: NEGATIVE
LYMPHOCYTES # BLD AUTO: 2.8 X10E3/UL (ref 0.7–3.1)
LYMPHOCYTES NFR BLD AUTO: 41 %
MCH RBC QN AUTO: 28.7 PG (ref 26.6–33)
MCHC RBC AUTO-ENTMCNC: 33.3 G/DL (ref 31.5–35.7)
MCV RBC AUTO: 86 FL (ref 79–97)
MICRO URNS: NORMAL
MONOCYTES # BLD AUTO: 0.5 X10E3/UL (ref 0.1–0.9)
MONOCYTES NFR BLD AUTO: 7 %
NEUTROPHILS # BLD AUTO: 3.3 X10E3/UL (ref 1.4–7)
NEUTROPHILS NFR BLD AUTO: 47 %
NITRITE UR QL STRIP: NEGATIVE
PH UR STRIP: 7 [PH] (ref 5–7.5)
PLATELET # BLD AUTO: 324 X10E3/UL (ref 150–379)
POTASSIUM SERPL-SCNC: 4.3 MMOL/L (ref 3.5–5.2)
PROT SERPL-MCNC: 7.8 G/DL (ref 6–8.5)
PROT UR QL STRIP: NEGATIVE
RBC # BLD AUTO: 5.34 X10E6/UL (ref 3.77–5.28)
SODIUM SERPL-SCNC: 144 MMOL/L (ref 134–144)
SP GR UR: 1.02 (ref 1–1.03)
TRIGL SERPL-MCNC: 69 MG/DL (ref 0–149)
TSH SERPL DL<=0.005 MIU/L-ACNC: 0.21 UIU/ML (ref 0.45–4.5)
URATE SERPL-MCNC: 6.1 MG/DL (ref 2.5–7.1)
UROBILINOGEN UR STRIP-MCNC: 0.2 MG/DL (ref 0.2–1)
VLDLC SERPL CALC-MCNC: 14 MG/DL (ref 5–40)
WBC # BLD AUTO: 6.8 X10E3/UL (ref 3.4–10.8)

## 2018-03-16 ENCOUNTER — CLINICAL SUPPORT (OUTPATIENT)
Dept: INTERNAL MEDICINE CLINIC | Age: 78
End: 2018-03-16

## 2018-03-16 DIAGNOSIS — Z51.81 ENCOUNTER FOR MONITORING COUMADIN THERAPY: Primary | ICD-10-CM

## 2018-03-16 DIAGNOSIS — Z79.01 ENCOUNTER FOR MONITORING COUMADIN THERAPY: Primary | ICD-10-CM

## 2018-03-16 LAB
INR BLD: 3.3
PT POC: 39.4 SECONDS
VALID INTERNAL CONTROL?: YES

## 2018-03-16 NOTE — PROGRESS NOTES
Castillo Lugo is a 77 y.o. female who presents today for Anticoagulation monitoring. Current dose:  Coumadin 5 mg 1 tab PO everyday except on sundays she takes none  Medication Changes:  no  Dietary Changes:  no    Latest INR:  Results for orders placed or performed in visit on 03/16/18   AMB POC PT/INR   Result Value Ref Range    VALID INTERNAL CONTROL POC Yes     Prothrombin time (POC) 39.4 seconds    INR POC 3.3          New Coumadin dose:.current treatment plan is effective, no change in therapy. Next check to be scheduled for  3 weeks.   Per Dr. Cris Snyder, EUNICEN

## 2018-03-16 NOTE — MR AVS SNAPSHOT
[address] [facility] 
 
 
 [address] [address] 
[phone] Patient: Ebonie Silva MRN: [id number] APZ:[id number] Visit Information Date & Time Provider Department Dept. Phone Encounter #  
 3/16/2018 10:00 AM NURSE [address] Sports Medicine and [address] [place] [address] [phone] [id number] Your Appointments 4/6/2018 10:00 AM  
Nurse Visit with Celeste [address] [place] [address] and Primary Care ([facility]) Appt Note: pt/inr  
 [address] 1 [facility] [place]  
  
   
 [address] [id number]  
  
    
 4/10/2018 11:20 AM  
ESTABLISHED PATIENT with Matt De MD  
[facility] ([facility]) Appt Note: Patient request 1 yr f/u  
 [address] [place] [address]  
[address] [address] Upcoming Health Maintenance Date Due  
 GLAUCOMA SCREENING Q2Y 4/21/2017 MEDICARE YEARLY EXAM 9/2/2018 DTaP/Tdap/Td series (2 - Td) 1/17/2027 Allergies as of 3/16/2018  Review Complete On: 2/28/2018 By: Lynne Salazar MD  
 No Known Allergies Current Immunizations  Reviewed on 5/4/2012 Name Date ZZZ-RETIRED (DO NOT USE) Pneumococcal Vaccine (Unspecified Type) 5/7/2012  8:12 AM  
  
 Not reviewed this visit You Were Diagnosed With   
  
 Codes Comments Encounter for monitoring coumadin therapy    -  Primary ICD-10-CM: Z51.81, Z79.01 
ICD-9-CM: V58.83, V58.61 Vitals OB Status Smoking Status Hysterectomy Former Smoker Preferred Pharmacy Pharmacy Name Phone [facility] [address] [facility] [phone] Your Updated Medication List  
  
   
This list is accurate as of 3/16/18 11:59 AM.  Always use your most recent med list.  
  
  
  
  
 aspirin 81 mg chewable tablet Take 81 mg by mouth daily. dilTIAZem  mg ER capsule Commonly known as:  CARTIA XT Take 1 Cap by mouth daily. furosemide 20 mg tablet Commonly known as:  LASIX HYDROcodone-acetaminophen 5-325 mg per tablet Commonly known as:  Zilphia Deck Take 1 Tab by mouth every six (6) hours as needed for Pain. Max Daily Amount: 4 Tabs. losartan 50 mg tablet Commonly known as:  COZAAR Take 1 Tab by mouth daily. metoprolol tartrate 25 mg tablet Commonly known as:  LOPRESSOR  
TAKE 1/2 TABLET BY MOUTH TWICE DAILY pravastatin 20 mg tablet Commonly known as:  PRAVACHOL Take 1 Tab by mouth nightly. warfarin 2 mg tablet Commonly known as:  COUMADIN  
TAKE ONE TABLET BY MOUTH DAILY We Performed the Following AMB POC PT/INR [97846 CPT(R)] Introducing [facility] & HEALTH SERVICES! [place] [email or web address] introduces Erydel patient portal. Now you can access parts of your medical record, email your doctor's office, and request medication refills online. 1. In your internet browser, go to [email or web address] 2. Click on the First Time User? Click Here link in the Sign In box. You will see the New Member Sign Up page. 3. Enter your Erydel Access Code exactly as it appears below. You will not need to use this code after youve completed the sign-up process. If you do not sign up before the expiration date, you must request a new code. · Erydel Access Code: [email or web address] Expires: 3/21/2018 12:03 PM 
 
4. Enter the last four digits of your Social Security Number (xxxx) and Date of Birth (mm/dd/yyyy) as indicated and click Submit. You will be taken to the next sign-up page. 5. Create a pfwaterworkst ID. This will be your Erydel login ID and cannot be changed, so think of one that is secure and easy to remember. 6. Create a Erydel password. You can change your password at any time. 7. Enter your Password Reset Question and Answer.  This can be used at a later time if you forget your password. 8. Enter your e-mail address. You will receive e-mail notification when new information is available in [address] 9. Click Sign Up. You can now view and download portions of your medical record. 10. Click the Download Summary menu link to download a portable copy of your medical information. If you have questions, please visit the Frequently Asked Questions section of the LetsVenture website. Remember, LetsVenture is NOT to be used for urgent needs. For medical emergencies, dial 911. Now available from your iPhone and Android! Please provide this summary of care documentation to your next provider. Your primary care clinician is listed as Woodrow Rios. If you have any questions after today's visit, please call [phone].

## 2018-03-29 RX ORDER — DILTIAZEM HYDROCHLORIDE 120 MG/1
CAPSULE, EXTENDED RELEASE ORAL
Qty: 90 CAP | Refills: 2 | Status: SHIPPED | OUTPATIENT
Start: 2018-03-29 | End: 2018-12-14 | Stop reason: SDUPTHER

## 2018-04-03 ENCOUNTER — CLINICAL SUPPORT (OUTPATIENT)
Dept: INTERNAL MEDICINE CLINIC | Age: 78
End: 2018-04-03

## 2018-04-03 DIAGNOSIS — Z79.891 CHRONICALLY ON OPIATE THERAPY: ICD-10-CM

## 2018-04-03 DIAGNOSIS — Z79.01 ENCOUNTER FOR MONITORING COUMADIN THERAPY: Primary | ICD-10-CM

## 2018-04-03 DIAGNOSIS — I48.91 ATRIAL FIBRILLATION, UNSPECIFIED TYPE (HCC): ICD-10-CM

## 2018-04-03 DIAGNOSIS — Z51.81 ENCOUNTER FOR MONITORING COUMADIN THERAPY: Primary | ICD-10-CM

## 2018-04-03 LAB
INR BLD: 2.1
PT POC: 25.3 SECONDS
VALID INTERNAL CONTROL?: YES

## 2018-04-03 RX ORDER — HYDROCODONE BITARTRATE AND ACETAMINOPHEN 5; 325 MG/1; MG/1
1 TABLET ORAL
Qty: 60 TAB | Refills: 0 | Status: SHIPPED | OUTPATIENT
Start: 2018-04-03 | End: 2018-04-30 | Stop reason: SDUPTHER

## 2018-04-03 NOTE — PROGRESS NOTES
Santa Luna is a 77 y.o. female who presents today for Anticoagulation monitoring. Current dose:  Coumadin 2 mg  1/2 tab PO daily  Medication Changes:  no  Dietary Changes:  no    Latest INR:  Results for orders placed or performed in visit on 04/03/18   AMB POC PT/INR   Result Value Ref Range    VALID INTERNAL CONTROL POC Yes     Prothrombin time (POC) 25.3 seconds    INR POC 2.1          New Coumadin dose:.current treatment plan is effective, no change in therapy. Next check to be scheduled for  3 weeks.   Per Dr. Yu Brewster LPN

## 2018-04-10 ENCOUNTER — OFFICE VISIT (OUTPATIENT)
Dept: CARDIOLOGY CLINIC | Age: 78
End: 2018-04-10

## 2018-04-10 VITALS
BODY MASS INDEX: 23.16 KG/M2 | HEIGHT: 65 IN | WEIGHT: 139 LBS | RESPIRATION RATE: 16 BRPM | DIASTOLIC BLOOD PRESSURE: 80 MMHG | SYSTOLIC BLOOD PRESSURE: 140 MMHG

## 2018-04-10 DIAGNOSIS — I82.511 CHRONIC DEEP VEIN THROMBOSIS (DVT) OF FEMORAL VEIN OF RIGHT LOWER EXTREMITY (HCC): ICD-10-CM

## 2018-04-10 DIAGNOSIS — I07.1 TRICUSPID VALVE INSUFFICIENCY, UNSPECIFIED ETIOLOGY: ICD-10-CM

## 2018-04-10 DIAGNOSIS — E78.5 DYSLIPIDEMIA: Primary | ICD-10-CM

## 2018-04-10 DIAGNOSIS — I05.9 MITRAL VALVE DISORDER: ICD-10-CM

## 2018-04-10 DIAGNOSIS — I25.10 ATHEROSCLEROSIS OF NATIVE CORONARY ARTERY OF NATIVE HEART WITHOUT ANGINA PECTORIS: ICD-10-CM

## 2018-04-10 DIAGNOSIS — I09.9 RHEUMATIC HEART DISEASE: ICD-10-CM

## 2018-04-10 DIAGNOSIS — I48.91 ATRIAL FIBRILLATION, UNSPECIFIED TYPE (HCC): ICD-10-CM

## 2018-04-10 DIAGNOSIS — I48.92 ATRIAL FLUTTER WITH CONTROLLED RESPONSE (HCC): ICD-10-CM

## 2018-04-10 DIAGNOSIS — I10 ESSENTIAL HYPERTENSION, BENIGN: ICD-10-CM

## 2018-04-10 NOTE — MR AVS SNAPSHOT
[address] [facility] [address] [place] 
[phone] Patient: Jimbo Shelby MRN: [id number]:9/[id number] Visit Information Date & Time Provider Department Dept. Phone Encounter #  
 4/10/2018 11:20 AM Karlie Saenz MD [facility] [phone] [id number] Your Appointments 4/23/2018 10:30 AM  
Nurse Visit with [address] and Primary Care ([facility]) Appt Note: pt/inr  
 [address] [phone]  
  
   
 [address] [id number]  
  
    
 5/2/2018 11:00 AM  
ECHO CARDIOGRAMS 2D with ECHO, ANDRE [facility] ([facility]) Appt Note: Dx: MVR per Dr. Hurt [facility] [address]  [address] [place]  
[address] [facility]  
  
    
 10/22/2018  1:20 PM  
ESTABLISHED PATIENT with Karlie Saenz MD  
[facility] ([address] [facility]) Appt Note: 6 month follow up  
 [address] [place] [address]  
[address] [address] Upcoming Health Maintenance Date Due  
 GLAUCOMA SCREENING Q2Y 4/21/2017 MEDICARE YEARLY EXAM 9/2/2018 DTaP/Tdap/Td series (2 - Td) 1/17/2027 Allergies as of 4/10/2018  Review Complete On: 4/10/2018 By: Chloe Son No Known Allergies Current Immunizations  Reviewed on 5/4/2012 Name Date ZZZ-RETIRED (DO NOT USE) Pneumococcal Vaccine (Unspecified Type) 5/7/2012  8:12 AM  
  
 Not reviewed this visit Vitals BP Resp Height(growth percentile) Weight(growth percentile) BMI OB Status 140/80 (BP 1 Location: Right arm, BP Patient Position: Sitting) 16 5' 5\" (1.651 m) 139 lb (63 kg) 23.13 kg/m2 Hysterectomy Smoking Status Former Smoker Vitals History BMI and BSA Data Body Mass Index Body Surface Area  
 23.13 kg/m 2 1.7 m 2 Preferred Pharmacy Pharmacy Name Phone [facility] [address] [facility] [phone] Your Updated Medication List  
  
   
This list is accurate as of 4/10/18 11:38 AM.  Always use your most recent med list.  
  
  
  
  
 aspirin 81 mg chewable tablet Take 81 mg by mouth daily. CARTIA  mg ER capsule Generic drug:  dilTIAZem CD  
TAKE ONE CAPSULE BY MOUTH DAILY  
  
 furosemide 20 mg tablet Commonly known as:  LASIX  
as needed. HYDROcodone-acetaminophen 5-325 mg per tablet Commonly known as:  Anne-Marie Mcclain Take 1 Tab by mouth every six (6) hours as needed for Pain. Max Daily Amount: 4 Tabs. losartan 50 mg tablet Commonly known as:  COZAAR Take 1 Tab by mouth daily. metoprolol tartrate 25 mg tablet Commonly known as:  LOPRESSOR  
TAKE 1/2 TABLET BY MOUTH TWICE DAILY pravastatin 20 mg tablet Commonly known as:  PRAVACHOL Take 1 Tab by mouth nightly. warfarin 2 mg tablet Commonly known as:  COUMADIN  
TAKE ONE TABLET BY MOUTH DAILY Introducing [facility] & HEALTH SERVICES! Noni Ross introduces "[employer] patient portal. Now you can access parts of your medical record, email your doctor's office, and request medication refills online. 1. In your internet browser, go to [email or web address] 2. Click on the First Time User? Click Here link in the Sign In box. You will see the New Member Sign Up page. 3. Enter your "[employer] Access Code exactly as it appears below. You will not need to use this code after youve completed the sign-up process. If you do not sign up before the expiration date, you must request a new code. · "[employer] Access Code: [email or web address] Expires: 7/2/2018 11:34 AM 
 
4.  Enter the last four digits of your Social Security Number (xxxx) and Date of Birth (mm/dd/yyyy) as indicated and click Submit. You will be taken to the next sign-up page. 5. Create a Unype ID. This will be your Unype login ID and cannot be changed, so think of one that is secure and easy to remember. 6. Create a Unype password. You can change your password at any time. 7. Enter your Password Reset Question and Answer. This can be used at a later time if you forget your password. 8. Enter your e-mail address. You will receive e-mail notification when new information is available in [address] 9. Click Sign Up. You can now view and download portions of your medical record. 10. Click the Download Summary menu link to download a portable copy of your medical information. If you have questions, please visit the Frequently Asked Questions section of the Unype website. Remember, Unype is NOT to be used for urgent needs. For medical emergencies, dial 911. Now available from your iPhone and Android! Please provide this summary of care documentation to your next provider. Your primary care clinician is listed as Amparo Cuevas. If you have any questions after today's visit, please call [phone].

## 2018-04-10 NOTE — PROGRESS NOTES
[facility] [place]: Sylvia Silva  [phone]    HPI: Santa Luna is a 77y.o. year-old female who presents for follow up regarding her complex valve disease, CAD, Atrial Fib and HTN. Weight is up today. She is frustrated with the right leg pain and she is going to have vein surgery. Maybe, she is not sure. It swells at times but not sure what causes it. Discussed the surgery, knee pain, injections. Has some dyspnea with exertion but no chest pain and the dyspnea is not bad, it is rare. INR has been stable, no bleeding or bruising. Still having trouble with the right leg, swollen and painful since she got that DVT. Back on the losartan, Bp looks good on that now. Dr. Nelia Rios is now following her INR and refilling her percocet. INR stable. EKG NSR LVH today Due for echo for MVR. Not needing the fluid pills and has stopped taking them. A/P:  1. CAD- 100% distal RCA disease on cath 3/11 not amenable to PCI or bypass, no exertional chest pain, continue aspirin and pravastatin  2. Rheumatic heart disease/MV disease- s/p replacement 3/12 Dr. Griselda Fong, stable, next echo 12/16  3. Afib- s/p cryomaze and ALIYAH closure at time of MVR, on Coumadin, rate controlled with Metoprolol and Diltiazem  -Aflutter and afib returned post-op, seen by Dr. Mark Brasher and offered aflutter ablation if it persisted, but gone, NSR now. Occasional palpitations now. Not bad, does not want further treatment at this time. 4. HTN- at goal today on metoprolol and diltiazem, losartan  5. Thyroid- off meds now s/p methimazole  6. Aortic Atherosclerosis- Aspirin and Pravastatin   7. Mild PAHTN-will reassess with TTE    8. Tobacco use- continue cessation, stopped completely  9. COPD - seen on CT chest, not on inhalers at this time, denies progression of dyspnea  10. Right sided chest pain/back pain/neck pain - continue Percocet PRN pain, as per Dr. Nelia Rios  .      10/16 EF 60%, gr3 dd, NAVI mild TR/PI  2015 Echo EF 65%, mild TR, PAP 45, mild PI, gr4dd. Echo 10/14 EF 65%, NAVI, nl functioning MVR bioprosthesis, mild-mod TR PAP 45  12/13 EF 60% mildly increased MV gradient, no MR. Echo 2/12: EF55% mildCLVH, mod MR, sev MS 4.5/15 peak gradient, LAE, mild AS, mild PAHTN, mod Tr  Echo 7/10 EF 55%, gr1DD, mod AS, JUAN 1.1, Mod MS, mild TR, mild paHTN PA 40  Severe MR cath 4/10, with mild CAD    Fhx 2bro w CHF, +Dm +HTN  Soc quit tobacco    She  has a past medical history of AF (atrial fibrillation) ([facility] Anemia; Arthritis; Blunt head trauma (03/13/2017); Chronic pain; Chronically on opiate therapy; Coagulation defects; Diastolic dysfunction; Dyslipidemia; Encounter for Hemoccult screening (06/22/2016); Fall (03/13/2017); H/O colonoscopy (2007); Hand pain, right; Heart failure ([facility] Meniscus degeneration, right (07/13/2017); Prediabetes; PUD (peptic ulcer disease); Rheumatic fever; S/P MVR (mitral valve replacement); Shoulder pain, right; SOB (shortness of breath); Thyroid disease; Venous insufficiency of both lower extremities (01/17/2017); and Warfarin-induced coagulopathy ([facility] Cardiovascular ROS: positive for chest pain, negative for progression of dyspnea or palpitations   Respiratory ROS: positive for - shortness of breath  Neurological ROS: no TIA or stroke symptoms  All other systems negative except as above. PE  Vitals:    04/10/18 1114   BP: 140/80   Resp: 16   Weight: 139 lb (63 kg)   Height: 5' 5\" (1.651 m)    Body mass index is 23.13 kg/(m^2).    General appearance - alert, well appearing, and in no distress  Mental status - affect appropriate to mood  Eyes - sclera anicteric, moist mucous membranes  Neck - supple, no significant adenopathy  Lymphatics - no  lymphadenopathy  Chest - clear to auscultation, no wheezes, rales or rhonchi  Heart - normal rate, regular rhythm, normal S1, S2, 2/6 BUTCH   Abdomen - soft, nontender, nondistended, no masses or organomegaly  Back exam - full range of motion, no tenderness  Neurological - cranial nerves II through XII grossly intact, no focal deficit  Musculoskeletal - no muscular tenderness noted, normal strength  Extremities - peripheral pulses normal, no pedal edema  Skin - normal coloration  no rashes    12 lead ECG: NSR 60bpm    Recent Labs:  Lab Results   Component Value Date/Time    Cholesterol, total 145 02/28/2018 02:14 PM     No results found for: NINA  Lab Results   Component Value Date/Time    BUN 20 02/28/2018 02:14 PM     Lab Results   Component Value Date/Time    Potassium 4.3 02/28/2018 02:14 PM     Lab Results   Component Value Date/Time    Hemoglobin A1c 5.5 02/28/2018 02:14 PM     No components found for: HGBI,  IHGB,  HGB,  HGBP,  WBHGB  Lab Results   Component Value Date/Time    PLATELET 621 98/58/5685 02:14 PM       Reviewed:  Past Medical History:   Diagnosis Date    AF (atrial fibrillation) (HCC)     RHEUMATIC FEVER AS CHILD, AFIB    Anemia     Arthritis     Blunt head trauma 03/13/2017    Chronic pain     Chronically on opiate therapy     Coagulation defects     COUMADIN    Diastolic dysfunction     grade 4    Dyslipidemia     Encounter for Hemoccult screening 06/22/2016    Fall 03/13/2017    H/O colonoscopy 2007    Hand pain, right     Heart failure (HCC)     Meniscus degeneration, right 07/13/2017    Extensive patellar chondral derangement as well as chondral derangement     Prediabetes     PUD (peptic ulcer disease)     Rheumatic fever     S/P MVR (mitral valve replacement)     Shoulder pain, right     SOB (shortness of breath)     Thyroid disease     Venous insufficiency of both lower extremities 01/17/2017    Warfarin-induced coagulopathy ([facility]      History   Smoking Status    Former Smoker    Packs/day: 0.50    Years: 30.00    Quit date: 1/20/2012   Smokeless Tobacco    Never Used     History   Alcohol Use No     No Known Allergies    Current Outpatient Prescriptions   Medication Sig    HYDROcodone-acetaminophen (NORCO) 5-325 mg per tablet Take 1 Tab by mouth every six (6) hours as needed for Pain. Max Daily Amount: 4 Tabs.  CARTIA  mg ER capsule TAKE ONE CAPSULE BY MOUTH DAILY    metoprolol tartrate (LOPRESSOR) 25 mg tablet TAKE 1/2 TABLET BY MOUTH TWICE DAILY    warfarin (COUMADIN) 2 mg tablet TAKE ONE TABLET BY MOUTH DAILY    losartan (COZAAR) 50 mg tablet Take 1 Tab by mouth daily.  pravastatin (PRAVACHOL) 20 mg tablet Take 1 Tab by mouth nightly.  aspirin 81 mg chewable tablet Take 81 mg by mouth daily.  furosemide (LASIX) 20 mg tablet as needed. No current facility-administered medications for this visit.         Angelica Barba MD  [facility] heart and [facility] [place]  [facility] [place] [address], [address] [facility] [address]  [address]

## 2018-04-30 ENCOUNTER — CLINICAL SUPPORT (OUTPATIENT)
Dept: INTERNAL MEDICINE CLINIC | Age: 78
End: 2018-04-30

## 2018-04-30 DIAGNOSIS — Z79.891 CHRONICALLY ON OPIATE THERAPY: ICD-10-CM

## 2018-04-30 DIAGNOSIS — Z51.81 ENCOUNTER FOR MONITORING COUMADIN THERAPY: Primary | ICD-10-CM

## 2018-04-30 DIAGNOSIS — Z79.01 ENCOUNTER FOR MONITORING COUMADIN THERAPY: Primary | ICD-10-CM

## 2018-04-30 LAB
INR BLD: 2.7
PT POC: 32.3 SECONDS
VALID INTERNAL CONTROL?: YES

## 2018-04-30 RX ORDER — HYDROCODONE BITARTRATE AND ACETAMINOPHEN 5; 325 MG/1; MG/1
1 TABLET ORAL
Qty: 60 TAB | Refills: 0 | Status: SHIPPED | OUTPATIENT
Start: 2018-04-30 | End: 2018-06-07 | Stop reason: SDUPTHER

## 2018-04-30 NOTE — PROGRESS NOTES
Elda Prasad is a 77 y.o. female who presents today for Anticoagulation monitoring. Current dose:  Coumadin 1 mg t tab PO daily. Medication Changes:  no  Dietary Changes:  no    Latest INR:  Results for orders placed or performed in visit on 04/30/18   AMB POC PT/INR   Result Value Ref Range    VALID INTERNAL CONTROL POC Yes     Prothrombin time (POC) 32.3 seconds    INR POC 2.7          New Coumadin dose:.current treatment plan is effective, no change in therapy. Next check to be scheduled for  4 weeks for doctors visit.   Per Dr. Jerald Marcano, EUNICEN

## 2018-04-30 NOTE — MR AVS SNAPSHOT
[address] [facility] 
 
 
 [address] [phone] 
[phone] Patient: Camilla Bejarano MRN: [id number]:[id number] Visit Information Date & Time Provider Department Dept. Phone Encounter #  
 4/30/2018 10:00 AM NURSE [address] Sports Medicine and Primary Care [phone] [id number] Your Appointments 5/2/2018 11:00 AM  
ECHO CARDIOGRAMS 2D with ECHO, ANDRE [facility] ([facility]) Appt Note: Dx: MVR per Dr. Boone Kwan [address] [facility] [address] [facility] [address]  
[address] [facility] [address]  
  
    
 6/7/2018 10:45 AM  
Any with Yasmani Arora MD  
[address] [place] [address] and Primary Care [facility]) Appt Note: 1 Month Follow Up  
 [address] [facility]  
  
   
 [address] [id number]  
  
    
 10/22/2018  1:20 PM  
ESTABLISHED PATIENT with Rolan Davies MD  
[facility] ([facility]) Appt Note: 6 month follow up  
 [address] [place] [address]  
[address] [address] Upcoming Health Maintenance Date Due  
 GLAUCOMA SCREENING Q2Y 4/21/2017 Influenza Age 5 to Adult 8/1/2018 MEDICARE YEARLY EXAM 9/2/2018 DTaP/Tdap/Td series (2 - Td) 1/17/2027 Allergies as of 4/30/2018  Review Complete On: 4/10/2018 By: Karla File No Known Allergies Current Immunizations  Reviewed on 5/4/2012 Name Date ZZZ-RETIRED (DO NOT USE) Pneumococcal Vaccine (Unspecified Type) 5/7/2012  8:12 AM  
  
 Not reviewed this visit You Were Diagnosed With   
  
 Codes Comments Encounter for monitoring coumadin therapy    -  Primary ICD-10-CM: Z51.81, Z79.01 
ICD-9-CM: V58.83, V58.61 Chronically on opiate therapy     ICD-10-CM: Z79.891 ICD-9-CM: V58.69 Vitals OB Status Smoking Status Hysterectomy Former Smoker Preferred Pharmacy Pharmacy Name Phone [facility] [address] [facility] [phone] Your Updated Medication List  
  
   
This list is accurate as of 4/30/18 11:22 AM.  Always use your most recent med list.  
  
  
  
  
 aspirin 81 mg chewable tablet Take 81 mg by mouth daily. CARTIA  mg ER capsule Generic drug:  dilTIAZem CD  
TAKE ONE CAPSULE BY MOUTH DAILY  
  
 furosemide 20 mg tablet Commonly known as:  LASIX  
as needed. HYDROcodone-acetaminophen 5-325 mg per tablet Commonly known as:  Marvelyn Code Take 1 Tab by mouth every six (6) hours as needed for Pain. Max Daily Amount: 4 Tabs. losartan 50 mg tablet Commonly known as:  COZAAR Take 1 Tab by mouth daily. metoprolol tartrate 25 mg tablet Commonly known as:  LOPRESSOR  
TAKE 1/2 TABLET BY MOUTH TWICE DAILY pravastatin 20 mg tablet Commonly known as:  PRAVACHOL Take 1 Tab by mouth nightly. warfarin 2 mg tablet Commonly known as:  COUMADIN  
TAKE ONE TABLET BY MOUTH DAILY Prescriptions Printed Refills HYDROcodone-acetaminophen (NORCO) 5-325 mg per tablet 0 Sig: Take 1 Tab by mouth every six (6) hours as needed for Pain. Max Daily Amount: 4 Tabs. Class: Print Route: Oral  
  
We Performed the Following AMB POC PT/INR [81758 CPT(R)] PAIN MGMT PANEL W/REFL, UR [CTX40165 Custom] Introducing [facility] & HEALTH SERVICES! [employer] introduces Te[employer]ium patient portal. Now you can access parts of your medical record, email your doctor's office, and request medication refills online. 1. In your internet browser, go to [email or web address] 2. Click on the First Time User? Click Here link in the Sign In box. You will see the New Member Sign Up page. 3. Enter your Neohapsis Access Code exactly as it appears below. You will not need to use this code after youve completed the sign-up process. If you do not sign up before the expiration date, you must request a new code. · Neohapsis Access Code: [email or web address] Expires: 7/2/2018 11:34 AM 
 
4. Enter the last four digits of your Social Security Number (xxxx) and Date of Birth (mm/dd/yyyy) as indicated and click Submit. You will be taken to the next sign-up page. 5. Create a Neohapsis ID. This will be your Neohapsis login ID and cannot be changed, so think of one that is secure and easy to remember. 6. Create a Neohapsis password. You can change your password at any time. 7. Enter your Password Reset Question and Answer. This can be used at a later time if you forget your password. 8. Enter your e-mail address. You will receive e-mail notification when new information is available in [address] 9. Click Sign Up. You can now view and download portions of your medical record. 10. Click the Download Summary menu link to download a portable copy of your medical information. If you have questions, please visit the Frequently Asked Questions section of the Neohapsis website. Remember, Neohapsis is NOT to be used for urgent needs. For medical emergencies, dial 911. Now available from your iPhone and Android! Please provide this summary of care documentation to your next provider. Your primary care clinician is listed as Carolina Mccann. If you have any questions after today's visit, please call [phone].

## 2018-05-02 ENCOUNTER — CLINICAL SUPPORT (OUTPATIENT)
Dept: CARDIOLOGY CLINIC | Age: 78
End: 2018-05-02

## 2018-05-02 DIAGNOSIS — I35.8 AORTIC VALVE SCLEROSIS: ICD-10-CM

## 2018-05-02 DIAGNOSIS — Z95.2 S/P MVR (MITRAL VALVE REPLACEMENT): Primary | ICD-10-CM

## 2018-05-06 LAB
AMPHETAMINES UR QL SCN: NEGATIVE NG/ML
BARBITURATES UR QL SCN: NEGATIVE NG/ML
BENZODIAZ UR QL SCN: NEGATIVE NG/ML
BZE UR QL SCN: NEGATIVE NG/ML
CANNABINOIDS UR QL SCN: NEGATIVE NG/ML
CREAT UR-MCNC: 92.4 MG/DL (ref 20–300)
FENTANYL+NORFENTANYL UR QL CFM: POSITIVE
MEPERIDINE UR QL: NEGATIVE NG/ML
METHADONE UR QL SCN: NEGATIVE NG/ML
OPIATES UR QL SCN: POSITIVE NG/ML
OXYCODONE+OXYMORPHONE UR QL SCN: POSITIVE
PCP UR QL: NEGATIVE NG/ML
PH UR: 6 [PH] (ref 4.5–8.9)
PLEASE NOTE:, 733157: ABNORMAL
PROPOXYPH UR QL SCN: NEGATIVE NG/ML
SP GR UR: 1.01
TRAMADOL UR QL SCN: NEGATIVE NG/ML

## 2018-05-11 ENCOUNTER — TELEPHONE (OUTPATIENT)
Dept: CARDIOLOGY CLINIC | Age: 78
End: 2018-05-11

## 2018-05-11 NOTE — TELEPHONE ENCOUNTER
The following echo results given per Dr. Luisito Zamarripa:    Echo: 5/18, EF 60%, MVR stable, LVOT impingement , no gradient.     2 pt identifiers used

## 2018-05-23 RX ORDER — LOSARTAN POTASSIUM 50 MG/1
50 TABLET ORAL DAILY
Qty: 90 TAB | Refills: 3 | Status: SHIPPED | OUTPATIENT
Start: 2018-05-23 | End: 2019-05-16 | Stop reason: SDUPTHER

## 2018-06-07 ENCOUNTER — OFFICE VISIT (OUTPATIENT)
Dept: INTERNAL MEDICINE CLINIC | Age: 78
End: 2018-06-07

## 2018-06-07 VITALS
WEIGHT: 135.5 LBS | DIASTOLIC BLOOD PRESSURE: 69 MMHG | RESPIRATION RATE: 18 BRPM | BODY MASS INDEX: 22.57 KG/M2 | SYSTOLIC BLOOD PRESSURE: 121 MMHG | TEMPERATURE: 98 F | OXYGEN SATURATION: 95 % | HEIGHT: 65 IN | HEART RATE: 65 BPM

## 2018-06-07 DIAGNOSIS — Z79.01 ENCOUNTER FOR MONITORING COUMADIN THERAPY: Primary | ICD-10-CM

## 2018-06-07 DIAGNOSIS — I27.20 PULMONARY HYPERTENSION (HCC): ICD-10-CM

## 2018-06-07 DIAGNOSIS — I48.92 ATRIAL FLUTTER WITH CONTROLLED RESPONSE (HCC): ICD-10-CM

## 2018-06-07 DIAGNOSIS — T45.515A WARFARIN-INDUCED COAGULOPATHY (HCC): ICD-10-CM

## 2018-06-07 DIAGNOSIS — Z79.891 CHRONICALLY ON OPIATE THERAPY: ICD-10-CM

## 2018-06-07 DIAGNOSIS — D68.32 WARFARIN-INDUCED COAGULOPATHY (HCC): ICD-10-CM

## 2018-06-07 DIAGNOSIS — Z51.81 ENCOUNTER FOR MONITORING COUMADIN THERAPY: Primary | ICD-10-CM

## 2018-06-07 DIAGNOSIS — I48.91 ATRIAL FIBRILLATION, UNSPECIFIED TYPE (HCC): ICD-10-CM

## 2018-06-07 LAB
INR BLD: 1.9
PT POC: 23.2 SECONDS
VALID INTERNAL CONTROL?: YES

## 2018-06-07 RX ORDER — HYDROCODONE BITARTRATE AND ACETAMINOPHEN 5; 325 MG/1; MG/1
1 TABLET ORAL
Qty: 60 TAB | Refills: 0 | Status: SHIPPED | OUTPATIENT
Start: 2018-06-07 | End: 2018-07-10 | Stop reason: SDUPTHER

## 2018-06-07 NOTE — PROGRESS NOTES
SPORTS MEDICINE AND PRIMARY CARE  Jordin Rodriguez MD, [address] [address]  Phone:  [phone]  Fax: [phone]       Chief Complaint   Patient presents with    Hypertension     f/u   . SUBJECTIVE:    Daniel Ortiz is a 77 y.o. female Patient returns today with known history of atrial fibrillation, arthritis, chronic pain, diastolic dysfunction, dyslipidemia, mitral valve replacement and is seen for evaluation. Dr. Radha Salguero is her cardiologist. She complains of leg discomfort, right greater than left, and wonders about swelling that occurs as the day progresses. She is also concerned about the appearance of her anterior chest.           Current Outpatient Prescriptions   Medication Sig Dispense Refill    HYDROcodone-acetaminophen (NORCO) 5-325 mg per tablet Take 1 Tab by mouth every six (6) hours as needed for Pain. Max Daily Amount: 4 Tabs. 60 Tab 0    losartan (COZAAR) 50 mg tablet Take 1 Tab by mouth daily. 90 Tab 3    CARTIA  mg ER capsule TAKE ONE CAPSULE BY MOUTH DAILY 90 Cap 2    metoprolol tartrate (LOPRESSOR) 25 mg tablet TAKE 1/2 TABLET BY MOUTH TWICE DAILY 90 Tab 3    warfarin (COUMADIN) 2 mg tablet TAKE ONE TABLET BY MOUTH DAILY 90 Tab 1    pravastatin (PRAVACHOL) 20 mg tablet Take 1 Tab by mouth nightly. 90 Tab 10    furosemide (LASIX) 20 mg tablet as needed.  aspirin 81 mg chewable tablet Take 81 mg by mouth daily.        Past Medical History:   Diagnosis Date    AF (atrial fibrillation) (HCC)     RHEUMATIC FEVER AS CHILD, AFIB    Anemia     Arthritis     Blunt head trauma 03/13/2017    Chronic pain     Chronically on opiate therapy     Coagulation defects     COUMADIN    Diastolic dysfunction     grade 4    Dyslipidemia     Encounter for Hemoccult screening 06/22/2016    Fall 03/13/2017    H/O colonoscopy 2007    Hand pain, right     Heart failure ([facility] [address]     Meniscus degeneration, right 07/13/2017    Extensive patellar chondral derangement as well as chondral derangement     Prediabetes     PUD (peptic ulcer disease)     Rheumatic fever     S/P MVR (mitral valve replacement)     Shoulder pain, right     SOB (shortness of breath)     Thyroid disease     Venous insufficiency of both lower extremities 2017    Warfarin-induced coagulopathy ([facility] [address]      Past Surgical History:   Procedure Laterality Date    HX CATARACT REMOVAL      BILATERAL    HX  SECTION      HX COLONOSCOPY      HX HEART CATHETERIZATION      HX HYSTERECTOMY      HX MITRAL VALVE REPLACEMENT      HX LUIS ENRIQUE AND BSO       No Known Allergies      REVIEW OF SYSTEMS:  General: negative for - chills or fever  ENT: negative for - headaches, nasal congestion or tinnitus  Respiratory: negative for - cough, hemoptysis, shortness of breath or wheezing  Cardiovascular : negative for - chest pain, edema, palpitations or shortness of breath  Gastrointestinal: negative for - abdominal pain, blood in stools, heartburn or nausea/vomiting  Genito-Urinary: no dysuria, trouble voiding, or hematuria  Musculoskeletal: negative for - gait disturbance, joint pain, joint stiffness or joint swelling  Neurological: no TIA or stroke symptoms  Hematologic: no bruises, no bleeding, no swollen glands  Integument: no lumps, mole changes, nail changes or rash  Endocrine: no malaise/lethargy or unexpected weight changes      Social History     Social History    Marital status: SINGLE     Spouse name: N/A    Number of children: N/A    Years of education: N/A     Social History Main Topics    Smoking status: Former Smoker     Packs/day: 0.50     Years: 30.00     Quit date: 2012    Smokeless tobacco: Never Used    Alcohol use No    Drug use: No    Sexual activity: No     Other Topics Concern    None     Social History Narrative    Family History:  Mother:  80 yrs, High Blood PressureFather:  52 yrs, High Blood PressureSister(s): alive, High Blood    Pressure, colon cancer, type II diabetesBrother(s): , High Blood Pressure, colon cancer, type II diabetesSon(s): alive 48 yrs1 sister(s)    . 1 son(s) . Social History: Alcohol Use Patient does not use alcohol. Smoking Status Patient is a former smoker. Marital Status: Single. Lives w ith:    alone. Children: sons. Occupation/W ork: retired - for [employer]. Education/School: has highschool diploma. Temple: [employer].     Family History   Problem Relation Age of Onset    Heart Attack Sister     Hypertension Sister     Kidney Disease Mother     Hypertension Mother     Hypertension Father     Cancer Brother     Heart Failure Brother     Arthritis-osteo Sister     Cancer Sister     Cancer Brother      PROSTATE       OBJECTIVE:    Visit Vitals    /69    Pulse 65    Temp 98 °F (36.7 °C) (Oral)    Resp 18    Ht 5' 5\" (1.651 m)    Wt 135 lb 8 oz (61.5 kg)    SpO2 95%    BMI 22.55 kg/m2     CONSTITUTIONAL: well , well nourished, appears age appropriate  EYES: perrla, eom intact  ENMT:moist mucous membranes, pharynx clear  NECK: supple. Thyroid normal  RESPIRATORY: Chest: clear bilaterally   CARDIOVASCULAR: Heart: regular rate and rhythm  GASTROINTESTINAL: Abdomen: soft, bowel sounds active  HEMATOLOGIC: no pathological lymph nodes palpated  MUSCULOSKELETAL: Extremities: no edema, pulse 1+   INTEGUMENT: No unusual rashes or suspicious skin lesions noted. Nails appear normal.  NEUROLOGIC: non-focal exam   MENTAL STATUS: alert and oriented, appropriate affect           ASSESSMENT:  1. Encounter for monitoring coumadin therapy    2. Chronically on opiate therapy    3. Atrial fibrillation, unspecified type ([address]    4. Atrial flutter with controlled response ([address]    5.  Pulmonary hypertension (HCC)    6. Warfarin-induced coagulopathy (HCC)      Chest exam is unremarkable and the concerns that she has are anatomical.    She has chronic venous insufficiency, for which she takes the Lasix every three or four days as needed. Will check her renal function. INR is therapeutic at 1.9. We'd like to see it closer to 2.4 to 3. INR fluctuates with her . BMI is at her ideal body weight. BP control is at goal.    She'll return to the office in 3-4 weeks for a PT check. This is a chronic problem that is not changed. Per review of available records and patients , there are not sign of overuse, misuse, diversion, or concerning side effects. Today we reviewed: the risk of overdose, addiction, and dependency  The following changes were made to the patients current treatment plan: nothing, medications refilled. PLAN:  .  Orders Placed This Encounter    METABOLIC PANEL, BASIC    CBC WITH AUTOMATED DIFF    AMB POC PT/INR    HYDROcodone-acetaminophen (NORCO) 5-325 mg per tablet       Follow-up Disposition:  Return in about 4 weeks (around 7/5/2018) for pt/inr. ATTENTION:   This medical record was transcribed using an electronic medical records system. Although proofread, it may and can contain electronic and spelling errors. Other human spelling and other errors may be present. Corrections may be executed at a later time. Please feel free to contact us for any clarifications as needed.

## 2018-06-07 NOTE — MR AVS SNAPSHOT
[address] [place] [address]. [address] [id number] 
[phone] Patient: Marly James MRN: [id number] TOK:[birth year] Visit Information Date & Time Provider Department Dept. Phone Encounter #  
 6/7/2018 10:45 AM Gala Diez MD [facility] Sports Medicine and Primary Care [phone] [id number] Follow-up Instructions Return in about 4 weeks (around 7/5/2018) for pt/inr. Follow-up and Disposition History Your Appointments 10/22/2018  1:20 PM  
ESTABLISHED PATIENT with Brian Man MD  
[facility] ([facility]) Appt Note: 6 month follow up  
 [address]  [address] [place] [address] [address] [facility] [id number] Upcoming Health Maintenance Date Due  
 GLAUCOMA SCREENING Q2Y 4/21/2017 Influenza Age 5 to Adult 8/1/2018 MEDICARE YEARLY EXAM 9/2/2018 DTaP/Tdap/Td series (2 - Td) 1/17/2027 Allergies as of 6/7/2018  Review Complete On: 6/7/2018 By: Gala Diez MD  
 No Known Allergies Current Immunizations  Reviewed on 5/4/2012 Name Date ZZZ-RETIRED (DO NOT USE) Pneumococcal Vaccine (Unspecified Type) 5/7/2012  8:12 AM  
  
 Not reviewed this visit You Were Diagnosed With   
  
 Codes Comments Encounter for monitoring coumadin therapy    -  Primary ICD-10-CM: Z51.81, Z79.01 
ICD-9-CM: V58.83, V58.61 Chronically on opiate therapy     ICD-10-CM: Z79.891 ICD-9-CM: V58.69 Atrial fibrillation, unspecified type ([facility] [address]     ICD-10-CM: I48.91 
ICD-9-CM: 427.31 Atrial flutter with controlled response ([facility] [address]     ICD-10-CM: I48.92 
ICD-9-CM: 427.32 Pulmonary hypertension (HCC)     ICD-10-CM: I27.20 ICD-9-CM: 416.8 Warfarin-induced coagulopathy (HCC)     ICD-10-CM: L76.29, C89.168V ICD-9-CM: 286.7, E778.2 Vitals BP Pulse Temp Resp Height(growth percentile) Weight(growth percentile) 121/69 65 98 °F (36.7 °C) (Oral) 18 5' 5\" (1.651 m) 135 lb 8 oz (61.5 kg) SpO2 BMI OB Status Smoking Status 95% 22.55 kg/m2 Hysterectomy Former Smoker Vitals History BMI and BSA Data Body Mass Index Body Surface Area  
 22.55 kg/m 2 1.68 m 2 Preferred Pharmacy Pharmacy Name Phone [facility] [phone] [facility] [address] [phone] Your Updated Medication List  
  
   
This list is accurate as of 6/7/18 12:15 PM.  Always use your most recent med list.  
  
  
  
  
 aspirin 81 mg chewable tablet Take 81 mg by mouth daily. CARTIA  mg ER capsule Generic drug:  dilTIAZem CD  
TAKE ONE CAPSULE BY MOUTH DAILY  
  
 furosemide 20 mg tablet Commonly known as:  LASIX  
as needed. HYDROcodone-acetaminophen 5-325 mg per tablet Commonly known as:  Kenji Ybarra Take 1 Tab by mouth every six (6) hours as needed for Pain. Max Daily Amount: 4 Tabs. losartan 50 mg tablet Commonly known as:  COZAAR Take 1 Tab by mouth daily. metoprolol tartrate 25 mg tablet Commonly known as:  LOPRESSOR  
TAKE 1/2 TABLET BY MOUTH TWICE DAILY pravastatin 20 mg tablet Commonly known as:  PRAVACHOL Take 1 Tab by mouth nightly. warfarin 2 mg tablet Commonly known as:  COUMADIN  
TAKE ONE TABLET BY MOUTH DAILY Prescriptions Printed Refills HYDROcodone-acetaminophen (NORCO) 5-325 mg per tablet 0 Sig: Take 1 Tab by mouth every six (6) hours as needed for Pain. Max Daily Amount: 4 Tabs. Class: Print Route: Oral  
  
We Performed the Following AMB POC PT/INR [34595 CPT(R)] CBC WITH AUTOMATED DIFF [36165 CPT(R)] METABOLIC PANEL, BASIC [13148 CPT(R)] Follow-up Instructions Return in about 4 weeks (around 7/5/2018) for pt/inr. Introducing [facility]! [employer] introduces [employer] patient portal. Now you can access parts of your medical record, email your doctor's office, and request medication refills online. 1. In your internet browser, go to [email or web address] 2. Click on the First Time User? Click Here link in the Sign In box. You will see the New Member Sign Up page. 3. Enter your [employer] Access Code exactly as it appears below. You will not need to use this code after youve completed the sign-up process. If you do not sign up before the expiration date, you must request a new code. · [employer] Access Code: [email or web address] Expires: 7/2/2018 11:34 AM 
 
4. Enter the last four digits of your Social Security Number (xxxx) and Date of Birth (mm/dd/yyyy) as indicated and click Submit. You will be taken to the next sign-up page. 5. Create a [employer] ID. This will be your [employer] login ID and cannot be changed, so think of one that is secure and easy to remember. 6. Create a [employer] password. You can change your password at any time. 7. Enter your Password Reset Question and Answer. This can be used at a later time if you forget your password. 8. Enter your e-mail address. You will receive e-mail notification when new information is available in [address] 9. Click Sign Up. You can now view and download portions of your medical record. 10. Click the Download Summary menu link to download a portable copy of your medical information. If you have questions, please visit the Frequently Asked Questions section of the [employer] website. Remember, [employer] is NOT to be used for urgent needs. For medical emergencies, dial 911. Now available from your iPhone and Android! Please provide this summary of care documentation to your next provider. Your primary care clinician is listed as David Taveras. If you have any questions after today's visit, please call [phone].

## 2018-06-07 NOTE — PROGRESS NOTES
1. Have you been to the ER, urgent care clinic since your last visit? Hospitalized since your last visit? No    2. Have you seen or consulted any other health care providers outside of the [facility] since your last visit? Include any pap smears or colon screening.  No

## 2018-06-08 LAB
BASOPHILS # BLD AUTO: 0 X10E3/UL (ref 0–0.2)
BASOPHILS NFR BLD AUTO: 1 %
BUN SERPL-MCNC: 27 MG/DL (ref 8–27)
BUN/CREAT SERPL: 21 (ref 12–28)
CALCIUM SERPL-MCNC: 9.6 MG/DL (ref 8.7–10.3)
CHLORIDE SERPL-SCNC: 104 MMOL/L (ref 96–106)
CO2 SERPL-SCNC: 24 MMOL/L (ref 18–29)
CREAT SERPL-MCNC: 1.28 MG/DL (ref 0.57–1)
EOSINOPHIL # BLD AUTO: 0.3 X10E3/UL (ref 0–0.4)
EOSINOPHIL NFR BLD AUTO: 5 %
ERYTHROCYTE [DISTWIDTH] IN BLOOD BY AUTOMATED COUNT: 15.7 % (ref 12.3–15.4)
GFR SERPLBLD CREATININE-BSD FMLA CKD-EPI: 40 ML/MIN/1.73
GFR SERPLBLD CREATININE-BSD FMLA CKD-EPI: 46 ML/MIN/1.73
GLUCOSE SERPL-MCNC: 161 MG/DL (ref 65–99)
HCT VFR BLD AUTO: 35.5 % (ref 34–46.6)
HGB BLD-MCNC: 11.7 G/DL (ref 11.1–15.9)
IMM GRANULOCYTES # BLD: 0 X10E3/UL (ref 0–0.1)
IMM GRANULOCYTES NFR BLD: 0 %
LYMPHOCYTES # BLD AUTO: 1.8 X10E3/UL (ref 0.7–3.1)
LYMPHOCYTES NFR BLD AUTO: 33 %
MCH RBC QN AUTO: 28.6 PG (ref 26.6–33)
MCHC RBC AUTO-ENTMCNC: 33 G/DL (ref 31.5–35.7)
MCV RBC AUTO: 87 FL (ref 79–97)
MONOCYTES # BLD AUTO: 0.3 X10E3/UL (ref 0.1–0.9)
MONOCYTES NFR BLD AUTO: 6 %
NEUTROPHILS # BLD AUTO: 3.1 X10E3/UL (ref 1.4–7)
NEUTROPHILS NFR BLD AUTO: 55 %
PLATELET # BLD AUTO: 251 X10E3/UL (ref 150–379)
POTASSIUM SERPL-SCNC: 5 MMOL/L (ref 3.5–5.2)
RBC # BLD AUTO: 4.09 X10E6/UL (ref 3.77–5.28)
SODIUM SERPL-SCNC: 144 MMOL/L (ref 134–144)
WBC # BLD AUTO: 5.5 X10E3/UL (ref 3.4–10.8)

## 2018-06-21 RX ORDER — PRAVASTATIN SODIUM 20 MG/1
TABLET ORAL
Qty: 90 TAB | Refills: 9 | Status: SHIPPED | OUTPATIENT
Start: 2018-06-21 | End: 2019-06-24 | Stop reason: SDUPTHER

## 2018-07-06 RX ORDER — FUROSEMIDE 20 MG/1
20 TABLET ORAL DAILY
Qty: 30 TAB | Refills: 3 | Status: SHIPPED | OUTPATIENT
Start: 2018-07-06 | End: 2019-09-24 | Stop reason: SDUPTHER

## 2018-07-10 ENCOUNTER — OFFICE VISIT (OUTPATIENT)
Dept: INTERNAL MEDICINE CLINIC | Age: 78
End: 2018-07-10

## 2018-07-10 VITALS
TEMPERATURE: 96.5 F | OXYGEN SATURATION: 100 % | DIASTOLIC BLOOD PRESSURE: 70 MMHG | HEIGHT: 65 IN | RESPIRATION RATE: 16 BRPM | HEART RATE: 67 BPM | SYSTOLIC BLOOD PRESSURE: 128 MMHG | WEIGHT: 141.4 LBS | BODY MASS INDEX: 23.56 KG/M2

## 2018-07-10 DIAGNOSIS — R06.02 SOB (SHORTNESS OF BREATH): ICD-10-CM

## 2018-07-10 DIAGNOSIS — Z79.891 CHRONICALLY ON OPIATE THERAPY: ICD-10-CM

## 2018-07-10 DIAGNOSIS — Z79.01 ENCOUNTER FOR MONITORING COUMADIN THERAPY: Primary | ICD-10-CM

## 2018-07-10 DIAGNOSIS — I82.511 CHRONIC DEEP VEIN THROMBOSIS (DVT) OF FEMORAL VEIN OF RIGHT LOWER EXTREMITY (HCC): ICD-10-CM

## 2018-07-10 DIAGNOSIS — I87.2 VENOUS INSUFFICIENCY OF BOTH LOWER EXTREMITIES: ICD-10-CM

## 2018-07-10 DIAGNOSIS — I48.91 ATRIAL FIBRILLATION, UNSPECIFIED TYPE (HCC): ICD-10-CM

## 2018-07-10 DIAGNOSIS — Z51.81 ENCOUNTER FOR MONITORING COUMADIN THERAPY: Primary | ICD-10-CM

## 2018-07-10 DIAGNOSIS — I05.9 MITRAL VALVE DISORDER: ICD-10-CM

## 2018-07-10 LAB
INR BLD: 1.4
PT POC: 17.2 SECONDS
VALID INTERNAL CONTROL?: YES

## 2018-07-10 RX ORDER — HYDROCODONE BITARTRATE AND ACETAMINOPHEN 5; 325 MG/1; MG/1
1 TABLET ORAL
Qty: 60 TAB | Refills: 0 | Status: SHIPPED | OUTPATIENT
Start: 2018-07-10 | End: 2018-08-16 | Stop reason: SDUPTHER

## 2018-07-10 NOTE — PROGRESS NOTES
SPORTS MEDICINE AND PRIMARY CARE  Shemar Dos Santos MD, [address] [address]  Phone:  [phone]  Fax: [phone]      Chief Complaint   Patient presents with    Hypertension     f/u         SUBECTIVE:    Lucas Ceja is a 77 y.o. female Patient returns today with known history of chronic pain, on opiate therapy, atrial fibrillation, on Coumadin, diastolic dysfunction, dyslipidemia, chronic venous insufficiency both lower extremities, and is seen for evaluation. Patient complains of swelling of her legs that seems to be helped with Lasix, for which she's taking three times a week, but this weekend she took it both Saturday and Sunday. The swelling gets worse as the day progresses and goes down at night when she goes to bed. No shortness of breath. Denies other specific complaints and is seen for evaluation. Current Outpatient Prescriptions   Medication Sig Dispense Refill    HYDROcodone-acetaminophen (NORCO) 5-325 mg per tablet Take 1 Tab by mouth every six (6) hours as needed for Pain. Max Daily Amount: 4 Tabs. 60 Tab 0    furosemide (LASIX) 20 mg tablet Take 1 Tab by mouth daily. 30 Tab 3    pravastatin (PRAVACHOL) 20 mg tablet TAKE ONE TABLET BY MOUTH EVERY EVENING 90 Tab 9    losartan (COZAAR) 50 mg tablet Take 1 Tab by mouth daily. 90 Tab 3    CARTIA  mg ER capsule TAKE ONE CAPSULE BY MOUTH DAILY 90 Cap 2    metoprolol tartrate (LOPRESSOR) 25 mg tablet TAKE 1/2 TABLET BY MOUTH TWICE DAILY 90 Tab 3    warfarin (COUMADIN) 2 mg tablet TAKE ONE TABLET BY MOUTH DAILY 90 Tab 1    aspirin 81 mg chewable tablet Take 81 mg by mouth daily.        Past Medical History:   Diagnosis Date    AF (atrial fibrillation) (HCC)     RHEUMATIC FEVER AS CHILD, AFIB    Anemia     Arthritis     Blunt head trauma 03/13/2017    Chronic pain     Chronically on opiate therapy     Coagulation defects     COUMADIN    Diastolic dysfunction     grade 4    Dyslipidemia     Encounter for Hemoccult screening 2016    Fall 2017    H/O colonoscopy 2007    Hand pain, right     Heart failure (HCC)     Meniscus degeneration, right 2017    Extensive patellar chondral derangement as well as chondral derangement     Prediabetes     PUD (peptic ulcer disease)     Rheumatic fever     S/P MVR (mitral valve replacement)     Shoulder pain, right     SOB (shortness of breath)     Thyroid disease     Venous insufficiency of both lower extremities 2017    Warfarin-induced coagulopathy ([facility] [address]      Past Surgical History:   Procedure Laterality Date    HX CATARACT REMOVAL      BILATERAL    HX  SECTION      HX COLONOSCOPY      HX HEART CATHETERIZATION      HX HYSTERECTOMY      HX MITRAL VALVE REPLACEMENT      HX LUIS ENRIQUE AND BSO       No Known Allergies    REVIEW OF SYSTEMS:   No chest pain. Social History     Social History    Marital status: SINGLE     Spouse name: N/A    Number of children: N/A    Years of education: N/A     Social History Main Topics    Smoking status: Former Smoker     Packs/day: 0.50     Years: 30.00     Quit date: 2012    Smokeless tobacco: Never Used    Alcohol use No    Drug use: No    Sexual activity: No     Other Topics Concern    None     Social History Narrative    Family History: Mother:  80 yrs, High Blood PressureFather:  52 yrs, High Blood PressureSister(s): alive, High Blood    Pressure, colon cancer, type II diabetesBrother(s): , High Blood Pressure, colon cancer, type II diabetesSon(s): alive 48 yrs1 sister(s)    . 1 son(s) . Social History: Alcohol Use Patient does not use alcohol. Smoking Status Patient is a former smoker. Marital Status: Single. Lives w ith:    alone. Children: sons. Occupation/W ork: retired - for [employer]. Education/School: has highschool diploma.     [employer]: [address] [employer].   r  Family History   Problem Relation Age of Onset    Heart Attack Sister     Hypertension Sister     Kidney Disease Mother     Hypertension Mother     Hypertension Father     Cancer Brother     Heart Failure Brother     Arthritis-osteo Sister     Cancer Sister     Cancer Brother      PROSTATE       OBJECTIVE:  Visit Vitals    /71    Pulse 67    Temp 96.5 °F (35.8 °C) (Oral)    Resp 16    Ht 5' 5\" (1.651 m)    Wt 141 lb 6.4 oz (64.1 kg)    SpO2 100%    BMI 23.53 kg/m2     ENT: perrla,  eom intact  NECK: supple. Thyroid normal  CHEST: clear to ascultation and percussion   HEART: regular rate and rhythm  ABD: soft, bowel sounds active  EXTREMITIES: no edema, pulse 1+     Office Visit on 07/10/2018   Component Date Value Ref Range Status    VALID INTERNAL CONTROL POC 07/10/2018 Yes   Final    Prothrombin time (POC) 07/10/2018 17.2  seconds Final    INR POC 07/10/2018 1.4   Final   Office Visit on 06/07/2018   Component Date Value Ref Range Status    VALID INTERNAL CONTROL POC 06/07/2018 Yes   Final    Prothrombin time (POC) 06/07/2018 23.2  seconds Final    INR POC 06/07/2018 1.9   Final    Glucose 06/07/2018 161* 65 - 99 mg/dL Final    BUN 06/07/2018 27  8 - 27 mg/dL Final    Creatinine 06/07/2018 1.28* 0.57 - 1.00 mg/dL Final    GFR est non-AA 06/07/2018 40* >59 mL/min/1.73 Final    GFR est AA 06/07/2018 46* >59 mL/min/1.73 Final    BUN/Creatinine ratio 06/07/2018 21  12 - 28 Final    Sodium 06/07/2018 144  134 - 144 mmol/L Final    Potassium 06/07/2018 5.0  3.5 - 5.2 mmol/L Final    Chloride 06/07/2018 104  96 - 106 mmol/L Final    CO2 06/07/2018 24  18 - 29 mmol/L Final    Comment: **Effective June 11, 2018 Carbon Dioxide, Total**    reference interval will be changing to:                Age                  Male          Female       0 days   - 30 days         16 - 34        16 - 34      31 days   -  1 year         15 - 22        15 - 25       2 years  -  5 years        16 - 34        14 - 32       6 years  - 15 years        23 - 32        19 - 27                 >12 years        20 - 29        20 - 34      Calcium 06/07/2018 9.6  8.7 - 10.3 mg/dL Final    WBC 06/07/2018 5.5  3.4 - 10.8 x10E3/uL Final    RBC 06/07/2018 4.09  3.77 - 5.28 x10E6/uL Final    HGB 06/07/2018 11.7  11.1 - 15.9 g/dL Final    HCT 06/07/2018 35.5  34.0 - 46.6 % Final    MCV 06/07/2018 87  79 - 97 fL Final    MCH 06/07/2018 28.6  26.6 - 33.0 pg Final    MCHC 06/07/2018 33.0  31.5 - 35.7 g/dL Final    RDW 06/07/2018 15.7* 12.3 - 15.4 % Final    PLATELET 89/83/4000 169  150 - 379 x10E3/uL Final    NEUTROPHILS 06/07/2018 55  Not Estab. % Final    Lymphocytes 06/07/2018 33  Not Estab. % Final    MONOCYTES 06/07/2018 6  Not Estab. % Final    EOSINOPHILS 06/07/2018 5  Not Estab. % Final    BASOPHILS 06/07/2018 1  Not Estab. % Final    ABS. NEUTROPHILS 06/07/2018 3.1  1.4 - 7.0 x10E3/uL Final    Abs Lymphocytes 06/07/2018 1.8  0.7 - 3.1 x10E3/uL Final    ABS. MONOCYTES 06/07/2018 0.3  0.1 - 0.9 x10E3/uL Final    ABS. EOSINOPHILS 06/07/2018 0.3  0.0 - 0.4 x10E3/uL Final    ABS. BASOPHILS 06/07/2018 0.0  0.0 - 0.2 x10E3/uL Final    IMMATURE GRANULOCYTES 06/07/2018 0  Not Estab. % Final    ABS. IMM. GRANS. 06/07/2018 0.0  0.0 - 0.1 x10E3/uL Final   Clinical Support on 04/30/2018   Component Date Value Ref Range Status    VALID INTERNAL CONTROL POC 04/30/2018 Yes   Final    Prothrombin time (POC) 04/30/2018 32.3  seconds Final    INR POC 04/30/2018 2.7   Final    Amphetamine Screen, urine 04/30/2018 Negative  Aoocao=1451 ng/mL Final    Barbiturates Screen, urine 04/30/2018 Negative  Pzweal=130 ng/mL Final    Benzodiazepines Screen, urine 04/30/2018 Negative  Gmjimr=168 ng/mL Final    Cannabinoid Screen, urine 04/30/2018 Negative  Cutoff=20 ng/mL Final    Cocaine Metab.  Screen, urine 04/30/2018 Negative  Spjsoq=069 ng/mL Final    Opiate Screen, urine 04/30/2018 Positive* Bizcjf=123 ng/mL Final    Comment: Opiate test includes Codeine, Morphine, Hydromorphone, Hydrocodone. Codeine                     Positive        A                         01     Codeine Confirm          116                ng/mL Vmsnyw=079       01  Codeine detected; this finding is consistent with use of medications  that include Tylenol #2, #3, #4, Empirin #2, #3, #4, Robitussin A-C  Syrup, or numerous other trade or generic formulations containing  Codeine. Drugs listed are representative of common sources of the  compound detected and are not intended to include all possible  sources. Morphine                    Positive        A                         01     Morphine Confirm         >3000              ng/mL Ledvyf=358       01  Morphine detected; this finding is consistent with use of medications  that include Duromorph, MS-Contin, Roxanol, Daniella, or drugs  containing Codeine, or generic formulations. This drug may also be  detected from use of Heroin. Drugs listed are representative of  common sources of the                            compound detected and are not intended to  include all possible sources. Hydromorphone               Positive        A                         01     Hydromorphone Confirm    102                ng/mL Vartmu=405       01  Hydromorphone detected; this finding is consistent with use of  medications that include Dilaudid, or drugs containing Hydrocodone, or  generic formulations. This drug may also be detected as a minor  metabolite associated with high doses of morphine. Drugs listed are  representative of common sources of the compound detected and are not  intended to include all possible sources. Hydrocodone                 Positive        A                         01     Hydrocodone Confirm      175                ng/mL Fnelgl=390       01  Hydrocodone detected; this finding is consistent with use of  medications that include Lortab, Lorcet, Vicodin, Vicoprofen,  Tussionex, Norco, or generic formulations.  Drugs listed are  representative of common sources of the compound detected an                           d are  not intended to include all possible sources.  Oxycodone/Oxymorphone, urine 04/30/2018 Positive* Epwhpt=137 Final    Comment: Test includes Oxycodone and Oxymorphone    Oxycodone                 Positive        A                         01     Oxycodone Confirm        107                ng/mL Mbnsie=572       01  Oxycodone detected; this finding is consistent with use of medications  that include Oxycontin, Percodan, Percocet, Tylox, or generic  formulations. Drugs listed are representative of common sources of the  compound detected and are not intended to include all possible  sources. Oxymorphone               Positive        A                         01     Oxymorphone Confirm      150                ng/mL Hjrvlp=086       01  Oxymorphone detected; this finding is consistent with use of  medications that include Numorphan, Opana, or drugs containing  Oxycodone, or generic formulations. Drugs listed are representative  of common sources of the compound detected and are not intended to  include all possible sources.  Phencyclidine Screen, urine 04/30/2018 Negative  Cutoff=25 ng/mL Final    Methadone Screen, urine 04/30/2018 Negative  Afyxey=762 ng/mL Final    Propoxyphene Screen, urine 04/30/2018 Negative  Bmbgsc=773 ng/mL Final    Meperidine screen 04/30/2018 Negative  Pqbyzi=999 ng/mL Final    Comment: This test was developed and its performance characteristics  determined by [facility]. It has not been cleared or approved  by the Food and Drug Administration.       FENTANYL, URINE 04/30/2018 Positive* Qtrxwr=5099 Final    Comment: Test includes Fentanyl and Norfentanyl  Fentanyl                    Positive        A                         01  Fentanyl Confirm            16482              pg/mL Jzfheu=012       01  Norfentanyl                 Positive        A                         01  Norfentanyl Confirm         80799              pg/mL Ltrhxa=842       01  Norfentanyl detected; this finding can be consistent with the use of  medications that include Actiq, Duragesic, Fentora, Sublimaze, or  generic formulations. Drugs listed are representative of common  sources of the compound detected and are not intended to include all  possible sources.  Tramadol Screen, urine 04/30/2018 Negative  Iusqqa=929 ng/mL Final    Creatinine, urine 04/30/2018 92.4  20.0 - 300.0 mg/dL Final    Specific Gravity 04/30/2018 1.014   Final    pH, urine 04/30/2018 6.0  4.5 - 8.9 Final    Please Note 04/30/2018 Comment   Final    Comment: Drug-test results should be interpreted in the context of clinical  information. Patient metabolic variables, specific drug chemistry, and  specimen characteristics can affect test outcome. Technical  consultation is available if a test result is inconsistent with an  expected outcome. ([email or web address] or call toll-free  [phone])  Drug brands, if listed herein, are trademarks of their respective  owners. ASSESSMENT:  1. Encounter for monitoring coumadin therapy    2. Atrial fibrillation, unspecified type ([address]    3. Chronic deep vein thrombosis (DVT) of femoral vein of right lower extremity (HCC)    4. Venous insufficiency of both lower extremities    5. SOB (shortness of breath)    6. Mitral valve disorder    7. Chronically on opiate therapy      There is no evidence of congestive heart failure. Will confirm that serologically with a BNP today. INR is subtherapeutic at . Will increase the Coumadin to , one half tablet daily,  on Sunday and Thursday. She'll return to the office in two weeks for ProTime check. Her blood pressure is at goal at 120/78 repeat compared to the blood pressure when she came in. She will see us again formally in three months. This is a chronic problem that is not changed.   Per review of available records and patients , there are not sign of overuse, misuse, diversion, or concerning side effects. Today we reviewed: the risk of overdose, addiction, and dependency  The following changes were made to the patients current treatment plan: nothing, medications refilled. I have discussed the diagnosis with the patient and the intended plan as seen in the  orders above. The patient understands and agees with the plan. The patient has   received an after visit summary and questions were answered concerning  future plans  Patient labs and/or xrays were reviewed  Past records were reviewed. PLAN:  .  Orders Placed This Encounter    CBC WITH AUTOMATED DIFF    RENAL FUNCTION PANEL    BNP    REFERRAL TO OPHTHALMOLOGY    AMB POC PT/INR    HYDROcodone-acetaminophen (NORCO) 5-325 mg per tablet       Follow-up Disposition:  Return in about 2 weeks (around 7/24/2018) for pt/inr ck. ATTENTION:   This medical record was transcribed using an electronic medical records system. Although proofread, it may and can contain electronic and spelling errors. Other human spelling and other errors may be present. Corrections may be executed at a later time. Please feel free to contact us for any clarifications as needed.

## 2018-07-10 NOTE — PROGRESS NOTES
1. Have you been to the ER, urgent care clinic since your last visit? Hospitalized since your last visit? No    2. Have you seen or consulted any other health care providers outside of the [address] [place] [address] since your last visit? Include any pap smears or colon screening.  No     Wants to discuss fluid in legs

## 2018-07-10 NOTE — MR AVS SNAPSHOT
[address] [place] [address] [id number] 
[phone] Patient: Agutsín Pratt MRN: [id number] Visit Information Date & Time Provider Department Dept. Phone Encounter #  
 7/10/2018 10:30 AM Kel Kenyon MD [facility] [phone] [id number] Follow-up Instructions Return in about 2 weeks (around 7/24/2018) for pt/inr ck. Follow-up and Disposition History Your Appointments 10/22/2018  1:20 PM  
ESTABLISHED PATIENT with Bay Llamas MD  
[facility] ([address]) Appt Note: 6 month follow up  
 [address]  
[address] [facility] [address] Upcoming Health Maintenance Date Due  
 GLAUCOMA SCREENING Q2Y 4/21/2017 Influenza Age 5 to Adult 8/1/2018 MEDICARE YEARLY EXAM 9/2/2018 DTaP/Tdap/Td series (2 - Td) 1/17/2027 Allergies as of 7/10/2018  Review Complete On: 7/10/2018 By: Kel Kenyon MD  
 No Known Allergies Current Immunizations  Reviewed on 5/4/2012 Name Date ZZZ-RETIRED (DO NOT USE) Pneumococcal Vaccine (Unspecified Type) 5/7/2012  8:12 AM  
  
 Not reviewed this visit You Were Diagnosed With   
  
 Codes Comments Encounter for monitoring coumadin therapy    -  Primary ICD-10-CM: Z51.81, Z79.01 
ICD-9-CM: V58.83, V58.61 Atrial fibrillation, unspecified type ([address]     ICD-10-CM: I48.91 
ICD-9-CM: 427.31 Chronic deep vein thrombosis (DVT) of femoral vein of right lower extremity (HCC)     ICD-10-CM: M85.557 ICD-9-CM: 453.51 Venous insufficiency of both lower extremities     ICD-10-CM: I87.2 ICD-9-CM: 459.81   
 SOB (shortness of breath)     ICD-10-CM: R06.02 
ICD-9-CM: 786.05 Mitral valve disorder     ICD-10-CM: I05.9 ICD-9-CM: 394.9 Chronically on opiate therapy     ICD-10-CM: Z79.891 ICD-9-CM: V58.69 Vitals BP Pulse Temp Resp Height(growth percentile) Weight(growth percentile) 150/71 67 96.5 °F (35.8 °C) (Oral) 16 5' 5\" (1.651 m) 141 lb 6.4 oz (64.1 kg) SpO2 BMI OB Status Smoking Status 100% 23.53 kg/m2 Hysterectomy Former Smoker Vitals History BMI and BSA Data Body Mass Index Body Surface Area  
 23.53 kg/m 2 1.71 m 2 [facility] [address] [phone] Your Updated Medication List  
  
   
This list is accurate as of 7/10/18 11:53 AM.  Always use your most recent med list.  
  
  
  
  
 aspirin 81 mg chewable tablet Take 81 mg by mouth daily. CARTIA  mg ER capsule Generic drug:  dilTIAZem CD  
TAKE ONE CAPSULE BY MOUTH DAILY  
  
 furosemide 20 mg tablet Commonly known as:  LASIX Take 1 Tab by mouth daily. HYDROcodone-acetaminophen 5-325 mg per tablet Commonly known as:  Lenon Gauze Take 1 Tab by mouth every six (6) hours as needed for Pain. Max Daily Amount: 4 Tabs. losartan 50 mg tablet Commonly known as:  COZAAR Take 1 Tab by mouth daily. metoprolol tartrate 25 mg tablet Commonly known as:  LOPRESSOR  
TAKE 1/2 TABLET BY MOUTH TWICE DAILY pravastatin 20 mg tablet Commonly known as:  PRAVACHOL  
TAKE ONE TABLET BY MOUTH EVERY EVENING  
  
 warfarin 2 mg tablet Commonly known as:  COUMADIN  
TAKE ONE TABLET BY MOUTH DAILY Prescriptions Printed Refills HYDROcodone-acetaminophen (NORCO) 5-325 mg per tablet 0 Sig: Take 1 Tab by mouth every six (6) hours as needed for Pain. Max Daily Amount: 4 Tabs. Class: Print Route: Oral  
  
We Performed the Following AMB POC PT/INR [57851 CPT(R)] BNP [id number] CPT(R)] CBC WITH AUTOMATED DIFF [56774 CPT(R)] REFERRAL TO OPHTHALMOLOGY [REF57 Custom] RENAL FUNCTION PANEL [17658 CPT(R)] Follow-up Instructions Return in about 2 weeks (around 7/24/2018) for pt/inr ck. Referral Information Referral ID Referred By Referred To  
  
 [id number] Moody VERGARA Not Available Visits Status Start Date End Date 1 New Request 7/10/18 7/10/19 If your referral has a status of pending review or denied, additional information will be sent to support the outcome of this decision. Introducing [facility]! [facility] introduces [employer] patient portal. Now you can access parts of your medical record, email your doctor's office, and request medication refills online. 1. In your internet browser, go to [email or web address] 2. Click on the First Time User? Click Here link in the Sign In box. You will see the New Member Sign Up page. 3. Enter your [employer] Access Code exactly as it appears below. You will not need to use this code after youve completed the sign-up process. If you do not sign up before the expiration date, you must request a new code. · [employer] Access Code: [email or web address] Expires: 10/8/2018 10:43 AM 
 
4. Enter the last four digits of your Social Security Number (xxxx) and Date of Birth (mm/dd/yyyy) as indicated and click Submit. You will be taken to the next sign-up page. 5. Create a [employer] ID. This will be your [employer] login ID and cannot be changed, so think of one that is secure and easy to remember. 6. Create a [employer] password. You can change your password at any time. 7. Enter your Password Reset Question and Answer. This can be used at a later time if you forget your password. 8. Enter your e-mail address. You will receive e-mail notification when new information is available in [address] 9. Click Sign Up. You can now view and download portions of your medical record. 10. Click the Download Summary menu link to download a portable copy of your medical information. If you have questions, please visit the Frequently Asked Questions section of the [employer] [facility] website. Remember, [employer] is NOT to be used for urgent needs. For medical emergencies, dial 911. Now available from your iPhone and Android! Please provide this summary of care documentation to your next provider. Your primary care clinician is listed as Henryetta Sprinkles. If you have any questions after today's visit, please call [phone].

## 2018-07-11 LAB
ALBUMIN SERPL-MCNC: 4.3 G/DL (ref 3.5–4.8)
BASOPHILS # BLD AUTO: 0 X10E3/UL (ref 0–0.2)
BASOPHILS NFR BLD AUTO: 1 %
BNP SERPL-MCNC: 274.7 PG/ML (ref 0–100)
BUN SERPL-MCNC: 18 MG/DL (ref 8–27)
BUN/CREAT SERPL: 17 (ref 12–28)
CALCIUM SERPL-MCNC: 9.7 MG/DL (ref 8.7–10.3)
CHLORIDE SERPL-SCNC: 101 MMOL/L (ref 96–106)
CO2 SERPL-SCNC: 24 MMOL/L (ref 20–29)
CREAT SERPL-MCNC: 1.03 MG/DL (ref 0.57–1)
EOSINOPHIL # BLD AUTO: 0.2 X10E3/UL (ref 0–0.4)
EOSINOPHIL NFR BLD AUTO: 3 %
ERYTHROCYTE [DISTWIDTH] IN BLOOD BY AUTOMATED COUNT: 15.7 % (ref 12.3–15.4)
GLUCOSE SERPL-MCNC: 115 MG/DL (ref 65–99)
HCT VFR BLD AUTO: 35.3 % (ref 34–46.6)
HGB BLD-MCNC: 12.1 G/DL (ref 11.1–15.9)
IMM GRANULOCYTES # BLD: 0 X10E3/UL (ref 0–0.1)
IMM GRANULOCYTES NFR BLD: 0 %
LYMPHOCYTES # BLD AUTO: 1.5 X10E3/UL (ref 0.7–3.1)
LYMPHOCYTES NFR BLD AUTO: 25 %
MCH RBC QN AUTO: 28.9 PG (ref 26.6–33)
MCHC RBC AUTO-ENTMCNC: 34.3 G/DL (ref 31.5–35.7)
MCV RBC AUTO: 84 FL (ref 79–97)
MONOCYTES # BLD AUTO: 0.4 X10E3/UL (ref 0.1–0.9)
MONOCYTES NFR BLD AUTO: 6 %
NEUTROPHILS # BLD AUTO: 3.8 X10E3/UL (ref 1.4–7)
NEUTROPHILS NFR BLD AUTO: 65 %
PHOSPHATE SERPL-MCNC: 3.2 MG/DL (ref 2.5–4.5)
PLATELET # BLD AUTO: 261 X10E3/UL (ref 150–379)
POTASSIUM SERPL-SCNC: 4.6 MMOL/L (ref 3.5–5.2)
RBC # BLD AUTO: 4.18 X10E6/UL (ref 3.77–5.28)
SODIUM SERPL-SCNC: 140 MMOL/L (ref 134–144)
WBC # BLD AUTO: 5.9 X10E3/UL (ref 3.4–10.8)

## 2018-07-18 ENCOUNTER — OFFICE VISIT (OUTPATIENT)
Dept: INTERNAL MEDICINE CLINIC | Age: 78
End: 2018-07-18

## 2018-07-18 VITALS
TEMPERATURE: 97.7 F | DIASTOLIC BLOOD PRESSURE: 57 MMHG | BODY MASS INDEX: 23.43 KG/M2 | HEART RATE: 70 BPM | WEIGHT: 140.6 LBS | RESPIRATION RATE: 18 BRPM | OXYGEN SATURATION: 93 % | SYSTOLIC BLOOD PRESSURE: 110 MMHG | HEIGHT: 65 IN

## 2018-07-18 DIAGNOSIS — Z79.01 ENCOUNTER FOR MONITORING COUMADIN THERAPY: Primary | ICD-10-CM

## 2018-07-18 DIAGNOSIS — Z51.81 ENCOUNTER FOR MONITORING COUMADIN THERAPY: Primary | ICD-10-CM

## 2018-07-18 LAB
INR BLD: 1.7
PT POC: 20 SECONDS
VALID INTERNAL CONTROL?: YES

## 2018-07-18 NOTE — PROGRESS NOTES
1. Have you been to the ER, urgent care clinic since your last visit? Hospitalized since your last visit? No    2. Have you seen or consulted any other health care providers outside of the [address] [place] [address] since your last visit? Include any pap smears or colon screening.  No      Complaints of insect bites

## 2018-07-18 NOTE — MR AVS SNAPSHOT
[address] [facility] 
 
 
 [address] [id number] 
[phone] Patient: Angella Ryder MRN: [id number] XAH:[id number] Visit Information Date & Time Provider Department Dept. Phone Encounter #  
 7/18/2018  2:30 PM Kely Rowley MD [employer] [facility] [id number] Follow-up Instructions Return in about 6 days (around 7/24/2018) for pt/inr. Follow-up and Disposition History Your Appointments 7/24/2018 10:30 AM  
Nurse Visit with [address] [facility] ([facility]as) Appt Note: pt/inr  
 [address] [facility]  
  
   
 [address] [id number]  
  
    
 10/22/2018  1:20 PM  
ESTABLISHED PATIENT with Joseluis Rinaldi MD  
[facility] ([facility]) Appt Note: 6 month follow up  
 Joey  [address] [place] [address] [address] [facility] 7 [phone] Upcoming Health Maintenance Date Due  
 GLAUCOMA SCREENING Q2Y 4/21/2017 Influenza Age 5 to Adult 8/1/2018 MEDICARE YEARLY EXAM 9/2/2018 DTaP/Tdap/Td series (2 - Td) 1/17/2027 Allergies as of 7/18/2018  Review Complete On: 7/18/2018 By: Yenni Rizo LPN No Known Allergies Current Immunizations  Reviewed on 5/4/2012 Name Date ZZZ-RETIRED (DO NOT USE) Pneumococcal Vaccine (Unspecified Type) 5/7/2012  8:12 AM  
  
 Not reviewed this visit You Were Diagnosed With   
  
 Codes Comments Encounter for monitoring coumadin therapy    -  Primary ICD-10-CM: Z51.81, Z79.01 
ICD-9-CM: V58.83, V58.61 Vitals BP Pulse Temp Resp Height(growth percentile) Weight(growth percentile) 110/57 70 97.7 °F (36.5 °C) (Oral) 18 5' 5\" (1.651 m) 140 lb 9.6 oz (63.8 kg) SpO2 BMI OB Status Smoking Status 93% 23.4 kg/m2 Hysterectomy Former Smoker Vitals History BMI and BSA Data Body Mass Index Body Surface Area  
 23.4 kg/m 2 1.71 m 2 Preferred Pharmacy Pharm[facility] Name Phone [facility] [address] [phone] Your Updated Medication List  
  
   
This list is accurate as of 7/18/18  5:33 PM.  Always use your most recent med list.  
  
  
  
  
 aspirin 81 mg chewable tablet Take 81 mg by mouth daily. CARTIA  mg ER capsule Generic drug:  dilTIAZem CD  
TAKE ONE CAPSULE BY MOUTH DAILY  
  
 furosemide 20 mg tablet Commonly known as:  LASIX Take 1 Tab by mouth daily. HYDROcodone-acetaminophen 5-325 mg per tablet Commonly known as:  [place] Cuff Take 1 Tab by mouth every six (6) hours as needed for Pain. Max Daily Amount: 4 Tabs. losartan 50 mg tablet Commonly known as:  COZAAR Take 1 Tab by mouth daily. metoprolol tartrate 25 mg tablet Commonly known as:  LOPRESSOR  
TAKE 1/2 TABLET BY MOUTH TWICE DAILY pravastatin 20 mg tablet Commonly known as:  PRAVACHOL  
TAKE ONE TABLET BY MOUTH EVERY EVENING  
  
 warfarin 2 mg tablet Commonly known as:  COUMADIN  
TAKE ONE TABLET BY MOUTH DAILY We Performed the Following AMB POC PT/INR [79626 CPT(R)] Follow-up Instructions Return in about 6 days (around 7/24/2018) for pt/inr. Introducing [facility]! [facility] introduces Twitpay patient portal. Now you can access parts of your medical record, email your doctor's office, and request medication refills online. 1. In your internet browser, go to [email or web address] 2. Click on the First Time User? Click Here link in the Sign In box. You will see the New Member Sign Up page. 3. Enter your Twit[employer] Access Code exactly as it appears below. You will not need to use this code after youve completed the sign-up process.  If you do not sign up before the expiration date, you must request a new code. · [employer] Access Code: [email or web address] Expires: 10/8/2018 10:43 AM 
 
4. Enter the last four digits of your Social Security Number (xxxx) and Date of Birth (mm/dd/yyyy) as indicated and click Submit. You will be taken to the next sign-up page. 5. Create a Brandcast ID. This will be your Brand[employer] login ID and cannot be changed, so think of one that is secure and easy to remember. 6. Create a [employer] password. You can change your password at any time. 7. Enter your Password Reset Question and Answer. This can be used at a later time if you forget your password. 8. Enter your e-mail address. You will receive e-mail notification when new information is available in [address] 9. Click Sign Up. You can now view and download portions of your medical record. 10. Click the Download Summary menu link to download a portable copy of your medical information. If you have questions, please visit the Frequently Asked Questions section of the [employer] website. Remember, Brandcast is NOT to be used for urgent needs. For medical emergencies, dial 911. Now available from your iPhone and Android! Please provide this summary of care documentation to your next provider. Your primary care clinician is listed as Eve Recinos. If you have any questions after today's visit, please call [phone].

## 2018-07-18 NOTE — PROGRESS NOTES
SP[facility]ICINE AND PR[facility]ARE  Argenis Sarmiento MD, [address] [address]  Phone:  [phone]  Fax: [phone]      Chief Complaint   Patient presents with    Insect Bite         SUBECTIVE:    Gabriel Brown is a 77 y.o. female Patient returns today with known history of atrial fib, on Coumadin, chronic pain, chronic opiate therapy, diastolic dysfunction, and is seen for evaluation. On Saturday she thinks she was bitten by a big or spider. Her left arm was remarkably swollen and now it's gone down. Her leg was also swollen. Patient is seen for evaluation. Patient tried calamine lotion, vinegar and swelling seems to have gone down in left arm. Patient is seen for evaluation. Current Outpatient Prescriptions   Medication Sig Dispense Refill    HYDROcodone-acetaminophen (NORCO) 5-325 mg per tablet Take 1 Tab by mouth every six (6) hours as needed for Pain. Max Daily Amount: 4 Tabs. 60 Tab 0    furosemide (LASIX) 20 mg tablet Take 1 Tab by mouth daily. 30 Tab 3    pravastatin (PRAVACHOL) 20 mg tablet TAKE ONE TABLET BY MOUTH EVERY EVENING 90 Tab 9    losartan (COZAAR) 50 mg tablet Take 1 Tab by mouth daily. 90 Tab 3    CARTIA  mg ER capsule TAKE ONE CAPSULE BY MOUTH DAILY 90 Cap 2    metoprolol tartrate (LOPRESSOR) 25 mg tablet TAKE 1/2 TABLET BY MOUTH TWICE DAILY 90 Tab 3    warfarin (COUMADIN) 2 mg tablet TAKE ONE TABLET BY MOUTH DAILY 90 Tab 1    aspirin 81 mg chewable tablet Take 81 mg by mouth daily.        Past Medical History:   Diagnosis Date    AF (atrial fibrillation) (HCC)     RHEUMATIC FEVER AS CHILD, AFIB    Anemia     Arthritis     Blunt head trauma 03/13/2017    Chronic pain     Chronically on opiate therapy     Coagulation defects     COUMADIN    Diastolic dysfunction     grade 4    Dyslipidemia     Encounter for Hemoccult screening 06/22/2016    Fall 03/13/2017    H/O colonoscopy 2007    Hand pain, right     Heart failure [facility])     Meniscus degeneration, right 2017    Extensive patellar chondral derangement as well as chondral derangement     Prediabetes     PUD (peptic ulcer disease)     Rheumatic fever     S/P MVR (mitral valve replacement)     Shoulder pain, right     SOB (shortness of breath)     Thyroid disease     Venous insufficiency of both lower extremities 2017    Warfarin-induced coagulopathy ([address]      Past Surgical History:   Procedure Laterality Date    HX CATARACT REMOVAL      BILATERAL    HX  SECTION      HX COLONOSCOPY      HX HEART CATHETERIZATION      HX HYSTERECTOMY      HX MITRAL VALVE REPLACEMENT      HX LUIS ENRIQUE AND BSO       No Known Allergies    REVIEW OF SYSTEMS:   There are no chills, no fever. Social History     Social History    Marital status: SINGLE     Spouse name: N/A    Number of children: N/A    Years of education: N/A     Social History Main Topics    Smoking status: Former Smoker     Packs/day: 0.50     Years: 30.00     Quit date: 2012    Smokeless tobacco: Never Used    Alcohol use No    Drug use: No    Sexual activity: No     Other Topics Concern    None     Social History Narrative    Family History: Mother:  80 yrs, High Blood PressureFather:  52 yrs, High Blood PressureSister(s): alive, High Blood    Pressure, colon cancer, type II diabetesBrother(s): , High Blood Pressure, colon cancer, type II diabetesSon(s): alive 48 yrs1 sister(s)    . 1 son(s) . Social History: Alcohol Use Patient does not use alcohol. Smoking Status Patient is a former smoker. Marital Status: Single. Lives w ith:    alone. Children: sons. Occupation/W ork: retired - for [employer]. Education/School: has highschool diploma.     Denominational: [address] [employer].   r  Family History   Problem Relation Age of Onset    Heart Attack Sister     Hypertension Sister     Kidney Disease Mother     Hypertension Mother    Lenny Cords Hypertension Father     Cancer Brother     Heart Failure Brother    [facility] Arthritis-osteo Sister     Cancer Sister     Cancer Brother      PROSTATE       OBJECTIVE:  Visit Vitals    /57    Pulse 70    Temp 97.7 °F (36.5 °C) (Oral)    Resp 18    Ht 5' 5\" (1.651 m)    Wt 140 lb 9.6 oz (63.8 kg)    SpO2 93%    BMI 23.4 kg/m2     ENT: perrla,  eom intact  NECK: supple. Thyroid normal  CHEST: clear to ascultation and percussion   HEART: regular rate and rhythm  ABD: soft, bowel sounds active  EXTREMITIES: no edema, pulse 1+     Office Visit on 07/18/2018   Component Date Value Ref Range Status    VALID INTERNAL CONTROL POC 07/18/2018 Yes   Final    Prothrombin time (POC) 07/18/2018 20.0  seconds Final    INR POC 07/18/2018 1.7   Final   Office Visit on 07/10/2018   Component Date Value Ref Range Status    VALID INTERNAL CONTROL POC 07/10/2018 Yes   Final    Prothrombin time (POC) 07/10/2018 17.2  seconds Final    INR POC 07/10/2018 1.4   Final    WBC 07/10/2018 5.9  3.4 - 10.8 x10E3/uL Final    RBC 07/10/2018 4.18  3.77 - 5.28 x10E6/uL Final    HGB 07/10/2018 12.1  11.1 - 15.9 g/dL Final    HCT 07/10/2018 35.3  34.0 - 46.6 % Final    MCV 07/10/2018 84  79 - 97 fL Final    MCH 07/10/2018 28.9  26.6 - 33.0 pg Final    MCHC 07/10/2018 34.3  31.5 - 35.7 g/dL Final    RDW 07/10/2018 15.7* 12.3 - 15.4 % Final    PLATELET 58/66/9110 084  150 - 379 x10E3/uL Final    NEUTROPHILS 07/10/2018 65  Not Estab. % Final    Lymphocytes 07/10/2018 25  Not Estab. % Final    MONOCYTES 07/10/2018 6  Not Estab. % Final    EOSINOPHILS 07/10/2018 3  Not Estab. % Final    BASOPHILS 07/10/2018 1  Not Estab. % Final    ABS. NEUTROPHILS 07/10/2018 3.8  1.4 - 7.0 x10E3/uL Final    Abs Lymphocytes 07/10/2018 1.5  0.7 - 3.1 x10E3/uL Final    ABS. MONOCYTES 07/10/2018 0.4  0.1 - 0.9 x10E3/uL Final    ABS. EOSINOPHILS 07/10/2018 0.2  0.0 - 0.4 x10E3/uL Final    ABS.  BASOPHILS 07/10/2018 0.0  0.0 - 0.2 x10E3/uL Final  IMMATURE GRANULOCYTES 07/10/2018 0  Not Estab. % Final    ABS. IMM. GRANS. 07/10/2018 0.0  0.0 - 0.1 x10E3/uL Final    Glucose 07/10/2018 115* 65 - 99 mg/dL Final    BUN 07/10/2018 18  8 - 27 mg/dL Final    Creatinine 07/10/2018 1.03* 0.57 - 1.00 mg/dL Final    GFR est non-AA 07/10/2018 52* >59 mL/min/1.73 Final    GFR est AA 07/10/2018 60  >59 mL/min/1.73 Final    BUN/Creatinine ratio 07/10/2018 17  12 - 28 Final    Sodium 07/10/2018 140  134 - 144 mmol/L Final    Potassium 07/10/2018 4.6  3.5 - 5.2 mmol/L Final    Chloride 07/10/2018 101  96 - 106 mmol/L Final    CO2 07/10/2018 24  20 - 29 mmol/L Final    Calcium 07/10/2018 9.7  8.7 - 10.3 mg/dL Final    Phosphorus 07/10/2018 3.2  2.5 - 4.5 mg/dL Final    Albumin 07/10/2018 4.3  3.5 - 4.8 g/dL Final    B-type Natriuretic Peptide 07/10/2018 274.7* 0.0 - 100.0 pg/mL Final   Office Visit on 06/07/2018   Component Date Value Ref Range Status    VALID INTERNAL CONTROL POC 06/07/2018 Yes   Final    Prothrombin time (POC) 06/07/2018 23.2  seconds Final    INR POC 06/07/2018 1.9   Final    Glucose 06/07/2018 161* 65 - 99 mg/dL Final    BUN 06/07/2018 27  8 - 27 mg/dL Final    Creatinine 06/07/2018 1.28* 0.57 - 1.00 mg/dL Final    GFR est non-AA 06/07/2018 40* >59 mL/min/1.73 Final    GFR est AA 06/07/2018 46* >59 mL/min/1.73 Final    BUN/Creatinine ratio 06/07/2018 21  12 - 28 Final    Sodium 06/07/2018 144  134 - 144 mmol/L Final    Potassium 06/07/2018 5.0  3.5 - 5.2 mmol/L Final    Chloride 06/07/2018 104  96 - 106 mmol/L Final    CO2 06/07/2018 24  18 - 29 mmol/L Final    Comment: **Effective June 11, 2018 Carbon Dioxide, Total**    reference interval will be changing to:                Age                  Male          Female       0 days   - 30 days         16 - 34        16 - 34      31 days   -  1 year         15 - 22        15 - 25       2 years  -  5 years        16 - 34        14 - 32       6 years  - 15 years        23 - 32 19 - 27                 >12 years        20 - 29        20 - 29      Calcium 06/07/2018 9.6  8.7 - 10.3 mg/dL Final    WBC 06/07/2018 5.5  3.4 - 10.8 x10E3/uL Final    RBC 06/07/2018 4.09  3.77 - 5.28 x10E6/uL Final    HGB 06/07/2018 11.7  11.1 - 15.9 g/dL Final    HCT 06/07/2018 35.5  34.0 - 46.6 % Final    MCV 06/07/2018 87  79 - 97 fL Final    MCH 06/07/2018 28.6  26.6 - 33.0 pg Final    MCHC 06/07/2018 33.0  31.5 - 35.7 g/dL Final    RDW 06/07/2018 15.7* 12.3 - 15.4 % Final    PLATELET 18/77/5761 126  150 - 379 x10E3/uL Final    NEUTROPHILS 06/07/2018 55  Not Estab. % Final    Lymphocytes 06/07/2018 33  Not Estab. % Final    MONOCYTES 06/07/2018 6  Not Estab. % Final    EOSINOPHILS 06/07/2018 5  Not Estab. % Final    BASOPHILS 06/07/2018 1  Not Estab. % Final    ABS. NEUTROPHILS 06/07/2018 3.1  1.4 - 7.0 x10E3/uL Final    Abs Lymphocytes 06/07/2018 1.8  0.7 - 3.1 x10E3/uL Final    ABS. MONOCYTES 06/07/2018 0.3  0.1 - 0.9 x10E3/uL Final    ABS. EOSINOPHILS 06/07/2018 0.3  0.0 - 0.4 x10E3/uL Final    ABS. BASOPHILS 06/07/2018 0.0  0.0 - 0.2 x10E3/uL Final    IMMATURE GRANULOCYTES 06/07/2018 0  Not Estab. % Final    ABS. IMM. GRANS. 06/07/2018 0.0  0.0 - 0.1 x10E3/uL Final   Clinical Support on 04/30/2018   Component Date Value Ref Range Status    VALID INTERNAL CONTROL POC 04/30/2018 Yes   Final    Prothrombin time (POC) 04/30/2018 32.3  seconds Final    INR POC 04/30/2018 2.7   Final    Amphetamine Screen, urine 04/30/2018 Negative  Dfvasu=4902 ng/mL Final    Barbiturates Screen, urine 04/30/2018 Negative  Gmzksc=792 ng/mL Final    Benzodiazepines Screen, urine 04/30/2018 Negative  Jzeeuf=427 ng/mL Final    Cannabinoid Screen, urine 04/30/2018 Negative  Cutoff=20 ng/mL Final    Cocaine Metab.  Screen, urine 04/30/2018 Negative  Atqrvt=147 ng/mL Final    Opiate Screen, urine 04/30/2018 Positive* Dbntdo=762 ng/mL Final    Comment: Opiate test includes Codeine, Morphine, Hydromorphone, Hydrocodone. Codeine                     Positive        A                         01     Codeine Confirm          116                ng/mL Hwctnv=488       01  Codeine detected; this finding is consistent with use of medications  that include Tylenol #2, #3, #4, Empirin #2, #3, #4, Robitussin A-C  Syrup, or numerous other trade or generic formulations containing  Codeine. Drugs listed are representative of common sources of the  compound detected and are not intended to include all possible  sources. Morphine                    Positive        A                         01     Morphine Confirm         >3000              ng/mL Znkcmj=892       01  Morphine detected; this finding is consistent with use of medications  that include Duromorph, MS-Contin, Roxanol, Daniella, or drugs  containing Codeine, or generic formulations. This drug may also be  detected from use of Heroin. Drugs listed are representative of  common sources of the                            compound detected and are not intended to  include all possible sources. Hydromorphone               Positive        A                         01     Hydromorphone Confirm    102                ng/mL Ykeylf=254       01  Hydromorphone detected; this finding is consistent with use of  medications that include Dilaudid, or drugs containing Hydrocodone, or  generic formulations. This drug may also be detected as a minor  metabolite associated with high doses of morphine. Drugs listed are  representative of common sources of the compound detected and are not  intended to include all possible sources. Hydrocodone                 Positive        A                         01     Hydrocodone Confirm      175                ng/mL Cjckza=978       01  Hydrocodone detected; this finding is consistent with use of  medications that include Lortab, Lorcet, Vicodin, Vicoprofen,  Tussionex, Norco, or generic formulations.  Drugs listed are  representative of common sources of the compound detected an                           d are  not intended to include all possible sources.  Oxycodone/Oxymorphone, urine 04/30/2018 Positive* Vvtpbl=351 Final    Comment: Test includes Oxycodone and Oxymorphone    Oxycodone                 Positive        A                         01     Oxycodone Confirm        107                ng/mL Awaldd=152       01  Oxycodone detected; this finding is consistent with use of medications  that include Oxycontin, Percodan, Percocet, Tylox, or generic  formulations. Drugs listed are representative of common sources of the  compound detected and are not intended to include all possible  sources. Oxymorphone               Positive        A                         01     Oxymorphone Confirm      150                ng/mL Lbcfca=075       01  Oxymorphone detected; this finding is consistent with use of  medications that include Numorphan, Opana, or drugs containing  Oxycodone, or generic formulations. Drugs listed are representative  of common sources of the compound detected and are not intended to  include all possible sources.  Phencyclidine Screen, urine 04/30/2018 Negative  Cutoff=25 ng/mL Final    Methadone Screen, urine 04/30/2018 Negative  Ifxhbe=073 ng/mL Final    Propoxyphene Screen, urine 04/30/2018 Negative  Gjjrqy=658 ng/mL Final    Meperidine screen 04/30/2018 Negative  Ouwydd=871 ng/mL Final    Comment: This test was developed and its performance characteristics  determined by [facility]. It has not been cleared or approved  by the Food and Drug Administration.       FENTANYL, URINE 04/30/2018 Positive* Tgxntf=7057 Final    Comment: Test includes Fentanyl and Norfentanyl  Fentanyl                    Positive        A                         01  Fentanyl Confirm            74664              pg/mL Npemem=681       01  Norfentanyl                 Positive        A                         01  Norfentanyl Confirm         89547 pg/mL Qtwags=891       01  Norfentanyl detected; this finding can be consistent with the use of  medications that include Actiq, Duragesic, Fentora, Sublimaze, or  generic formulations. Drugs listed are representative of common  sources of the compound detected and are not intended to include all  possible sources.  Tramadol Screen, urine 04/30/2018 Negative  Pftntm=206 ng/mL Final    Creatinine, urine 04/30/2018 92.4  20.0 - 300.0 mg/dL Final    Specific Gravity 04/30/2018 1.014   Final    pH, urine 04/30/2018 6.0  4.5 - 8.9 Final    Please Note 04/30/2018 Comment   Final    Comment: Drug-test results should be interpreted in the context of clinical  information. Patient metabolic variables, specific drug chemistry, and  specimen characteristics can affect test outcome. Technical  consultation is available if a test result is inconsistent with an  expected outcome. ([email or web address] call toll-free  [phone])  Drug brands, if listed herein, are trademarks of their respective  owners. ASSESSMENT:  1. Encounter for monitoring coumadin therapy      She has minimal swelling of her left compared to the right arm, but her description suggests it's resolving spontaneously without added treatment from a medical perspective. We advise her if she develops chills, fever or red streaks in the arm that it's become infected and she needs to let us know immediately and we'll place her on some antibiotics, i.e. Keflex. Extremity evaluation is unremarkable except for the asteatosis. What kind of bite this was is unclear, suspect a spider bite. It appears to be resolving without sequelae. She'll continue to monitor her arm and the areas that she was concerned about and come back immediately if there is a significant change. Otherwise she'll keep her appointment on the 24th for a PT check. improved. Therefore no change will be made.   She's taking 2 mg on Sundays and Thursdays and 1 mg the remaining days. She'll keep her scheduled appointment on the 24th for recheck of INR. I have discussed the diagnosis with the patient and the intended plan as seen in the  orders above. The patient understands and agees with the plan. The patient has   received an after visit summary and questions were answered concerning  future plans  Patient labs and/or xrays were reviewed  Past records were reviewed. PLAN:  .  Orders Placed This Encounter    AMB POC PT/INR       Follow-up Disposition:  Return in about 6 days (around 7/24/2018) for pt/inr. ATTENTION:   This medical record was transcribed using an electronic medical records system. Although proofread, it may and can contain electronic and spelling errors. Other human spelling and other errors may be present. Corrections may be executed at a later time. Please feel free to contact us for any clarifications as needed.

## 2018-07-24 ENCOUNTER — CLINICAL SUPPORT (OUTPATIENT)
Dept: INTERNAL MEDICINE CLINIC | Age: 78
End: 2018-07-24

## 2018-07-24 DIAGNOSIS — Z79.01 ENCOUNTER FOR MONITORING COUMADIN THERAPY: Primary | ICD-10-CM

## 2018-07-24 DIAGNOSIS — Z51.81 ENCOUNTER FOR MONITORING COUMADIN THERAPY: Primary | ICD-10-CM

## 2018-07-24 LAB
INR BLD: 3.1
PT POC: 37.3 SECONDS
VALID INTERNAL CONTROL?: YES

## 2018-07-24 RX ORDER — WARFARIN 2 MG/1
TABLET ORAL
Qty: 90 TAB | Refills: 1 | Status: SHIPPED | OUTPATIENT
Start: 2018-07-24 | End: 2019-07-15 | Stop reason: SDUPTHER

## 2018-07-24 RX ORDER — TRIAMCINOLONE ACETONIDE 1 MG/G
CREAM TOPICAL 2 TIMES DAILY
Qty: 45 G | Refills: 11 | Status: SHIPPED | OUTPATIENT
Start: 2018-07-24

## 2018-07-24 NOTE — MR AVS SNAPSHOT
[facility] 
 
 
 [address] [address] 
[phone] Patient: Denece Brunner MRN: [id number] PPO:4/29/9915 Visit Information Date & Time Provider Department Dept. Phone Encounter #  
 7/24/2018 10:30 AM [facility] and [address] [place] [address] [phone] [id number] Your Appointments 8/7/2018 10:00 AM  
Nurse Visit with Celeste [address] [place] [address] and Primary Care ([facility]) Appt Note: 2 week PT/INR  
 [address] [facility] [place]  
  
   
 [address] [address]  
  
    
 10/22/2018  1:20 PM  
ESTABLISHED PATIENT with Sho Gonzalez MD  
[facility] ([facility]) Appt Note: 6 month follow up  
 [address]  
[address] [facility] 7 [address] Upcoming Health Maintenance Date Due  
 GLAUCOMA SCREENING Q2Y 4/21/2017 Influenza Age 5 to Adult 8/1/2018 MEDICARE YEARLY EXAM 9/2/2018 DTaP/Tdap/Td series (2 - Td) 1/17/2027 Allergies as of 7/24/2018  Review Complete On: 7/18/2018 By: Roxana Rodriguez LPN No Known Allergies Current Immunizations  Reviewed on 5/4/2012 Name Date ZZZ-RETIRED (DO NOT USE) Pneumococcal Vaccine (Unspecified Type) 5/7/2012  8:12 AM  
  
 Not reviewed this visit You Were Diagnosed With   
  
 Codes Comments Encounter for monitoring coumadin therapy    -  Primary ICD-10-CM: Z51.81, Z79.01 
ICD-9-CM: V58.83, V58.61 Vitals OB Status Smoking Status Hysterectomy Former Smoker Preferred Pharmacy Pharmacy Name Phone [facility] [address] [address] [facility] [phone] Your Updated Medication List  
  
   
This list is accurate as of 7/24/18 11:59 AM.  Always use your most recent med list.  
  
  
  
  
 aspirin 81 mg chewable tablet Take 81 mg by mouth daily. CARTIA  mg ER capsule Generic drug:  dilTIAZem CD  
TAKE ONE CAPSULE BY MOUTH DAILY  
  
 furosemide 20 mg tablet Commonly known as:  LASIX Take 1 Tab by mouth daily. HYDROcodone-acetaminophen 5-325 mg per tablet Commonly known as:  Sobeida President Take 1 Tab by mouth every six (6) hours as needed for Pain. Max Daily Amount: 4 Tabs. losartan 50 mg tablet Commonly known as:  COZAAR Take 1 Tab by mouth daily. metoprolol tartrate 25 mg tablet Commonly known as:  LOPRESSOR  
TAKE 1/2 TABLET BY MOUTH TWICE DAILY pravastatin 20 mg tablet Commonly known as:  PRAVACHOL  
TAKE ONE TABLET BY MOUTH EVERY EVENING  
  
 triamcinolone acetonide 0.1 % topical cream  
Commonly known as:  KENALOG Apply  to affected area two (2) times a day. warfarin 2 mg tablet Commonly known as:  COUMADIN  
2 mg sun and thurs and  1 md janessa bacon  
  
  
  
  
Prescriptions Sent to Pharmacy Refills  
 warfarin (COUMADIN) 2 mg tablet 1 Si mg sun and thurs and  1 md janessa bacon Class: Normal  
 Pharmacy: [address] [place] [address] Ph #: [phone]  
 triamcinolone acetonide (KENALOG) 0.1 % topical cream 11 Sig: Apply  to affected area two (2) times a day. Class: Normal  
 Pharmacy: [address] Ph #: [phone] Route: Topical  
  
We Performed the Following AMB POC PT/INR [82026 CPT(R)] Introducing [facility] & HEALTH SERVICES! [facility] introduces TreatFeed patient portal. Now you can access parts of your medical record, email your doctor's office, and request medication refills online. 1. In your internet browser, go to [email or web address] 2. Click on the First Time User? Click Here link in the Sign In box. You will see the New Member Sign Up page. 3. Enter your Enlighted Access Code exactly as it appears below. You will not need to use this code after youve completed the sign-up process. If you do not sign up before the expiration date, you must request a new code. · Enlighted Access Code: [email or web address] Expires: 10/8/2018 10:43 AM 
 
4. Enter the last four digits of your Social Security Number (xxxx) and Date of Birth (mm/dd/yyyy) as indicated and click Submit. You will be taken to the next sign-up page. 5. Create a Enlighted ID. This will be your Enlighted login ID and cannot be changed, so think of one that is secure and easy to remember. 6. Create a Enlighted password. You can change your password at any time. 7. Enter your Password Reset Question and Answer. This can be used at a later time if you forget your password. 8. Enter your e-mail address. You will receive e-mail notification when new information is available in [address] 9. Click Sign Up. You can now view and download portions of your medical record. 10. Click the Download Summary menu link to download a portable copy of your medical information. If you have questions, please visit the Frequently Asked Questions section of the Enlighted website. Remember, Enlighted is NOT to be used for urgent needs. For medical emergencies, dial 911. Now available from your iPhone and Android! Please provide this summary of care documentation to your next provider. Your primary care clinician is listed as Dustin Simons. If you have any questions after today's visit, please call [phone].

## 2018-07-26 NOTE — PROGRESS NOTES
Rosmery Guerra is a 77 y.o. female who presents today for Anticoagulation monitoring. Current dose:  Coumadin 2 mg 1 tab PO on Sun. &Thur and 1 mg tab PO on M T W F S   Medication Changes:  no  Dietary Changes:  no    Latest INR:  Results for orders placed or performed in visit on 07/24/18   AMB POC PT/INR   Result Value Ref Range    VALID INTERNAL CONTROL POC Yes     Prothrombin time (POC) 37.3 seconds    INR POC 3.1          New Coumadin dose:.current treatment plan is effective, no change in therapy. Next check to be scheduled for  2 weeks.   Dr. Goyo Gerardo LPN

## 2018-08-02 ENCOUNTER — OFFICE VISIT (OUTPATIENT)
Dept: INTERNAL MEDICINE CLINIC | Age: 78
End: 2018-08-02

## 2018-08-02 VITALS — BODY MASS INDEX: 21.84 KG/M2 | HEIGHT: 65 IN | WEIGHT: 131.1 LBS

## 2018-08-02 DIAGNOSIS — Z51.81 ENCOUNTER FOR MONITORING COUMADIN THERAPY: Primary | ICD-10-CM

## 2018-08-02 DIAGNOSIS — Z79.01 ENCOUNTER FOR MONITORING COUMADIN THERAPY: Primary | ICD-10-CM

## 2018-08-02 DIAGNOSIS — I82.511 CHRONIC DEEP VEIN THROMBOSIS (DVT) OF FEMORAL VEIN OF RIGHT LOWER EXTREMITY (HCC): ICD-10-CM

## 2018-08-02 DIAGNOSIS — T45.515A WARFARIN-INDUCED COAGULOPATHY (HCC): ICD-10-CM

## 2018-08-02 DIAGNOSIS — I27.20 PULMONARY HYPERTENSION (HCC): ICD-10-CM

## 2018-08-02 DIAGNOSIS — D68.32 WARFARIN-INDUCED COAGULOPATHY (HCC): ICD-10-CM

## 2018-08-02 DIAGNOSIS — I48.91 ATRIAL FIBRILLATION, UNSPECIFIED TYPE (HCC): ICD-10-CM

## 2018-08-02 LAB
INR BLD: 3.4
PT POC: 41 SECONDS
VALID INTERNAL CONTROL?: YES

## 2018-08-02 RX ORDER — PREDNISONE 10 MG/1
20 TABLET ORAL DAILY
Qty: 30 TAB | Refills: 1 | Status: SHIPPED | OUTPATIENT
Start: 2018-08-02 | End: 2019-10-09 | Stop reason: ALTCHOICE

## 2018-08-02 NOTE — PROGRESS NOTES
1. Have you been to the ER, urgent care clinic since your last visit? Hospitalized since your last visit? No 
 
2. Have you seen or consulted any other health care providers outside of the [facility] since your last visit? Include any pap smears or colon screening. No  
 
Skin issues

## 2018-08-02 NOTE — PROGRESS NOTES
SPORTS MEDICINE AND PRIMARY CARE Remington Duff MD, [address] [address] Phone:  [phone]  Fax: [phone] Chief Complaint Patient presents with  Hypertension SUBECTIVE: 
 
Elle Beck is a 77 y.o. female  Dictation on: 08/02/2018  9:03 AM by: Brett Thomas [[id number]] Dictation on: 08/02/2018  9:09 AM by: Brett Thomas [[id number]] Current Outpatient Prescriptions Medication Sig Dispense Refill  predniSONE (DELTASONE) 10 mg tablet Take 2 Tabs by mouth daily. For 7 days then 10 mg daily x 7 days 30 Tab 1  
 warfarin (COUMADIN) 2 mg tablet 2 mg sun and thurs and  1 md riddle t w f s 90 Tab 1  
 triamcinolone acetonide (KENALOG) 0.1 % topical cream Apply  to affected area two (2) times a day. 45 g 11  
 HYDROcodone-acetaminophen (NORCO) 5-325 mg per tablet Take 1 Tab by mouth every six (6) hours as needed for Pain. Max Daily Amount: 4 Tabs. 60 Tab 0  
 furosemide (LASIX) 20 mg tablet Take 1 Tab by mouth daily. 30 Tab 3  pravastatin (PRAVACHOL) 20 mg tablet TAKE ONE TABLET BY MOUTH EVERY EVENING 90 Tab 9  
 losartan (COZAAR) 50 mg tablet Take 1 Tab by mouth daily. 90 Tab 3  
 CARTIA  mg ER capsule TAKE ONE CAPSULE BY MOUTH DAILY 90 Cap 2  
 metoprolol tartrate (LOPRESSOR) 25 mg tablet TAKE 1/2 TABLET BY MOUTH TWICE DAILY 90 Tab 3  
 aspirin 81 mg chewable tablet Take 81 mg by mouth daily. Past Medical History:  
Diagnosis Date  AF (atrial fibrillation) (HCC) RHEUMATIC FEVER AS CHILD, AFIB  Anemia  Arthritis  Blunt head trauma 03/13/2017  Chronic pain  Chronically on opiate therapy  Coagulation defects COUMADIN  
 Diastolic dysfunction   
 grade 4  Dyslipidemia  Encounter for Hemoccult screening 06/22/2016  Fall 03/13/2017  H/O colonoscopy 2007  Hand pain, right  Heart failure ([address]  Meniscus degeneration, right 07/13/2017  Extensive patellar chondral derangement as well as chondral derangement  Prediabetes  PUD (peptic ulcer disease)  Rheumatic fever  S/P MVR (mitral valve replacement)  Shoulder pain, right  SOB (shortness of breath)  Thyroid disease  Venous insufficiency of both lower extremities 2017  Warfarin-induced coagulopathy ([facility] Past Surgical History:  
Procedure Laterality Date  HX CATARACT REMOVAL    
 BILATERAL  
 HX  SECTION    
 HX COLONOSCOPY    
 HX HEART CATHETERIZATION    
 HX HYSTERECTOMY  HX MITRAL VALVE REPLACEMENT    
 HX LUIS ENRIQUE AND BSO No Known Allergies REVIEW OF SYSTEMS: 
  Dictation on: 2018  9:09 AM by: Ranjit Gibson [[id number]] Social History Social History  Marital status: SINGLE Spouse name: N/A  
 Number of children: N/A  
 Years of education: N/A Social History Main Topics  Smoking status: Former Smoker Packs/day: 0.50 Years: 30.00 Quit date: 2012  Smokeless tobacco: Never Used  Alcohol use No  
 Drug use: No  
 Sexual activity: No  
 
Other Topics Concern  None Social History Narrative Family History: Mother:  80 yrs, High Blood PressureFather:  52 yrs, High Blood PressureSister(s): alive, High Blood Pressure, colon cancer, type II diabetesBrother(s): , High Blood Pressure, colon cancer, type II diabetesSon(s): alive 48 yrs1 sister(s) Gabino Higginbotham 1 son(s) . Social History: Alcohol Use Patient does not use alcohol. Smoking Status Patient is a former smoker. Marital Status: Single. Lives w ith:  
 alone. Children: sons. Occupation/W ork: retired - for [employer]. Education/School: has highschool diploma. Yarsani: [address] [employer].  
r 
Family History Problem Relation Age of Onset  Heart Attack Sister  Hypertension Sister  Kidney Disease Mother  Hypertension Mother  Hypertension Father  Cancer Brother  Heart Failure Brother  Arthritis-osteo Sister  Cancer Sister  Cancer Brother PROSTATE OBJECTIVE: 
Visit Vitals  Ht 5' 5\" (1.651 m)  Wt 131 lb 1.6 oz (59.5 kg)  BMI 21.82 kg/m2 ENT: perrla,  eom intact NECK: supple. Thyroid normal 
CHEST: clear to ascultation and percussion HEART: regular rate and rhythm ABD: soft, bowel sounds active EXTREMITIES: no edema, pulse 1+ Office Visit on 08/02/2018 Component Date Value Ref Range Status  VALID INTERNAL CONTROL POC 08/02/2018 Yes   Final  
 Prothrombin time (POC) 08/02/2018 41.0  seconds Final  
 INR POC 08/02/2018 3.4   Final  
Clinical Support on 07/24/2018 Component Date Value Ref Range Status  VALID INTERNAL CONTROL POC 07/24/2018 Yes   Final  
 Prothrombin time (POC) 07/24/2018 37.3  seconds Final  
 INR POC 07/24/2018 3.1   Final  
Office Visit on 07/18/2018 Component Date Value Ref Range Status  VALID INTERNAL CONTROL POC 07/18/2018 Yes   Final  
 Prothrombin time (POC) 07/18/2018 20.0  seconds Final  
 INR POC 07/18/2018 1.7   Final  
Office Visit on 07/10/2018 Component Date Value Ref Range Status  VALID INTERNAL CONTROL POC 07/10/2018 Yes   Final  
 Prothrombin time (POC) 07/10/2018 17.2  seconds Final  
 INR POC 07/10/2018 1.4   Final  
 WBC 07/10/2018 5.9  3.4 - 10.8 x10E3/uL Final  
 RBC 07/10/2018 4.18  3.77 - 5.28 x10E6/uL Final  
 HGB 07/10/2018 12.1  11.1 - 15.9 g/dL Final  
 HCT 07/10/2018 35.3  34.0 - 46.6 % Final  
 MCV 07/10/2018 84  79 - 97 fL Final  
 MCH 07/10/2018 28.9  26.6 - 33.0 pg Final  
 MCHC 07/10/2018 34.3  31.5 - 35.7 g/dL Final  
 RDW 07/10/2018 15.7* 12.3 - 15.4 % Final  
 PLATELET 38/45/7447 416  150 - 379 x10E3/uL Final  
 NEUTROPHILS 07/10/2018 65  Not Estab. % Final  
 Lymphocytes 07/10/2018 25  Not Estab. % Final  
 MONOCYTES 07/10/2018 6  Not Estab. % Final  
 EOSINOPHILS 07/10/2018 3  Not Estab. % Final  
 BASOPHILS 07/10/2018 1  Not Estab. % Final  
 ABS. NEUTROPHILS 07/10/2018 3.8  1.4 - 7.0 x10E3/uL Final  
 Abs Lymphocytes 07/10/2018 1.5  0.7 - 3.1 x10E3/uL Final  
 ABS. MONOCYTES 07/10/2018 0.4  0.1 - 0.9 x10E3/uL Final  
 ABS. EOSINOPHILS 07/10/2018 0.2  0.0 - 0.4 x10E3/uL Final  
 ABS. BASOPHILS 07/10/2018 0.0  0.0 - 0.2 x10E3/uL Final  
 IMMATURE GRANULOCYTES 07/10/2018 0  Not Estab. % Final  
 ABS. IMM. GRANS. 07/10/2018 0.0  0.0 - 0.1 x10E3/uL Final  
 Glucose 07/10/2018 115* 65 - 99 mg/dL Final  
 BUN 07/10/2018 18  8 - 27 mg/dL Final  
 Creatinine 07/10/2018 1.03* 0.57 - 1.00 mg/dL Final  
 GFR est non-AA 07/10/2018 52* >59 mL/min/1.73 Final  
 GFR est AA 07/10/2018 60  >59 mL/min/1.73 Final  
 BUN/Creatinine ratio 07/10/2018 17  12 - 28 Final  
 Sodium 07/10/2018 140  134 - 144 mmol/L Final  
 Potassium 07/10/2018 4.6  3.5 - 5.2 mmol/L Final  
 Chloride 07/10/2018 101  96 - 106 mmol/L Final  
 CO2 07/10/2018 24  20 - 29 mmol/L Final  
 Calcium 07/10/2018 9.7  8.7 - 10.3 mg/dL Final  
 Phosphorus 07/10/2018 3.2  2.5 - 4.5 mg/dL Final  
 Albumin 07/10/2018 4.3  3.5 - 4.8 g/dL Final  
 B-type Natriuretic Peptide 07/10/2018 274.7* 0.0 - 100.0 pg/mL Final  
Office Visit on 06/07/2018 Component Date Value Ref Range Status  VALID INTERNAL CONTROL POC 06/07/2018 Yes   Final  
 Prothrombin time (POC) 06/07/2018 23.2  seconds Final  
 INR POC 06/07/2018 1.9   Final  
 Glucose 06/07/2018 161* 65 - 99 mg/dL Final  
 BUN 06/07/2018 27  8 - 27 mg/dL Final  
 Creatinine 06/07/2018 1.28* 0.57 - 1.00 mg/dL Final  
 GFR est non-AA 06/07/2018 40* >59 mL/min/1.73 Final  
 GFR est AA 06/07/2018 46* >59 mL/min/1.73 Final  
 BUN/Creatinine ratio 06/07/2018 21  12 - 28 Final  
 Sodium 06/07/2018 144  134 - 144 mmol/L Final  
 Potassium 06/07/2018 5.0  3.5 - 5.2 mmol/L Final  
 Chloride 06/07/2018 104  96 - 106 mmol/L Final  
 CO2 06/07/2018 24  18 - 29 mmol/L Final  
 Comment: **Effective June 11, 2018 Carbon Dioxide, Total** 
 reference interval will be changing to: Age                  Male          Female 
     0 days   - 30 days         12 - 34        16 - 34 
    31 days   -  1 year         15 - 25        15 - 25 
     2 years  -  5 years        16 - 34        14 - 32 
     6 years  - 12 years        23 - 32        22 - 32 
               >12 years        21 - 34        20 - 34 
  
 Calcium 06/07/2018 9.6  8.7 - 10.3 mg/dL Final  
 WBC 06/07/2018 5.5  3.4 - 10.8 x10E3/uL Final  
 RBC 06/07/2018 4.09  3.77 - 5.28 x10E6/uL Final  
 HGB 06/07/2018 11.7  11.1 - 15.9 g/dL Final  
 HCT 06/07/2018 35.5  34.0 - 46.6 % Final  
 MCV 06/07/2018 87  79 - 97 fL Final  
 MCH 06/07/2018 28.6  26.6 - 33.0 pg Final  
 MCHC 06/07/2018 33.0  31.5 - 35.7 g/dL Final  
 RDW 06/07/2018 15.7* 12.3 - 15.4 % Final  
 PLATELET 60/67/3490 765  150 - 379 x10E3/uL Final  
 NEUTROPHILS 06/07/2018 55  Not Estab. % Final  
 Lymphocytes 06/07/2018 33  Not Estab. % Final  
 MONOCYTES 06/07/2018 6  Not Estab. % Final  
 EOSINOPHILS 06/07/2018 5  Not Estab. % Final  
 BASOPHILS 06/07/2018 1  Not Estab. % Final  
 ABS. NEUTROPHILS 06/07/2018 3.1  1.4 - 7.0 x10E3/uL Final  
 Abs Lymphocytes 06/07/2018 1.8  0.7 - 3.1 x10E3/uL Final  
 ABS. MONOCYTES 06/07/2018 0.3  0.1 - 0.9 x10E3/uL Final  
 ABS. EOSINOPHILS 06/07/2018 0.3  0.0 - 0.4 x10E3/uL Final  
 ABS. BASOPHILS 06/07/2018 0.0  0.0 - 0.2 x10E3/uL Final  
 IMMATURE GRANULOCYTES 06/07/2018 0  Not Estab. % Final  
 ABS. IMM. GRANS. 06/07/2018 0.0  0.0 - 0.1 x10E3/uL Final  
 
  
 
ASSESSMENT: 
1. Encounter for monitoring coumadin therapy 2. Atrial fibrillation, unspecified type ([facility] 3. Pulmonary hypertension ([address] 4. Warfarin-induced coagulopathy ([facility] 5. Chronic deep vein thrombosis (DVT) of femoral vein of right lower extremity (HCC) Dictation on: 08/02/2018  9:10 AM by: Adolfo Vidal [[id number]]  Dictation on: 08/02/2018  9:11 AM by: Adolfo Vidal [[id number]] I have discussed the diagnosis with the patient and the intended plan as seen in the 
orders above. The patient understands and agees with the plan. The patient has  
received an after visit summary and questions were answered concerning 
future plans Patient labs and/or xrays were reviewed Past records were reviewed. PLAN: 
. Orders Placed This Encounter  AMB POC PT/INR  predniSONE (DELTASONE) 10 mg tablet Follow-up Disposition: 
Return in about 1 week (around 8/9/2018) for pt/inr. ATTENTION:  
This medical record was transcribed using an electronic medical records system. Although proofread, it may and can contain electronic and spelling errors. Other human spelling and other errors may be present. Corrections may be executed at a later time. Please feel free to contact us for any clarifications as needed.

## 2018-08-02 NOTE — MR AVS SNAPSHOT
[address] [facility] 
 
 
 [address] [address] 
[phone] Patient: Denece Brunner MRN: [id number] OHW:[birth year] Visit Information Date & Time Provider Department Dept. Phone Encounter #  
 8/2/2018  8:30 AM Ayaz Ennis 80 Sports Medicine and [address] [id number] Your Appointments 8/7/2018 10:00 AM  
Nurse Visit with Celeste [address] [place] [address] and Primary Care ([facility]) Appt Note: 2 week PT/INR  
 [address] [facility]  
  
   
 [address] [address]  
  
    
 10/22/2018  1:20 PM  
ESTABLISHED PATIENT with Sho Gonzalez MD  
[facility] ([address]) Appt Note: 6 month follow up  
 [address]  
[address] [facility] 7 [address] Upcoming Health Maintenance Date Due  
 GLAUCOMA SCREENING Q2Y 4/21/2017 Influenza Age 5 to Adult 10/2/2018* MEDICARE YEARLY EXAM 9/2/2018 DTaP/Tdap/Td series (2 - Td) 1/17/2027 *Topic was postponed. The date shown is not the original due date. Allergies as of 8/2/2018  Review Complete On: 8/2/2018 By: Mercedes Salguero MD  
 No Known Allergies Current Immunizations  Reviewed on 5/4/2012 Name Date ZZZ-RETIRED (DO NOT USE) Pneumococcal Vaccine (Unspecified Type) 5/7/2012  8:12 AM  
  
 Not reviewed this visit You Were Diagnosed With   
  
 Codes Comments Encounter for monitoring coumadin therapy    -  Primary ICD-10-CM: Z51.81, Z79.01 
ICD-9-CM: V58.83, V58.61 Atrial fibrillation, unspecified type ([facility]     ICD-10-CM: I48.91 
ICD-9-CM: 427.31 Pulmonary hypertension (HCC)     ICD-10-CM: I27.20 ICD-9-CM: 416.8 Warfarin-induced coagulopathy (HCC)     ICD-10-CM: Q53.51, M27.225V ICD-9-CM: 286.7, J829.2 Chronic deep vein thrombosis (DVT) of femoral vein of right lower extremity (HCC)     ICD-10-CM: W00.985 ICD-9-CM: 453.51 Vitals Height(growth percentile) Weight(growth percentile) BMI OB Status Smoking Status 5' 5\" (1.651 m) 131 lb 1.6 oz (59.5 kg) 21.82 kg/m2 Hysterectomy Former Smoker BMI and BSA Data Body Mass Index Body Surface Area  
 21.82 kg/m 2 1.65 m 2 Preferred Pharmacy Pharmacy Name Phone [facility] [address] [phone] Your Updated Medication List  
  
   
This list is accurate as of 8/2/18  9:17 AM.  Always use your most recent med list.  
  
  
  
  
 aspirin 81 mg chewable tablet Take 81 mg by mouth daily. CARTIA  mg ER capsule Generic drug:  dilTIAZem CD  
TAKE ONE CAPSULE BY MOUTH DAILY  
  
 furosemide 20 mg tablet Commonly known as:  LASIX Take 1 Tab by mouth daily. HYDROcodone-acetaminophen 5-325 mg per tablet Commonly known as:  Magda Kind Take 1 Tab by mouth every six (6) hours as needed for Pain. Max Daily Amount: 4 Tabs. losartan 50 mg tablet Commonly known as:  COZAAR Take 1 Tab by mouth daily. metoprolol tartrate 25 mg tablet Commonly known as:  LOPRESSOR  
TAKE 1/2 TABLET BY MOUTH TWICE DAILY pravastatin 20 mg tablet Commonly known as:  PRAVACHOL  
TAKE ONE TABLET BY MOUTH EVERY EVENING  
  
 predniSONE 10 mg tablet Commonly known as:  Saw Darcy Take 2 Tabs by mouth daily. For 7 days then 10 mg daily x 7 days  
  
 triamcinolone acetonide 0.1 % topical cream  
Commonly known as:  KENALOG Apply  to affected area two (2) times a day. warfarin 2 mg tablet Commonly known as:  COUMADIN  
2 mg sun and thurs and  1 md janessa newell [facility]  
  
  
  
  
Prescriptions Sent to Pharmacy Refills  
 predniSONE (DELTASONE) 10 mg tablet 1 Sig: Take 2 Tabs by mouth daily. For 7 days then 10 mg daily x 7 days Class: Normal  
 [facility] [address] [facility] #: [phone] Route: Oral  
  
We Performed the Following AMB POC PT/INR [88305 CPT(R)] Introducing [facility] SERVICES! [facility] introduces Unii patient portal. Now you can access parts of your medical record, email your doctor's office, and request medication refills online. 1. In your internet browser, go to [email or web address] 2. Click on the First Time User? Click Here link in the Sign In box. You will see the New Member Sign Up page. 3. Enter your Unii Access Code exactly as it appears below. You will not need to use this code after youve completed the sign-up process. If you do not sign up before the expiration date, you must request a new code. · Unii Access Code: [email or web address] Expires: 10/8/2018 10:43 AM 
 
4. Enter the last four digits of your Social Security Number (xxxx) and Date of Birth (mm/dd/yyyy) as indicated and click Submit. You will be taken to the next sign-up page. 5. Create a Unii ID. This will be your Unii login ID and cannot be changed, so think of one that is secure and easy to remember. 6. Create a Unii password. You can change your password at any time. 7. Enter your Password Reset Question and Answer. This can be used at a later time if you forget your password. 8. Enter your e-mail address. You will receive e-mail notification when new information is available in [address] 9. Click Sign Up. You can now view and download portions of your medical record. 10. Click the Download Summary menu link to download a portable copy of your medical information. If you have questions, please visit the Frequently Asked Questions section of the Unii website. Remember, Unii is NOT to be used for urgent needs. For medical emergencies, dial 911. Now available from your iPhone and Android! Please provide this summary of care documentation to your next provider. Your primary care clinician is listed as Stanley Sprinkles. If you have any questions after today's visit, please call [phone].

## 2018-08-07 NOTE — PROGRESS NOTES
very poor audio quality - low volume - static - please read  carefully for any potential errors and/or omissions    She has various papules and actually small hives on her  extremities, to a lesser extent her trunk as well as her neck. An etiology is not evident. We will give her two-week course of  prednisone in an effort to resolve it completely. On the remote  possibility of insect bites, we ask her to spray her house,  specifically her bedroom which she will do today. Because of the  interaction of prednisone with her Coumadin, we will decrease her  Coumadin dose to 1 mg daily, then on Sundays as she will not take  Coumadin. She will then return to the office in one week for a  PT check. I will see her in two weeks.

## 2018-08-07 NOTE — PROGRESS NOTES
The patient returns today continuing complaining of issue of  scattered areas and actually appear to be hives and they . They have become very painful and very pruritic to the point  where she is unable to sleep at night. The patient is seen for  evaluation.

## 2018-08-07 NOTE — PROGRESS NOTES
very poor audio quality - low volume - static - please read  carefully for any potential errors and/or omissions    The patient returns today on oral anticoagulants for history of  atrial fibrillation, mitral valve replacement, diastolic  dysfunction, grade 4, venous insufficiency and is seen for  evaluation.

## 2018-08-09 ENCOUNTER — CLINICAL SUPPORT (OUTPATIENT)
Dept: INTERNAL MEDICINE CLINIC | Age: 78
End: 2018-08-09

## 2018-08-09 DIAGNOSIS — Z79.01 ENCOUNTER FOR MONITORING COUMADIN THERAPY: Primary | ICD-10-CM

## 2018-08-09 DIAGNOSIS — Z51.81 ENCOUNTER FOR MONITORING COUMADIN THERAPY: Primary | ICD-10-CM

## 2018-08-09 LAB
INR BLD: 4.9
PT POC: 58.7 SECONDS
VALID INTERNAL CONTROL?: YES

## 2018-08-09 NOTE — MR AVS SNAPSHOT
[facility] 
 
 
 [address] [address] 
[phone] Patient: Eloy Iyer MRN: [id number] SJY:2/39/9792 Visit Information Date & Time Provider Department Dept. Phone Encounter #  
 8/9/2018 10:00 AM [facility] [phone] [id number] Your Appointments 8/16/2018 10:00 AM  
Nurse Visit with [address] and [facility] ([facility] [place]) Appt Note: pt/inr  
 [address] [facility] [place]  
  
   
 [address] [address]  
  
    
 10/22/2018  1:20 PM  
ESTABLISHED PATIENT with Irena Babin MD  
[facility] ([facility]) Appt Note: 6 month follow up  
 [address]  
[address] [facility] [address] Upcoming Health Maintenance Date Due  
 GLAUCOMA SCREENING Q2Y 4/21/2017 Influenza Age 5 to Adult 10/2/2018* MEDICARE YEARLY EXAM 9/2/2018 DTaP/Tdap/Td series (2 - Td) 1/17/2027 *Topic was postponed. The date shown is not the original due date. Allergies as of 8/9/2018  Review Complete On: 8/2/2018 By: Amanda Chavez MD  
 No Known Allergies Current Immunizations  Reviewed on 5/4/2012 Name Date ZZZ-RETIRED (DO NOT USE) Pneumococcal Vaccine (Unspecified Type) 5/7/2012  8:12 AM  
  
 Not reviewed this visit You Were Diagnosed With   
  
 Codes Comments Encounter for monitoring coumadin therapy    -  Primary ICD-10-CM: Z51.81, Z79.01 
ICD-9-CM: V58.83, V58.61 Vitals OB Status Smoking Status Hysterectomy Former Smoker Preferred Pharmacy Pharmacy Name Phone Parul Zepeda [address] [facility] [phone] Your Updated Medication List  
  
   
 This list is accurate as of 8/9/18 11:05 AM.  Always use your most recent med list.  
  
  
  
  
 aspirin 81 mg chewable tablet Take 81 mg by mouth daily. CARTIA  mg ER capsule Generic drug:  dilTIAZem CD  
TAKE ONE CAPSULE BY MOUTH DAILY  
  
 furosemide 20 mg tablet Commonly known as:  LASIX Take 1 Tab by mouth daily. HYDROcodone-acetaminophen 5-325 mg per tablet Commonly known as:  Magda Kind Take 1 Tab by mouth every six (6) hours as needed for Pain. Max Daily Amount: 4 Tabs. losartan 50 mg tablet Commonly known as:  COZAAR Take 1 Tab by mouth daily. metoprolol tartrate 25 mg tablet Commonly known as:  LOPRESSOR  
TAKE 1/2 TABLET BY MOUTH TWICE DAILY pravastatin 20 mg tablet Commonly known as:  PRAVACHOL  
TAKE ONE TABLET BY MOUTH EVERY EVENING  
  
 predniSONE 10 mg tablet Commonly known as:  Saw Darcy Take 2 Tabs by mouth daily. For 7 days then 10 mg daily x 7 days  
  
 triamcinolone acetonide 0.1 % topical cream  
Commonly known as:  KENALOG Apply  to affected area two (2) times a day. warfarin 2 mg tablet Commonly known as:  COUMADIN  
2 mg sun and thurs and  1 md janessa bacon We Performed the Following AMB POC PT/INR [66135 CPT(R)] Introducing [facility] [place] & HEALTH SERVICES! Alex Lu introduces Widdle patient portal. Now you can access parts of your medical record, email your doctor's office, and request medication refills online. 1. In your internet browser, go to [email or web address]/W[employer] Inc 2. Click on the First Time User? Click Here link in the Sign In box. You will see the New Member Sign Up page. 3. Enter your Widdle Access Code exactly as it appears below. You will not need to use this code after youve completed the sign-up process. If you do not sign up before the expiration date, you must request a new code. · Widdle Access Code: [email or web address] Expires: 10/8/2018 10:43 AM 
 
 4. Enter the last four digits of your Social Security Number (xxxx) and Date of Birth (mm/dd/yyyy) as indicated and click Submit. You will be taken to the next sign-up page. 5. Create a [facility] ID. This will be your [facility] login ID and cannot be changed, so think of one that is secure and easy to remember. 6. Create a [facility] password. You can change your password at any time. 7. Enter your Password Reset Question and Answer. This can be used at a later time if you forget your password. 8. Enter your e-mail address. You will receive e-mail notification when new information is available in [address] 9. Click Sign Up. You can now view and download portions of your medical record. 10. Click the Download Summary menu link to download a portable copy of your medical information. If you have questions, please visit the Frequently Asked Questions section of the [facility] website. Remember, [facility] is NOT to be used for urgent needs. For medical emergencies, dial 911. Now available from your iPhone and Android! Please provide this summary of care documentation to your next provider. Your primary care clinician is listed as Jocy Kumar. If you have any questions after today's visit, please call [phone].

## 2018-08-09 NOTE — PROGRESS NOTES
Margarito Garcia is a 77 y.o. female who presents today for Anticoagulation monitoring. Current dose:  Coumadin 2 mg  1/2 tab PO q day except on Sundays 1 tab PO  Medication Changes:  yes - hold coumadin x 2 days then start taking 1 tab PO everyday except on Sundays don't take none  Dietary Changes:  no    Latest INR:  Results for orders placed or performed in visit on 08/09/18   AMB POC PT/INR   Result Value Ref Range    VALID INTERNAL CONTROL POC Yes     Prothrombin time (POC) 58.7 seconds    INR POC 4.9          New Coumadin dose:.current treatment plan is effective, no change in therapy, orders as documented in EMR. Next check to be scheduled for  1 weeks.   Dr. Kristen Hopper, EUNICEN

## 2018-08-16 ENCOUNTER — CLINICAL SUPPORT (OUTPATIENT)
Dept: INTERNAL MEDICINE CLINIC | Age: 78
End: 2018-08-16

## 2018-08-16 DIAGNOSIS — Z51.81 ENCOUNTER FOR MONITORING COUMADIN THERAPY: Primary | ICD-10-CM

## 2018-08-16 DIAGNOSIS — Z79.891 CHRONICALLY ON OPIATE THERAPY: ICD-10-CM

## 2018-08-16 DIAGNOSIS — Z79.01 ENCOUNTER FOR MONITORING COUMADIN THERAPY: Primary | ICD-10-CM

## 2018-08-16 DIAGNOSIS — L29.9 ITCHING: ICD-10-CM

## 2018-08-16 LAB
INR BLD: 2.4
PT POC: 29 SECONDS
VALID INTERNAL CONTROL?: YES

## 2018-08-16 RX ORDER — HYDROCODONE BITARTRATE AND ACETAMINOPHEN 5; 325 MG/1; MG/1
1 TABLET ORAL
Qty: 60 TAB | Refills: 0 | Status: SHIPPED | OUTPATIENT
Start: 2018-08-16 | End: 2018-10-11 | Stop reason: SDUPTHER

## 2018-08-16 NOTE — MR AVS SNAPSHOT
[address] [address] 
[phone] Patient: Arsalan Gamez MRN: [id number] MNA:7/91/9621 Visit Information Date & Time Provider Department Dept. Phone Encounter #  
 8/16/2018 10:00 AM NURSE [address] [place] [address] [facility] Medicine [facility] Primary Care [phone] [id number] Your Appointments 10/22/2018  1:20 PM  
ESTABLISHED PATIENT with Donna Davis MD  
[facility] ([facility]) Appt Note: 6 month follow up  
 [address]  
[address] [address] Upcoming Health Maintenance Date Due  
 GLAUCOMA SCREENING Q2Y 4/21/2017 Influenza Age 5 to Adult 10/2/2018* MEDICARE YEARLY EXAM 9/2/2018 DTaP/Tdap/Td series (2 - Td) 1/17/2027 *Topic was postponed. The date shown is not the original due date. Allergies as of 8/16/2018  Review Complete On: 8/2/2018 By: Renato Duenas MD  
 No Known Allergies Current Immunizations  Reviewed on 5/4/2012 Name Date ZZZ-RETIRED (DO NOT USE) Pneumococcal Vaccine (Unspecified Type) 5/7/2012  8:12 AM  
  
 Not reviewed this visit You Were Diagnosed With   
  
 Codes Comments Encounter for monitoring coumadin therapy    -  Primary ICD-10-CM: Z51.81, Z79.01 
ICD-9-CM: V58.83, V58.61 Chronically on opiate therapy     ICD-10-CM: Z79.891 ICD-9-CM: V58.69 Itching     ICD-10-CM: L29.9 ICD-9-CM: 698.9 Vitals OB Status Smoking Status Hysterectomy Former Smoker Preferred Pharmacy Pharmacy Name Phone Shahab Gustafson [address] [address] [facility] [phone] Your Updated Medication List  
  
   
This list is accurate as of 8/16/18 11:11 AM.  Always use your most recent med list.  
  
  
  
  
 aspirin 81 mg chewable tablet Take 81 mg by mouth daily. CARTIA  mg ER capsule Generic drug:  dilTIAZem CD  
TAKE ONE CAPSULE BY MOUTH DAILY  
  
 furosemide 20 mg tablet Commonly known as:  LASIX Take 1 Tab by mouth daily. HYDROcodone-acetaminophen 5-325 mg per tablet Commonly known as:  Kimberly Greyson Take 1 Tab by mouth every six (6) hours as needed for Pain. Max Daily Amount: 4 Tabs. losartan 50 mg tablet Commonly known as:  COZAAR Take 1 Tab by mouth daily. metoprolol tartrate 25 mg tablet Commonly known as:  LOPRESSOR  
TAKE 1/2 TABLET BY MOUTH TWICE DAILY pravastatin 20 mg tablet Commonly known as:  PRAVACHOL  
TAKE ONE TABLET BY MOUTH EVERY EVENING  
  
 predniSONE 10 mg tablet Commonly known as:  Susi Aver Take 2 Tabs by mouth daily. For 7 days then 10 mg daily x 7 days  
  
 triamcinolone acetonide 0.1 % topical cream  
Commonly known as:  KENALOG Apply  to affected area two (2) times a day. warfarin 2 mg tablet Commonly known as:  COUMADIN  
2 mg sun and thlupe and  1 md janessa bacon  
  
  
  
  
Prescriptions Printed Refills HYDROcodone-acetaminophen (NORCO) 5-325 mg per tablet 0 Sig: Take 1 Tab by mouth every six (6) hours as needed for Pain. Max Daily Amount: 4 Tabs. Class: Print Route: Oral  
  
We Performed the Following AMB POC PT/INR [01137 CPT(R)] REFERRAL TO DERMATOLOGY [REF19 Custom] Comments:  
 Please evaluate patient for luis fernando.  
  
Referral Information Referral ID Referred By Referred To  
  
 [id number] Leatha Chase MD   
   [place] [address] [address] [place] [address] Phone: [phone] Fax: [phone] Visits Status Start Date End Date 1 New Request 8/16/18 8/16/19 If your referral has a status of pending review or denied, additional information will be sent to support the outcome of this decision. Introducing \[facility] [place] [facility]\"" & HEALTH SERVICES! [employer] introduces iyzico patient portal. Now you can access parts of your medical record, email your doctor's office, and request medication refills online. 1. In your internet browser, go to [email or web address] 2. Click on the First Time User? Click Here link in the Sign In box. You will see the New Member Sign Up page. 3. Enter your iyzico Access Code exactly as it appears below. You will not need to use this code after youve completed the sign-up process. If you do not sign up before the expiration date, you must request a new code. · iyzico Access Code: [email or web address] Expires: 10/8/2018 10:43 AM 
 
4. Enter the last four digits of your Social Security Number (xxxx) and Date of Birth (mm/dd/yyyy) as indicated and click Submit. You will be taken to the next sign-up page. 5. Create a iyzico ID. This will be your iyzico login ID and cannot be changed, so think of one that is secure and easy to remember. 6. Create a iyzico password. You can change your password at any time. 7. Enter your Password Reset Question and Answer. This can be used at a later time if you forget your password. 8. Enter your e-mail address. You will receive e-mail notification when new information is available in [address] 9. Click Sign Up. You can now view and download portions of your medical record. 10. Click the Download Summary menu link to download a portable copy of your medical information. If you have questions, please visit the Frequently Asked Questions section of the iyzico website. Remember, iyzico is NOT to be used for urgent needs. For medical emergencies, dial 911. Now available from your iPhone and Android! Please provide this summary of care documentation to your next provider. Your primary care clinician is listed as Arnaldo Mariee. If you have any questions after today's visit, please call [phone].

## 2018-08-16 NOTE — PROGRESS NOTES
Lucas Ceja is a 77 y.o. female who presents today for Anticoagulation monitoring. Current dose:  Coumadin 2 mg 1/2 tab PO everyday except on Sunday & Thursday take none  Medication Changes:  no  Dietary Changes:  no    Latest INR:  Results for orders placed or performed in visit on 08/16/18   AMB POC PT/INR   Result Value Ref Range    VALID INTERNAL CONTROL POC Yes     Prothrombin time (POC) 29.0 seconds    INR POC 2.4          New Coumadin dose:.current treatment plan is effective, no change in therapy. Next check to be scheduled for  3 weeks.   Per Dr. Marcela Phillips, EUNICEN

## 2018-09-06 ENCOUNTER — CLINICAL SUPPORT (OUTPATIENT)
Dept: INTERNAL MEDICINE CLINIC | Age: 78
End: 2018-09-06

## 2018-09-06 DIAGNOSIS — Z79.01 ENCOUNTER FOR MONITORING COUMADIN THERAPY: Primary | ICD-10-CM

## 2018-09-06 DIAGNOSIS — Z51.81 ENCOUNTER FOR MONITORING COUMADIN THERAPY: Primary | ICD-10-CM

## 2018-09-06 LAB
INR BLD: 2.5
PT POC: 30 SECONDS
VALID INTERNAL CONTROL?: YES

## 2018-09-06 NOTE — MR AVS SNAPSHOT
[address] [facility]. [address] [address] 
[phone] Patient: Chantal Luciano MRN: [id number] EPJ:3/60/2834 Visit Information Date & Time Provider Department Dept. Phone Encounter #  
 9/6/2018 10:00 AM [facility] and [address] [place] [address] [phone] [id number] Your Appointments 10/24/2018  1:20 PM  
ESTABLISHED PATIENT with Sabrina Carrillo MD  
[facility] ([address]) Appt Note: 6 month follow up; 6 month follow up rsd w/pt 10/22 to 10/24 Dr. Christian Godoy post weekend call kmr [phone]  
[address] [address] Upcoming Health Maintenance Date Due  
 GLAUCOMA SCREENING Q2Y 4/21/2017 MEDICARE YEARLY EXAM 9/2/2018 Influenza Age 5 to Adult 10/2/2018* DTaP/Tdap/Td series (2 - Td) 1/17/2027 *Topic was postponed. The date shown is not the original due date. Allergies as of 9/6/2018  Review Complete On: 8/2/2018 By: Boaz Zapata MD  
 No Known Allergies Current Immunizations  Reviewed on 5/4/2012 Name Date ZZZ-RETIRED (DO NOT USE) Pneumococcal Vaccine (Unspecified Type) 5/7/2012  8:12 AM  
  
 Not reviewed this visit You Were Diagnosed With   
  
 Codes Comments Encounter for monitoring coumadin therapy    -  Primary ICD-10-CM: Z51.81, Z79.01 
ICD-9-CM: V58.83, V58.61 Vitals OB Status Smoking Status Hysterectomy Former Smoker Preferred Pharmacy Pharmacy Name Phone Allyson Wilkerson [address] [facility] [phone] Your Updated Medication List  
  
   
This list is accurate as of 9/6/18 10:48 AM.  Always use your most recent med list.  
  
  
  
  
 aspirin 81 mg chewable tablet Take 81 mg by mouth daily. CARTIA  mg ER capsule Generic drug:  dilTIAZem CD  
TAKE ONE CAPSULE BY MOUTH DAILY  
  
 furosemide 20 mg tablet Commonly known as:  LASIX Take 1 Tab by mouth daily. HYDROcodone-acetaminophen 5-325 mg per tablet Commonly known as:  Vinh Herrlich Take 1 Tab by mouth every six (6) hours as needed for Pain. Max Daily Amount: 4 Tabs. losartan 50 mg tablet Commonly known as:  COZAAR Take 1 Tab by mouth daily. metoprolol tartrate 25 mg tablet Commonly known as:  LOPRESSOR  
TAKE 1/2 TABLET BY MOUTH TWICE DAILY pravastatin 20 mg tablet Commonly known as:  PRAVACHOL  
TAKE ONE TABLET BY MOUTH EVERY EVENING  
  
 predniSONE 10 mg tablet Commonly known as:  Tiff Gabino Take 2 Tabs by mouth daily. For 7 days then 10 mg daily x 7 days  
  
 triamcinolone acetonide 0.1 % topical cream  
Commonly known as:  KENALOG Apply  to affected area two (2) times a day. warfarin 2 mg tablet Commonly known as:  COUMADIN  
2 mg sun and thurs and  1 md janessa bacon We Performed the Following AMB POC PT/INR [69316 CPT(R)] Introducing \[facility] [place] [facility]\"" & HEALTH SERVICES! [employer] [facility] introduces One [employer] patient portal. Now you can access parts of your medical record, email your doctor's office, and request medication refills online. 1. In your internet browser, go to [email or web address] 2. Click on the First Time User? Click Here link in the Sign In box. You will see the New Member Sign Up page. 3. Enter your One [employer] Access Code exactly as it appears below. You will not need to use this code after youve completed the sign-up process. If you do not sign up before the expiration date, you must request a new code. · One [employer] Access Code: [email or web address] Expires: 10/8/2018 10:43 AM 
 
4. Enter the last four digits of your Social Security Number (xxxx) and Date of Birth (mm/dd/yyyy) as indicated and click Submit. You will be taken to the next sign-up page. 5. Create a Dapt ID. This will be your Dapt login ID and cannot be changed, so think of one that is secure and easy to remember. 6. Create a Dapt password. You can change your password at any time. 7. Enter your Password Reset Question and Answer. This can be used at a later time if you forget your password. 8. Enter your e-mail address. You will receive e-mail notification when new information is available in [address] 9. Click Sign Up. You can now view and download portions of your medical record. 10. Click the Download Summary menu link to download a portable copy of your medical information. If you have questions, please visit the Frequently Asked Questions section of the Dapt website. Remember, Dapt is NOT to be used for urgent needs. For medical emergencies, dial 911. Now available from your iPhone and Android! Please provide this summary of care documentation to your next provider. Your primary care clinician is listed as Sabi Hudson. If you have any questions after today's visit, please call [phone].

## 2018-09-06 NOTE — PROGRESS NOTES
Munira Pemberton is a 77 y.o. female who presents today for Anticoagulation monitoring. Current dose:  Coumadin 2 mg 1/2 tab PO  Everyday except on Sundays none  Medication Changes:  no  Dietary Changes:  no    Latest INR:  Results for orders placed or performed in visit on 09/06/18   AMB POC PT/INR   Result Value Ref Range    VALID INTERNAL CONTROL POC Yes     Prothrombin time (POC) 30.0 seconds    INR POC 2.5          New Coumadin dose:.current treatment plan is effective, no change in therapy. Next check to be scheduled for  4 weeks.   Per Dr. Alverto Cook, EUNICEN

## 2018-10-11 ENCOUNTER — CLINICAL SUPPORT (OUTPATIENT)
Dept: INTERNAL MEDICINE CLINIC | Age: 78
End: 2018-10-11

## 2018-10-11 DIAGNOSIS — Z79.891 CHRONICALLY ON OPIATE THERAPY: ICD-10-CM

## 2018-10-11 DIAGNOSIS — Z79.01 ENCOUNTER FOR MONITORING COUMADIN THERAPY: Primary | ICD-10-CM

## 2018-10-11 DIAGNOSIS — Z51.81 ENCOUNTER FOR MONITORING COUMADIN THERAPY: Primary | ICD-10-CM

## 2018-10-11 LAB
INR BLD: 1.8
PT POC: 21.4 SECONDS
VALID INTERNAL CONTROL?: YES

## 2018-10-11 RX ORDER — HYDROCODONE BITARTRATE AND ACETAMINOPHEN 5; 325 MG/1; MG/1
1 TABLET ORAL
Qty: 60 TAB | Refills: 0 | Status: SHIPPED | OUTPATIENT
Start: 2018-10-11 | End: 2018-12-03 | Stop reason: SDUPTHER

## 2018-10-11 NOTE — MR AVS SNAPSHOT
[address] [facility] 
 
 
 [address] [address] 
[phone] Patient: Alejandra Gilford MRN: [id number] WEK:1/91/4657 Visit Information Date & Time Provider Department Dept. Phone Encounter #  
 10/11/2018 10:00 AM NURSE [address] Sports Medicine and Primary Care [phone] [id number] Your Appointments 10/24/2018  1:20 PM  
ESTABLISHED PATIENT with Mayford Halsted, MD  
[facility] ([facility]) Appt Note: 6 month follow up; 6 month follow up rsd w/pt 10/22 to 10/24 Dr. Jase Rider post weekend call kmr [address]  [address] [place] [address]  
  
    
 11/1/2018 10:00 AM  
Nurse Visit with [address] and Primary Care ([facility] [place]) Appt Note: pt/inr  
 [address] [facility]  
  
   
 [address] [address] Upcoming Health Maintenance Date Due Shingrix Vaccine Age 50> (1 of 2) 2/22/1989 GLAUCOMA SCREENING Q2Y 4/21/2017 Influenza Age 5 to Adult 8/1/2018 MEDICARE YEARLY EXAM 9/2/2018 DTaP/Tdap/Td series (2 - Td) 1/17/2027 Allergies as of 10/11/2018  Review Complete On: 8/2/2018 By: Gabino Carrillo MD  
 No Known Allergies Current Immunizations  Reviewed on 5/4/2012 Name Date ZZZ-RETIRED (DO NOT USE) Pneumococcal Vaccine (Unspecified Type) 5/7/2012  8:12 AM  
  
 Not reviewed this visit You Were Diagnosed With   
  
 Codes Comments Encounter for monitoring Coumadin therapy    -  Primary ICD-10-CM: Z51.81, Z79.01 
ICD-9-CM: V58.83, V58.61 Vitals OB Status Smoking Status Hysterectomy Former Smoker Preferred Pharmacy Pharmacy Name Phone [facility] [address] [facility] [phone] Your Updated Medication List  
  
   
This list is accurate as of 10/11/18 12:16 PM.  Always use your most recent med list.  
  
  
  
  
 aspirin 81 mg chewable tablet Take 81 mg by mouth daily. CARTIA  mg ER capsule Generic drug:  dilTIAZem CD  
TAKE ONE CAPSULE BY MOUTH DAILY  
  
 furosemide 20 mg tablet Commonly known as:  LASIX Take 1 Tab by mouth daily. HYDROcodone-acetaminophen 5-325 mg per tablet Commonly known as:  Genita Shiraz Take 1 Tab by mouth every six (6) hours as needed for Pain. Max Daily Amount: 4 Tabs. losartan 50 mg tablet Commonly known as:  COZAAR Take 1 Tab by mouth daily. metoprolol tartrate 25 mg tablet Commonly known as:  LOPRESSOR  
TAKE 1/2 TABLET BY MOUTH TWICE DAILY pravastatin 20 mg tablet Commonly known as:  PRAVACHOL  
TAKE ONE TABLET BY MOUTH EVERY EVENING  
  
 predniSONE 10 mg tablet Commonly known as:  Cozette Corona Take 2 Tabs by mouth daily. For 7 days then 10 mg daily x 7 days  
  
 triamcinolone acetonide 0.1 % topical cream  
Commonly known as:  KENALOG Apply  to affected area two (2) times a day. warfarin 2 mg tablet Commonly known as:  COUMADIN  
2 mg sun and thurs and  1 md janessa bacon We Performed the Following AMB POC PT/INR [66844 CPT(R)] Introducing [facility] [place] & HEALTH SERVICES! [facility] introduces PeopleString patient portal. Now you can access parts of your medical record, email your doctor's office, and request medication refills online. 1. In your internet browser, go to [email or web address] 2. Click on the First Time User? Click Here link in the Sign In box. You will see the New Member Sign Up page. 3. Enter your PeopleString Access Code exactly as it appears below. You will not need to use this code after youve completed the sign-up process. If you do not sign up before the expiration date, you must request a new code. · PeopleString Access Code: [email or web address] Expires: 1/9/2019 12:16 PM 
 
4. Enter the last four digits of your Social Security Number (xxxx) and Date of Birth (mm/dd/yyyy) as indicated and click Submit. You will be taken to the next sign-up page. 5. Create a [employer] ID. This will be your [employer] login ID and cannot be changed, so think of one that is secure and easy to remember. 6. Create a [employer] password. You can change your password at any time. 7. Enter your Password Reset Question and Answer. This can be used at a later time if you forget your password. 8. Enter your e-mail address. You will receive e-mail notification when new information is available in [address] 9. Click Sign Up. You can now view and download portions of your medical record. 10. Click the Download Summary menu link to download a portable copy of your medical information. If you have questions, please visit the Frequently Asked Questions section of the [employer] website. Remember, [employer] is NOT to be used for urgent needs. For medical emergencies, dial 911. Now available from your iPhone and Android! Please provide this summary of care documentation to your next provider. Your primary care clinician is listed as [facility]. If you have any questions after today's visit, please call [phone].

## 2018-10-11 NOTE — PROGRESS NOTES
This is a chronic problem that is not changed. Per review of available records and patients , there are not sign of overuse, misuse, diversion, or concerning side effects. Today we reviewed: the risk of overdose, addiction, and dependency  The following changes were made to the patients current treatment plan: nothing, medications refilled.

## 2018-10-12 NOTE — PROGRESS NOTES
Siobhan Brooks is a 77 y.o. female who presents today for Anticoagulation monitoring. Current dose:  Coumadin 2 mg 1/2 everyday except on sundays she takes none. Medication Changes:  no  Dietary Changes:  no    Latest INR:  Results for orders placed or performed in visit on 10/11/18   AMB POC PT/INR   Result Value Ref Range    VALID INTERNAL CONTROL POC Yes     Prothrombin time (POC) 21.4 seconds    INR POC 1.8          New Coumadin dose:.current treatment plan is effective, no change in therapy, orders as documented in EMR. Next check to be scheduled for  3 weeks.   Per Dr. London Elaine LPN

## 2018-10-24 ENCOUNTER — OFFICE VISIT (OUTPATIENT)
Dept: CARDIOLOGY CLINIC | Age: 78
End: 2018-10-24

## 2018-10-24 VITALS
HEIGHT: 65 IN | HEART RATE: 66 BPM | DIASTOLIC BLOOD PRESSURE: 82 MMHG | SYSTOLIC BLOOD PRESSURE: 160 MMHG | WEIGHT: 141 LBS | RESPIRATION RATE: 16 BRPM | BODY MASS INDEX: 23.49 KG/M2

## 2018-10-24 DIAGNOSIS — I25.10 ATHEROSCLEROSIS OF NATIVE CORONARY ARTERY OF NATIVE HEART WITHOUT ANGINA PECTORIS: ICD-10-CM

## 2018-10-24 DIAGNOSIS — I07.1 TRICUSPID VALVE INSUFFICIENCY, UNSPECIFIED ETIOLOGY: ICD-10-CM

## 2018-10-24 DIAGNOSIS — I35.8 AORTIC VALVE SCLEROSIS: ICD-10-CM

## 2018-10-24 DIAGNOSIS — I48.91 ATRIAL FIBRILLATION, UNSPECIFIED TYPE (HCC): ICD-10-CM

## 2018-10-24 DIAGNOSIS — E78.5 DYSLIPIDEMIA: ICD-10-CM

## 2018-10-24 DIAGNOSIS — Z95.2 S/P MVR (MITRAL VALVE REPLACEMENT): Primary | ICD-10-CM

## 2018-10-24 NOTE — PROGRESS NOTES
[facility] [place]: [facility]  [phone]    HPI: Alejandra Gilford is a 77y.o. year-old female who presents for follow up regarding her complex valve disease, CAD, Atrial Fib and HTN. Weight is up today. She is frustrated with the right leg pain but it is getting better, she is wearing her compression stocking today. Has a cold, had congestion. Bp up and she thinks it is just the cold/congestion. Maybe, she is not sure. It swells at times but not sure what causes it. Discussed the surgery, knee pain, injections. Has some dyspnea with exertion but no chest pain and the dyspnea is not bad, it is rare. INR has been stable, no bleeding or bruising. Had some itching and needs to see dermatology. Taking benadryl due to skin itching. No edema. Back on the losartan, Bp looks good on that now. Dr. Shane Mora is now following her INR and refilling her percocet. INR stable. EKG NSR LVH today Due for echo for MVR. Not needing the fluid pills and has stopped taking them. A/P:  1. CAD- 100% distal RCA disease on cath 3/11 not amenable to PCI or bypass, no exertional chest pain, continue aspirin and pravastatin  2. Rheumatic heart disease/MV disease- s/p replacement 3/12 Dr. Lesley Escobar, stable, next echo 12/16  3. Afib- s/p cryomaze and ALIYAH closure at time of MVR, on Coumadin, rate controlled with Metoprolol and Diltiazem  -Aflutter and afib returned post-op, seen by Dr. Jemal Carty and offered aflutter ablation if it persisted, but gone, NSR now. Occasional palpitations now. Not bad, does not want further treatment at this time. 4. HTN- high today but better now. at goal today on metoprolol and diltiazem, losartan  5. Thyroid- off meds now s/p methimazole  6. Aortic Atherosclerosis- Aspirin and Pravastatin   7. Mild PAHTN  8. Tobacco use- continue cessation, stopped completely  9. COPD - seen on CT chest, not on inhalers at this time, denies progression of dyspnea  10.  Right sided chest pain/back pain/neck pain - continue Percocet PRN pain, as per Dr. Brian Busby  . Echo 5/18 EF 60% LAE, MVR normal function, gradient 7mhg., mild Tr, mild PI  10/16 EF 60%, gr3 dd, NAVI mild TR/PI  2015 Echo EF 65%, mild TR, PAP 45, mild PI, gr4dd. Echo 10/14 EF 65%, NAVI, nl functioning MVR bioprosthesis, mild-mod TR PAP 45  12/13 EF 60% mildly increased MV gradient, no MR. Echo 2/12: EF55% mildCLVH, mod MR, sev MS 4.5/15 peak gradient, LAE, mild AS, mild PAHTN, mod Tr  Echo 7/10 EF 55%, gr1DD, mod AS, JUAN 1.1, Mod MS, mild TR, mild paHTN PA 40  Severe MR cath 4/10, with mild CAD    Fhx 2bro w CHF, +Dm +HTN  Soc quit tobacco    She  has a past medical history of AF (atrial fibrillation) ([address] Anemia, Arthritis, Blunt head trauma, Chronic pain, Chronically on opiate therapy, Coagulation defects, Diastolic dysfunction, Dyslipidemia, Encounter for Hemoccult screening, Fall, H/O colonoscopy, Hand pain, right, Heart failure ([address] Meniscus degeneration, right, Prediabetes, PUD (peptic ulcer disease), Rheumatic fever, S/P MVR (mitral valve replacement), Shoulder pain, right, SOB (shortness of breath), Thyroid disease, Venous insufficiency of both lower extremities, and Warfarin-induced coagulopathy ([address] Cardiovascular ROS: positive for chest pain, negative for progression of dyspnea or palpitations   Respiratory ROS: positive for - shortness of breath  Neurological ROS: no TIA or stroke symptoms  All other systems negative except as above. PE  Vitals:    10/24/18 1307   BP: 160/82   Pulse: 66   Resp: 16   Weight: 141 lb (64 kg)   Height: 5' 5\" (1.651 m)    Body mass index is 23.46 kg/m².    General appearance - alert, well appearing, and in no distress  Mental status - affect appropriate to mood  Eyes - sclera anicteric, moist mucous membranes  Neck - supple, no significant adenopathy  Lymphatics - no  lymphadenopathy  Chest - clear to auscultation, no wheezes, rales or rhonchi  Heart - normal rate, regular rhythm, normal S1, S2, 2/6 BUTCH   Abdomen - soft, nontender, nondistended, no masses or organomegaly  Back exam - full range of motion, no tenderness  Neurological - cranial nerves II through XII grossly intact, no focal deficit  Musculoskeletal - no muscular tenderness noted, normal strength  Extremities - peripheral pulses normal, no pedal edema  Skin - normal coloration  no rashes    12 lead ECG: NSR 60bpm    Recent Labs:  Lab Results   Component Value Date/Time    Cholesterol, total 145 02/28/2018 02:14 PM     No results found for: NINA  Lab Results   Component Value Date/Time    BUN 18 07/10/2018 11:36 AM     Lab Results   Component Value Date/Time    Potassium 4.6 07/10/2018 11:36 AM     Lab Results   Component Value Date/Time    Hemoglobin A1c 5.5 02/28/2018 02:14 PM     No components found for: HGBI,  IHGB,  HGB,  HGBP,  WBHGB  Lab Results   Component Value Date/Time    PLATELET 804 48/13/5974 11:36 AM       Reviewed:  Past Medical History:   Diagnosis Date    AF (atrial fibrillation) (HCC)     RHEUMATIC FEVER AS CHILD, AFIB    Anemia     Arthritis     Blunt head trauma 03/13/2017    Chronic pain     Chronically on opiate therapy     Coagulation defects     COUMADIN    Diastolic dysfunction     grade 4    Dyslipidemia     Encounter for Hemoccult screening 06/22/2016    Fall 03/13/2017    H/O colonoscopy 2007    Hand pain, right     Heart failure (HCC)     Meniscus degeneration, right 07/13/2017    Extensive patellar chondral derangement as well as chondral derangement     Prediabetes     PUD (peptic ulcer disease)     Rheumatic fever     S/P MVR (mitral valve replacement)     Shoulder pain, right     SOB (shortness of breath)     Thyroid disease     Venous insufficiency of both lower extremities 01/17/2017    Warfarin-induced coagulopathy (HCC)      Social History     Tobacco Use   Smoking Status Former Smoker    Packs/day: 0.50    Years: 30.00    Pack years: 15.00    Last attempt to quit: 1/20/2012    Years since quittin.7   Smokeless Tobacco Never Used     Social History     Substance and Sexual Activity   Alcohol Use No    Alcohol/week: 0.0 oz     No Known Allergies    Current Outpatient Medications   Medication Sig    metoprolol tartrate (LOPRESSOR) 25 mg tablet TAKE 1/2 TABLET BY MOUTH TWICE DAILY    HYDROcodone-acetaminophen (NORCO) 5-325 mg per tablet Take 1 Tab by mouth every six (6) hours as needed for Pain. Max Daily Amount: 4 Tabs.  predniSONE (DELTASONE) 10 mg tablet Take 2 Tabs by mouth daily. For 7 days then 10 mg daily x 7 days    warfarin (COUMADIN) 2 mg tablet 2 mg sun and thurs and  1 md janessa bacon    triamcinolone acetonide (KENALOG) 0.1 % topical cream Apply  to affected area two (2) times a day.  furosemide (LASIX) 20 mg tablet Take 1 Tab by mouth daily.  pravastatin (PRAVACHOL) 20 mg tablet TAKE ONE TABLET BY MOUTH EVERY EVENING    losartan (COZAAR) 50 mg tablet Take 1 Tab by mouth daily.  CARTIA  mg ER capsule TAKE ONE CAPSULE BY MOUTH DAILY    aspirin 81 mg chewable tablet Take 81 mg by mouth daily. No current facility-administered medications for this visit.         Keara Bond MD  [facility] [place]  [address] [facility] [address]  [facility], [address]

## 2018-11-02 ENCOUNTER — CLINICAL SUPPORT (OUTPATIENT)
Dept: INTERNAL MEDICINE CLINIC | Age: 78
End: 2018-11-02

## 2018-11-02 DIAGNOSIS — Z51.81 ENCOUNTER FOR MONITORING COUMADIN THERAPY: Primary | ICD-10-CM

## 2018-11-02 DIAGNOSIS — Z79.01 ENCOUNTER FOR MONITORING COUMADIN THERAPY: Primary | ICD-10-CM

## 2018-11-02 LAB
INR BLD: 2.5
PT POC: 29.6 SECONDS
VALID INTERNAL CONTROL?: YES

## 2018-11-02 NOTE — PROGRESS NOTES
Dave Escobar is a 77 y.o. female who presents today for Anticoagulation monitoring. Current dose:  Coumadin 2 mg  1/2 tab PO mon- sat and none on sunday  Medication Changes:  no  Dietary Changes:  no    Latest INR:  Results for orders placed or performed in visit on 11/02/18   AMB POC PT/INR   Result Value Ref Range    VALID INTERNAL CONTROL POC Yes     Prothrombin time (POC) 29.6 seconds    INR POC 2.5          New Coumadin dose:.current treatment plan is effective, no change in therapy. Next check to be scheduled for  4 weeks.   Per Everette Genao LPN

## 2018-12-03 ENCOUNTER — CLINICAL SUPPORT (OUTPATIENT)
Dept: INTERNAL MEDICINE CLINIC | Age: 78
End: 2018-12-03

## 2018-12-03 DIAGNOSIS — Z51.81 ENCOUNTER FOR MONITORING COUMADIN THERAPY: Primary | ICD-10-CM

## 2018-12-03 DIAGNOSIS — Z79.891 CHRONICALLY ON OPIATE THERAPY: ICD-10-CM

## 2018-12-03 DIAGNOSIS — Z79.01 ENCOUNTER FOR MONITORING COUMADIN THERAPY: Primary | ICD-10-CM

## 2018-12-03 LAB
INR BLD: 1.7
PT POC: 20.4 SECONDS
VALID INTERNAL CONTROL?: YES

## 2018-12-03 RX ORDER — HYDROCODONE BITARTRATE AND ACETAMINOPHEN 5; 325 MG/1; MG/1
1 TABLET ORAL
Qty: 60 TAB | Refills: 0 | Status: SHIPPED | OUTPATIENT
Start: 2018-12-03 | End: 2019-01-22 | Stop reason: SDUPTHER

## 2018-12-03 NOTE — PROGRESS NOTES
Patient came in for PT/INR check. Patient states that she is taking her coumadin 1mg PO Monday-Saturday but does not take any on Sunday. She had a PT reading of 20.4 and an INR of 1.7 per Dr. Jorge Richards he wants her to start taking her coumadin 1mg PO daily and come for a recheck in 3 weeks.      Eugenia Teague

## 2018-12-17 RX ORDER — DILTIAZEM HYDROCHLORIDE 120 MG/1
CAPSULE, EXTENDED RELEASE ORAL
Qty: 90 CAP | Refills: 1 | Status: SHIPPED | OUTPATIENT
Start: 2018-12-17 | End: 2019-05-16 | Stop reason: SDUPTHER

## 2018-12-24 ENCOUNTER — CLINICAL SUPPORT (OUTPATIENT)
Dept: INTERNAL MEDICINE CLINIC | Age: 78
End: 2018-12-24

## 2018-12-24 DIAGNOSIS — Z51.81 ENCOUNTER FOR MONITORING COUMADIN THERAPY: Primary | ICD-10-CM

## 2018-12-24 DIAGNOSIS — Z79.01 ENCOUNTER FOR MONITORING COUMADIN THERAPY: Primary | ICD-10-CM

## 2018-12-24 LAB
INR BLD: 2.5
PT POC: 30.3 SECONDS
VALID INTERNAL CONTROL?: YES

## 2018-12-24 NOTE — PROGRESS NOTES
Lasalle Denver is a 77 y.o. female who presents today for Anticoagulation monitoring. Current dose:  Coumadin 2 mg 1 tab PO daily. except on sundays none  Medication Changes:  no  Dietary Changes:  no    Latest INR:  Results for orders placed or performed in visit on 12/24/18   AMB POC PT/INR   Result Value Ref Range    VALID INTERNAL CONTROL POC Yes     Prothrombin time (POC) 30.3 seconds    INR POC 2.5          New Coumadin dose:.current treatment plan is effective, no change in therapy. Next check to be scheduled for  4 weeks.   Per Dr. Claudetta App, LPN

## 2018-12-27 ENCOUNTER — HOSPITAL ENCOUNTER (EMERGENCY)
Age: 78
Discharge: HOME | End: 2018-12-27
Attending: EMERGENCY MEDICINE | Admitting: EMERGENCY MEDICINE
Payer: MEDICARE

## 2018-12-27 DIAGNOSIS — Z13.9 ENCOUNTER FOR MEDICAL SCREENING EXAMINATION: Primary | ICD-10-CM

## 2018-12-27 PROCEDURE — 99282 EMERGENCY DEPT VISIT SF MDM: CPT

## 2018-12-27 NOTE — ED NOTES
Pt verbally stated name, month/year, and location. Pt still refusing. MD went over risks of leaving without having an tests/examinations done.

## 2018-12-27 NOTE — ED PROVIDER NOTES
77 y.o. female with past medical history significant for prediabetes, heart failure, AFIB, s/p mitral valve replacement, s/p heart cath, diastolic dysfunction, venous insufficiency of both lower extremities, Warfarin-induced coagulopathy, rheumatic fever, thyroid disease, anemia, PUD, arthritis, dyslipidemia, s/p LUIS ENRIQUE and BSO, who presents to the ED via EMS, with chief complaint of possible stroke. EMS reports that they transported the patient from home after a family member found the patient on the floor \"babbling\". They state that the patient was last known well at ~1530 this afternoon. EMS reports that when they arrived at the scene, the patient was not following directions and was unsure why the EMS was there. They state that patient started talking coherently en route ~1804 today. Pt reports that she does not know why she is here, and is upset that she was taken from her home by EMS. EMS also reports that the patient has a h/o blood clots and AFIB and is currently taking Warfarin. There are no other acute medical concerns at this time. Social hx: Tobacco use (former smoker, quit date: 1/20/12); Negative for EtOH use; Negative for Illicit Drug use  PCP: Tereza Shields MD    Note written by Elsi Shen, as dictated by Torrie Arora MD 6:09 PM      The history is provided by the patient, the EMS personnel and medical records. No  was used.         Past Medical History:   Diagnosis Date    AF (atrial fibrillation) (HCC)     RHEUMATIC FEVER AS CHILD, AFIB    Anemia     Arthritis     Blunt head trauma 03/13/2017    Chronic pain     Chronically on opiate therapy     Coagulation defects     COUMADIN    Diastolic dysfunction     grade 4    Dyslipidemia     Encounter for Hemoccult screening 06/22/2016    Fall 03/13/2017    H/O colonoscopy 2007    Hand pain, right     Heart failure [address] [address]     Meniscus degeneration, right 07/13/2017    Extensive patellar chondral derangement as well as chondral derangement     Prediabetes     PUD (peptic ulcer disease)     Rheumatic fever     S/P MVR (mitral valve replacement)     Shoulder pain, right     SOB (shortness of breath)     Thyroid disease     Venous insufficiency of both lower extremities 2017    Warfarin-induced coagulopathy ([facility] [address] [facility]        Past Surgical History:   Procedure Laterality Date    HX CATARACT REMOVAL      BILATERAL    HX  SECTION      HX COLONOSCOPY      HX HEART CATHETERIZATION      HX HYSTERECTOMY      HX MITRAL VALVE REPLACEMENT      HX LUIS ENRIQUE AND BSO           Family History:   Problem Relation Age of Onset    Heart Attack Sister     Hypertension Sister     Kidney Disease Mother     Hypertension Mother     Hypertension Father     Cancer Brother     Heart Failure Brother     Arthritis-osteo Sister     Cancer Sister     Cancer Brother         PROSTATE       Social History     Socioeconomic History    Marital status: SINGLE     Spouse name: Not on file    Number of children: Not on file    Years of education: Not on file    Highest education level: Not on file   Social Needs    Financial resource strain: Not on file    Food insecurity - worry: Not on file    Food insecurity - inability: Not on file   Mingly needs - medical: Not on file   Mingly needs - non-medical: Not on file   Occupational History    Not on file   Tobacco Use    Smoking status: Former Smoker     Packs/day: 0.50     Years: 30.00     Pack years: 15.00     Last attempt to quit: 2012     Years since quittin.9    Smokeless tobacco: Never Used   Substance and Sexual Activity    Alcohol use: No     Alcohol/week: 0.0 oz    Drug use: No    Sexual activity: No   Other Topics Concern    Not on file   Social History Narrative    Family History:  Mother:  80 yrs, High Blood PressureFather:  52 yrs, High Blood PressureSister(s): alive, High Blood    Pressure, colon cancer, type II diabetesBrother(s): , High Blood Pressure, colon cancer, type II diabetesSon(s): alive 48 yrs1 sister(s)    . 1 son(s) . Social History: Alcohol Use Patient does not use alcohol. Smoking Status Patient is a former smoker. Marital Status: Single. Lives w ith:    alone. Children: sons. Occupation/W ork: retired - for [employer]. Education/School: has highschool diploma. Anabaptism: [address] [employer].         ALLERGIES: Patient has no known allergies. Review of Systems   Constitutional: Negative for chills and fever. HENT: Negative for ear pain and sore throat. Eyes: Negative for photophobia and pain. Respiratory: Negative for cough, chest tightness and shortness of breath. Cardiovascular: Negative for chest pain and palpitations. Gastrointestinal: Negative for abdominal pain, diarrhea, nausea and vomiting. Genitourinary: Negative for dysuria and flank pain. Musculoskeletal: Negative for back pain and neck pain. Skin: Negative for rash and wound. Neurological: Negative for seizures and headaches. There were no vitals filed for this visit. Physical Exam   Constitutional: She is oriented to person, place, and time. She appears well-developed. HENT:   Head: Normocephalic and atraumatic. Pulmonary/Chest: Effort normal. No respiratory distress. Neurological: She is alert and oriented to person, place, and time. exam limited as patient began to decline care and requested to be discharged    MDM  Number of Diagnoses or Management Options  Encounter for medical screening examination:   Diagnosis management comments: Patient may have had altered mental state at home, but her is AxOx3 and refusing care. Procedures    PROGRESS NOTE:  6:12 PM  Pt refusing head CT at this time. PROGRESS NOTE:  6:28 PM  Dr. Mark Grajeda spoke at length again with patient. Pt oriented to person, place, time.  She was able to call a relative on phone, but does not want any blood tests or other examinations. She wants to be released from the hosptial now. 6:29 PM  Patient's results have been reviewed with them. Patient and/or family have verbally conveyed their understanding and agreement of the patient's signs, symptoms, diagnosis, treatment and prognosis and additionally agree to follow up as recommended or return to the Emergency Room should their condition change prior to follow-up. Discharge instructions have also been provided to the patient with some educational information regarding their diagnosis as well a list of reasons why they would want to return to the ER prior to their follow-up appointment should their condition change.

## 2018-12-27 NOTE — ED TRIAGE NOTES
Pt arrives via ems. Family member called 46. LKW 3:30pm EMS reports the family member at the home stated the patient was found on the kitchen floor babling and not acting normal. EMS reports no command following when pt was picked up.  EMS reports en route pt was A&O x4 at 6:03pm.  per ems

## 2019-01-22 ENCOUNTER — CLINICAL SUPPORT (OUTPATIENT)
Dept: INTERNAL MEDICINE CLINIC | Age: 79
End: 2019-01-22

## 2019-01-22 DIAGNOSIS — Z79.891 CHRONICALLY ON OPIATE THERAPY: ICD-10-CM

## 2019-01-22 RX ORDER — GABAPENTIN 300 MG/1
300 CAPSULE ORAL
Qty: 30 CAP | Refills: 2 | Status: SHIPPED | OUTPATIENT
Start: 2019-01-22 | End: 2019-10-09

## 2019-01-22 RX ORDER — HYDROCODONE BITARTRATE AND ACETAMINOPHEN 5; 325 MG/1; MG/1
1 TABLET ORAL
Qty: 60 TAB | Refills: 0 | Status: SHIPPED | OUTPATIENT
Start: 2019-01-22 | End: 2019-10-09 | Stop reason: SDUPTHER

## 2019-01-22 NOTE — PROGRESS NOTES
Patient come into the office today for PT/INR. Patient states that she is taking Coumadin 1mg tablet Monday -Saturday and 2mg  Sunday. Pt-27.4 INR 2.3. Per Dr. Cassie Flanagan no new changes. Return to the office in 1 month for repeat PT/INR.

## 2019-01-22 NOTE — PROGRESS NOTES
We had a discussion with the patient regarding her narcotics. She is taking Hydrocodone for pain management, specifically, joint pains. She has a headache today. We suggested we switch from Hydrocodone to Gabapentin for pain control. She does not want to do it, but reluctantly agrees. We told her that as she approaches her 79th birthday, she should not be on narcotics the rest of her life. We will, therefore, start Gabapentin 300 mg qhs. She will come back in a month for a repeat check and at that time we will increase the dose of Gabapentin. This will be the last Hydrocodone prescription that I will write and she is aware of that. We offered a pain management referral, she declines. This is a chronic problem that is not changed. Per review of available records and patients , there are not sign of overuse, misuse, diversion, or concerning side effects. Today we reviewed: the risk of overdose, addiction, and dependency  The following changes were made to the patients current treatment plan: nothing, medications refilled.

## 2019-01-26 LAB
AMPHETAMINES UR QL SCN: NEGATIVE NG/ML
BARBITURATES UR QL SCN: NEGATIVE NG/ML
BENZODIAZ UR QL SCN: NEGATIVE NG/ML
BZE UR QL SCN: NEGATIVE NG/ML
CANNABINOIDS UR QL SCN: NEGATIVE NG/ML
CREAT UR-MCNC: 155.2 MG/DL (ref 20–300)
FENTANYL+NORFENTANYL UR QL CFM: POSITIVE
MEPERIDINE UR QL: NEGATIVE NG/ML
METHADONE UR QL SCN: NEGATIVE NG/ML
OPIATES UR QL SCN: POSITIVE NG/ML
OXYCODONE+OXYMORPHONE UR QL SCN: NEGATIVE NG/ML
PCP UR QL: NEGATIVE NG/ML
PH UR: 6.8 [PH] (ref 4.5–8.9)
PLEASE NOTE:, 733157: ABNORMAL
PROPOXYPH UR QL SCN: NEGATIVE NG/ML
SP GR UR: 1.02
TRAMADOL UR QL SCN: NEGATIVE NG/ML

## 2019-02-19 ENCOUNTER — CLINICAL SUPPORT (OUTPATIENT)
Dept: INTERNAL MEDICINE CLINIC | Age: 79
End: 2019-02-19

## 2019-02-19 DIAGNOSIS — I48.91 ATRIAL FIBRILLATION, UNSPECIFIED TYPE (HCC): Primary | ICD-10-CM

## 2019-02-19 LAB
INR BLD: 2.7
PT POC: 31.9 SECONDS
VALID INTERNAL CONTROL?: YES

## 2019-02-19 NOTE — PROGRESS NOTES
Chief Complaint   Patient presents with    Coagulation disorder     Patient in office for pt/inr. Patient states that she is taking Coumadin 1 mg M-Sat and 2 mg on Sunday. Results for orders placed or performed in visit on 02/19/19   AMB POC PT/INR   Result Value Ref Range    VALID INTERNAL CONTROL POC Yes     Prothrombin time (POC) 31.9 seconds    INR POC 2.7      Per Dr. Wilfred Jimenez, no changes and return to office in one month for pt/inr check.

## 2019-03-13 ENCOUNTER — TELEPHONE (OUTPATIENT)
Dept: CARDIOLOGY CLINIC | Age: 79
End: 2019-03-13

## 2019-03-13 NOTE — TELEPHONE ENCOUNTER
Patient states she is taking Metoprolol that has been recalled and would like Dr. Maria A Guevara to prescribe a new medication. Please advise. Vero aSlinas Patient can be reached at [phone].  Thanks

## 2019-03-14 NOTE — TELEPHONE ENCOUNTER
Returned patient's call, 2 pt identifiers used      Patient was calling about Losartan not Metoprolol. Patient will check with her local pharmacy about a recall.

## 2019-03-19 ENCOUNTER — CLINICAL SUPPORT (OUTPATIENT)
Dept: INTERNAL MEDICINE CLINIC | Age: 79
End: 2019-03-19

## 2019-03-19 DIAGNOSIS — I48.91 ATRIAL FIBRILLATION, UNSPECIFIED TYPE (HCC): Primary | ICD-10-CM

## 2019-03-19 LAB
INR BLD: 2.4
PT POC: 29.3 SECONDS
VALID INTERNAL CONTROL?: YES

## 2019-03-19 NOTE — PROGRESS NOTES
Chief Complaint   Patient presents with    Coagulation disorder     Patient is here for a PT/INR check. Per patient she is taking Warfarin 2mg 1 tab on Sunday's and 1mg 1/2 tab on Mon- Ajgx-Hmo-Oymrk-Fri and Sat. Results for orders placed or performed in visit on 03/19/19   AMB POC PT/INR   Result Value Ref Range    VALID INTERNAL CONTROL POC Yes     Prothrombin time (POC) 29.3 seconds    INR POC 2.4      Per Dr. Jennifer Mohr No Adjustments and patient is to return in 1 month for a PT/INR check.

## 2019-04-15 ENCOUNTER — TELEPHONE (OUTPATIENT)
Dept: CARDIOLOGY CLINIC | Age: 79
End: 2019-04-15

## 2019-04-15 NOTE — TELEPHONE ENCOUNTER
Identifiers x 2. Informed patient that she is scheduled for echo and follow up appt on 5-16-19. Verbalized understanding.

## 2019-04-15 NOTE — TELEPHONE ENCOUNTER
Patient would like to speak to you in regards to the echo she has scheduled on 4/16/19.      Phone: [phone]

## 2019-04-23 ENCOUNTER — CLINICAL SUPPORT (OUTPATIENT)
Dept: INTERNAL MEDICINE CLINIC | Age: 79
End: 2019-04-23

## 2019-04-23 DIAGNOSIS — D68.32 WARFARIN-INDUCED COAGULOPATHY (HCC): Primary | ICD-10-CM

## 2019-04-23 DIAGNOSIS — T45.515A WARFARIN-INDUCED COAGULOPATHY (HCC): Primary | ICD-10-CM

## 2019-04-23 LAB
INR BLD: 3
PT POC: 36.5 SECONDS
VALID INTERNAL CONTROL?: YES

## 2019-04-23 NOTE — PROGRESS NOTES
Patient return to the office to for PT/INR. Chief Complaint   Patient presents with    Coagulation disorder     Results for orders placed or performed in visit on 04/23/19   AMB POC PT/INR   Result Value Ref Range    VALID INTERNAL CONTROL POC Yes     Prothrombin time (POC) 36.5 seconds    INR POC 3.0      Patient states she's taking Coumadin 2mg 1 tab on Sundays, 1/2 tab all other days per Dr Darrius Holder no adjustment needed at this time patient instructed to return to the office in 1 month for PT/INR.

## 2019-05-16 ENCOUNTER — OFFICE VISIT (OUTPATIENT)
Dept: CARDIOLOGY CLINIC | Age: 79
End: 2019-05-16

## 2019-05-16 VITALS
DIASTOLIC BLOOD PRESSURE: 80 MMHG | OXYGEN SATURATION: 98 % | WEIGHT: 129 LBS | SYSTOLIC BLOOD PRESSURE: 140 MMHG | HEART RATE: 62 BPM | BODY MASS INDEX: 21.49 KG/M2 | RESPIRATION RATE: 16 BRPM | HEIGHT: 65 IN

## 2019-05-16 DIAGNOSIS — I07.1 TRICUSPID VALVE INSUFFICIENCY, UNSPECIFIED ETIOLOGY: ICD-10-CM

## 2019-05-16 DIAGNOSIS — I10 ESSENTIAL HYPERTENSION, BENIGN: ICD-10-CM

## 2019-05-16 DIAGNOSIS — I09.9 RHEUMATIC HEART DISEASE: ICD-10-CM

## 2019-05-16 DIAGNOSIS — I25.10 ATHEROSCLEROSIS OF NATIVE CORONARY ARTERY OF NATIVE HEART WITHOUT ANGINA PECTORIS: ICD-10-CM

## 2019-05-16 DIAGNOSIS — E78.5 DYSLIPIDEMIA: ICD-10-CM

## 2019-05-16 DIAGNOSIS — I48.91 ATRIAL FIBRILLATION, UNSPECIFIED TYPE (HCC): ICD-10-CM

## 2019-05-16 DIAGNOSIS — Z95.2 S/P MVR (MITRAL VALVE REPLACEMENT): Primary | ICD-10-CM

## 2019-05-16 RX ORDER — LOSARTAN POTASSIUM 50 MG/1
50 TABLET ORAL DAILY
Qty: 90 TAB | Refills: 3 | Status: SHIPPED | OUTPATIENT
Start: 2019-05-16 | End: 2019-11-21

## 2019-05-16 RX ORDER — DILTIAZEM HYDROCHLORIDE 120 MG/1
120 CAPSULE, COATED, EXTENDED RELEASE ORAL DAILY
Qty: 90 CAP | Refills: 1 | Status: SHIPPED | OUTPATIENT
Start: 2019-05-16 | End: 2019-12-16 | Stop reason: SDUPTHER

## 2019-05-16 NOTE — PROGRESS NOTES
[facility] [place]: Chandrika Campos  [phone]    HPI: Judge Carter is a [de-identified]y.o. year-old female who presents for follow up regarding her complex valve disease, CAD, Atrial Fib and HTN. She is in a lot of pain from the back and her legs and across her upper buttocks. She wants to know if I can give her pain pills. Which I have declined to do as it is not a cardiac issue at thiS point and Dr. Ruthie Dickens has recommended against it. He tried her on gabapentin and that husain snot seem to be relieving her sx. Encouraged her to followup there and potentially to see Ortho. She has not seen them or had PT etc.   Sees them for coumadin check next week. Checking it once a month. Has been doing ok. No edema. Dr. Ruthie Dickens is now following her INR and was refilling her percocet. INR stable. EKG NSR LVH today Due for echo for MVR. Not needing the fluid pills and has stopped taking them. Weight is back down about 10 pounds. A/P:  1. CAD- 100% distal RCA disease on cath 3/11 not amenable to PCI or bypass, no exertional chest pain, continue aspirin and pravastatin  2. Rheumatic heart disease/MV disease- s/p replacement 3/12 Dr. Josi Moreira, stable, next echo 12/16  3. Afib- s/p cryomaze and ALIYAH closure at time of MVR, on Coumadin, rate controlled with Metoprolol and Diltiazem  -Aflutter and afib returned post-op, seen by Dr. Luis Orellana and offered aflutter ablation if it persisted, but gone, NSR now. Occasional palpitations now. Not bad, does not want/giorgio further treatment at this time. 4. HTN- upper normal today, cont current meds, in pain today  5. Thyroid- off meds now s/p methimazole  6. Aortic Atherosclerosis- Aspirin and Pravastatin   7. Mild PAHTN  8. Tobacco use- continue cessation, stopped completely  9. COPD - seen on CT chest, not on inhalers at this time, denies progression of dyspnea  10. Right sided chest pain/back pain/neck pain - continue Percocet PRN pain, as per Dr. Ruthie Dickens  .    Echo 5/18 EF 60% LAE, MVR normal function, gradient 7mhg., mild Tr, mild PI  10/16 EF 60%, gr3 dd, NAVI mild TR/PI  2015 Echo EF 65%, mild TR, PAP 45, mild PI, gr4dd. Echo 10/14 EF 65%, NAVI, nl functioning MVR bioprosthesis, mild-mod TR PAP 45  12/13 EF 60% mildly increased MV gradient, no MR. Echo 2/12: EF55% mildCLVH, mod MR, sev MS 4.5/15 peak gradient, LAE, mild AS, mild PAHTN, mod Tr  Echo 7/10 EF 55%, gr1DD, mod AS, JUAN 1.1, Mod MS, mild TR, mild paHTN PA 40  Severe MR cath 4/10, with mild CAD    Fhx 2bro w CHF, +Dm +HTN  Soc quit tobacco    She  has a past medical history of AF (atrial fibrillation) ([facility] [address] Anemia, Arthritis, Blunt head trauma (03/13/2017), Chronic pain, Chronically on opiate therapy, Coagulation defects, Diastolic dysfunction, Dyslipidemia, Encounter for Hemoccult screening (06/22/2016), Fall (03/13/2017), H/O colonoscopy (2007), Hand pain, right, Heart failure ([address] Meniscus degeneration, right (07/13/2017), Prediabetes, PUD (peptic ulcer disease), Rheumatic fever, S/P MVR (mitral valve replacement), Shoulder pain, right, SOB (shortness of breath), Thyroid disease, Venous insufficiency of both lower extremities (01/17/2017), and Warfarin-induced coagulopathy ([address] Cardiovascular ROS: positive for chest pain, negative for progression of dyspnea or palpitations   Respiratory ROS: positive for - shortness of breath  Neurological ROS: no TIA or stroke symptoms  All other systems negative except as above. PE  Vitals:    05/16/19 1149   BP: 140/80   Pulse: 62   Resp: 16   SpO2: 98%   Weight: 129 lb (58.5 kg)   Height: 5' 5\" (1.651 m)    Body mass index is 21.47 kg/m².    General appearance - alert, well appearing, and in no distress  Mental status - affect appropriate to mood  Eyes - sclera anicteric, moist mucous membranes  Neck - supple, no significant adenopathy  Lymphatics - no  lymphadenopathy  Chest - clear to auscultation, no wheezes, rales or rhonchi  Heart - normal rate, regular rhythm, normal S1, S2, 2/6 BUTCH   Abdomen - soft, nontender, nondistended, no masses or organomegaly  Back exam - full range of motion, no tenderness  Neurological - cranial nerves II through XII grossly intact, no focal deficit  Musculoskeletal - no muscular tenderness noted, normal strength  Extremities - peripheral pulses normal, no pedal edema  Skin - normal coloration  no rashes    12 lead ECG: NSR 60bpm    Recent Labs:  Lab Results   Component Value Date/Time    Cholesterol, total 145 02/28/2018 02:14 PM     No results found for: NINA  Lab Results   Component Value Date/Time    BUN 18 07/10/2018 11:36 AM     Lab Results   Component Value Date/Time    Potassium 4.6 07/10/2018 11:36 AM     Lab Results   Component Value Date/Time    Hemoglobin A1c 5.5 02/28/2018 02:14 PM     No components found for: HGBI,  IHGB,  HGB,  HGBP,  WBHGB  Lab Results   Component Value Date/Time    PLATELET 760 90/53/2164 11:36 AM       Reviewed:  Past Medical History:   Diagnosis Date    AF (atrial fibrillation) (HCC)     RHEUMATIC FEVER AS CHILD, AFIB    Anemia     Arthritis     Blunt head trauma 03/13/2017    Chronic pain     Chronically on opiate therapy     Coagulation defects     COUMADIN    Diastolic dysfunction     grade 4    Dyslipidemia     Encounter for Hemoccult screening 06/22/2016    Fall 03/13/2017    H/O colonoscopy 2007    Hand pain, right     Heart failure (HCC)     Meniscus degeneration, right 07/13/2017    Extensive patellar chondral derangement as well as chondral derangement     Prediabetes     PUD (peptic ulcer disease)     Rheumatic fever     S/P MVR (mitral valve replacement)     Shoulder pain, right     SOB (shortness of breath)     Thyroid disease     Venous insufficiency of both lower extremities 01/17/2017    Warfarin-induced coagulopathy (HCC)      Social History     Tobacco Use   Smoking Status Former Smoker    Packs/day: 0.50    Years: 30.00    Pack years: 15.00    Last attempt to quit: 1/20/2012  Years since quittin.3   Smokeless Tobacco Never Used     Social History     Substance and Sexual Activity   Alcohol Use No    Alcohol/week: 0.0 oz     No Known Allergies    Current Outpatient Medications   Medication Sig    CARTIA  mg ER capsule TAKE ONE CAPSULE BY MOUTH DAILY    metoprolol tartrate (LOPRESSOR) 25 mg tablet TAKE 1/2 TABLET BY MOUTH TWICE DAILY    warfarin (COUMADIN) 2 mg tablet 2 mg sun and thurs and  1 md janessa bacon    furosemide (LASIX) 20 mg tablet Take 1 Tab by mouth daily.  pravastatin (PRAVACHOL) 20 mg tablet TAKE ONE TABLET BY MOUTH EVERY EVENING    losartan (COZAAR) 50 mg tablet Take 1 Tab by mouth daily.  aspirin 81 mg chewable tablet Take 81 mg by mouth daily.  HYDROcodone-acetaminophen (NORCO) 5-325 mg per tablet Take 1 Tab by mouth every six (6) hours as needed for Pain. Max Daily Amount: 4 Tabs.  gabapentin (NEURONTIN) 300 mg capsule Take 1 Cap by mouth nightly.  predniSONE (DELTASONE) 10 mg tablet Take 2 Tabs by mouth daily. For 7 days then 10 mg daily x 7 days    triamcinolone acetonide (KENALOG) 0.1 % topical cream Apply  to affected area two (2) times a day. No current facility-administered medications for this visit.         Tan Talbot MD  [facility] and [facility] [place]  [address] [facility] [address]  [address]

## 2019-05-21 ENCOUNTER — CLINICAL SUPPORT (OUTPATIENT)
Dept: INTERNAL MEDICINE CLINIC | Age: 79
End: 2019-05-21

## 2019-05-21 DIAGNOSIS — I48.91 ATRIAL FIBRILLATION, UNSPECIFIED TYPE (HCC): Primary | ICD-10-CM

## 2019-06-25 RX ORDER — PRAVASTATIN SODIUM 20 MG/1
TABLET ORAL
Qty: 90 TAB | Refills: 8 | Status: SHIPPED | OUTPATIENT
Start: 2019-06-25 | End: 2019-12-03 | Stop reason: SDUPTHER

## 2019-06-26 ENCOUNTER — TELEPHONE (OUTPATIENT)
Dept: INTERNAL MEDICINE CLINIC | Age: 79
End: 2019-06-26

## 2019-06-26 ENCOUNTER — CLINICAL SUPPORT (OUTPATIENT)
Dept: INTERNAL MEDICINE CLINIC | Age: 79
End: 2019-06-26

## 2019-06-26 DIAGNOSIS — Z51.81 ENCOUNTER FOR MONITORING COUMADIN THERAPY: Primary | ICD-10-CM

## 2019-06-26 DIAGNOSIS — Z79.01 ENCOUNTER FOR MONITORING COUMADIN THERAPY: Primary | ICD-10-CM

## 2019-06-26 LAB
INR BLD: 2.3
PT POC: 27.7 SECONDS
VALID INTERNAL CONTROL?: YES

## 2019-06-27 NOTE — PROGRESS NOTES
Chief Complaint   Patient presents with    Coagulation disorder     Patient is here for a PT/INR check. Per Patient she is taking Warfarin 2mg. Patient stated that she is taking 1/2 tab on Monday-Saturday and 1 tab on Sunday. Results for orders placed or performed in visit on 06/26/19   AMB POC PT/INR   Result Value Ref Range    VALID INTERNAL CONTROL POC Yes     Prothrombin time (POC) 27.7 seconds    INR POC 2.3      Per Dr. Tyler Botello no Adjustments and Patient is to return in 1 month for another PT/INR check.

## 2019-07-15 RX ORDER — WARFARIN 2 MG/1
TABLET ORAL
Qty: 90 TAB | Refills: 0 | Status: SHIPPED | OUTPATIENT
Start: 2019-07-15 | End: 2019-12-03 | Stop reason: SDUPTHER

## 2019-07-25 ENCOUNTER — CLINICAL SUPPORT (OUTPATIENT)
Dept: INTERNAL MEDICINE CLINIC | Age: 79
End: 2019-07-25

## 2019-07-25 DIAGNOSIS — I48.91 ATRIAL FIBRILLATION, UNSPECIFIED TYPE (HCC): Primary | ICD-10-CM

## 2019-07-25 LAB
INR BLD: 2.9
PT POC: 34.7 SECONDS
VALID INTERNAL CONTROL?: YES

## 2019-07-25 NOTE — PROGRESS NOTES
Pt came in today for a PT/INR check. Pt states she is taking   Coumadin 2 mg   1/2 tab PO Monday-Saturday  1 tab PO Sunday  PT-34.7  INR-2.9  Per Dr. Lei Mcgovern pt is to start taking Coumadin 2 mg 1/2 tab PO daily and return in 1 month for another PT/INR check.     Sabine Choi

## 2019-08-27 ENCOUNTER — CLINICAL SUPPORT (OUTPATIENT)
Dept: INTERNAL MEDICINE CLINIC | Age: 79
End: 2019-08-27

## 2019-08-27 DIAGNOSIS — I48.91 ATRIAL FIBRILLATION, UNSPECIFIED TYPE (HCC): Primary | ICD-10-CM

## 2019-08-27 LAB
INR BLD: 2.2
PT POC: 26.8 SECONDS
VALID INTERNAL CONTROL?: YES

## 2019-08-27 NOTE — PROGRESS NOTES
Norris Gar is a [de-identified] y.o. female who presents today for Anticoagulation monitoring. Current dose:  Coumadin 2 mg 1/2 tab PO daily. Medication Changes:  no  Dietary Changes:  no    Latest INR:  Results for orders placed or performed in visit on 08/27/19   AMB POC PT/INR   Result Value Ref Range    VALID INTERNAL CONTROL POC Yes     Prothrombin time (POC) 26.8 seconds    INR POC 2.2          New Coumadin dose:.current treatment plan is effective, no change in therapy. Next check to be scheduled for  4 weeks.   Per Dr. Raul Ward, EUNICEN

## 2019-09-24 ENCOUNTER — CLINICAL SUPPORT (OUTPATIENT)
Dept: INTERNAL MEDICINE CLINIC | Age: 79
End: 2019-09-24

## 2019-09-24 DIAGNOSIS — Z79.01 ENCOUNTER FOR MONITORING COUMADIN THERAPY: Primary | ICD-10-CM

## 2019-09-24 DIAGNOSIS — Z51.81 ENCOUNTER FOR MONITORING COUMADIN THERAPY: Primary | ICD-10-CM

## 2019-09-24 RX ORDER — FUROSEMIDE 20 MG/1
20 TABLET ORAL DAILY
Qty: 30 TAB | Refills: 3 | Status: SHIPPED | OUTPATIENT
Start: 2019-09-24 | End: 2020-07-07 | Stop reason: SDUPTHER

## 2019-09-24 NOTE — PROGRESS NOTES
Chief Complaint   Patient presents with    Coagulation disorder     Patient was here for a PT/INR check. Per patient she is taking Warfarin 2mg. Patient stated that she was taking 1/2 tab 7 days a week. Results for orders placed or performed in visit on 08/27/19   AMB POC PT/INR   Result Value Ref Range    VALID INTERNAL CONTROL POC Yes     Prothrombin time (POC) 26.8 seconds    INR POC 2.2       Per Dr. Babcock Meter Patient is to start taking 1 tab Sunday and Thursday and 1/2 tab all the other days and return in 2 weeks for a PT check.

## 2019-10-09 ENCOUNTER — OFFICE VISIT (OUTPATIENT)
Dept: INTERNAL MEDICINE CLINIC | Age: 79
End: 2019-10-09

## 2019-10-09 VITALS
OXYGEN SATURATION: 98 % | HEIGHT: 65 IN | DIASTOLIC BLOOD PRESSURE: 75 MMHG | TEMPERATURE: 98 F | HEART RATE: 65 BPM | RESPIRATION RATE: 16 BRPM | SYSTOLIC BLOOD PRESSURE: 155 MMHG | BODY MASS INDEX: 21.06 KG/M2 | WEIGHT: 126.4 LBS

## 2019-10-09 DIAGNOSIS — Z86.79 S/P MAZE OPERATION FOR ATRIAL FIBRILLATION: ICD-10-CM

## 2019-10-09 DIAGNOSIS — Z79.891 CHRONIC PRESCRIPTION OPIATE USE: ICD-10-CM

## 2019-10-09 DIAGNOSIS — D68.32 WARFARIN-INDUCED COAGULOPATHY (HCC): ICD-10-CM

## 2019-10-09 DIAGNOSIS — Z51.81 ENCOUNTER FOR MONITORING COUMADIN THERAPY: ICD-10-CM

## 2019-10-09 DIAGNOSIS — I10 ESSENTIAL HYPERTENSION, BENIGN: ICD-10-CM

## 2019-10-09 DIAGNOSIS — I27.20 PULMONARY HYPERTENSION (HCC): ICD-10-CM

## 2019-10-09 DIAGNOSIS — I87.2 VENOUS INSUFFICIENCY OF BOTH LOWER EXTREMITIES: ICD-10-CM

## 2019-10-09 DIAGNOSIS — I51.89 DIASTOLIC DYSFUNCTION: ICD-10-CM

## 2019-10-09 DIAGNOSIS — Z13.39 SCREENING FOR ALCOHOLISM: ICD-10-CM

## 2019-10-09 DIAGNOSIS — Z79.01 ENCOUNTER FOR MONITORING COUMADIN THERAPY: ICD-10-CM

## 2019-10-09 DIAGNOSIS — Z98.890 S/P MAZE OPERATION FOR ATRIAL FIBRILLATION: ICD-10-CM

## 2019-10-09 DIAGNOSIS — Z12.31 ENCOUNTER FOR SCREENING MAMMOGRAM FOR MALIGNANT NEOPLASM OF BREAST: ICD-10-CM

## 2019-10-09 DIAGNOSIS — T45.515A WARFARIN-INDUCED COAGULOPATHY (HCC): ICD-10-CM

## 2019-10-09 DIAGNOSIS — R79.9 ABNORMAL FINDING OF BLOOD CHEMISTRY, UNSPECIFIED: ICD-10-CM

## 2019-10-09 DIAGNOSIS — Z13.31 SCREENING FOR DEPRESSION: ICD-10-CM

## 2019-10-09 DIAGNOSIS — Z23 ENCOUNTER FOR IMMUNIZATION: ICD-10-CM

## 2019-10-09 DIAGNOSIS — Z95.2 S/P MVR (MITRAL VALVE REPLACEMENT): ICD-10-CM

## 2019-10-09 DIAGNOSIS — I09.9 RHEUMATIC HEART DISEASE: ICD-10-CM

## 2019-10-09 DIAGNOSIS — Z00.00 MEDICARE ANNUAL WELLNESS VISIT, SUBSEQUENT: Primary | ICD-10-CM

## 2019-10-09 DIAGNOSIS — M19.90 ARTHRITIS: ICD-10-CM

## 2019-10-09 DIAGNOSIS — G89.29 OTHER CHRONIC PAIN: ICD-10-CM

## 2019-10-09 DIAGNOSIS — Z79.891 CHRONICALLY ON OPIATE THERAPY: ICD-10-CM

## 2019-10-09 LAB
INR BLD: 3.2
PT POC: 38.3 SECONDS
VALID INTERNAL CONTROL?: YES

## 2019-10-09 RX ORDER — HYDROCODONE BITARTRATE AND ACETAMINOPHEN 5; 325 MG/1; MG/1
1 TABLET ORAL
Qty: 60 TAB | Refills: 0 | Status: SHIPPED | OUTPATIENT
Start: 2019-10-09 | End: 2019-11-08

## 2019-10-09 NOTE — PROGRESS NOTES
SPORTS MEDICINE AND PRIMARY CARE  Eb Pablo MD, [address] [address]  Phone:  [phone]  Fax: [phone]      Chief Complaint   Patient presents with    Annual Wellness Visit         SUBECTIVE:    Dolores Ramey is a [de-identified] y.o. female Patient returns today with known history of mitral valve replacement, on Coumadin, status post MAZE procedure for atrial fibrillation, venous insufficiency, rheumatic heart disease, pulmonary hypertension, primary hypertension, diastolic dysfunction, chronic pain, arthritis, and is seen for evaluation. Patient returns today concerned about her right knee. The Gabapentin was not effective. She still is having a lot of pain. She would like to go back to the Hydrocodone for pain. She is concerned that [facility] keeps sending nurses to her house for no apparent reason. Other new complaints denied and patient is seen for evaluation. Current Outpatient Medications   Medication Sig Dispense Refill    HYDROcodone-acetaminophen (NORCO) 5-325 mg per tablet Take 1 Tab by mouth every six (6) hours as needed for Pain for up to 30 days. Max Daily Amount: 4 Tabs. 60 Tab 0    furosemide (LASIX) 20 mg tablet Take 1 Tab by mouth daily. 30 Tab 3    warfarin (COUMADIN) 2 mg tablet TAKE ONE TABLET BY MOUTH ON SUNDAY AND THURSDAY, AND TAKE ONE-HALF TABLET BY MOUTH DAILY ON MONDAY, TUESDAY, WEDNESDAY, FRIDAY, AND SATURDAY 90 Tab 0    pravastatin (PRAVACHOL) 20 mg tablet TAKE ONE TABLET BY MOUTH EVERY EVENING 90 Tab 8    losartan (COZAAR) 50 mg tablet Take 1 Tab by mouth daily. 90 Tab 3    dilTIAZem CD (CARTIA XT) 120 mg ER capsule Take 1 Cap by mouth daily. 90 Cap 1    metoprolol tartrate (LOPRESSOR) 25 mg tablet TAKE 1/2 TABLET BY MOUTH TWICE DAILY 90 Tab 3    aspirin 81 mg chewable tablet Take 81 mg by mouth daily.  triamcinolone acetonide (KENALOG) 0.1 % topical cream Apply  to affected area two (2) times a day.  45 g 11     Past Medical History:   Diagnosis Date    AF (atrial fibrillation) (HCC)     RHEUMATIC FEVER AS CHILD, AFIB    Anemia     Arthritis     Blunt head trauma 2017    Chronic pain     Chronically on opiate therapy     Coagulation defects     COUMADIN    Diastolic dysfunction     grade 4    Dyslipidemia     Encounter for Hemoccult screening 2016    Fall 2017    H/O colonoscopy     Hand pain, right     Heart failure (HCC)     Meniscus degeneration, right 2017    Extensive patellar chondral derangement as well as chondral derangement     Prediabetes     PUD (peptic ulcer disease)     Rheumatic fever     S/P MVR (mitral valve replacement)     Shoulder pain, right     SOB (shortness of breath)     Thyroid disease     Venous insufficiency of both lower extremities 2017    Warfarin-induced coagulopathy ([facility] [address]      Past Surgical History:   Procedure Laterality Date    HX CATARACT REMOVAL      BILATERAL    HX  SECTION      HX COLONOSCOPY      HX HEART CATHETERIZATION      HX HYSTERECTOMY      HX MITRAL VALVE REPLACEMENT      HX LUIS ENRIQUE AND BSO       No Known Allergies    REVIEW OF SYSTEMS:   No chest pain, no shortness of breath. Social History     Socioeconomic History    Marital status: SINGLE     Spouse name: Not on file    Number of children: Not on file    Years of education: Not on file    Highest education level: Not on file   Tobacco Use    Smoking status: Former Smoker     Packs/day: 0.50     Years: 30.00     Pack years: 15.00     Last attempt to quit: 2012     Years since quittin.7    Smokeless tobacco: Never Used   Substance and Sexual Activity    Alcohol use: No     Alcohol/week: 0.0 standard drinks    Drug use: No    Sexual activity: Not Currently   Social History Narrative    Family History:  Mother:  80 yrs, High Blood PressureFather:  52 yrs, High Blood PressureSister(s): alive, High Blood    Pressure, colon cancer, type II diabetesBrother(s): , High Blood Pressure, colon cancer, type II diabetesSon(s): alive 48 yrs1 sister(s)    . 1 son(s) . Social History: Alcohol Use Patient does not use alcohol. Smoking Status Patient is a former smoker. Marital Status: Single. Lives w ith:    alone. Children: sons. Occupation/W ork: retired - for [employer]. Education/School: has highschool diploma. [employer]: [address] [employer].   r  Family History   Problem Relation Age of Onset    Heart Attack Sister     Hypertension Sister     Kidney Disease Mother     Hypertension Mother     Hypertension Father     Cancer Brother     Heart Failure Brother     Arthritis-osteo Sister     Cancer Sister     Cancer Brother         PROSTATE       OBJECTIVE:  Visit Vitals  /75   Pulse 65   Temp 98 °F (36.7 °C) (Oral)   Resp 16   Ht 5' 5\" (1.651 m)   Wt 126 lb 6.4 oz (57.3 kg)   SpO2 98%   BMI 21.03 kg/m²     ENT: perrla,  eom intact  NECK: supple. Thyroid normal  CHEST: clear to ascultation and percussion   HEART: regular rate and rhythm  ABD: soft, bowel sounds active  EXTREMITIES: no edema, pulse 1+     Office Visit on 10/09/2019   Component Date Value Ref Range Status    VALID INTERNAL CONTROL POC 10/09/2019 Yes   Final    Prothrombin time (POC) 10/09/2019 38.3  seconds Final    INR POC 10/09/2019 3.2   Final   Clinical Support on 2019   Component Date Value Ref Range Status    VALID INTERNAL CONTROL POC 2019 Yes   Final    Prothrombin time (POC) 2019 26.8  seconds Final    INR POC 2019 2.2   Final   Clinical Support on 2019   Component Date Value Ref Range Status    VALID INTERNAL CONTROL POC 2019 Yes   Final    Prothrombin time (POC) 2019 34.7  seconds Final    INR POC 2019 2.9   Final          ASSESSMENT:  1. Medicare annual wellness visit, subsequent    2. Encounter for monitoring Coumadin therapy    3. Encounter for immunization    4. Screening for alcoholism    5. Screening for depression    6. S/P MVR (mitral valve replacement)    7. S/P Maze operation for atrial fibrillation    8. Venous insufficiency of both lower extremities    9. Warfarin-induced coagulopathy ([facility] [address]    10. Rheumatic heart disease    11. Pulmonary hypertension ([facility] [address]    12. Essential hypertension, benign    13. Diastolic dysfunction    14. Other chronic pain    15. Arthritis    16. Chronically on opiate therapy    17. Chronic prescription opiate use    18. Abnormal finding of blood chemistry, unspecified     19. Encounter for screening mammogram for malignant neoplasm of breast       Patient's INR is at the upper limits of normal for therapeutic. Will ask her to take 1 mg daily and 2 mg on Sundays. She got her flu shot today. We also encouraged her to get her Shingrix. Venous insufficiency of lower extremities is stable. BP control is less than ideal.  She was rushing today, she thinks that is the reason for the BP elevation. Will check the BP on her next visit. If it remains elevated an adjustment will be made. She has a known history of diastolic dysfunction, for which she is completely compensated. She has chronic pain related to the DJD in her right knee, which is probably advanced, but she does not want to have a knee replacement. She will be back in three weeks for a PT check. She will see me in three months. We discussed advanced care planning with her. She does want resuscitation, but she does not want to be left a vegetable, she does not want PEG feedings if that should become necessary. I have discussed the diagnosis with the patient and the intended plan as seen in the  orders above. The patient understands and agees with the plan. The patient has   received an after visit summary and questions were answered concerning  future plans  Patient labs and/or xrays were reviewed  Past records were reviewed.     PLAN:  .  Orders Placed This Encounter    Depression Screen Annual    [facility] MAMMO BI SCREENING INCL CAD    Influenza virus vaccine (FLUZONE HIGH-DOSE) 65 years and older    Pneumococcal conjugate 13 valent, IM (PREVNAR-13)    URINALYSIS W/ RFLX MICROSCOPIC    CBC WITH AUTOMATED DIFF    METABOLIC PANEL, COMPREHENSIVE    LIPID PANEL    TSH 3RD GENERATION    HEMOGLOBIN A1C WITH EAG    PAIN MGMT PANEL W/REFL, UR    AMB POC PT/INR    HYDROcodone-acetaminophen (NORCO) 5-325 mg per tablet       Follow-up and Dispositions    · Return in about 3 weeks (around 10/30/2019) for bp check, pt/inr. ATTENTION:   This medical record was transcribed using an electronic medical records system. Although proofread, it may and can contain electronic and spelling errors. Other human spelling and other errors may be present. Corrections may be executed at a later time. Please feel free to contact us for any clarifications as needed.

## 2019-10-09 NOTE — PROGRESS NOTES
1. Have you been to the ER, urgent care clinic since your last visit? Hospitalized since your last visit? No    2. Have you seen or consulted any other health care providers outside of the [address] [place] [address] since your last visit? Include any pap smears or colon screening. No  This is the Subsequent Medicare Annual Wellness Exam, performed 12 months or more after the Initial AWV or the last Subsequent AWV    I have reviewed the patient's medical history in detail and updated the computerized patient record. History     Past Medical History:   Diagnosis Date    AF (atrial fibrillation) (HCC)     RHEUMATIC FEVER AS CHILD, AFIB    Anemia     Arthritis     Blunt head trauma 2017    Chronic pain     Chronically on opiate therapy     Coagulation defects     COUMADIN    Diastolic dysfunction     grade 4    Dyslipidemia     Encounter for Hemoccult screening 2016    Fall 2017    H/O colonoscopy     Hand pain, right     Heart failure (HCC)     Meniscus degeneration, right 2017    Extensive patellar chondral derangement as well as chondral derangement     Prediabetes     PUD (peptic ulcer disease)     Rheumatic fever     S/P MVR (mitral valve replacement)     Shoulder pain, right     SOB (shortness of breath)     Thyroid disease     Venous insufficiency of both lower extremities 2017    Warfarin-induced coagulopathy ([facility]       Past Surgical History:   Procedure Laterality Date    HX CATARACT REMOVAL      BILATERAL    HX  SECTION      HX COLONOSCOPY      HX HEART CATHETERIZATION      HX HYSTERECTOMY      HX MITRAL VALVE REPLACEMENT      HX LUIS ENRIQUE AND BSO       Current Outpatient Medications   Medication Sig Dispense Refill    furosemide (LASIX) 20 mg tablet Take 1 Tab by mouth daily.  30 Tab 3    warfarin (COUMADIN) 2 mg tablet TAKE ONE TABLET BY MOUTH ON  AND THURSDAY, AND TAKE ONE-HALF TABLET BY MOUTH DAILY ON MONDAY, TUESDAY, WEDNESDAY, FRIDAY, AND SATURDAY 90 Tab 0    pravastatin (PRAVACHOL) 20 mg tablet TAKE ONE TABLET BY MOUTH EVERY EVENING 90 Tab 8    losartan (COZAAR) 50 mg tablet Take 1 Tab by mouth daily. 90 Tab 3    dilTIAZem CD (CARTIA XT) 120 mg ER capsule Take 1 Cap by mouth daily. 90 Cap 1    metoprolol tartrate (LOPRESSOR) 25 mg tablet TAKE 1/2 TABLET BY MOUTH TWICE DAILY 90 Tab 3    aspirin 81 mg chewable tablet Take 81 mg by mouth daily.  HYDROcodone-acetaminophen (NORCO) 5-325 mg per tablet Take 1 Tab by mouth every six (6) hours as needed for Pain. Max Daily Amount: 4 Tabs. 60 Tab 0    gabapentin (NEURONTIN) 300 mg capsule Take 1 Cap by mouth nightly. 30 Cap 2    predniSONE (DELTASONE) 10 mg tablet Take 2 Tabs by mouth daily. For 7 days then 10 mg daily x 7 days 30 Tab 1    triamcinolone acetonide (KENALOG) 0.1 % topical cream Apply  to affected area two (2) times a day.  45 g 11     No Known Allergies  Family History   Problem Relation Age of Onset    Heart Attack Sister     Hypertension Sister     Kidney Disease Mother     Hypertension Mother     Hypertension Father     Cancer Brother     Heart Failure Brother     Arthritis-osteo Sister     Cancer Sister     Cancer Brother         PROSTATE     Social History     Tobacco Use    Smoking status: Former Smoker     Packs/day: 0.50     Years: 30.00     Pack years: 15.00     Last attempt to quit: 2012     Years since quittin.7    Smokeless tobacco: Never Used   Substance Use Topics    Alcohol use: No     Alcohol/week: 0.0 standard drinks     Patient Active Problem List   Diagnosis Code    Chest pain R07.9    Other dyspnea and respiratory abnormality R06.09, R09.89    Mitral valve disorder I05.9    Palpitations R00.2    Atrial fibrillation (HCC) I48.91    Essential hypertension, benign I10    Hypothyroid E03.9    Tricuspid regurgitation I07.1    Rheumatic heart disease I09.9    S/P MVR (mitral valve replacement) Z95.2    S/P Maze operation for atrial fibrillation Z98.890, Z86.79    Anemia associated with acute blood loss D62    Atrial flutter with controlled response ([facility]) I48.92    Aortic valve disorders I35.9    Pulmonary hypertension ([facility]) I27.20    Neck pain M54.2    Coronary atherosclerosis of native coronary artery I25.10    Warfarin-induced coagulopathy ([facility]) D68.32, T45.515A    Arthritis M19.90    Prediabetes R73.03    Arrhythmia I49.9    PUD (peptic ulcer disease) K27.9    Anemia D64.9    Thyroid disease E07.9    Rheumatic fever I00    AF (atrial fibrillation) ([facility]) I48.91    H/O colonoscopy Z98.890    Chronic pain G89.29    Shoulder pain, right M25.511    Hand pain, right M79.641    ACP (advance care planning) Z71.89    SOB (shortness of breath) K59.35    Diastolic dysfunction C81.76    Venous insufficiency of both lower extremities I87.2    Chronic deep vein thrombosis (DVT) of femoral vein of right lower extremity ([facility]) I82.511    Fall W19. XXXA    Blunt head trauma S09. 8XXA    Chronically on opiate therapy Z79.891    Dyslipidemia E78.5    Meniscus degeneration, right M23.306       Depression Risk Factor Screening:     3 most recent PHQ Screens 10/9/2019   PHQ Not Done -   Little interest or pleasure in doing things Not at all   Feeling down, depressed, irritable, or hopeless Not at all   Total Score PHQ 2 0     Alcohol Risk Factor Screening: You do not drink alcohol or very rarely. Functional Ability and Level of Safety:   Hearing Loss  Hearing is good. Activities of Daily Living  The home contains: no safety equipment. Patient does total self care    Fall Risk  Fall Risk Assessment, last 12 mths 10/9/2019   Able to walk? Yes   Fall in past 12 months?  No   Fall with injury? -   Number of falls in past 12 months -   Fall Risk Score -       Abuse Screen  Patient is not abused    Cognitive Screening   Evaluation of Cognitive Function:  Has your family/caregiver stated any concerns about your memory: no  Normal    Patient Care Team   Patient Care Team:  Ryan Hogan MD as PCP - General (Internal Medicine)  Fanny Alarcon MD as Referring Provider (Cardiology)  Edna Shoemaker MD (Cardiology)    Assessment/Plan   Education and counseling provided:  Are appropriate based on today's review and evaluation       Diagnoses and all orders for this visit:    1. Encounter for monitoring Coumadin therapy  -     AMB POC PT/INR    2.  Encounter for immunization  -     INFLUENZA VIRUS VACCINE, HIGH DOSE SEASONAL, PRESERVATIVE FREE  -     PNEUMOCOCCAL CONJ VACCINE 13 VALENT IM  -     TX IMMUNIZ ADMIN,1 SINGLE/COMB VAC/TOXOID        Health Maintenance Due   Topic Date Due    GLAUCOMA SCREENING Q2Y  04/21/2017    MEDICARE YEARLY EXAM  09/02/2018    Pneumococcal 65+ years (2 of 2 - PCV13) 12/12/2018    Influenza Age 9 to Adult  08/01/2019

## 2019-10-09 NOTE — PATIENT INSTRUCTIONS
Vaccine Information Statement Influenza (Flu) Vaccine (Inactivated or Recombinant): What You Need to Know Many Vaccine Information Statements are available in Yakut and other languages. See www.immunize.org/vis Hojas de información sobre vacunas están disponibles en español y en muchos otros idiomas. Visite www.immunize.org/vis 1. Why get vaccinated? Influenza vaccine can prevent influenza (flu). Flu is a contagious disease that spreads around the [facility] every year, usually between October and May. Anyone can get the flu, but it is more dangerous for some people. Infants and young children, people 72years of age and older, pregnant women, and people with certain health conditions or a weakened immune system are at greatest risk of flu complications. Pneumonia, bronchitis, sinus infections and ear infections are examples of flu-related complications. If you have a medical condition, such as heart disease, cancer or diabetes, flu can make it worse. Flu can cause fever and chills, sore throat, muscle aches, fatigue, cough, headache, and runny or stuffy nose. Some people may have vomiting and diarrhea, though this is more common in children than adults. Each year thousands of people in the [facility] die from flu, and many more are hospitalized. Flu vaccine prevents millions of illnesses and flu-related visits to the doctor each year. 2. Influenza vaccines CDC recommends everyone 10months of age and older get vaccinated every flu season. Children 6 months through 6years of age may need 2 doses during a single flu season. Everyone else needs only 1 dose each flu season. It takes about 2 weeks for protection to develop after vaccination. There are many flu viruses, and they are always changing. Each year a new flu vaccine is made to protect against three or four viruses that are likely to cause disease in the upcoming flu season.  Even when the vaccine doesnt exactly match these viruses, it may still provide some protection. Influenza vaccine does not cause flu. Influenza vaccine may be given at the same time as other vaccines. 3. Talk with your health care provider Tell your vaccine provider if the person getting the vaccine: 
 Has had an allergic reaction after a previous dose of influenza vaccine, or has any severe, life-threatening allergies.  Has ever had Guillain-Barré Syndrome (also called GBS). In some cases, your health care provider may decide to postpone influenza vaccination to a future visit. People with minor illnesses, such as a cold, may be vaccinated. People who are moderately or severely ill should usually wait until they recover before getting influenza vaccine. Your health care provider can give you more information. 4. Risks of a reaction  Soreness, redness, and swelling where shot is given, fever, muscle aches, and headache can happen after influenza vaccine.  There may be a very small increased risk of Guillain-Barré Syndrome (GBS) after inactivated influenza vaccine (the flu shot). [facility] children who get the flu shot along with pneumococcal vaccine (PCV13), and/or DTaP vaccine at the same time might be slightly more likely to have a seizure caused by fever. Tell your health care provider if a child who is getting flu vaccine has ever had a seizure. People sometimes faint after medical procedures, including vaccination. Tell your provider if you feel dizzy or have vision changes or ringing in the ears. As with any medicine, there is a very remote chance of a vaccine causing a severe allergic reaction, other serious injury, or death. 5. What if there is a serious problem? An allergic reaction could occur after the vaccinated person leaves the clinic.  If you see signs of a severe allergic reaction (hives, swelling of the face and throat, difficulty breathing, a fast heartbeat, dizziness, or weakness), call 9-1-1 and get the person to the nearest hospital. 
 
For other signs that concern you, call your health care provider. Adverse reactions should be reported to the Vaccine Adverse Event Reporting System (VAERS). Your health care provider will usually file this report, or you can do it yourself. Visit the VAERS website at www.vaers. hhs.gov or call 4-886.388.6249. VAERS is only for reporting reactions, and VAERS staff do not give medical advice. 6. The National Vaccine Injury Compensation Program 
 
The [facility] Vaccine Injury Compensation Program (VICP) is a federal program that was created to compensate people who may have been injured by certain vaccines. Visit the VICP website at www.[facility].gov/vaccinecompensation or call 2-976.465.3243 to learn about the program and about filing a claim. There is a time limit to file a claim for compensation. 7. How can I learn more?  Ask your health care provider.  Call your local or state health department.  Contact the Centers for Disease Control and Prevention (CDC): 
- Call 4-393.443.4999 (1-800-CDC-INFO) or 
- Visit CDCs influenza website at www.cdc.gov/flu Vaccine Information Statement (Interim) Inactivated Influenza Vaccine 8/15/2019 
[address] [place]-87 Department of [facility] and [employer] Centers for Disease Control and Prevention Office Use Only Vaccine Information Statement Pneumococcal Conjugate Vaccine (PCV13): What You Need to Know Many Vaccine Information Statements are available in Khmer and other languages. See www.immunize.org/vis. Hojas de información Sobre Vacunas están disponibles en español y en muchos otros idiomas. Visite www.immunize.org/vis. 1. Why get vaccinated? Vaccination can protect both children and adults from pneumococcal disease. Pneumococcal disease is caused by bacteria that can spread from person to person through close contact.   It can cause ear infections, and it can also lead to more serious infections of the: 
 Lungs (pneumonia),  Blood (bacteremia), and 
 Covering of the brain and spinal cord (meningitis). Pneumococcal pneumonia is most common among adults. Pneumococcal meningitis can cause deafness and brain damage, and it kills about 1 child in 10 who get it. Anyone can get pneumococcal disease, but children under 3years of age and adults 72 years and older, people with certain medical conditions, and cigarette smokers are at the highest risk. Before there was a vaccine, the [facility] saw: 
 more than 700 cases of meningitis, 
 about 13,000 blood infections, 
 about 5 million ear infections, and 
 about 200 deaths 
in children under 5 each year from pneumococcal disease. Since vaccine became available, severe pneumococcal disease in these children has fallen by 88%. About 18,000 older adults die of pneumococcal disease each year in the [place]. Treatment of pneumococcal infections with penicillin and other drugs is not as effective as it used to be, because some strains of the disease have become resistant to these drugs. This makes prevention of the disease, through vaccination, even more important. 2. PCV13 vaccine Pneumococcal conjugate vaccine (called PCV13) protects against 13 types of pneumococcal bacteria. PCV13 is routinely given to children at 2, 4, 6, and 1515 months of age. It is also recommended for children and adults 3to 59years of age with certain health conditions, and for all adults 72years of age and older. Your doctor can give you details. 3. Some people should not get this vaccine Anyone who has ever had a life-threatening allergic reaction to a dose of this vaccine, to an earlier pneumococcal vaccine called PCV7, or to any vaccine containing diphtheria toxoid (for example, DTaP), should not get PCV13.  
 
Anyone with a severe allergy to any component of PCV13 should not get the vaccine. Tell your doctor if the person being vaccinated has any severe allergies. If the person scheduled for vaccination is not feeling well, your healthcare provider might decide to reschedule the shot on another day. 4. Risks of a vaccine reaction With any medicine, including vaccines, there is a chance of reactions. These are usually mild and go away on their own, but serious reactions are also possible. Problems reported following PCV13 varied by age and dose in the series. The most common problems reported among children were:  About half became drowsy after the shot, had a temporary loss of appetite, or had redness or tenderness where the shot was given.  About 1 out of 3 had swelling where the shot was given.  About 1 out of 3 had a mild fever, and about 1 in 20 had a fever over 102.2°F. 
 Up to about 8 out of 10 became fussy or irritable. Adults have reported pain, redness, and swelling where the shot was given; also mild fever, fatigue, headache, chills, or muscle pain. Nika Boop children who get PCV13 along with inactivated flu vaccine at the same time may be at increased risk for seizures caused by fever. Ask your doctor for more information. Problems that could happen after any vaccine:  People sometimes faint after a medical procedure, including vaccination. Sitting or lying down for about 15 minutes can help prevent fainting, and injuries caused by a fall. Tell your doctor if you feel dizzy, or have vision changes or ringing in the ears.  Some older children and adults get severe pain in the shoulder and have difficulty moving the arm where a shot was given. This happens very rarely.  Any medication can cause a severe allergic reaction. Such reactions from a vaccine are very rare, estimated at about 1 in a million doses, and would happen within a few minutes to a few hours after the vaccination. As with any medicine, there is a very small chance of a vaccine causing a serious injury or death. The safety of vaccines is always being monitored. For more information, visit: www.cdc.gov/vaccinesafety/  
 
5. What if there is a serious reaction? What should I look for?  Look for anything that concerns you, such as signs of a severe allergic reaction, very high fever, or unusual behavior. Signs of a severe allergic reaction can include hives, swelling of the face and throat, difficulty breathing, a fast heartbeat, dizziness, and weakness  usually within a few minutes to a few hours after the vaccination. What should I do?  If you think it is a severe allergic reaction or other emergency that cant wait, call 9-1-1 or get the person to the nearest hospital. Otherwise, call your doctor. Reactions should be reported to the Vaccine Adverse Event Reporting System (VAERS). Your doctor should file this report, or you can do it yourself through the VAERS web site at www.vaers. [facility].gov, or by calling 5-194.556.7843. VAERS does not give medical advice. 6. The National Vaccine Injury Compensation Program 
 
The [facility] Vaccine Injury Compensation Program (VICP) is a federal program that was created to compensate people who may have been injured by certain vaccines. Persons who believe they may have been injured by a vaccine can learn about the program and about filing a claim by calling 3-857.223.4609 or visiting the [employer] website at www.[facility].gov/vaccinecompensation. There is a time limit to file a claim for compensation. 7. How can I learn more?  Ask your healthcare provider. He or she can give you the vaccine package insert or suggest other sources of information.  Call your local or state health department.  Contact the Centers for Disease Control and Prevention (CDC): 
- Call 3-607.491.9734 (1-800-CDC-INFO) or 
- Visit CDCs website at www.cdc.gov/vaccines Vaccine Information Statement PCV13 Vaccine 11/5/2015  
42 [address] [place] 126HH-47 Department of Health and [employer] Centers for Disease Control and Prevention Office Use Only

## 2019-10-09 NOTE — ACP (ADVANCE CARE PLANNING)

## 2019-10-14 LAB
ALBUMIN SERPL-MCNC: 3.9 G/DL (ref 3.5–4.7)
ALBUMIN/GLOB SERPL: 1.2 {RATIO} (ref 1.2–2.2)
ALP SERPL-CCNC: 65 IU/L (ref 39–117)
ALT SERPL-CCNC: 7 IU/L (ref 0–32)
AMPHETAMINES UR QL SCN: NEGATIVE NG/ML
APPEARANCE UR: CLEAR
AST SERPL-CCNC: 13 IU/L (ref 0–40)
BARBITURATES UR QL SCN: NEGATIVE NG/ML
BASOPHILS # BLD AUTO: 0 X10E3/UL (ref 0–0.2)
BASOPHILS NFR BLD AUTO: 0 %
BENZODIAZ UR QL SCN: NEGATIVE NG/ML
BILIRUB SERPL-MCNC: 0.5 MG/DL (ref 0–1.2)
BILIRUB UR QL STRIP: NEGATIVE
BUN SERPL-MCNC: 25 MG/DL (ref 8–27)
BUN/CREAT SERPL: 17 (ref 12–28)
BZE UR QL SCN: NEGATIVE NG/ML
CALCIUM SERPL-MCNC: 9.2 MG/DL (ref 8.7–10.3)
CANNABINOIDS UR QL SCN: NEGATIVE NG/ML
CHLORIDE SERPL-SCNC: 107 MMOL/L (ref 96–106)
CHOLEST SERPL-MCNC: 120 MG/DL (ref 100–199)
CO2 SERPL-SCNC: 20 MMOL/L (ref 20–29)
COLOR UR: YELLOW
CREAT SERPL-MCNC: 1.47 MG/DL (ref 0.57–1)
CREAT UR-MCNC: 101.7 MG/DL (ref 20–300)
EOSINOPHIL # BLD AUTO: 0.2 X10E3/UL (ref 0–0.4)
EOSINOPHIL NFR BLD AUTO: 3 %
ERYTHROCYTE [DISTWIDTH] IN BLOOD BY AUTOMATED COUNT: 15.1 % (ref 12.3–15.4)
EST. AVERAGE GLUCOSE BLD GHB EST-MCNC: 114 MG/DL
FENTANYL+NORFENTANYL UR QL CFM: POSITIVE
GLOBULIN SER CALC-MCNC: 3.3 G/DL (ref 1.5–4.5)
GLUCOSE SERPL-MCNC: 98 MG/DL (ref 65–99)
GLUCOSE UR QL: NEGATIVE
HBA1C MFR BLD: 5.6 % (ref 4.8–5.6)
HCT VFR BLD AUTO: 29.7 % (ref 34–46.6)
HDLC SERPL-MCNC: 47 MG/DL
HGB BLD-MCNC: 9.9 G/DL (ref 11.1–15.9)
HGB UR QL STRIP: NEGATIVE
IMM GRANULOCYTES # BLD AUTO: 0 X10E3/UL (ref 0–0.1)
IMM GRANULOCYTES NFR BLD AUTO: 0 %
KETONES UR QL STRIP: NEGATIVE
LDLC SERPL CALC-MCNC: 61 MG/DL (ref 0–99)
LEUKOCYTE ESTERASE UR QL STRIP: NEGATIVE
LYMPHOCYTES # BLD AUTO: 2.1 X10E3/UL (ref 0.7–3.1)
LYMPHOCYTES NFR BLD AUTO: 32 %
MCH RBC QN AUTO: 28.4 PG (ref 26.6–33)
MCHC RBC AUTO-ENTMCNC: 33.3 G/DL (ref 31.5–35.7)
MCV RBC AUTO: 85 FL (ref 79–97)
MEPERIDINE UR QL: NEGATIVE NG/ML
METHADONE UR QL SCN: NEGATIVE NG/ML
MICRO URNS: NORMAL
MONOCYTES # BLD AUTO: 0.5 X10E3/UL (ref 0.1–0.9)
MONOCYTES NFR BLD AUTO: 8 %
NEUTROPHILS # BLD AUTO: 3.6 X10E3/UL (ref 1.4–7)
NEUTROPHILS NFR BLD AUTO: 57 %
NITRITE UR QL STRIP: NEGATIVE
OPIATES UR QL SCN: POSITIVE NG/ML
OXYCODONE+OXYMORPHONE UR QL SCN: NEGATIVE NG/ML
PCP UR QL: NEGATIVE NG/ML
PH UR STRIP: 7 [PH] (ref 5–7.5)
PH UR: 6.9 [PH] (ref 4.5–8.9)
PLATELET # BLD AUTO: 269 X10E3/UL (ref 150–450)
PLEASE NOTE:, 733157: ABNORMAL
POTASSIUM SERPL-SCNC: 4.3 MMOL/L (ref 3.5–5.2)
PROPOXYPH UR QL SCN: NEGATIVE NG/ML
PROT SERPL-MCNC: 7.2 G/DL (ref 6–8.5)
PROT UR QL STRIP: NORMAL
RBC # BLD AUTO: 3.48 X10E6/UL (ref 3.77–5.28)
SODIUM SERPL-SCNC: 143 MMOL/L (ref 134–144)
SP GR UR: 1.01
SP GR UR: 1.02 (ref 1–1.03)
TRAMADOL UR QL SCN: NEGATIVE NG/ML
TRIGL SERPL-MCNC: 61 MG/DL (ref 0–149)
TSH SERPL DL<=0.005 MIU/L-ACNC: 0.26 UIU/ML (ref 0.45–4.5)
UROBILINOGEN UR STRIP-MCNC: 0.2 MG/DL (ref 0.2–1)
VLDLC SERPL CALC-MCNC: 12 MG/DL (ref 5–40)
WBC # BLD AUTO: 6.5 X10E3/UL (ref 3.4–10.8)

## 2019-10-17 ENCOUNTER — CLINICAL SUPPORT (OUTPATIENT)
Dept: INTERNAL MEDICINE CLINIC | Age: 79
End: 2019-10-17

## 2019-10-17 DIAGNOSIS — Z79.891 CHRONIC PRESCRIPTION OPIATE USE: Primary | ICD-10-CM

## 2019-10-22 LAB
AMPHETAMINES UR QL SCN: NEGATIVE NG/ML
BARBITURATES UR QL SCN: NEGATIVE NG/ML
BENZODIAZ UR QL SCN: NEGATIVE NG/ML
BZE UR QL SCN: NEGATIVE NG/ML
CANNABINOIDS UR QL SCN: NEGATIVE NG/ML
CREAT UR-MCNC: 99.5 MG/DL (ref 20–300)
FENTANYL+NORFENTANYL UR QL CFM: POSITIVE
MEPERIDINE UR QL: NEGATIVE NG/ML
METHADONE UR QL SCN: NEGATIVE NG/ML
OPIATES UR QL SCN: POSITIVE NG/ML
OXYCODONE+OXYMORPHONE UR QL SCN: NEGATIVE NG/ML
PCP UR QL: NEGATIVE NG/ML
PH UR: 7 [PH] (ref 4.5–8.9)
PLEASE NOTE:, 733157: ABNORMAL
PROPOXYPH UR QL SCN: NEGATIVE NG/ML
SP GR UR: 1.01
TRAMADOL UR QL SCN: NEGATIVE NG/ML

## 2019-11-05 ENCOUNTER — CLINICAL SUPPORT (OUTPATIENT)
Dept: INTERNAL MEDICINE CLINIC | Age: 79
End: 2019-11-05

## 2019-11-05 DIAGNOSIS — Z79.01 ENCOUNTER FOR MONITORING COUMADIN THERAPY: Primary | ICD-10-CM

## 2019-11-05 DIAGNOSIS — Z51.81 ENCOUNTER FOR MONITORING COUMADIN THERAPY: Primary | ICD-10-CM

## 2019-11-05 LAB
INR BLD: 2.6
PT POC: 30.7 SECONDS
VALID INTERNAL CONTROL?: YES

## 2019-11-05 NOTE — PROGRESS NOTES
Pt came in today for PT/INR check. Pt states she is taking   Coumadin 2 mg   1/2 tab PO Monday-Saturday  1 tab PO Sunday  PT-30.7  INR-2.6  Per Dr. Sintia Ruth pt is to continue taking Coumadin as directed above with no changes. Return in 1 month for another PT/INR check.      Vickie Portillo

## 2019-11-21 ENCOUNTER — OFFICE VISIT (OUTPATIENT)
Dept: CARDIOLOGY CLINIC | Age: 79
End: 2019-11-21

## 2019-11-21 VITALS
SYSTOLIC BLOOD PRESSURE: 120 MMHG | WEIGHT: 120 LBS | OXYGEN SATURATION: 100 % | HEIGHT: 65 IN | HEART RATE: 116 BPM | DIASTOLIC BLOOD PRESSURE: 82 MMHG | BODY MASS INDEX: 19.99 KG/M2

## 2019-11-21 DIAGNOSIS — I10 ESSENTIAL HYPERTENSION, BENIGN: ICD-10-CM

## 2019-11-21 DIAGNOSIS — I09.9 RHEUMATIC HEART DISEASE: ICD-10-CM

## 2019-11-21 DIAGNOSIS — I05.9 MITRAL VALVE DISORDER: ICD-10-CM

## 2019-11-21 DIAGNOSIS — E78.5 DYSLIPIDEMIA: ICD-10-CM

## 2019-11-21 DIAGNOSIS — I48.91 ATRIAL FIBRILLATION, UNSPECIFIED TYPE (HCC): Primary | ICD-10-CM

## 2019-11-21 DIAGNOSIS — I27.20 PULMONARY HYPERTENSION (HCC): ICD-10-CM

## 2019-11-21 DIAGNOSIS — I48.92 ATRIAL FLUTTER WITH CONTROLLED RESPONSE (HCC): ICD-10-CM

## 2019-11-21 DIAGNOSIS — I25.10 ATHEROSCLEROSIS OF NATIVE CORONARY ARTERY OF NATIVE HEART WITHOUT ANGINA PECTORIS: ICD-10-CM

## 2019-11-21 DIAGNOSIS — I07.1 TRICUSPID VALVE INSUFFICIENCY, UNSPECIFIED ETIOLOGY: ICD-10-CM

## 2019-11-21 DIAGNOSIS — Z95.2 S/P MVR (MITRAL VALVE REPLACEMENT): ICD-10-CM

## 2019-11-21 PROBLEM — N18.30 CKD (CHRONIC KIDNEY DISEASE) STAGE 3, GFR 30-59 ML/MIN (HCC): Status: ACTIVE | Noted: 2019-11-21

## 2019-11-21 RX ORDER — LOSARTAN POTASSIUM 25 MG/1
25 TABLET ORAL DAILY
Qty: 90 TAB | Refills: 3 | Status: SHIPPED | OUTPATIENT
Start: 2019-11-21 | End: 2020-08-24

## 2019-11-21 RX ORDER — METOPROLOL TARTRATE 25 MG/1
25 TABLET, FILM COATED ORAL 2 TIMES DAILY
Qty: 90 TAB | Refills: 3 | Status: SHIPPED | OUTPATIENT
Start: 2019-11-21 | End: 2020-09-11

## 2019-11-21 NOTE — PROGRESS NOTES
[facility] [place]: Juani Johnson  [phone]    HPI: Ale Hull is a [de-identified]y.o. year-old female who presents for follow up regarding her complex valve disease, CAD, Atrial Fib and HTN. She is having a little bit if dyspnea on exertion, today in office 02 dropped a bit with walking but it came right back. No chest pain. Does have a bit of swelling in the right knee but no edema. HR is up today on EKG. She blames the stress of getting here and the , etc.   HR stayed at 120 before and after walking and went down to 90 with exertion. Will adjust her meds for more rate control. No sig evita on last loop. On coumadin, no bleeding in the stool, etc.     She is in a lot of pain from the back and her legs and across her upper buttocks. Pain is managed by Dr. Sadiq Kelly at this point. He tried her on gabapentin and that does not seem to be relieving her sx. Encouraged her to followup there and potentially to see Ortho. She has not seen them or had PT etc.   Sees them for coumadin check next week. Checking it once a month. Has been doing ok. No edema. Dr. Sadiq Kelly is now following her INR and was refilling her percocet. INR stable. EKG NSR LVH today Due for echo for MVR. Not needing the fluid pills and has stopped taking them. Weight is back down about 10 pounds. A/P:  1. CAD- 100% distal RCA disease on cath 3/11 not amenable to PCI or bypass, no exertional chest pain, continue aspirin and pravastatin  2. Rheumatic heart disease/MV disease- s/p replacement 3/12 Dr. Sarah Roman, stable, next echo 12/16  3. Afib- s/p cryomaze and ALIYAH closure at time of MVR, on Coumadin, rate controlled with Metoprolol and Diltiazem  -Aflutter and afib returned post-op, seen by Dr. Maxine Lund and offered aflutter ablation if it persisted, but gone, NSR now. Occasional palpitations now. Not bad, does not want/need further treatment at this time. Adjust meds today. 4. HTN-  normal today, cont current meds  5.  Thyroid- off meds now s/p methimazole  6. Aortic Atherosclerosis- Aspirin and Pravastatin   7. Mild PAHTN  8. Tobacco use- continue cessation, stopped completely  9. COPD - seen on CT chest, not on inhalers at this time, denies progression of dyspnea  10. Right sided chest pain/back pain/neck pain - continue Percocet PRN pain, as per Dr. Kiran Hollingsworth  . Echo 5/18 EF 60% LAE, MVR normal function, gradient 7mhg., mild Tr, mild PI  10/16 EF 60%, gr3 dd, NAVI mild TR/PI  2015 Echo EF 65%, mild TR, PAP 45, mild PI, gr4dd. Echo 10/14 EF 65%, NAVI, nl functioning MVR bioprosthesis, mild-mod TR PAP 45  12/13 EF 60% mildly increased MV gradient, no MR. Echo 2/12: EF55% mildCLVH, mod MR, sev MS 4.5/15 peak gradient, LAE, mild AS, mild PAHTN, mod Tr  Echo 7/10 EF 55%, gr1DD, mod AS, JUAN 1.1, Mod MS, mild TR, mild paHTN PA 40  Severe MR cath 4/10, with mild CAD    Fhx 2bro w CHF, +Dm +HTN  Soc quit tobacco    She  has a past medical history of AF (atrial fibrillation) ([address] Anemia, Arthritis, Blunt head trauma (03/13/2017), Chronic pain, Chronically on opiate therapy, Coagulation defects, Diastolic dysfunction, Dyslipidemia, Encounter for Hemoccult screening (06/22/2016), Fall (03/13/2017), H/O colonoscopy (2007), Hand pain, right, Heart failure ([address] Meniscus degeneration, right (07/13/2017), Prediabetes, PUD (peptic ulcer disease), Rheumatic fever, S/P MVR (mitral valve replacement), Shoulder pain, right, SOB (shortness of breath), Thyroid disease, Venous insufficiency of both lower extremities (01/17/2017), and Warfarin-induced coagulopathy ([address] Cardiovascular ROS: positive for chest pain, negative for progression of dyspnea or palpitations   Respiratory ROS: positive for - shortness of breath  Neurological ROS: no TIA or stroke symptoms  All other systems negative except as above.      PE  Vitals:    11/21/19 1139 11/21/19 1140 11/21/19 1141 11/21/19 1142   BP:       Pulse: (!) 114 90 (!) 120 (!) 116   SpO2: 100% (!) 86% 98% 100% Weight:       Height:        Body mass index is 19.97 kg/m².    General appearance - alert, well appearing, and in no distress  Mental status - affect appropriate to mood  Eyes - sclera anicteric, moist mucous membranes  Neck - supple, no significant adenopathy  Lymphatics - no  lymphadenopathy  Chest - clear to auscultation, no wheezes, rales or rhonchi  Heart - normal rate, regular rhythm, normal S1, S2, 2/6 BUTCH   Abdomen - soft, nontender, nondistended, no masses or organomegaly  Back exam - full range of motion, no tenderness  Neurological - cranial nerves II through XII grossly intact, no focal deficit  Musculoskeletal - no muscular tenderness noted, normal strength  Extremities - peripheral pulses normal, no pedal edema  Skin - normal coloration  no rashes    12 lead ECG: NSR 60bpm    Recent Labs:  Lab Results   Component Value Date/Time    Cholesterol, total 120 10/09/2019 11:37 AM     No results found for: NINA  Lab Results   Component Value Date/Time    BUN 25 10/09/2019 11:37 AM     Lab Results   Component Value Date/Time    Potassium 4.3 10/09/2019 11:37 AM     Lab Results   Component Value Date/Time    Hemoglobin A1c 5.6 10/09/2019 11:37 AM     No components found for: HGBI,  IHGB,  HGB,  HGBP,  WBHGB  Lab Results   Component Value Date/Time    PLATELET 915 53/73/4884 11:37 AM       Reviewed:  Past Medical History:   Diagnosis Date    AF (atrial fibrillation) (HCC)     RHEUMATIC FEVER AS CHILD, AFIB    Anemia     Arthritis     Blunt head trauma 03/13/2017    Chronic pain     Chronically on opiate therapy     Coagulation defects     COUMADIN    Diastolic dysfunction     grade 4    Dyslipidemia     Encounter for Hemoccult screening 06/22/2016    Fall 03/13/2017    H/O colonoscopy 2007    Hand pain, right     Heart failure (HCC)     Meniscus degeneration, right 07/13/2017    Extensive patellar chondral derangement as well as chondral derangement     Prediabetes     PUD (peptic ulcer disease)     Rheumatic fever     S/P MVR (mitral valve replacement)     Shoulder pain, right     SOB (shortness of breath)     Thyroid disease     Venous insufficiency of both lower extremities 2017    Warfarin-induced coagulopathy (HCC)      Social History     Tobacco Use   Smoking Status Former Smoker    Packs/day: 0.50    Years: 30.00    Pack years: 15.00    Last attempt to quit: 2012    Years since quittin.8   Smokeless Tobacco Never Used     Social History     Substance and Sexual Activity   Alcohol Use No    Alcohol/week: 0.0 standard drinks     No Known Allergies    Current Outpatient Medications   Medication Sig    metoprolol tartrate (LOPRESSOR) 25 mg tablet TAKE 1/2 TABLET BY MOUTH TWICE DAILY    furosemide (LASIX) 20 mg tablet Take 1 Tab by mouth daily. (Patient taking differently: Take 20 mg by mouth as needed.)    warfarin (COUMADIN) 2 mg tablet TAKE ONE TABLET BY MOUTH ON  AND THURSDAY, AND TAKE ONE-HALF TABLET BY MOUTH DAILY ON MONDAY, TUESDAY, WEDNESDAY, FRIDAY, AND SATURDAY    pravastatin (PRAVACHOL) 20 mg tablet TAKE ONE TABLET BY MOUTH EVERY EVENING    losartan (COZAAR) 50 mg tablet Take 1 Tab by mouth daily.  dilTIAZem CD (CARTIA XT) 120 mg ER capsule Take 1 Cap by mouth daily.  aspirin 81 mg chewable tablet Take 81 mg by mouth daily.  triamcinolone acetonide (KENALOG) 0.1 % topical cream Apply  to affected area two (2) times a day. No current facility-administered medications for this visit.         Nicole Melgar MD  [facility] [place]  [phone] [facility] [place] [address]

## 2019-11-21 NOTE — PATIENT INSTRUCTIONS
Please cut your losartan down to 1/2 tablet  Increase your metoprolol to a whole tablet every 12 hours.

## 2019-12-03 ENCOUNTER — CLINICAL SUPPORT (OUTPATIENT)
Dept: INTERNAL MEDICINE CLINIC | Age: 79
End: 2019-12-03

## 2019-12-03 DIAGNOSIS — Z51.81 ENCOUNTER FOR MONITORING COUMADIN THERAPY: Primary | ICD-10-CM

## 2019-12-03 DIAGNOSIS — Z79.01 ENCOUNTER FOR MONITORING COUMADIN THERAPY: Primary | ICD-10-CM

## 2019-12-03 LAB
INR BLD: 2.7
PT POC: 32.5 SECONDS
VALID INTERNAL CONTROL?: YES

## 2019-12-03 RX ORDER — WARFARIN 2 MG/1
2 TABLET ORAL DAILY
Qty: 90 TAB | Refills: 3 | Status: SHIPPED | OUTPATIENT
Start: 2019-12-03 | End: 2020-12-06

## 2019-12-03 RX ORDER — PRAVASTATIN SODIUM 20 MG/1
TABLET ORAL
Qty: 90 TAB | Refills: 3 | Status: SHIPPED | OUTPATIENT
Start: 2019-12-03 | End: 2020-12-06

## 2019-12-03 NOTE — PROGRESS NOTES
Pt came in today for PT/INR check. Pt states she is taking   Coumadin 2 mg   1/2 tab PO Monday-Saturday  1 tab PO Sunday  PT-32.5  INR-2.7  Per Dr. Jones Sep pt is to continue taking Coumadin as directed above with no changes. Return in 1 month for another PT/INR check.      Silva De Guzman

## 2019-12-16 ENCOUNTER — TELEPHONE (OUTPATIENT)
Dept: CARDIOLOGY CLINIC | Age: 79
End: 2019-12-16

## 2019-12-16 RX ORDER — DILTIAZEM HYDROCHLORIDE 120 MG/1
CAPSULE, COATED, EXTENDED RELEASE ORAL
Qty: 90 CAP | Refills: 3 | Status: SHIPPED | OUTPATIENT
Start: 2019-12-16 | End: 2020-12-06

## 2019-12-16 RX ORDER — DILTIAZEM HYDROCHLORIDE 120 MG/1
CAPSULE, EXTENDED RELEASE ORAL
Qty: 90 CAP | Refills: 0 | Status: SHIPPED | OUTPATIENT
Start: 2019-12-16 | End: 2019-12-16 | Stop reason: SDUPTHER

## 2019-12-16 NOTE — TELEPHONE ENCOUNTER
Patients pharmacy is calling to let our office know that the patient states that she will not be picking up the prescription for Cartia XT as she does not want to take anymore medications.      Phone: [phone]

## 2019-12-31 ENCOUNTER — CLINICAL SUPPORT (OUTPATIENT)
Dept: INTERNAL MEDICINE CLINIC | Age: 79
End: 2019-12-31

## 2019-12-31 DIAGNOSIS — I48.91 ATRIAL FIBRILLATION, UNSPECIFIED TYPE (HCC): Primary | ICD-10-CM

## 2019-12-31 NOTE — PROGRESS NOTES
[facility]. [facility] given by Dr. Roby Tilley to place order for POC PT/INR. Pt in office today for POC inr IN 3.2 was told to follow up in 3 weeks and to make no changes to her current coumadin orders. Deneen Meckel, LPN     PT/INR done no adverse reaction noted.

## 2020-01-24 ENCOUNTER — CLINICAL SUPPORT (OUTPATIENT)
Dept: INTERNAL MEDICINE CLINIC | Age: 80
End: 2020-01-24

## 2020-01-24 DIAGNOSIS — I48.91 ATRIAL FIBRILLATION, UNSPECIFIED TYPE (HCC): Primary | ICD-10-CM

## 2020-01-24 LAB
INR BLD: 3.3
PT POC: 39.4 SECONDS
VALID INTERNAL CONTROL?: YES

## 2020-01-24 NOTE — PROGRESS NOTES
Pt came in today for a PT/INR check. Pt states she is currently taking   Coumadin 2 mg 1 tab PO Sunday  1/2 tab PO Mon-Sat  PT-39.4  INR-3.3  Per Dr. Nikita De La Torre pt is to hold taking Coumadin today and start back up 1/2 tab PO daily tomorrow. Return in 3 weeks for another PT/INR check.      Rehana Trujillo

## 2020-01-28 ENCOUNTER — OFFICE VISIT (OUTPATIENT)
Dept: INTERNAL MEDICINE CLINIC | Age: 80
End: 2020-01-28

## 2020-01-28 VITALS
HEIGHT: 65 IN | OXYGEN SATURATION: 96 % | RESPIRATION RATE: 18 BRPM | SYSTOLIC BLOOD PRESSURE: 111 MMHG | WEIGHT: 120.1 LBS | HEART RATE: 81 BPM | TEMPERATURE: 97.8 F | DIASTOLIC BLOOD PRESSURE: 70 MMHG | BODY MASS INDEX: 20.01 KG/M2

## 2020-01-28 DIAGNOSIS — R73.03 PREDIABETES: ICD-10-CM

## 2020-01-28 DIAGNOSIS — I27.20 PULMONARY HYPERTENSION (HCC): ICD-10-CM

## 2020-01-28 DIAGNOSIS — I10 ESSENTIAL HYPERTENSION, BENIGN: ICD-10-CM

## 2020-01-28 DIAGNOSIS — I09.9 RHEUMATIC HEART DISEASE: ICD-10-CM

## 2020-01-28 DIAGNOSIS — D68.32 WARFARIN-INDUCED COAGULOPATHY (HCC): ICD-10-CM

## 2020-01-28 DIAGNOSIS — I87.2 VENOUS INSUFFICIENCY OF BOTH LOWER EXTREMITIES: ICD-10-CM

## 2020-01-28 DIAGNOSIS — Z98.890 S/P MAZE OPERATION FOR ATRIAL FIBRILLATION: ICD-10-CM

## 2020-01-28 DIAGNOSIS — T45.515A WARFARIN-INDUCED COAGULOPATHY (HCC): ICD-10-CM

## 2020-01-28 DIAGNOSIS — I51.89 DIASTOLIC DYSFUNCTION: ICD-10-CM

## 2020-01-28 DIAGNOSIS — Z95.2 S/P MVR (MITRAL VALVE REPLACEMENT): ICD-10-CM

## 2020-01-28 DIAGNOSIS — Z86.79 S/P MAZE OPERATION FOR ATRIAL FIBRILLATION: ICD-10-CM

## 2020-01-28 DIAGNOSIS — I48.91 ATRIAL FIBRILLATION, UNSPECIFIED TYPE (HCC): Primary | ICD-10-CM

## 2020-01-28 DIAGNOSIS — D64.9 ANEMIA, UNSPECIFIED TYPE: ICD-10-CM

## 2020-01-28 LAB
INR BLD: 2.1
PT POC: 24.9 SECONDS
VALID INTERNAL CONTROL?: YES

## 2020-01-28 NOTE — ASSESSMENT & PLAN NOTE
This condition is managed by Specialist.  Key CAD CHF Meds             dilTIAZem CD (CARTIA XT) 120 mg ER capsule (Taking) TAKE ONE CAPSULE BY MOUTH DAILY    pravastatin (PRAVACHOL) 20 mg tablet (Taking) TAKE ONE TABLET BY MOUTH EVERY EVENING    warfarin (COUMADIN) 2 mg tablet (Taking) Take 1 Tab by mouth daily. On Sunday only and One half tablet Monday thru sat    losartan (COZAAR) 25 mg tablet (Taking) Take 1 Tab by mouth daily. metoprolol tartrate (LOPRESSOR) 25 mg tablet (Taking) Take 1 Tab by mouth two (2) times a day. furosemide (LASIX) 20 mg tablet (Taking) Take 1 Tab by mouth daily. aspirin 81 mg chewable tablet (Taking) Take 81 mg by mouth daily.         Lab Results   Component Value Date/Time    Sodium 143 10/09/2019 11:37 AM    Potassium 4.3 10/09/2019 11:37 AM    Cholesterol, total 120 10/09/2019 11:37 AM    HDL Cholesterol 47 10/09/2019 11:37 AM    LDL, calculated 61 10/09/2019 11:37 AM    Triglyceride 61 10/09/2019 11:37 AM    INR POC 2.1 01/28/2020 12:56 PM

## 2020-01-28 NOTE — ASSESSMENT & PLAN NOTE
Stable, based on history, physical exam and review of pertinent labs, studies and medications; meds reconciled; continue current treatment plan.   Lab Results   Component Value Date/Time    WBC 6.5 10/09/2019 11:37 AM    HGB 9.9 10/09/2019 11:37 AM    HCT 29.7 10/09/2019 11:37 AM    PLATELET 676 62/02/9382 11:37 AM    Creatinine 1.47 10/09/2019 11:37 AM    BUN 25 10/09/2019 11:37 AM    Potassium 4.3 10/09/2019 11:37 AM    INR POC 2.1 01/28/2020 12:56 PM

## 2020-01-28 NOTE — PROGRESS NOTES
1. Have you been to the ER, urgent care clinic since your last visit? Hospitalized since your last visit? No    2. Have you seen or consulted any other health care providers outside of the [address] [place] [address] since your last visit? Include any pap smears or colon screening.  No     Patient wanted to discuss ankle swelling

## 2020-01-28 NOTE — PROGRESS NOTES
SPORTS [facility]ICINE [facility] PR[facility]  Jennifer Amaya MD, [address] [address]  Phone:  [phone]  Fax: [phone]       Chief Complaint   Patient presents with    Ankle swelling   . SUBJECTIVE:    Linnea Guzman is a [de-identified] y.o. female Patient returns today with a history of chronic venous insufficiency both lower extremities, status post mitral valve replacement, on Coumadin, status post MAZE operation for atrial fibrillation, rheumatic heart disease, pulmonary hypertension, prediabetes, primary hypertension, diastolic dysfunction, chronic kidney disease, stage 3, atrial fibrillation, and is seen for evaluation. Current Outpatient Medications   Medication Sig Dispense Refill    dilTIAZem CD (CARTIA XT) 120 mg ER capsule TAKE ONE CAPSULE BY MOUTH DAILY 90 Cap 3    pravastatin (PRAVACHOL) 20 mg tablet TAKE ONE TABLET BY MOUTH EVERY EVENING 90 Tab 3    warfarin (COUMADIN) 2 mg tablet Take 1 Tab by mouth daily. On Sunday only and One half tablet Monday thru sat 90 Tab 3    losartan (COZAAR) 25 mg tablet Take 1 Tab by mouth daily. 90 Tab 3    metoprolol tartrate (LOPRESSOR) 25 mg tablet Take 1 Tab by mouth two (2) times a day. 90 Tab 3    furosemide (LASIX) 20 mg tablet Take 1 Tab by mouth daily. (Patient taking differently: Take 20 mg by mouth as needed.) 30 Tab 3    aspirin 81 mg chewable tablet Take 81 mg by mouth daily.  triamcinolone acetonide (KENALOG) 0.1 % topical cream Apply  to affected area two (2) times a day.  45 g 11     Past Medical History:   Diagnosis Date    AF (atrial fibrillation) (HCC)     RHEUMATIC FEVER AS CHILD, AFIB    Anemia     Arthritis     Blunt head trauma 03/13/2017    Chronic pain     Chronically on opiate therapy     Coagulation defects     COUMADIN    Diastolic dysfunction     grade 4    Dyslipidemia     Encounter for Hemoccult screening 06/22/2016    Fall 03/13/2017    H/O colonoscopy 2007    Hand pain, right     Heart failure (HCC)     Meniscus degeneration, right 2017    Extensive patellar chondral derangement as well as chondral derangement     Prediabetes     PUD (peptic ulcer disease)     Rheumatic fever     S/P MVR (mitral valve replacement)     Shoulder pain, right     SOB (shortness of breath)     Thyroid disease     Venous insufficiency of both lower extremities 2017    [facility]farin-induced coagulopathy ([facility]      Past Surgical History:   Procedure Laterality Date    HX CATARACT REMOVAL      BILATERAL    HX  SECTION      HX COLONOSCOPY      HX HEART CATHETERIZATION      HX HYSTERECTOMY      HX MITRAL VALVE REPLACEMENT      HX LUIS ENRIQUE AND BSO       No Known Allergies      REVIEW OF SYSTEMS:  General: negative for - chills or fever  ENT: negative for - headaches, nasal congestion or tinnitus  Respiratory: negative for - cough, hemoptysis, shortness of breath or wheezing  Cardiovascular : negative for - chest pain, edema, palpitations or shortness of breath  Gastrointestinal: negative for - abdominal pain, blood in stools, heartburn or nausea/vomiting  Genito-Urinary: no dysuria, trouble voiding, or hematuria  Musculoskeletal: negative for - gait disturbance, joint pain, joint stiffness or joint swelling  Neurological: no TIA or stroke symptoms  Hematologic: no bruises, no bleeding, no swollen glands  Integument: no lumps, mole changes, nail changes or rash  Endocrine: no malaise/lethargy or unexpected weight changes      Social History     Socioeconomic History    Marital status: SINGLE     Spouse name: Not on file    Number of children: Not on file    Years of education: Not on file    Highest education level: Not on file   Tobacco Use    Smoking status: Former Smoker     Packs/day: 0.50     Years: 30.00     Pack years: 15.00     Last attempt to quit: 2012     Years since quittin.0    Smokeless tobacco: Never Used   Substance and Sexual Activity    Alcohol use:  No Alcohol/week: 0.0 standard drinks    Drug use: No    Sexual activity: Not Currently   Social History Narrative    Family History: Mother:  80 yrs, High Blood PressureFather:  52 yrs, High Blood PressureSister(s): alive, High Blood    Pressure, colon cancer, type II diabetesBrother(s): , High Blood Pressure, colon cancer, type II diabetesSon(s): alive 48 yrs1 sister(s)    . 1 son(s) . Social History: Alcohol Use Patient does not use alcohol. Smoking Status Patient is a former smoker. Marital Status: Single. Lives w ith:    alone. Children: sons. Occupation/W ork: retired - for [employer]. Education/School: has highschool diploma. Hinduism: [address] [employer].     Family History   Problem Relation Age of Onset    Heart Attack Sister     Hypertension Sister     Kidney Disease Mother     Hypertension Mother     Hypertension Father     Cancer Brother     Heart Failure Brother     Arthritis-osteo Sister     Cancer Sister     Cancer Brother         PROSTATE       OBJECTIVE:    Visit Vitals  /70   Pulse 81   Temp 97.8 °F (36.6 °C) (Oral)   Resp 18   Ht 5' 5\" (1.651 m)   Wt 120 lb 1.6 oz (54.5 kg)   SpO2 96%   BMI 19.99 kg/m²     CONSTITUTIONAL: well , well nourished, appears age appropriate  EYES: perrla, eom intact  ENMT:moist mucous membranes, pharynx clear  NECK: supple. Thyroid normal  RESPIRATORY: Chest: clear bilaterally   CARDIOVASCULAR: Heart: regular rate and rhythm  GASTROINTESTINAL: Abdomen: soft, bowel sounds active  HEMATOLOGIC: no pathological lymph nodes palpated  MUSCULOSKELETAL: Extremities: no edema, pulse 1+   INTEGUMENT: No unusual rashes or suspicious skin lesions noted. Nails appear normal.  NEUROLOGIC: non-focal exam   MENTAL STATUS: alert and oriented, appropriate affect           ASSESSMENT:  1. Atrial fibrillation, unspecified type ([address]    2.  Warfarin-induced coagulopathy (HCC)    3. Venous insufficiency of both lower extremities    4. S/P MVR (mitral valve replacement)    5. S/P Maze operation for atrial fibrillation    6. Rheumatic heart disease    7. Pulmonary hypertension ([address]    8. Prediabetes    9. Essential hypertension, benign    10. Diastolic dysfunction    11. Anemia, unspecified type      Patient is in atrial fibrillation at the time of auscultation today. She is on Coumadin with INR therapeutic. She complains of swelling in her feet at the end of the day. When she puts her feet up on pillows, which we suggested on last visit, she has no swelling. This confirms chronic venous insufficiency without evidence of cardiac decompensation. History of pulmonary hypertension, which currently is asymptomatic. We have not checked ______________. History of prediabetes, which remains at that stage. Blood pressure control is at goal.    She has a known history of diastolic dysfunction, which was my concern on the last visit when she was complaining of swelling. There is no evidence of cardiac decompensation. She is not in congestive heart failure. She has anemia, for which we evaluate today and give her a hemoccult to try and find the source. If it is getting worse then we will be a little more aggressive than just a serologic evaluation. She will be back to see us in one month for a PT check. We have a discussion regarding narcotics. She does not know how the urine was positive for Fentanyl on now two different occasions. We advise her that we cannot give her Hydrocodone when she has Fentanyl in her system, and she has no idea how she got it, but she is going to investigate by looking at her medications at home. I have discussed the diagnosis with the patient and the intended plan as seen in the  orders above. The patient understands and agees with the plan.   The patient has   received an after visit summary and questions were answered concerning  future plans  Patient labs and/or xrays were reviewed  Past records were reviewed. PLAN:  .  Orders Placed This Encounter    CBC WITH AUTOMATED DIFF    IRON PROFILE    RETICULOCYTE COUNT    VITAMIN B12 & FOLATE    GAMMOPATHY EVAL, SPEP/MICHAEL, IG QT/FLC    FERRITIN    RENAL FUNCTION PANEL    AMB POC PT/INR    AMB POC FECAL OCCULT BLOOD (INSURE FIT)       Follow-up and Dispositions    · Return in about 4 weeks (around 2/25/2020) for pt/inr. ATTENTION:   This medical record was transcribed using an electronic medical records system. Although proofread, it may and can contain electronic and spelling errors. Other human spelling and other errors may be present. Corrections may be executed at a later time. Please feel free to contact us for any clarifications as needed.

## 2020-01-28 NOTE — ASSESSMENT & PLAN NOTE
Stable, based on history, physical exam and review of pertinent labs, studies and medications; meds reconciled; continue current treatment plan. Key CAD CHF Meds             dilTIAZem CD (CARTIA XT) 120 mg ER capsule (Taking) TAKE ONE CAPSULE BY MOUTH DAILY    pravastatin (PRAVACHOL) 20 mg tablet (Taking) TAKE ONE TABLET BY MOUTH EVERY EVENING    warfarin (COUMADIN) 2 mg tablet (Taking) Take 1 Tab by mouth daily. On Sunday only and One half tablet Monday thru sat    losartan (COZAAR) 25 mg tablet (Taking) Take 1 Tab by mouth daily. metoprolol tartrate (LOPRESSOR) 25 mg tablet (Taking) Take 1 Tab by mouth two (2) times a day. furosemide (LASIX) 20 mg tablet (Taking) Take 1 Tab by mouth daily. aspirin 81 mg chewable tablet (Taking) Take 81 mg by mouth daily.         Lab Results   Component Value Date/Time    Sodium 143 10/09/2019 11:37 AM    Potassium 4.3 10/09/2019 11:37 AM    Cholesterol, total 120 10/09/2019 11:37 AM    HDL Cholesterol 47 10/09/2019 11:37 AM    LDL, calculated 61 10/09/2019 11:37 AM    Triglyceride 61 10/09/2019 11:37 AM    INR POC 2.1 01/28/2020 12:56 PM

## 2020-01-30 LAB
ALBUMIN SERPL ELPH-MCNC: 3.8 G/DL (ref 2.9–4.4)
ALBUMIN SERPL-MCNC: 4.3 G/DL (ref 3.7–4.7)
ALBUMIN/GLOB SERPL: 1.1 {RATIO} (ref 0.7–1.7)
ALPHA1 GLOB SERPL ELPH-MCNC: 0.3 G/DL (ref 0–0.4)
ALPHA2 GLOB SERPL ELPH-MCNC: 0.9 G/DL (ref 0.4–1)
B-GLOBULIN SERPL ELPH-MCNC: 1.1 G/DL (ref 0.7–1.3)
BASOPHILS # BLD AUTO: 0 X10E3/UL (ref 0–0.2)
BASOPHILS NFR BLD AUTO: 1 %
BUN SERPL-MCNC: 20 MG/DL (ref 8–27)
BUN/CREAT SERPL: 16 (ref 12–28)
CALCIUM SERPL-MCNC: 9.7 MG/DL (ref 8.7–10.3)
CHLORIDE SERPL-SCNC: 102 MMOL/L (ref 96–106)
CO2 SERPL-SCNC: 21 MMOL/L (ref 20–29)
CREAT SERPL-MCNC: 1.26 MG/DL (ref 0.57–1)
EOSINOPHIL # BLD AUTO: 0.1 X10E3/UL (ref 0–0.4)
EOSINOPHIL NFR BLD AUTO: 3 %
ERYTHROCYTE [DISTWIDTH] IN BLOOD BY AUTOMATED COUNT: 14.4 % (ref 11.7–15.4)
FERRITIN SERPL-MCNC: 104 NG/ML (ref 15–150)
FOLATE SERPL-MCNC: 8.1 NG/ML
GAMMA GLOB SERPL ELPH-MCNC: 1.6 G/DL (ref 0.4–1.8)
GLOBULIN SER-MCNC: 3.8 G/DL (ref 2.2–3.9)
GLUCOSE SERPL-MCNC: 64 MG/DL (ref 65–99)
HCT VFR BLD AUTO: 36.4 % (ref 34–46.6)
HGB BLD-MCNC: 11.8 G/DL (ref 11.1–15.9)
IGA SERPL-MCNC: 493 MG/DL (ref 64–422)
IGG SERPL-MCNC: 1801 MG/DL (ref 700–1600)
IGM SERPL-MCNC: 47 MG/DL (ref 26–217)
IMM GRANULOCYTES # BLD AUTO: 0 X10E3/UL (ref 0–0.1)
IMM GRANULOCYTES NFR BLD AUTO: 0 %
INTERPRETATION SERPL IEP-IMP: ABNORMAL
IRON SATN MFR SERPL: 30 % (ref 15–55)
IRON SERPL-MCNC: 85 UG/DL (ref 27–139)
KAPPA LC FREE SER-MCNC: 38.7 MG/L (ref 3.3–19.4)
KAPPA LC FREE/LAMBDA FREE SER: 1.57 {RATIO} (ref 0.26–1.65)
LAMBDA LC FREE SERPL-MCNC: 24.7 MG/L (ref 5.7–26.3)
LYMPHOCYTES # BLD AUTO: 1.8 X10E3/UL (ref 0.7–3.1)
LYMPHOCYTES NFR BLD AUTO: 36 %
M PROTEIN SERPL ELPH-MCNC: ABNORMAL G/DL
MCH RBC QN AUTO: 28 PG (ref 26.6–33)
MCHC RBC AUTO-ENTMCNC: 32.4 G/DL (ref 31.5–35.7)
MCV RBC AUTO: 86 FL (ref 79–97)
MONOCYTES # BLD AUTO: 0.5 X10E3/UL (ref 0.1–0.9)
MONOCYTES NFR BLD AUTO: 10 %
NEUTROPHILS # BLD AUTO: 2.6 X10E3/UL (ref 1.4–7)
NEUTROPHILS NFR BLD AUTO: 50 %
PHOSPHATE SERPL-MCNC: 3 MG/DL (ref 3–4.3)
PLATELET # BLD AUTO: 234 X10E3/UL (ref 150–450)
PLEASE NOTE:, 149534: ABNORMAL
POTASSIUM SERPL-SCNC: 4.4 MMOL/L (ref 3.5–5.2)
PROT SERPL-MCNC: 7.6 G/DL (ref 6–8.5)
RBC # BLD AUTO: 4.22 X10E6/UL (ref 3.77–5.28)
RETICS/RBC NFR AUTO: 1.1 % (ref 0.6–2.6)
SODIUM SERPL-SCNC: 143 MMOL/L (ref 134–144)
TIBC SERPL-MCNC: 281 UG/DL (ref 250–450)
UIBC SERPL-MCNC: 196 UG/DL (ref 118–369)
VIT B12 SERPL-MCNC: 524 PG/ML (ref 232–1245)
WBC # BLD AUTO: 5.1 X10E3/UL (ref 3.4–10.8)

## 2020-02-27 ENCOUNTER — CLINICAL SUPPORT (OUTPATIENT)
Dept: INTERNAL MEDICINE CLINIC | Age: 80
End: 2020-02-27

## 2020-02-27 DIAGNOSIS — Z51.81 ENCOUNTER FOR MONITORING COUMADIN THERAPY: Primary | ICD-10-CM

## 2020-02-27 DIAGNOSIS — Z79.01 ENCOUNTER FOR MONITORING COUMADIN THERAPY: Primary | ICD-10-CM

## 2020-02-28 LAB
INR BLD: 2
PT POC: 23.4 SECONDS
VALID INTERNAL CONTROL?: YES

## 2020-02-28 NOTE — PROGRESS NOTES
Pt came in today for a PT/INR check. Pt states she is currently taking Coumadin 2 mg   1 tab PO Sunday  1/2 tab PO Mon-Sat  PT-23.4  INR-2.0  Per Dr. Marie Diallo pt is to continue taking Coumadin as directed above with no changes and return in 1 month for another PT/INR check.       Dillan Jang

## 2020-03-26 ENCOUNTER — CLINICAL SUPPORT (OUTPATIENT)
Dept: INTERNAL MEDICINE CLINIC | Age: 80
End: 2020-03-26

## 2020-03-26 DIAGNOSIS — Z79.01 ENCOUNTER FOR MONITORING COUMADIN THERAPY: Primary | ICD-10-CM

## 2020-03-26 DIAGNOSIS — Z51.81 ENCOUNTER FOR MONITORING COUMADIN THERAPY: Primary | ICD-10-CM

## 2020-03-26 LAB
INR BLD: 2.5
PT POC: 30.4 SECONDS
VALID INTERNAL CONTROL?: YES

## 2020-03-26 NOTE — PROGRESS NOTES
Munira Pemberton is a 79 y.o. female who presents today for Anticoagulation monitoring. Current dose:  Coumadin 2 mg 1/2 tab PO Monday- Saturday and 1 tab PO on Sunday   Medication Changes:  no  Dietary Changes:  no    Latest INR:  Results for orders placed or performed in visit on 03/26/20   AMB POC PT/INR   Result Value Ref Range    VALID INTERNAL CONTROL POC Yes     Prothrombin time (POC) 30.4 seconds    INR POC 2.5          New Coumadin dose:.current treatment plan is effective, no change in therapy. Next check to be scheduled for  4 weeks.   Per Dr. Alverto Cook, EUNICEN

## 2020-05-08 ENCOUNTER — VIRTUAL VISIT (OUTPATIENT)
Dept: CARDIOLOGY CLINIC | Age: 80
End: 2020-05-08

## 2020-05-08 VITALS — BODY MASS INDEX: 19.99 KG/M2 | HEIGHT: 65 IN | WEIGHT: 120 LBS

## 2020-05-08 DIAGNOSIS — I07.1 TRICUSPID VALVE INSUFFICIENCY, UNSPECIFIED ETIOLOGY: ICD-10-CM

## 2020-05-08 DIAGNOSIS — I05.9 MITRAL VALVE DISORDER: Primary | ICD-10-CM

## 2020-05-08 DIAGNOSIS — I48.92 ATRIAL FLUTTER WITH CONTROLLED RESPONSE (HCC): ICD-10-CM

## 2020-05-08 DIAGNOSIS — I09.9 RHEUMATIC HEART DISEASE: ICD-10-CM

## 2020-05-08 DIAGNOSIS — Z95.2 S/P MVR (MITRAL VALVE REPLACEMENT): ICD-10-CM

## 2020-05-08 DIAGNOSIS — I48.91 ATRIAL FIBRILLATION, UNSPECIFIED TYPE (HCC): ICD-10-CM

## 2020-05-08 DIAGNOSIS — I10 ESSENTIAL HYPERTENSION, BENIGN: ICD-10-CM

## 2020-05-08 DIAGNOSIS — I27.20 PULMONARY HYPERTENSION (HCC): ICD-10-CM

## 2020-05-08 NOTE — PROGRESS NOTES
VIRTUAL VISIT DOCUMENTATION     Pursuant to the emergency declaration under the [facility], 1135 waiver authority and the [employer] Act, this Virtual  Visit was conducted, with patient's consent, to reduce the patient's risk of exposure to COVID-19 and provide continuity of care for an established patient. Services were provided through a video synchronous discussion virtually to substitute for in-person clinic visit. CHIEF COMPLAINT      Dyke Moritz is a 79 y.o. female who was seen by synchronous (real-time) audio-video technology on 5/8/2020. Patient is being seen today for afib, mvr. Has some foot pain, x few months. No dizziness, no blood in the stool. Reviewed her echo results with her. She is staying alone and away from people. She likes the virtual visits and would like to do that. Again. She is feeling ok in general. Getting aroung ok except for the foot pain. Limited exercise. ASSESSMENT      1. CAD- 100% distal RCA disease on cath 3/11 not amenable to PCI or bypass, no exertional chest pain, continue aspirin and pravastatin  2. Rheumatic heart disease/MV disease- s/p replacement 3/12 Dr. Venkatesh Stephens, stable, next echo 12/16  3. Afib- s/p cryomaze and ALIYAH closure at time of MVR, on Coumadin, rate controlled with Metoprolol and Diltiazem  -Aflutter and afib returned post-op, seen by Dr. Nasra Tovar and offered aflutter ablation if it persisted, but gone, NSR now. Occasional palpitations now. Not bad, does not want/need further treatment at this time. Adjust meds today. 4. HTN-  normal today, cont current meds  5. Thyroid- off meds now s/p methimazole  6. Aortic Atherosclerosis- Aspirin and Pravastatin   7. Mild PAHTN  8. Tobacco use- continue cessation, stopped completely  9. COPD - seen on CT chest, not on inhalers at this time, denies progression of dyspnea  10.  Right sided chest pain/back pain/neck pain - continue Percocet PRN pain, as per Dr. Deedee Figueroa  . Echo 5/18 EF 60% LAE, MVR normal function, gradient 7mhg., mild Tr, mild PI  10/16 EF 60%, gr3 dd, NAVI mild TR/PI  2015 Echo EF 65%, mild TR, PAP 45, mild PI, gr4dd. Echo 10/14 EF 65%, NAVI, nl functioning MVR bioprosthesis, mild-mod TR PAP 45  12/13 EF 60% mildly increased MV gradient, no MR. Echo 2/12: EF55% mildCLVH, mod MR, sev MS 4.5/15 peak gradient, LAE, mild AS, mild PAHTN, mod Tr  Echo 7/10 EF 55%, gr1DD, mod AS, JUAN 1.1, Mod MS, mild TR, mild paHTN PA 40  Severe MR cath 4/10, with mild CAD     Fhx 2bro w CHF, +Dm +HTN  Soc quit tobacco        PLAN       We discussed the expected course, resolution and complications of the diagnosis(es) in detail. Medication risks, benefits, costs, interactions, and alternatives were discussed as indicated. I advised her to contact the office if her condition worsens, changes or fails to improve as anticipated.  She expressed understanding with the diagnosis(es) and plan    HISTORY OF PRESENTING ILLNESS      Rosanna Kayser is a 79 y.o. female        ACTIVE PROBLEM LIST     Patient Active Problem List    Diagnosis Date Noted    CKD (chronic kidney disease) stage 3, GFR 30-59 ml/min ([address] 11/21/2019    Dyslipidemia     Meniscus degeneration, right     Chronically on opiate therapy     Fall 03/13/2017    Blunt head trauma 03/13/2017    Chronic deep vein thrombosis (DVT) of femoral vein of right lower extremity ([address] 03/02/2017    Venous insufficiency of both lower extremities 29/67/3729    Diastolic dysfunction     SOB (shortness of breath)     ACP (advance care planning) 02/11/2016    Shoulder pain, right     Hand pain, right     Chronic pain     H/O colonoscopy     Prediabetes     Arrhythmia     PUD (peptic ulcer disease)     Anemia     Thyroid disease     Rheumatic fever     Arthritis     Warfarin-induced coagulopathy ([address]     Coronary atherosclerosis of native coronary artery 2013    Neck pain 2013    Aortic valve disorders 10/19/2012    Pulmonary hypertension ([address] 10/19/2012    S/P MVR (mitral valve replacement) 2012    S/P Maze operation for atrial fibrillation 2012    Anemia associated with acute blood loss 2012    Essential hypertension, benign 2012    Hypothyroid 2012    Tricuspid regurgitation 2012    Rheumatic heart disease 2012    Chest pain 2012    Other dyspnea and respiratory abnormality 2012    Mitral valve disorder 2012    Palpitations 2012    Atrial fibrillation ([address] 2012           PAST MEDICAL HISTORY     Past Medical History:   Diagnosis Date    AF (atrial fibrillation) (HCC)     RHEUMATIC FEVER AS CHILD, AFIB    Anemia     Anemia     Arthritis     Blunt head trauma 2017    Chronic pain     Chronically on opiate therapy     CKD (chronic kidney disease), stage III ([address]     Coagulation defects     COUMADIN    Diastolic dysfunction     grade 4    Dyslipidemia     Encounter for Hemoccult screening 2016    Fall 2017    H/O colonoscopy     Hand pain, right     Heart failure (HCC)     Meniscus degeneration, right 2017    Extensive patellar chondral derangement as well as chondral derangement     Prediabetes     PUD (peptic ulcer disease)     Rheumatic fever     S/P MVR (mitral valve replacement)     Shoulder pain, right     SOB (shortness of breath)     Thyroid disease     Venous insufficiency of both lower extremities 2017    Warfarin-induced coagulopathy ([address]            PAST SURGICAL HISTORY     Past Surgical History:   Procedure Laterality Date    HX CATARACT REMOVAL      BILATERAL    HX  SECTION      HX COLONOSCOPY      HX HEART CATHETERIZATION      HX HYSTERECTOMY      HX MITRAL VALVE REPLACEMENT      HX LUIS ENRIQUE AND BSO            ALLERGIES     No Known Allergies       FAMILY HISTORY     Family History   Problem Relation Age of Onset    Heart Attack Sister     Hypertension Sister     Kidney Disease Mother     Hypertension Mother     Hypertension Father     Cancer Brother     Heart Failure Brother     Arthritis-osteo Sister     Cancer Sister     Cancer Brother         PROSTATE    negative for cardiac disease       SOCIAL HISTORY     Social History     Socioeconomic History    Marital status: SINGLE     Spouse name: Not on file    Number of children: Not on file    Years of education: Not on file    Highest education level: Not on file   Tobacco Use    Smoking status: Former Smoker     Packs/day: 0.50     Years: 30.00     Pack years: 15.00     Last attempt to quit: 2012     Years since quittin.3    Smokeless tobacco: Never Used   Substance and Sexual Activity    Alcohol use: No     Alcohol/week: 0.0 standard drinks    Drug use: No    Sexual activity: Not Currently   Social History Narrative    Family History: Mother:  80 yrs, High Blood PressureFather:  52 yrs, High Blood PressureSister(s): alive, High Blood    Pressure, colon cancer, type II diabetesBrother(s): , High Blood Pressure, colon cancer, type II diabetesSon(s): alive 48 yrs1 sister(s)    . 1 son(s) . Social History: Alcohol Use Patient does not use alcohol. Smoking Status Patient is a former smoker. Marital Status: Single. Lives w ith:    alone. Children: sons. Occupation/W ork: retired - for [employer]. Education/School: has highschool diploma. Rastafarian: [address] [employer].         MEDICATIONS     Current Outpatient Medications   Medication Sig    dilTIAZem CD (CARTIA XT) 120 mg ER capsule TAKE ONE CAPSULE BY MOUTH DAILY    pravastatin (PRAVACHOL) 20 mg tablet TAKE ONE TABLET BY MOUTH EVERY EVENING    warfarin (COUMADIN) 2 mg tablet Take 1 Tab by mouth daily.  On  only and One half tablet Monday thru sat    losartan (COZAAR) 25 mg tablet Take 1 Tab by mouth daily.  metoprolol tartrate (LOPRESSOR) 25 mg tablet Take 1 Tab by mouth two (2) times a day.  furosemide (LASIX) 20 mg tablet Take 1 Tab by mouth daily. (Patient taking differently: Take 20 mg by mouth as needed.)    triamcinolone acetonide (KENALOG) 0.1 % topical cream Apply  to affected area two (2) times a day.  aspirin 81 mg chewable tablet Take 81 mg by mouth daily. No current facility-administered medications for this visit. I have reviewed the nurses notes, vitals, problem list, allergy list, medical history, family, social history and medications. REVIEW OF SYMPTOMS     Constitutional: Negative for fever, chills, malaise/fatigue and diaphoresis. Respiratory: Negative for cough, hemoptysis, sputum production, shortness of breath and wheezing. Cardiovascular: Negative for chest pain, palpitations, orthopnea, claudication, leg swelling and PND. Gastrointestinal: Negative for heartburn, nausea, vomiting, blood in stool and melena. Genitourinary: Negative for dysuria and flank pain. Musculoskeletal: Negative for joint pain and back pain. Skin: Negative for rash. Neurological: Negative for focal weakness, seizures, loss of consciousness, weakness and headaches. Endo/Heme/Allergies: Negative for abnormal bleeding. Psychiatric/Behavioral: Negative for memory loss. PHYSICAL EXAMINATION      Due to this being a TeleHealth evaluation, many elements of the physical examination are unable to be assessed. General- mental status is ok  Thought process logical  breathing is unlabored, no wheezing   Speech is clear. DIAGNOSTIC DATA      No specialty comments available.        LABORATORY DATA      Lab Results   Component Value Date/Time    WBC 5.1 01/28/2020 01:32 PM    HGB 11.8 01/28/2020 01:32 PM    HCT 36.4 01/28/2020 01:32 PM    PLATELET 964 45/83/9238 01:32 PM    MCV 86 01/28/2020 01:32 PM      Lab Results   Component Value Date/Time    Sodium 143 01/28/2020 01:32 PM    Potassium 4.4 01/28/2020 01:32 PM    Chloride 102 01/28/2020 01:32 PM    CO2 21 01/28/2020 01:32 PM    Anion gap 8 03/03/2017 03:52 AM    Glucose 64 (L) 01/28/2020 01:32 PM    BUN 20 01/28/2020 01:32 PM    Creatinine 1.26 (H) 01/28/2020 01:32 PM    BUN/Creatinine ratio 16 01/28/2020 01:32 PM    GFR est AA 46 (L) 01/28/2020 01:32 PM    GFR est non-AA 40 (L) 01/28/2020 01:32 PM    Calcium 9.7 01/28/2020 01:32 PM    Bilirubin, total 0.5 10/09/2019 11:37 AM    AST (SGOT) 13 10/09/2019 11:37 AM    Alk. phosphatase 65 10/09/2019 11:37 AM    Protein, total 7.6 01/28/2020 01:32 PM    Albumin 4.3 01/28/2020 01:32 PM    Globulin 4.4 (H) 03/02/2017 01:43 PM    A-G Ratio 1.2 10/09/2019 11:37 AM    ALT (SGPT) 7 10/09/2019 11:37 AM             FOLLOW-UP            Patient was made aware and verbalized understanding that an appointment will be scheduled for them for a virtual visit and/or office visit within the above time frame. Patient understanding his/her responsibility to call and change time/date if he/she so chooses. Thank you, Tiago Sewell MD for allowing me to participate in the care of Ibrahima Chin. Please do not hesitate to contact me for further questions/concerns. Greater than 20 minutes was spent in direct video patient care, planning and chart review. This visit was conducted using [employer] and telephone telemedicine services.        Maru Jhaveri MD    [address]        [phone] / [phone] Fax       Jaxon Durand [address] [facility] [address]  [facility]  [phone] / [phone] Fax

## 2020-05-11 ENCOUNTER — TELEPHONE (OUTPATIENT)
Dept: CARDIOLOGY CLINIC | Age: 80
End: 2020-05-11

## 2020-05-11 NOTE — TELEPHONE ENCOUNTER
Dr. Sly Ruiz,     Would you like any follow up to her appointment with you on 5/8?     Thank you,     Jimi Stewart

## 2020-05-13 NOTE — TELEPHONE ENCOUNTER
5/13/2020 appointment scheduled with patient     Future Appointments   Date Time Provider Shalini Wick   5/14/2020 10:00 AM NURSE NANDO Reid   11/10/2020  1:20 PM Kourtney Pinto  [address]

## 2020-05-14 ENCOUNTER — CLINICAL SUPPORT (OUTPATIENT)
Dept: INTERNAL MEDICINE CLINIC | Age: 80
End: 2020-05-14

## 2020-05-14 DIAGNOSIS — Z51.81 ENCOUNTER FOR MONITORING COUMADIN THERAPY: Primary | ICD-10-CM

## 2020-05-14 DIAGNOSIS — Z79.01 ENCOUNTER FOR MONITORING COUMADIN THERAPY: Primary | ICD-10-CM

## 2020-05-14 LAB
INR BLD: 26.6
PT POC: 2.2 SECONDS
VALID INTERNAL CONTROL?: YES

## 2020-05-14 NOTE — PROGRESS NOTES
Carline Momin is a 79 y.o. female who presents today for Anticoagulation monitoring. Current dose:  Coumadin 2 mg 1/2 tab PO everyday except on Sunday 1 tab PO  Medication Changes:  no  Dietary Changes:  no    Latest INR:  Results for orders placed or performed in visit on 05/14/20   AMB POC PT/INR   Result Value Ref Range    VALID INTERNAL CONTROL POC Yes     Prothrombin time (POC) 2.2 seconds    INR POC 26.6          New Coumadin dose:.current treatment plan is effective, no change in therapy. Per   Next check to be scheduled for  4 weeks.     Adal Nugent LPN

## 2020-06-18 ENCOUNTER — CLINICAL SUPPORT (OUTPATIENT)
Dept: INTERNAL MEDICINE CLINIC | Age: 80
End: 2020-06-18

## 2020-06-18 DIAGNOSIS — D68.32 WARFARIN-INDUCED COAGULOPATHY (HCC): Primary | ICD-10-CM

## 2020-06-18 DIAGNOSIS — T45.515A WARFARIN-INDUCED COAGULOPATHY (HCC): Primary | ICD-10-CM

## 2020-06-18 LAB
INR BLD: 24.4
PT POC: 2 SECONDS
VALID INTERNAL CONTROL?: YES

## 2020-06-18 NOTE — PROGRESS NOTES
Pt was seen for PT/INR check  Results were PT 2.0 and INR 24.4  Pt currently takes 1mg coumadin Mon-Sat and 2mg on Sunday. Per Dr. Matilda Mandel, no med change and f/u in 1 month.

## 2020-07-07 ENCOUNTER — OFFICE VISIT (OUTPATIENT)
Dept: INTERNAL MEDICINE CLINIC | Age: 80
End: 2020-07-07

## 2020-07-07 VITALS
RESPIRATION RATE: 20 BRPM | OXYGEN SATURATION: 98 % | HEART RATE: 79 BPM | WEIGHT: 116.8 LBS | DIASTOLIC BLOOD PRESSURE: 63 MMHG | SYSTOLIC BLOOD PRESSURE: 116 MMHG | HEIGHT: 65 IN | BODY MASS INDEX: 19.46 KG/M2 | TEMPERATURE: 98.1 F

## 2020-07-07 DIAGNOSIS — T45.515A WARFARIN-INDUCED COAGULOPATHY (HCC): Primary | ICD-10-CM

## 2020-07-07 DIAGNOSIS — D68.32 WARFARIN-INDUCED COAGULOPATHY (HCC): Primary | ICD-10-CM

## 2020-07-07 DIAGNOSIS — I00 RHEUMATIC FEVER: ICD-10-CM

## 2020-07-07 DIAGNOSIS — N18.30 CKD (CHRONIC KIDNEY DISEASE) STAGE 3, GFR 30-59 ML/MIN (HCC): ICD-10-CM

## 2020-07-07 DIAGNOSIS — R06.02 SOB (SHORTNESS OF BREATH): ICD-10-CM

## 2020-07-07 DIAGNOSIS — E78.5 DYSLIPIDEMIA: ICD-10-CM

## 2020-07-07 DIAGNOSIS — Z95.2 S/P MVR (MITRAL VALVE REPLACEMENT): ICD-10-CM

## 2020-07-07 DIAGNOSIS — I48.91 ATRIAL FIBRILLATION, UNSPECIFIED TYPE (HCC): ICD-10-CM

## 2020-07-07 DIAGNOSIS — I82.511 CHRONIC DEEP VEIN THROMBOSIS (DVT) OF FEMORAL VEIN OF RIGHT LOWER EXTREMITY (HCC): ICD-10-CM

## 2020-07-07 LAB
INR BLD: 2.1
PT POC: 25.7 SECONDS
VALID INTERNAL CONTROL?: YES

## 2020-07-07 RX ORDER — FUROSEMIDE 40 MG/1
TABLET ORAL
Qty: 90 TAB | Refills: 0 | Status: SHIPPED | OUTPATIENT
Start: 2020-07-07 | End: 2020-10-06 | Stop reason: SDUPTHER

## 2020-07-07 RX ORDER — FUROSEMIDE 40 MG/1
40 TABLET ORAL DAILY
Qty: 30 TAB | Refills: 1 | Status: SHIPPED | OUTPATIENT
Start: 2020-07-07 | End: 2020-07-07

## 2020-07-07 NOTE — PROGRESS NOTES
1. Have you been to the ER, urgent care clinic since your last visit? Hospitalized since your last visit? No    2. Have you seen or consulted any other health care providers outside of the [address] [place] [address] since your last visit? Include any pap smears or colon screening.  No     Wants to discuss ankle and foot swelling

## 2020-07-07 NOTE — PROGRESS NOTES
SPORTS MEDICINE AND PRIMARY CARE  Juan J Guzman MD, [address] [address]  Phone:  [phone]  Fax: [phone]       Chief Complaint   Patient presents with    Foot Swelling   . SUBJECTIVE:    Annelle Severin is a 79 y.o. female Patient returns today with known history of coronary artery disease, followed by Ana Silva MD, with cardiac cath 03/11, not amenable to PCI or bypass, rheumatic heart disease, status post mitral valve replacement, atrial fibrillation, status post CryoMaze and ALIYAH closure at time of MVR, on Coumadin, rate controlled with Metoprolol and Diltiazem. Atrial flutter and fib returned postop and remains on chronic Coumadin, primary hypertension, mild pulmonary artery hypertension, COPD, and low back pain. She was previously on Hydrocodone which was discontinued due to Fentanyl in urine, which she could not explain, or could I, but I could not comfortably continue the medication. Other medical problems include dyslipidemia and chronic deep vein thrombosis. Patient currently complains of ankle swelling, left greater than right, and has been on Lasix periodically 20 mg, which does not seem to be helping that much. She is wearing support stockings today and is seen for evaluation. Current Outpatient Medications   Medication Sig Dispense Refill    furosemide (LASIX) 40 mg tablet Take 1 Tab by mouth daily. 30 Tab 1    dilTIAZem CD (CARTIA XT) 120 mg ER capsule TAKE ONE CAPSULE BY MOUTH DAILY 90 Cap 3    pravastatin (PRAVACHOL) 20 mg tablet TAKE ONE TABLET BY MOUTH EVERY EVENING 90 Tab 3    warfarin (COUMADIN) 2 mg tablet Take 1 Tab by mouth daily. On Sunday only and One half tablet Monday thru sat 90 Tab 3    losartan (COZAAR) 25 mg tablet Take 1 Tab by mouth daily. 90 Tab 3    metoprolol tartrate (LOPRESSOR) 25 mg tablet Take 1 Tab by mouth two (2) times a day.  90 Tab 3    triamcinolone acetonide (KENALOG) 0.1 % topical cream Apply  to affected area two (2) times a day. 45 g 11    aspirin 81 mg chewable tablet Take 81 mg by mouth daily.        Past Medical History:   Diagnosis Date    AF (atrial fibrillation) (HCC)     RHEUMATIC FEVER AS CHILD, AFIB    Anemia     Anemia     Arthritis     Blunt head trauma 2017    Chronic pain     Chronically on opiate therapy     CKD (chronic kidney disease), stage III (HCC)     Coagulation defects     COUMADIN    Diastolic dysfunction     grade 4    Dyslipidemia     Encounter for Hemoccult screening 2016    Fall 2017    H/O colonoscopy     Hand pain, right     Heart failure (HCC)     Meniscus degeneration, right 2017    Extensive patellar chondral derangement as well as chondral derangement     Prediabetes     PUD (peptic ulcer disease)     Rheumatic fever     S/P MVR (mitral valve replacement)     Shoulder pain, right     SOB (shortness of breath)     Thyroid disease     Venous insufficiency of both lower extremities 2017    Warfarin-induced coagulopathy ([facility]      Past Surgical History:   Procedure Laterality Date    HX CATARACT REMOVAL      BILATERAL    HX  SECTION      HX COLONOSCOPY      HX HEART CATHETERIZATION      HX HYSTERECTOMY      HX MITRAL VALVE REPLACEMENT      HX LUIS ENRIQUE AND BSO       No Known Allergies      REVIEW OF SYSTEMS:  General: negative for - chills or fever  ENT: negative for - headaches, nasal congestion or tinnitus  Respiratory: negative for - cough, hemoptysis, shortness of breath or wheezing  Cardiovascular : negative for - chest pain, edema, palpitations or shortness of breath  Gastrointestinal: negative for - abdominal pain, blood in stools, heartburn or nausea/vomiting  Genito-Urinary: no dysuria, trouble voiding, or hematuria  Musculoskeletal: negative for - gait disturbance, joint pain, joint stiffness or joint swelling  Neurological: no TIA or stroke symptoms  Hematologic: no bruises, no bleeding, no swollen glands  Integument: no lumps, mole changes, nail changes or rash  Endocrine: no malaise/lethargy or unexpected weight changes      Social History     Socioeconomic History    Marital status: SINGLE     Spouse name: Not on file    Number of children: Not on file    Years of education: Not on file    Highest education level: Not on file   Tobacco Use    Smoking status: Former Smoker     Packs/day: 0.50     Years: 30.00     Pack years: 15.00     Last attempt to quit: 2012     Years since quittin.4    Smokeless tobacco: Never Used   Substance and Sexual Activity    Alcohol use: No     Alcohol/week: 0.0 standard drinks    Drug use: No    Sexual activity: Not Currently   Social History Narrative    Family History: Mother:  80 yrs, High Blood PressureFather:  52 yrs, High Blood PressureSister(s): alive, High Blood    Pressure, colon cancer, type II diabetesBrother(s): , High Blood Pressure, colon cancer, type II diabetesSon(s): alive 48 yrs1 sister(s)    . 1 son(s) . Social History: Alcohol Use Patient does not use alcohol. Smoking Status Patient is a former smoker        . Marital Status: Single. Lives w ith:    alone. Children: sons. Occupation/W ork: retired - for [employer]. Education/School: has highschool diploma.     [employer]ism: [address] [employer].     Family History   Problem Relation Age of Onset    Heart Attack Sister     Hypertension Sister     Kidney Disease Mother     Hypertension Mother     Hypertension Father     Cancer Brother     Heart Failure Brother     Arthritis-osteo Sister     Cancer Sister     Cancer Brother         PROSTATE       OBJECTIVE:    Visit Vitals  /63   Pulse 79   Temp 98.1 °F (36.7 °C) (Oral)   Resp 20   Ht 5' 5\" (1.651 m)   Wt 116 lb 12.8 oz (53 kg)   SpO2 98%   BMI 19.44 kg/m²     CONSTITUTIONAL: well , well nourished, appears age appropriate  EYES: perrla, eom intact  ENMT:moist mucous membranes, pharynx clear  NECK: supple. Thyroid normal  RESPIRATORY: Chest: clear bilaterally   CARDIOVASCULAR: Heart: regular rate and rhythm  GASTROINTESTINAL: Abdomen: soft, bowel sounds active  HEMATOLOGIC: no pathological lymph nodes palpated  MUSCULOSKELETAL: Extremities: no edema, pulse 1+   INTEGUMENT: No unusual rashes or suspicious skin lesions noted. Nails appear normal.  NEUROLOGIC: non-focal exam   MENTAL STATUS: alert and oriented, appropriate affect           ASSESSMENT:  1. Warfarin-induced coagulopathy ([facility]    2. Atrial fibrillation, unspecified type ([facility]    3. CKD (chronic kidney disease) stage 3, GFR 30-59 ml/min ([facility])    4. Dyslipidemia    5. Chronic deep vein thrombosis (DVT) of femoral vein of right lower extremity ([facility])    6. S/P MVR (mitral valve replacement)    7. Rheumatic fever    8. SOB (shortness of breath)      The P-T and INR are therapeutic at 2.1 and 25.7 respectively. On auscultation today she may well be in atrial fibrillation versus frequent PACs. She has chronic kidney disease, for which we are gingerly going to increase Lasix to 40 mg daily. On her next visit in two weeks for a P-T check, we will also check renal status at that time. We certainly do not want to produce a significant prerenal azotemia. We may have to settle with some, however. On questioning, over the past four to five months she has noted progressively increasing shortness of breath with now minimal exertion. On exam she has JVD full at 45 degrees. For that reason, I want to increase Lasix to 40 mg daily. She is not having any chest pain fortunately. She will be back to see us in two weeks for P-T check and a renal panel. She agrees with the plan. As she leaves, she wonders if she can get the Hydrocodone. We advise her that we need to see a negative urine before we can do that. She will produce that on the next visit in two weeks.       I have discussed the diagnosis with the patient and the intended plan as seen in the  orders above. The patient understands and agees with the plan. The patient has   received an after visit summary and questions were answered concerning  future plans  Patient labs and/or xrays were reviewed  Past records were reviewed. PLAN:  .  Orders Placed This Encounter    CBC WITH AUTOMATED DIFF    RENAL FUNCTION PANEL    BNP (NT-PRO)    AMB POC PT/INR    furosemide (LASIX) 40 mg tablet       Follow-up and Dispositions    · Return in about 2 weeks (around 7/21/2020) for bp check, pt/inr renal panel and bnp. ATTENTION:   This medical record was transcribed using an electronic medical records system. Although proofread, it may and can contain electronic and spelling errors. Other human spelling and other errors may be present. Corrections may be executed at a later time. Please feel free to contact us for any clarifications as needed.

## 2020-07-08 LAB — NT-PROBNP SERPL-MCNC: 3615 PG/ML (ref 0–738)

## 2020-07-21 ENCOUNTER — CLINICAL SUPPORT (OUTPATIENT)
Dept: INTERNAL MEDICINE CLINIC | Age: 80
End: 2020-07-21

## 2020-07-21 DIAGNOSIS — Z79.01 ENCOUNTER FOR MONITORING COUMADIN THERAPY: Primary | ICD-10-CM

## 2020-07-21 DIAGNOSIS — Z51.81 ENCOUNTER FOR MONITORING COUMADIN THERAPY: Primary | ICD-10-CM

## 2020-07-21 LAB
INR BLD: 2.7
PT POC: 32.7 SECONDS
VALID INTERNAL CONTROL?: YES

## 2020-07-21 NOTE — PROGRESS NOTES
Chief Complaint   Patient presents with    Coagulation disorder     Patient is here for a PT/INR check. Patient states she is taking Warfarin 2mg. She states she is taking 1mg Monday, Tuesday,Wednesday, Thursday, Friday,Saturday and 2mg on Sundays. Results for orders placed or performed in visit on 07/21/20   AMB POC PT/INR   Result Value Ref Range    VALID INTERNAL CONTROL POC Yes     Prothrombin time (POC) 32.7 seconds    INR POC 2.7      Per Dr. Ignacio Kingsley No adjustments and patient is to return in 1 month for a PT check.

## 2020-08-18 ENCOUNTER — CLINICAL SUPPORT (OUTPATIENT)
Dept: INTERNAL MEDICINE CLINIC | Age: 80
End: 2020-08-18
Payer: MEDICARE

## 2020-08-18 DIAGNOSIS — Z51.81 ENCOUNTER FOR MONITORING COUMADIN THERAPY: Primary | ICD-10-CM

## 2020-08-18 DIAGNOSIS — Z79.01 ENCOUNTER FOR MONITORING COUMADIN THERAPY: Primary | ICD-10-CM

## 2020-08-18 LAB
INR BLD: 3.3
PT POC: 40.2 SECONDS
VALID INTERNAL CONTROL?: YES

## 2020-08-18 PROCEDURE — 85610 PROTHROMBIN TIME: CPT | Performed by: INTERNAL MEDICINE

## 2020-08-18 NOTE — PROGRESS NOTES
Marco Nava is a 79 y.o. female who presents today for Anticoagulation monitoring. Current dose:  Coumadin 2 mg 1/2 tab PO everyday except on Sunday 1 tab PO  Medication Changes:  yes - coumadin 2 mg 1/2 tab PO daily  Dietary Changes:  no    Latest INR:  Results for orders placed or performed in visit on 08/18/20   AMB POC PT/INR   Result Value Ref Range    VALID INTERNAL CONTROL POC Yes     Prothrombin time (POC) 40.2 seconds    INR POC 3.3          New Coumadin dose:.orders as documented in EMR. Next check to be scheduled for  3 weeks.   Per Dr. Mireya Benites, EUNICEN

## 2020-08-24 RX ORDER — LOSARTAN POTASSIUM 25 MG/1
TABLET ORAL
Qty: 90 TAB | Refills: 3 | Status: SHIPPED | OUTPATIENT
Start: 2020-08-24 | End: 2021-06-25

## 2020-08-26 ENCOUNTER — TELEPHONE (OUTPATIENT)
Dept: CARDIOLOGY CLINIC | Age: 80
End: 2020-08-26

## 2020-08-26 NOTE — TELEPHONE ENCOUNTER
8/26/2020 at 11:26 called both home and mobile for patient, no answer or voicemail - will try call again later - need to reschedule 11/10/2020 1:20 with Dr. Emilia Lockwood to same day, same time with Derick Olsen - Dr. Emilia Lockwood PM hospital coverage

## 2020-08-28 NOTE — TELEPHONE ENCOUNTER
8/28/2020 appointment rescheduled with patient, in office visit    Future Appointments   Date Time Provider Shalini Wick   9/8/2020 10:30 AM NURSE WILLIAMPC NIKKO ABAD BS AMB   11/19/2020 10:20 AM MD LOGAN Velasquez AMB

## 2020-09-08 ENCOUNTER — CLINICAL SUPPORT (OUTPATIENT)
Dept: INTERNAL MEDICINE CLINIC | Age: 80
End: 2020-09-08
Payer: MEDICARE

## 2020-09-08 DIAGNOSIS — Z79.01 ENCOUNTER FOR MONITORING COUMADIN THERAPY: Primary | ICD-10-CM

## 2020-09-08 DIAGNOSIS — Z51.81 ENCOUNTER FOR MONITORING COUMADIN THERAPY: Primary | ICD-10-CM

## 2020-09-08 PROCEDURE — 85610 PROTHROMBIN TIME: CPT | Performed by: INTERNAL MEDICINE

## 2020-09-11 RX ORDER — METOPROLOL TARTRATE 25 MG/1
TABLET, FILM COATED ORAL
Qty: 90 TAB | Refills: 3 | Status: SHIPPED | OUTPATIENT
Start: 2020-09-11 | End: 2021-01-27 | Stop reason: SDUPTHER

## 2020-10-08 ENCOUNTER — CLINICAL SUPPORT (OUTPATIENT)
Dept: INTERNAL MEDICINE CLINIC | Age: 80
End: 2020-10-08
Payer: MEDICARE

## 2020-10-08 DIAGNOSIS — Z23 ENCOUNTER FOR IMMUNIZATION: Primary | ICD-10-CM

## 2020-10-08 DIAGNOSIS — Z79.01 ENCOUNTER FOR MONITORING COUMADIN THERAPY: ICD-10-CM

## 2020-10-08 DIAGNOSIS — Z51.81 ENCOUNTER FOR MONITORING COUMADIN THERAPY: ICD-10-CM

## 2020-10-08 LAB
INR BLD: 2.7
PT POC: 32.3 SECONDS
VALID INTERNAL CONTROL?: YES

## 2020-10-08 PROCEDURE — 85610 PROTHROMBIN TIME: CPT

## 2020-10-08 PROCEDURE — 90653 IIV ADJUVANT VACCINE IM: CPT

## 2020-10-08 PROCEDURE — G0008 ADMIN INFLUENZA VIRUS VAC: HCPCS

## 2020-10-08 NOTE — PROGRESS NOTES
Roger Yeager is a 79 y.o. female who presents today for Anticoagulation monitoring. Current dose:  Coumadin 2 mg 1/2 tab PO daily. Medication Changes:  no  Dietary Changes:  no    Latest INR:  Results for orders placed or performed in visit on 10/08/20   AMB POC PT/INR   Result Value Ref Range    VALID INTERNAL CONTROL POC Yes     Prothrombin time (POC) 32.3 seconds    INR POC 2.7          New Coumadin dose:.current treatment plan is effective, no change in therapy. Next check to be scheduled for  4 weeks. Per Dr. Shan Gonsalez LPN   Flu shot given in left deltoid. Tolerated.  Temp 98.0  Lamar Montenegro LPN

## 2020-11-12 ENCOUNTER — CLINICAL SUPPORT (OUTPATIENT)
Dept: INTERNAL MEDICINE CLINIC | Age: 80
End: 2020-11-12
Payer: MEDICARE

## 2020-11-12 DIAGNOSIS — Z79.01 ENCOUNTER FOR MONITORING COUMADIN THERAPY: Primary | ICD-10-CM

## 2020-11-12 DIAGNOSIS — Z51.81 ENCOUNTER FOR MONITORING COUMADIN THERAPY: Primary | ICD-10-CM

## 2020-11-12 LAB
INR BLD: 2.9
PT POC: 35.4 SECONDS
VALID INTERNAL CONTROL?: YES

## 2020-11-12 PROCEDURE — 85610 PROTHROMBIN TIME: CPT | Performed by: INTERNAL MEDICINE

## 2020-11-12 NOTE — PROGRESS NOTES
Ben Mancini is a 79 y.o. female who presents today for Anticoagulation monitoring. Current dose:  Coumadin 2 mg 1/2 tab PO daily. Medication Changes:  no  Dietary Changes:  no    Latest INR:  Results for orders placed or performed in visit on 11/12/20   AMB POC PT/INR   Result Value Ref Range    VALID INTERNAL CONTROL POC Yes     Prothrombin time (POC) 35.4 seconds    INR POC 2.9          New Coumadin dose:.current treatment plan is effective, no change in therapy. Next check to be scheduled for  4 weeks.   Per Dr. Irene Weller LPN

## 2020-11-16 LAB
INR BLD: 2.4
PT POC: 28.3 SECONDS
VALID INTERNAL CONTROL?: YES

## 2020-11-16 NOTE — PROGRESS NOTES
Alma Ferreira is a 79 y.o. female who presents today for Anticoagulation monitoring. Current dose:  Coumadin 2 mg  1/2 tab PO daily. Medication Changes:  no  Dietary Changes:  no    Latest INR:  Results for orders placed or performed in visit on 09/08/20   AMB POC PT/INR   Result Value Ref Range    VALID INTERNAL CONTROL POC Yes     Prothrombin time (POC) 28.3 seconds    INR POC 2.4          New Coumadin dose:.current treatment plan is effective, no change in therapy. Next check to be scheduled for  4 weeks.   Per Dr. aSntiago Jorge LPN

## 2020-11-19 ENCOUNTER — OFFICE VISIT (OUTPATIENT)
Dept: CARDIOLOGY CLINIC | Age: 80
End: 2020-11-19
Payer: MEDICARE

## 2020-11-19 VITALS
RESPIRATION RATE: 14 BRPM | SYSTOLIC BLOOD PRESSURE: 140 MMHG | HEIGHT: 65 IN | DIASTOLIC BLOOD PRESSURE: 60 MMHG | WEIGHT: 121 LBS | HEART RATE: 80 BPM | BODY MASS INDEX: 20.16 KG/M2

## 2020-11-19 DIAGNOSIS — I48.91 ATRIAL FIBRILLATION, UNSPECIFIED TYPE (HCC): ICD-10-CM

## 2020-11-19 DIAGNOSIS — I10 ESSENTIAL HYPERTENSION, BENIGN: ICD-10-CM

## 2020-11-19 DIAGNOSIS — I07.1 TRICUSPID VALVE INSUFFICIENCY, UNSPECIFIED ETIOLOGY: ICD-10-CM

## 2020-11-19 DIAGNOSIS — I05.9 MITRAL VALVE DISORDER: Primary | ICD-10-CM

## 2020-11-19 PROCEDURE — G8420 CALC BMI NORM PARAMETERS: HCPCS | Performed by: INTERNAL MEDICINE

## 2020-11-19 PROCEDURE — 1090F PRES/ABSN URINE INCON ASSESS: CPT | Performed by: INTERNAL MEDICINE

## 2020-11-19 PROCEDURE — 1101F PT FALLS ASSESS-DOCD LE1/YR: CPT | Performed by: INTERNAL MEDICINE

## 2020-11-19 PROCEDURE — G8399 PT W/DXA RESULTS DOCUMENT: HCPCS | Performed by: INTERNAL MEDICINE

## 2020-11-19 PROCEDURE — 99214 OFFICE O/P EST MOD 30 MIN: CPT | Performed by: INTERNAL MEDICINE

## 2020-11-19 PROCEDURE — G8427 DOCREV CUR MEDS BY ELIG CLIN: HCPCS | Performed by: INTERNAL MEDICINE

## 2020-11-19 PROCEDURE — 93000 ELECTROCARDIOGRAM COMPLETE: CPT | Performed by: INTERNAL MEDICINE

## 2020-11-19 PROCEDURE — G8536 NO DOC ELDER MAL SCRN: HCPCS | Performed by: INTERNAL MEDICINE

## 2020-11-19 PROCEDURE — G8754 DIAS BP LESS 90: HCPCS | Performed by: INTERNAL MEDICINE

## 2020-11-19 PROCEDURE — G8510 SCR DEP NEG, NO PLAN REQD: HCPCS | Performed by: INTERNAL MEDICINE

## 2020-11-19 PROCEDURE — G8753 SYS BP > OR = 140: HCPCS | Performed by: INTERNAL MEDICINE

## 2020-11-19 NOTE — PROGRESS NOTES
CHIEF COMPLAINT      Lois Gerard is a 79 y.o. female who was seen    today for afib, mvr. Has some foot pain, x few months. No dizziness, no blood in the stool. Reviewed her echo results with her. She is staying alone and away from people. She is feeling ok in general. Getting aroung ok except for the foot pain. Limited exercise. More edema bilateral LE, props them up as much as she can. Edema is 2+ better since taking the lasix 40mg once a day. She is eating more ice cream and was able to get back up those 4 pounds to 120 today. NO chest pain, no dizziness, breathing is stable  Physical in a month or so. NO chest pain or dyspnea. She feels more tired and out of breath. She does do strenuous stuff from time to time, which makes her out of breath, she doesn't think it is anything to worry about. Reviewed her CAD and no stress testing in some years, she prefers to wait on that for now and see how she does. ASSESSMENT      1. CAD 3/12- 100% distal RCA disease on cath 3/11 not amenable to PCI or bypass, no exertional chest pain, continue aspirin and pravastatin  -declines stress testing for now  2. Rheumatic heart disease/MV disease- s/p replacement 3/12 Dr. Armando Hines, stable   3. Afib- s/p cryomaze and ALIYAH closure at time of MVR, on Coumadin, rate controlled with Metoprolol and Diltiazem  -Aflutter and afib returned post-op, seen by Dr. Jose Orourke and offered aflutter ablation if it persisted, but gone, NSR now. Occasional palpitations now. Not bad, does not want/need further treatment at this time. 4. HTN-  normal today, cont current meds  5. Thyroid- off meds now s/p methimazole  6. Aortic Atherosclerosis- Aspirin and Pravastatin   7. Mild PAHTN  8. Tobacco use- continue cessation, stopped completely  9. COPD - seen on CT chest, not on inhalers at this time, denies progression of dyspnea  10.  Right sided chest pain/back pain/neck pain - continue Percocet PRN pain, as per  Sindi  . Echo 5/20 EF 60-65% LAE  Echo 5/18 EF 60% LAE, MVR normal function, gradient 7mhg., mild Tr, mild PI  10/16 EF 60%, gr3 dd, NAVI mild TR/PI  2015 Echo EF 65%, mild TR, PAP 45, mild PI, gr4dd. Echo 10/14 EF 65%, NAVI, nl functioning MVR bioprosthesis, mild-mod TR PAP 45  12/13 EF 60% mildly increased MV gradient, no MR. Echo 2/12: EF55% mildCLVH, mod MR, sev MS 4.5/15 peak gradient, LAE, mild AS, mild PAHTN, mod Tr  Echo 7/10 EF 55%, gr1DD, mod AS, JUAN 1.1, Mod MS, mild TR, mild paHTN PA 40  Severe MR cath 4/10, with mild CAD     Fhx 2 bro w CHF, +Dm +HTN  Soc quit tobacco        PLAN       We discussed the expected course, resolution and complications of the diagnosis(es) in detail. Medication risks, benefits, costs, interactions, and alternatives were discussed as indicated. I advised her to contact the office if her condition worsens, changes or fails to improve as anticipated.  She expressed understanding with the diagnosis(es) and plan    HISTORY OF PRESENTING ILLNESS      Alma Ferreira is a 79 y.o. female        ACTIVE PROBLEM LIST     Patient Active Problem List    Diagnosis Date Noted    CKD (chronic kidney disease) stage 3, GFR 30-59 ml/min 11/21/2019    Dyslipidemia     Meniscus degeneration, right     Chronically on opiate therapy     Fall 03/13/2017    Blunt head trauma 03/13/2017    Chronic deep vein thrombosis (DVT) of femoral vein of right lower extremity ([address] 03/02/2017    Venous insufficiency of both lower extremities 93/40/1910    Diastolic dysfunction     SOB (shortness of breath)     ACP (advance care planning) 02/11/2016    Shoulder pain, right     Hand pain, right     Chronic pain     H/O colonoscopy     Prediabetes     Arrhythmia     PUD (peptic ulcer disease)     Anemia     Thyroid disease     Rheumatic fever     Arthritis     Warfarin-induced coagulopathy ([address]     Coronary atherosclerosis of native coronary artery 12/02/2013    Neck pain 08/12/2013  Aortic valve disorders 10/19/2012    Pulmonary hypertension ([facility] 10/19/2012    S/P MVR (mitral valve replacement) 2012    S/P Maze operation for atrial fibrillation 2012    Anemia associated with acute blood loss 2012    Essential hypertension, benign 2012    Hypothyroid 2012    Tricuspid regurgitation 2012    Rheumatic heart disease 2012    Chest pain 2012    Other dyspnea and respiratory abnormality 2012    Mitral valve disorder 2012    Palpitations 2012    Atrial fibrillation ([facility] 2012           PAST MEDICAL HISTORY     Past Medical History:   Diagnosis Date    AF (atrial fibrillation) (HCC)     RHEUMATIC FEVER AS CHILD, AFIB    Anemia     Anemia     Arthritis     Blunt head trauma 2017    Chronic pain     Chronically on opiate therapy     CKD (chronic kidney disease), stage III     Coagulation defects     COUMADIN    Diastolic dysfunction     grade 4    Dyslipidemia     Encounter for Hemoccult screening 2016    Fall 2017    H/O colonoscopy     Hand pain, right     Heart failure (HCC)     Meniscus degeneration, right 2017    Extensive patellar chondral derangement as well as chondral derangement     Prediabetes     PUD (peptic ulcer disease)     Rheumatic fever     S/P MVR (mitral valve replacement)     Shoulder pain, right     SOB (shortness of breath)     Thyroid disease     Venous insufficiency of both lower extremities 2017    Warfarin-induced coagulopathy ([facility]            PAST SURGICAL HISTORY     Past Surgical History:   Procedure Laterality Date    HX CATARACT REMOVAL      BILATERAL    HX  SECTION      HX COLONOSCOPY      HX HEART CATHETERIZATION      HX HYSTERECTOMY      HX MITRAL VALVE REPLACEMENT      HX LUIS ENRIQUE AND BSO            ALLERGIES     No Known Allergies       FAMILY HISTORY     Family History   Problem Relation Age of Onset    Heart Attack Sister     Hypertension Sister     Kidney Disease Mother     Hypertension Mother     Hypertension Father     Cancer Brother     Heart Failure Brother     Arthritis-osteo Sister     Cancer Sister     Cancer Brother         PROSTATE    negative for cardiac disease       SOCIAL HISTORY     Social History     Socioeconomic History    Marital status: SINGLE     Spouse name: Not on file    Number of children: Not on file    Years of education: Not on file    Highest education level: Not on file   Tobacco Use    Smoking status: Former Smoker     Packs/day: 0.50     Years: 30.00     Pack years: 15.00     Last attempt to quit: 2012     Years since quittin.8    Smokeless tobacco: Never Used   Substance and Sexual Activity    Alcohol use: No     Alcohol/week: 0.0 standard drinks    Drug use: No    Sexual activity: Not Currently   Social History Narrative    Family History: Mother:  80 yrs, High Blood PressureFather:  52 yrs, High Blood PressureSister(s): alive, High Blood    Pressure, colon cancer, type II diabetesBrother(s): , High Blood Pressure, colon cancer, type II diabetesSon(s): alive 48 yrs1 sister(s)    . 1 son(s) . Social History: Alcohol Use Patient does not use alcohol. Smoking Status Patient is a former smoker        . Marital Status: Single. Lives w ith:    alone. Children: sons. Occupation/W ork: retired - for [employer]. Education/School: has highschool diploma.     Temple: [address] [employer].         MEDICATIONS     Current Outpatient Medications   Medication Sig    furosemide (LASIX) 40 mg tablet TAKE 1 TABLET BY MOUTH DAILY    metoprolol tartrate (LOPRESSOR) 25 mg tablet TAKE 1 TABLET BY MOUTH TWICE DAILY    losartan (COZAAR) 25 mg tablet TAKE 1 TABLET BY MOUTH DAILY    dilTIAZem CD (CARTIA XT) 120 mg ER capsule TAKE ONE CAPSULE BY MOUTH DAILY    pravastatin (PRAVACHOL) 20 mg tablet TAKE ONE TABLET BY MOUTH EVERY EVENING    warfarin (COUMADIN) 2 mg tablet Take 1 Tab by mouth daily. On Sunday only and One half tablet Monday thru sat    triamcinolone acetonide (KENALOG) 0.1 % topical cream Apply  to affected area two (2) times a day.  aspirin 81 mg chewable tablet Take 81 mg by mouth daily. No current facility-administered medications for this visit. I have reviewed the nurses notes, vitals, problem list, allergy list, medical history, family, social history and medications. REVIEW OF SYMPTOMS     Constitutional: Negative for fever, chills, malaise/fatigue and diaphoresis. Respiratory: Negative for cough, hemoptysis, sputum production, shortness of breath and wheezing. Cardiovascular: Negative for chest pain, palpitations, orthopnea, claudication, leg swelling and PND. Gastrointestinal: Negative for heartburn, nausea, vomiting, blood in stool and melena. Genitourinary: Negative for dysuria and flank pain. Musculoskeletal: Negative for joint pain and back pain. Skin: Negative for rash. Neurological: Negative for focal weakness, seizures, loss of consciousness, weakness and headaches. Endo/Heme/Allergies: Negative for abnormal bleeding. Psychiatric/Behavioral: Negative for memory loss. PHYSICAL EXAMINATION      Due to this being a TeleHealth evaluation, many elements of the physical examination are unable to be assessed. General- mental status is ok  Thought process logical  breathing is unlabored, no wheezing   Speech is clear. DIAGNOSTIC DATA      No specialty comments available.        LABORATORY DATA      Lab Results   Component Value Date/Time    WBC 5.1 01/28/2020 01:32 PM    HGB 11.8 01/28/2020 01:32 PM    HCT 36.4 01/28/2020 01:32 PM    PLATELET 716 51/47/7924 01:32 PM    MCV 86 01/28/2020 01:32 PM      Lab Results   Component Value Date/Time    Sodium 143 01/28/2020 01:32 PM    Potassium 4.4 01/28/2020 01:32 PM    Chloride 102 01/28/2020 01:32 PM    CO2 21 01/28/2020 01:32 PM    Anion gap 8 03/03/2017 03:52 AM    Glucose 64 (L) 01/28/2020 01:32 PM    BUN 20 01/28/2020 01:32 PM    Creatinine 1.26 (H) 01/28/2020 01:32 PM    BUN/Creatinine ratio 16 01/28/2020 01:32 PM    GFR est AA 46 (L) 01/28/2020 01:32 PM    GFR est non-AA 40 (L) 01/28/2020 01:32 PM    Calcium 9.7 01/28/2020 01:32 PM    Bilirubin, total 0.5 10/09/2019 11:37 AM    Alk. phosphatase 65 10/09/2019 11:37 AM    Protein, total 7.6 01/28/2020 01:32 PM    Albumin 4.3 01/28/2020 01:32 PM    Globulin 4.4 (H) 03/02/2017 01:43 PM    A-G Ratio 1.2 10/09/2019 11:37 AM    ALT (SGPT) 7 10/09/2019 11:37 AM             FOLLOW-UP            Patient was made aware and verbalized understanding that an appointment will be scheduled for them for a virtual visit and/or office visit within the above time frame. Patient understanding his/her responsibility to call and change time/date if he/she so chooses. Thank you, Shikha Salazar MD for allowing me to participate in the care of Mellisa Mckeon. Please do not hesitate to contact me for further questions/concerns. Greater than 20 minutes was spent in direct video patient care, planning and chart review. This visit was conducted using [employer] and telephone telemedicine services.        Fantasma Ortiz MD    [place] [address]        [phone] / [phone] Fax       [facility] [facility] [address]  [place]  [phone] / [phone] Fax

## 2020-12-06 RX ORDER — DILTIAZEM HYDROCHLORIDE 120 MG/1
CAPSULE, COATED, EXTENDED RELEASE ORAL
Qty: 90 CAP | Refills: 3 | Status: SHIPPED | OUTPATIENT
Start: 2020-12-06

## 2020-12-06 RX ORDER — PRAVASTATIN SODIUM 20 MG/1
TABLET ORAL
Qty: 90 TAB | Refills: 3 | Status: SHIPPED | OUTPATIENT
Start: 2020-12-06

## 2020-12-06 RX ORDER — WARFARIN 2 MG/1
TABLET ORAL
Qty: 90 TAB | Refills: 3 | Status: SHIPPED | OUTPATIENT
Start: 2020-12-06 | End: 2021-06-25

## 2020-12-15 ENCOUNTER — CLINICAL SUPPORT (OUTPATIENT)
Dept: INTERNAL MEDICINE CLINIC | Age: 80
End: 2020-12-15
Payer: MEDICARE

## 2020-12-15 DIAGNOSIS — Z51.81 ENCOUNTER FOR MONITORING COUMADIN THERAPY: Primary | ICD-10-CM

## 2020-12-15 DIAGNOSIS — Z79.01 ENCOUNTER FOR MONITORING COUMADIN THERAPY: Primary | ICD-10-CM

## 2020-12-15 LAB
INR BLD: 2
PT POC: 23.5 SECONDS
VALID INTERNAL CONTROL?: YES

## 2020-12-15 PROCEDURE — 85610 PROTHROMBIN TIME: CPT | Performed by: INTERNAL MEDICINE

## 2020-12-15 NOTE — PROGRESS NOTES
Mellisa Mckeon is a 79 y.o. female who presents today for Anticoagulation monitoring. Current dose:  Coumadin 2 mg 1/2 tai PO daily  Medication Changes:  no  Dietary Changes:  no    Latest INR:  Results for orders placed or performed in visit on 12/15/20   AMB POC PT/INR   Result Value Ref Range    VALID INTERNAL CONTROL POC Yes     Prothrombin time (POC) 23.5 seconds    INR POC 2.0          New Coumadin dose:.current treatment plan is effective, no change in therapy. Next check to be scheduled for  4 weeks.   Per Elaina Mireles LPN

## 2021-01-27 ENCOUNTER — CLINICAL SUPPORT (OUTPATIENT)
Dept: INTERNAL MEDICINE CLINIC | Age: 81
End: 2021-01-27
Payer: MEDICARE

## 2021-01-27 DIAGNOSIS — Z51.81 ENCOUNTER FOR MONITORING COUMADIN THERAPY: Primary | ICD-10-CM

## 2021-01-27 DIAGNOSIS — Z79.01 ENCOUNTER FOR MONITORING COUMADIN THERAPY: Primary | ICD-10-CM

## 2021-01-27 LAB
INR BLD: 1.9
PT POC: 22.3 SECONDS
VALID INTERNAL CONTROL?: YES

## 2021-01-27 PROCEDURE — 85610 PROTHROMBIN TIME: CPT | Performed by: INTERNAL MEDICINE

## 2021-01-27 RX ORDER — METOPROLOL TARTRATE 25 MG/1
TABLET, FILM COATED ORAL
Qty: 180 TAB | Refills: 3 | Status: SHIPPED | OUTPATIENT
Start: 2021-01-27 | End: 2021-03-16

## 2021-01-27 NOTE — PROGRESS NOTES
Rochelle Simon is a 79 y.o. female who presents today for Anticoagulation monitoring. Current dose:  Coumadin 2 mg 1/2 tab PO daily. Medication Changes:  no  Dietary Changes:  no    Latest INR:  Results for orders placed or performed in visit on 01/27/21   AMB POC PT/INR   Result Value Ref Range    VALID INTERNAL CONTROL POC Yes     Prothrombin time (POC) 22.3 seconds    INR POC 1.9          New Coumadin dose:.current treatment plan is effective, no change in therapy. Next check to be scheduled for  4 weeks.   Dr. Lacy Abbasi, EUNICEN

## 2021-02-09 ENCOUNTER — TELEPHONE (OUTPATIENT)
Dept: CARDIOLOGY CLINIC | Age: 81
End: 2021-02-09

## 2021-02-09 NOTE — TELEPHONE ENCOUNTER
Returned patient's call, 2 pt identifiers used    Patient wanted to know if Dr. Gagan Luciano recommends the Covid vaccine. I advised yes Dr. Gagan Luciano does recommend she get the vaccine.

## 2021-02-09 NOTE — TELEPHONE ENCOUNTER
Patient would like to know if she is able to get COVID vaccine. Please advise.      Phone: [phone]

## 2021-03-12 ENCOUNTER — OFFICE VISIT (OUTPATIENT)
Dept: INTERNAL MEDICINE CLINIC | Age: 81
End: 2021-03-12
Payer: MEDICARE

## 2021-03-12 VITALS
TEMPERATURE: 97.8 F | HEART RATE: 82 BPM | OXYGEN SATURATION: 94 % | WEIGHT: 119.5 LBS | RESPIRATION RATE: 16 BRPM | BODY MASS INDEX: 19.91 KG/M2 | DIASTOLIC BLOOD PRESSURE: 74 MMHG | SYSTOLIC BLOOD PRESSURE: 136 MMHG | HEIGHT: 65 IN

## 2021-03-12 DIAGNOSIS — Z79.01 ENCOUNTER FOR MONITORING COUMADIN THERAPY: ICD-10-CM

## 2021-03-12 DIAGNOSIS — G89.29 OTHER CHRONIC PAIN: ICD-10-CM

## 2021-03-12 DIAGNOSIS — E78.5 DYSLIPIDEMIA: ICD-10-CM

## 2021-03-12 DIAGNOSIS — I10 ESSENTIAL HYPERTENSION, BENIGN: ICD-10-CM

## 2021-03-12 DIAGNOSIS — I51.89 DIASTOLIC DYSFUNCTION: ICD-10-CM

## 2021-03-12 DIAGNOSIS — N18.31 STAGE 3A CHRONIC KIDNEY DISEASE (HCC): ICD-10-CM

## 2021-03-12 DIAGNOSIS — I82.511 CHRONIC DEEP VEIN THROMBOSIS (DVT) OF FEMORAL VEIN OF RIGHT LOWER EXTREMITY (HCC): ICD-10-CM

## 2021-03-12 DIAGNOSIS — I48.91 ATRIAL FIBRILLATION, UNSPECIFIED TYPE (HCC): ICD-10-CM

## 2021-03-12 DIAGNOSIS — I25.10 ATHEROSCLEROSIS OF NATIVE CORONARY ARTERY OF NATIVE HEART WITHOUT ANGINA PECTORIS: ICD-10-CM

## 2021-03-12 DIAGNOSIS — Z13.31 SCREENING FOR DEPRESSION: ICD-10-CM

## 2021-03-12 DIAGNOSIS — D68.32 WARFARIN-INDUCED COAGULOPATHY (HCC): ICD-10-CM

## 2021-03-12 DIAGNOSIS — M23.306 MENISCUS DEGENERATION, RIGHT: ICD-10-CM

## 2021-03-12 DIAGNOSIS — T45.515A WARFARIN-INDUCED COAGULOPATHY (HCC): ICD-10-CM

## 2021-03-12 DIAGNOSIS — M19.90 ARTHRITIS: ICD-10-CM

## 2021-03-12 DIAGNOSIS — R73.02 IGT (IMPAIRED GLUCOSE TOLERANCE): ICD-10-CM

## 2021-03-12 DIAGNOSIS — I27.20 PULMONARY HYPERTENSION (HCC): ICD-10-CM

## 2021-03-12 DIAGNOSIS — R06.02 SOB (SHORTNESS OF BREATH): ICD-10-CM

## 2021-03-12 DIAGNOSIS — D36.10 NEUROMA: ICD-10-CM

## 2021-03-12 DIAGNOSIS — Z00.00 MEDICARE ANNUAL WELLNESS VISIT, SUBSEQUENT: Primary | ICD-10-CM

## 2021-03-12 DIAGNOSIS — Z51.81 ENCOUNTER FOR MONITORING COUMADIN THERAPY: ICD-10-CM

## 2021-03-12 LAB
INR BLD: 2.2
PT POC: 26.8 SECONDS
VALID INTERNAL CONTROL?: YES

## 2021-03-12 PROCEDURE — G8427 DOCREV CUR MEDS BY ELIG CLIN: HCPCS | Performed by: INTERNAL MEDICINE

## 2021-03-12 PROCEDURE — G0439 PPPS, SUBSEQ VISIT: HCPCS | Performed by: INTERNAL MEDICINE

## 2021-03-12 PROCEDURE — G8432 DEP SCR NOT DOC, RNG: HCPCS | Performed by: INTERNAL MEDICINE

## 2021-03-12 PROCEDURE — G8399 PT W/DXA RESULTS DOCUMENT: HCPCS | Performed by: INTERNAL MEDICINE

## 2021-03-12 PROCEDURE — 99213 OFFICE O/P EST LOW 20 MIN: CPT | Performed by: INTERNAL MEDICINE

## 2021-03-12 PROCEDURE — G8420 CALC BMI NORM PARAMETERS: HCPCS | Performed by: INTERNAL MEDICINE

## 2021-03-12 PROCEDURE — G8753 SYS BP > OR = 140: HCPCS | Performed by: INTERNAL MEDICINE

## 2021-03-12 PROCEDURE — G8536 NO DOC ELDER MAL SCRN: HCPCS | Performed by: INTERNAL MEDICINE

## 2021-03-12 PROCEDURE — G8754 DIAS BP LESS 90: HCPCS | Performed by: INTERNAL MEDICINE

## 2021-03-12 PROCEDURE — 85610 PROTHROMBIN TIME: CPT | Performed by: INTERNAL MEDICINE

## 2021-03-12 PROCEDURE — 1090F PRES/ABSN URINE INCON ASSESS: CPT | Performed by: INTERNAL MEDICINE

## 2021-03-12 PROCEDURE — 1101F PT FALLS ASSESS-DOCD LE1/YR: CPT | Performed by: INTERNAL MEDICINE

## 2021-03-12 NOTE — PATIENT INSTRUCTIONS
Medicare Wellness Visit, Female The best way to live healthy is to have a lifestyle where you eat a well-balanced diet, exercise regularly, limit alcohol use, and quit all forms of tobacco/nicotine, if applicable. Regular preventive services are another way to keep healthy. Preventive services (vaccines, screening tests, monitoring & exams) can help personalize your care plan, which helps you manage your own care. Screening tests can find health problems at the earliest stages, when they are easiest to treat. [facility] follows the current, evidence-based guidelines published by the [facility] Lehman ([facility]) when recommending preventive services for our patients. Because we follow these guidelines, sometimes recommendations change over time as research supports it. (For example, mammograms used to be recommended annually. Even though Medicare will still pay for an annual mammogram, the newer guidelines recommend a mammogram every two years for women of average risk). Of course, you and your doctor may decide to screen more often for some diseases, based on your risk and your co-morbidities (chronic disease you are already diagnosed with). Preventive services for you include: - Medicare offers their members a free annual wellness visit, which is time for you and your primary care provider to discuss and plan for your preventive service needs. Take advantage of this benefit every year! 
-All adults over the age of 72 should receive the recommended pneumonia vaccines. Current USPSTF guidelines recommend a series of two vaccines for the best pneumonia protection.  
-All adults should have a flu vaccine yearly and a tetanus vaccine every 10 years.  
-All adults age 48 and older should receive the shingles vaccines (series of two vaccines).      
-All adults age 38-68 who are overweight should have a diabetes screening test once every three years.  
-All adults born between [birth year] and 1966 should be screened once for Hepatitis C. 
-Other screening tests and preventive services for persons with diabetes include: an eye exam to screen for diabetic retinopathy, a kidney function test, a foot exam, and stricter control over your cholesterol.  
-Cardiovascular screening for adults with routine risk involves an electrocardiogram (ECG) at intervals determined by your doctor.  
-Colorectal cancer screenings should be done for adults age 54-65 with no increased risk factors for colorectal cancer. There are a number of acceptable methods of screening for this type of cancer. Each test has its own benefits and drawbacks. Discuss with your doctor what is most appropriate for you during your annual wellness visit. The different tests include: colonoscopy (considered the best screening method), a fecal occult blood test, a fecal DNA test, and sigmoidoscopy. 
 
-A bone mass density test is recommended when a woman turns 65 to screen for osteoporosis. This test is only recommended one time, as a screening. Some providers will use this same test as a disease monitoring tool if you already have osteoporosis. -Breast cancer screenings are recommended every other year for women of normal risk, age 54-69. 
-Cervical cancer screenings for women over age 72 are only recommended with certain risk factors. Here is a list of your current Health Maintenance items (your personalized list of preventive services) with a due date: 
Health Maintenance Due Topic Date Due  
 COVID-19 Vaccine (1) Never done  Shingles Vaccine (1 of 2) Never done  Glaucoma Screening   04/21/2017  Cholesterol Test   10/09/2020

## 2021-03-12 NOTE — PROGRESS NOTES
SPORTS MEDICINE AND PRIMARY CARE  Yassine Chiang MD, [address] [address]  Phone:  [phone]  Fax: [phone]      Chief Complaint   Patient presents with    Annual Wellness Visit         SUBECTIVE:    Sebastian Wong is a 79 y.o. female Patient returns today with a known history of chronic DVT, atrial fibrillation, pulmonary hypertension, _______________ (inaudible) induced coagulopathy, impaired glucose tolerance, CKD stage 3, dyslipidemia, meniscus degeneration on the right, primary hypertension, coronary artery disease and is seen for evaluation. Patient returns today complaining of frequent leg swelling for the past three or four months. She notes shortness of breath if she does anything strenuous. Patient states it seems like water is running in her legs at times. Complains of arthritis pains in her back. Current Outpatient Medications   Medication Sig Dispense Refill    metoprolol tartrate (LOPRESSOR) 25 mg tablet TAKE 1 TABLET BY MOUTH TWICE DAILY 180 Tab 3    warfarin (COUMADIN) 2 mg tablet TAKE 1/2 TABLET BY MOUTH MONDAY THROUGH SATURDAY AND 1 TABLET BY MOUTH DAILY ON SUNDAY ONLY 90 Tab 3    dilTIAZem ER (CARDIZEM CD) 120 mg capsule TAKE 1 CAPSULE BY MOUTH EVERY DAY 90 Cap 3    pravastatin (PRAVACHOL) 20 mg tablet TAKE 1 TABLET BY MOUTH EVERY EVENING 90 Tab 3    furosemide (LASIX) 40 mg tablet TAKE 1 TABLET BY MOUTH DAILY 90 Tab 2    losartan (COZAAR) 25 mg tablet TAKE 1 TABLET BY MOUTH DAILY 90 Tab 3    triamcinolone acetonide (KENALOG) 0.1 % topical cream Apply  to affected area two (2) times a day. 45 g 11    aspirin 81 mg chewable tablet Take 81 mg by mouth daily.        Past Medical History:   Diagnosis Date    AF (atrial fibrillation) (HCC)     RHEUMATIC FEVER AS CHILD, AFIB    Anemia     Anemia     Arthritis     Blunt head trauma 03/13/2017    Chronic pain     Chronically on opiate therapy     CKD (chronic kidney disease), stage III  Coagulation defects     COUMADIN    Diastolic dysfunction     grade 4    Dyslipidemia     Encounter for Hemoccult screening 2016    Fall 2017    H/O colonoscopy     Hand pain, right     Heart failure (HCC)     Meniscus degeneration, right 2017    Extensive patellar chondral derangement as well as chondral derangement     Neuroma 2021    Prediabetes     PUD (peptic ulcer disease)     Rheumatic fever     S/P MVR (mitral valve replacement)     Shoulder pain, right     SOB (shortness of breath)     Thyroid disease     Venous insufficiency of both lower extremities 2017    Warfarin-induced coagulopathy ([facility] [address]      Past Surgical History:   Procedure Laterality Date    HX CATARACT REMOVAL      BILATERAL    HX  SECTION      HX COLONOSCOPY      HX HEART CATHETERIZATION      HX HYSTERECTOMY      HX MITRAL VALVE REPLACEMENT      HX LUIS ENRIQUE AND BSO       No Known Allergies    REVIEW OF SYSTEMS:   Ankle swelling, shortness of breath, but no chest pain. Social History     Socioeconomic History    Marital status: SINGLE     Spouse name: Not on file    Number of children: Not on file    Years of education: Not on file    Highest education level: Not on file   Tobacco Use    Smoking status: Former Smoker     Packs/day: 0.50     Years: 30.00     Pack years: 15.00     Quit date: 2012     Years since quittin.1    Smokeless tobacco: Never Used   Substance and Sexual Activity    Alcohol use: No     Alcohol/week: 0.0 standard drinks    Drug use: No    Sexual activity: Not Currently   Social History Narrative    Family History: Mother:  80 yrs, High Blood PressureFather:  52 yrs, High Blood PressureSister(s): alive, High Blood    Pressure, colon cancer, type II diabetesBrother(s): , High Blood Pressure, colon cancer, type II diabetesSon(s): alive 48 yrs1 sister(s)    . 1 son(s) .         Social History: Alcohol Use Patient does not use alcohol. Smoking Status Patient is a former smoker        . Marital Status: Single. Lives w ith: alone. Children: sons. Occupation/W ork: retired - for [employer]. Education/School: has highschool diploma. [employer]: [address] [employer].   r  Family History   Problem Relation Age of Onset    Heart Attack Sister     Hypertension Sister     Kidney Disease Mother     Hypertension Mother     Hypertension Father     Cancer Brother     Heart Failure Brother     Arthritis-osteo Sister     Cancer Sister     Cancer Brother         PROSTATE       OBJECTIVE:  Visit Vitals  BP (!) 153/78   Pulse 82   Temp 97.8 °F (36.6 °C) (Oral)   Resp 16   Ht 5' 5\" (1.651 m)   Wt 119 lb 8 oz (54.2 kg)   SpO2 94%   BMI 19.89 kg/m²     ENT: perrla,  eom intact  NECK: supple. Thyroid normal  CHEST: clear to ascultation and percussion   HEART: regular rate and rhythm  ABD: soft, bowel sounds active  EXTREMITIES: no edema, pulse 1+     Office Visit on 03/12/2021   Component Date Value Ref Range Status    VALID INTERNAL CONTROL POC 03/12/2021 Yes   Final    Prothrombin time (POC) 03/12/2021 26.8  seconds Final    INR POC 03/12/2021 2.2   Final   Clinical Support on 01/27/2021   Component Date Value Ref Range Status    VALID INTERNAL CONTROL POC 01/27/2021 Yes   Final    Prothrombin time (POC) 01/27/2021 22.3  seconds Final    INR POC 01/27/2021 1.9   Final   Clinical Support on 12/15/2020   Component Date Value Ref Range Status    VALID INTERNAL CONTROL POC 12/15/2020 Yes   Final    Prothrombin time (POC) 12/15/2020 23.5  seconds Final    INR POC 12/15/2020 2.0   Final          ASSESSMENT:  1. Medicare annual wellness visit, subsequent    2. Encounter for monitoring Coumadin therapy    3. Screening for depression    4. Atrial fibrillation, unspecified type ([address]    5. Pulmonary hypertension (HCC)    6. Warfarin-induced coagulopathy (HCC)    7. Stage 3a chronic kidney disease    8. Dyslipidemia    9. Chronic deep vein thrombosis (DVT) of femoral vein of right lower extremity (HCC)    10. Meniscus degeneration, right    11. Essential hypertension, benign    12. Diastolic dysfunction    13. IGT (impaired glucose tolerance)    14. Atherosclerosis of native coronary artery of native heart without angina pectoris    15. Other chronic pain    16. Arthritis    17. Neuroma    18. SOB (shortness of breath)      We check her INR today and note that it is therapeutic at 2.2. Atrial fibrillation with a controlled ventricular response at the time of auscultation. CKD stage 3A. We will continue to monitor and we will check today. History of dyslipidemia. Her LDL is at goal with LDL of 61. There is also a history of chronic deep venous thrombosis, for which she is taking Coumadin. Meniscus tear on the right is also contributing to her chronic pain related to arthritis. She notes more pain in the back, however. Blood pressure on initial visit was 153/78, repeat blood pressure is 136/74, which is appropriate. We will check her hemoglobin A1c for her impaired glucose tolerance. Although she has atherosclerosis of coronary arteries, no symptoms currently. She will be back to see us in one month for a P-T check. Appropriate lab studies will be checked. For the edema, which I think is more related to chronic venous insufficiency, the edema of congestive heart failure, she does have neck veins at 45 degrees. We will increase Lasix to 40 mg twice a day. She will cut back her Lasix to once a day when swelling goes down. If she is still taking her Lasix then we will check her renal function again. I have discussed the diagnosis with the patient and the intended plan as seen in the  orders above. The patient understands and agees with the plan.   The patient has   received an after visit summary and questions were answered concerning  future plans  Patient labs and/or xrays were reviewed  Past records were reviewed. PLAN:  .  Orders Placed This Encounter    ANNUAL DEPRESSION SCREEN 8-15 MIN    URINALYSIS W/ RFLX MICROSCOPIC    CBC WITH AUTOMATED DIFF    METABOLIC PANEL, COMPREHENSIVE    LIPID PANEL    TSH 3RD GENERATION    HEMOGLOBIN A1C WITH EAG    PAIN MGMT PANEL, URINE W/RFLX CONFIRM    NT-PRO BNP    AMB POC PT/INR       Follow-up and Dispositions    · Return in about 4 weeks (around 4/9/2021) for pt/inr, bp check. ATTENTION:   This medical record was transcribed using an electronic medical records system. Although proofread, it may and can contain electronic and spelling errors. Other human spelling and other errors may be present. Corrections may be executed at a later time. Please feel free to contact us for any clarifications as needed.

## 2021-03-12 NOTE — PROGRESS NOTES
1. Have you been to the ER, urgent care clinic since your last visit? Hospitalized since your last visit? No    2. Have you seen or consulted any other health care providers outside of the [address] [place] [address] since your last visit? Include any pap smears or colon screening. No     Patient wants to discuss leg and foot swelling and aching    Patient states she takes coumadin 2 mg 1/2 tab PO daily  This is the Subsequent Medicare Annual Wellness Exam, performed 12 months or more after the Initial AWV or the last Subsequent AWV    I have reviewed the patient's medical history in detail and updated the computerized patient record. Depression Risk Factor Screening:     3 most recent PHQ Screens 3/12/2021   PHQ Not Done -   Little interest or pleasure in doing things Not at all   Feeling down, depressed, irritable, or hopeless Not at all   Total Score PHQ 2 0       Alcohol Risk Screen    Do you average more than 1 drink per night or more than 7 drinks a week:  No    On any one occasion in the past three months have you have had more than 3 drinks containing alcohol:  No        Functional Ability and Level of Safety:    Hearing: Hearing is good. Activities of Daily Living: The home contains: handrails and grab bars  Patient does total self care      Ambulation: with difficulty, uses a cane     Fall Risk:  Fall Risk Assessment, last 12 mths 3/12/2021   Able to walk? Yes   Fall in past 12 months? 0   Do you feel unsteady? 0   Are you worried about falling 0   Number of falls in past 12 months -   Fall with injury? -      Abuse Screen:  Patient is not abused       Cognitive Screening    Has your family/caregiver stated any concerns about your memory: no     Cognitive Screening: not necessary    Assessment/Plan   Education and counseling provided:  Are appropriate based on today's review and evaluation    Diagnoses and all orders for this visit:    1.  Encounter for monitoring Coumadin therapy  -     AMB POC PT/INR        Health Maintenance Due     Health Maintenance Due   Topic Date Due    COVID-19 Vaccine (1) Never done    Shingrix Vaccine Age 50> (1 of 2) Never done    GLAUCOMA SCREENING Q2Y  04/21/2017    Medicare Yearly Exam  10/09/2020    Lipid Screen  10/09/2020       Patient Care Team   Patient Care Team:  Beatriz Al MD as PCP - General (Internal Medicine)  Beatriz Al MD [facility]CP [facility]  Trisha Gannon MD as Referring Provider (Cardiology)  Wayne Fajardo MD (Cardiology)    History     Patient Active Problem List   Diagnosis Code    Chest pain R07.9    Other dyspnea and respiratory abnormality R06.09, R09.89    Mitral valve disorder I05.9    Palpitations R00.2    Atrial fibrillation ([address] I48.91    Essential hypertension, benign I10    Hypothyroid E03.9    Tricuspid regurgitation I07.1    Rheumatic heart disease I09.9    S/P MVR (mitral valve replacement) Z95.2    S/P Maze operation for atrial fibrillation Z98.890, Z86.79    Anemia associated with acute blood loss D62    Aortic valve disorders I35.9    Pulmonary hypertension ([address] I27.20    Neck pain M54.2    Coronary atherosclerosis of native coronary artery I25.10    Warfarin-induced coagulopathy (HCC) D68.32, T45.515A    Arthritis M19.90    Prediabetes R73.03    Arrhythmia I49.9    PUD (peptic ulcer disease) K27.9    Anemia D64.9    Thyroid disease E07.9    Rheumatic fever I00    H/O colonoscopy Z98.890    Chronic pain G89.29    Shoulder pain, right M25.511    Hand pain, right M79.641    ACP (advance care planning) Z71.89    SOB (shortness of breath) R62.36    Diastolic dysfunction K64.61    Venous insufficiency of both lower extremities I87.2    Chronic deep vein thrombosis (DVT) of femoral vein of right lower extremity (HCC) I82.511    Fall W19. XXXA    Blunt head trauma S09. 8XXA    Chronically on opiate therapy Z79.891    Dyslipidemia E78.5    Meniscus degeneration, right M23.306    CKD (chronic kidney disease) stage 3, GFR 30-59 ml/min N18.30     Past Medical History:   Diagnosis Date    AF (atrial fibrillation) (HCC)     RHEUMATIC FEVER AS CHILD, AFIB    Anemia     Anemia     Arthritis     Blunt head trauma 2017    Chronic pain     Chronically on opiate therapy     CKD (chronic kidney disease), stage III     Coagulation defects     COUMADIN    Diastolic dysfunction     grade 4    Dyslipidemia     Encounter for Hemoccult screening 2016    Fall 2017    H/O colonoscopy     Hand pain, right     Heart failure (HCC)     Meniscus degeneration, right 2017    Extensive patellar chondral derangement as well as chondral derangement     Prediabetes     PUD (peptic ulcer disease)     Rheumatic fever     S/P MVR (mitral valve replacement)     Shoulder pain, right     SOB (shortness of breath)     Thyroid disease     Venous insufficiency of both lower extremities 2017    Warfarin-induced coagulopathy ([facility] [address]       Past Surgical History:   Procedure Laterality Date    HX CATARACT REMOVAL      BILATERAL    HX  SECTION      HX COLONOSCOPY      HX HEART CATHETERIZATION      HX HYSTERECTOMY      HX MITRAL VALVE REPLACEMENT      HX LUIS ENRIQUE AND BSO       Current Outpatient Medications   Medication Sig Dispense Refill    metoprolol tartrate (LOPRESSOR) 25 mg tablet TAKE 1 TABLET BY MOUTH TWICE DAILY 180 Tab 3    warfarin (COUMADIN) 2 mg tablet TAKE 1/2 TABLET BY MOUTH MONDAY THROUGH SATURDAY AND 1 TABLET BY MOUTH DAILY ON  ONLY 90 Tab 3    dilTIAZem ER (CARDIZEM CD) 120 mg capsule TAKE 1 CAPSULE BY MOUTH EVERY DAY 90 Cap 3    pravastatin (PRAVACHOL) 20 mg tablet TAKE 1 TABLET BY MOUTH EVERY EVENING 90 Tab 3    furosemide (LASIX) 40 mg tablet TAKE 1 TABLET BY MOUTH DAILY 90 Tab 2    losartan (COZAAR) 25 mg tablet TAKE 1 TABLET BY MOUTH DAILY 90 Tab 3    triamcinolone acetonide (KENALOG) 0.1 % topical cream Apply  to affected area two (2) times a day. 45 g 11    aspirin 81 mg chewable tablet Take 81 mg by mouth daily.        No Known Allergies    Family History   Problem Relation Age of Onset    Heart Attack Sister     Hypertension Sister     Kidney Disease Mother     Hypertension Mother     Hypertension Father     Cancer Brother     Heart Failure Brother     Arthritis-osteo Sister     Cancer Sister     Cancer Brother         PROSTATE     Social History     Tobacco Use    Smoking status: Former Smoker     Packs/day: 0.50     Years: 30.00     Pack years: 15.00     Quit date: 2012     Years since quittin.1    Smokeless tobacco: Never Used   Substance Use Topics    Alcohol use: No     Alcohol/week: 0.0 standard drinks

## 2021-03-13 LAB
ALBUMIN SERPL-MCNC: 3.8 G/DL (ref 3.5–5)
ALBUMIN/GLOB SERPL: 1 {RATIO} (ref 1.1–2.2)
ALP SERPL-CCNC: 86 U/L (ref 45–117)
ALT SERPL-CCNC: 20 U/L (ref 12–78)
ANION GAP SERPL CALC-SCNC: 7 MMOL/L (ref 5–15)
APPEARANCE UR: CLEAR
AST SERPL-CCNC: 14 U/L (ref 15–37)
BASOPHILS # BLD: 0 K/UL (ref 0–0.1)
BASOPHILS NFR BLD: 1 % (ref 0–1)
BILIRUB SERPL-MCNC: 0.6 MG/DL (ref 0.2–1)
BILIRUB UR QL: NEGATIVE
BNP SERPL-MCNC: 3655 PG/ML
BUN SERPL-MCNC: 22 MG/DL (ref 6–20)
BUN/CREAT SERPL: 17 (ref 12–20)
CALCIUM SERPL-MCNC: 9.4 MG/DL (ref 8.5–10.1)
CHLORIDE SERPL-SCNC: 107 MMOL/L (ref 97–108)
CHOLEST SERPL-MCNC: 129 MG/DL
CO2 SERPL-SCNC: 29 MMOL/L (ref 21–32)
COLOR UR: NORMAL
COMMENT, HOLDF: NORMAL
CREAT SERPL-MCNC: 1.28 MG/DL (ref 0.55–1.02)
DIFFERENTIAL METHOD BLD: ABNORMAL
EOSINOPHIL # BLD: 0.2 K/UL (ref 0–0.4)
EOSINOPHIL NFR BLD: 3 % (ref 0–7)
ERYTHROCYTE [DISTWIDTH] IN BLOOD BY AUTOMATED COUNT: 14.9 % (ref 11.5–14.5)
EST. AVERAGE GLUCOSE BLD GHB EST-MCNC: 111 MG/DL
GLOBULIN SER CALC-MCNC: 3.9 G/DL (ref 2–4)
GLUCOSE SERPL-MCNC: 97 MG/DL (ref 65–100)
GLUCOSE UR STRIP.AUTO-MCNC: NEGATIVE MG/DL
HBA1C MFR BLD: 5.5 % (ref 4–5.6)
HCT VFR BLD AUTO: 33.5 % (ref 35–47)
HDLC SERPL-MCNC: 61 MG/DL
HDLC SERPL: 2.1 {RATIO} (ref 0–5)
HGB BLD-MCNC: 11 G/DL (ref 11.5–16)
HGB UR QL STRIP: NEGATIVE
IMM GRANULOCYTES # BLD AUTO: 0 K/UL (ref 0–0.04)
IMM GRANULOCYTES NFR BLD AUTO: 0 % (ref 0–0.5)
KETONES UR QL STRIP.AUTO: NEGATIVE MG/DL
LDLC SERPL CALC-MCNC: 50.8 MG/DL (ref 0–100)
LEUKOCYTE ESTERASE UR QL STRIP.AUTO: NEGATIVE
LIPID PROFILE,FLP: NORMAL
LYMPHOCYTES # BLD: 1.6 K/UL (ref 0.8–3.5)
LYMPHOCYTES NFR BLD: 28 % (ref 12–49)
MCH RBC QN AUTO: 28.5 PG (ref 26–34)
MCHC RBC AUTO-ENTMCNC: 32.8 G/DL (ref 30–36.5)
MCV RBC AUTO: 86.8 FL (ref 80–99)
MONOCYTES # BLD: 0.6 K/UL (ref 0–1)
MONOCYTES NFR BLD: 10 % (ref 5–13)
NEUTS SEG # BLD: 3.5 K/UL (ref 1.8–8)
NEUTS SEG NFR BLD: 58 % (ref 32–75)
NITRITE UR QL STRIP.AUTO: NEGATIVE
NRBC # BLD: 0 K/UL (ref 0–0.01)
NRBC BLD-RTO: 0 PER 100 WBC
PH UR STRIP: 7 [PH] (ref 5–8)
PLATELET # BLD AUTO: 238 K/UL (ref 150–400)
PMV BLD AUTO: 11.2 FL (ref 8.9–12.9)
POTASSIUM SERPL-SCNC: 4.2 MMOL/L (ref 3.5–5.1)
PROT SERPL-MCNC: 7.7 G/DL (ref 6.4–8.2)
PROT UR STRIP-MCNC: NEGATIVE MG/DL
RBC # BLD AUTO: 3.86 M/UL (ref 3.8–5.2)
SAMPLES BEING HELD,HOLD: NORMAL
SODIUM SERPL-SCNC: 143 MMOL/L (ref 136–145)
SP GR UR REFRACTOMETRY: 1.01 (ref 1–1.03)
TRIGL SERPL-MCNC: 86 MG/DL (ref ?–150)
TSH SERPL DL<=0.05 MIU/L-ACNC: 0.28 UIU/ML (ref 0.36–3.74)
UROBILINOGEN UR QL STRIP.AUTO: 0.2 EU/DL (ref 0.2–1)
VLDLC SERPL CALC-MCNC: 17.2 MG/DL
WBC # BLD AUTO: 5.8 K/UL (ref 3.6–11)

## 2021-03-16 RX ORDER — METOPROLOL TARTRATE 25 MG/1
TABLET, FILM COATED ORAL
Qty: 90 TAB | Refills: 3 | Status: ON HOLD | OUTPATIENT
Start: 2021-03-16 | End: 2021-06-25 | Stop reason: SDUPTHER

## 2021-03-17 LAB
AMPHETAMINES UR QL SCN: NEGATIVE NG/ML
BARBITURATES UR QL SCN: NEGATIVE NG/ML
BENZODIAZ UR QL SCN: NEGATIVE NG/ML
BZE UR QL SCN: NEGATIVE NG/ML
CANNABINOIDS UR QL SCN: NEGATIVE NG/ML
CODEINE, 737848: NEGATIVE
CREAT UR-MCNC: 95.2 MG/DL (ref 20–300)
FENTANYL+NORFENTANYL UR QL SCN: NORMAL PG/ML
FENTANYL, PMD99: POSITIVE
FENTANYL/NORFENT, PMD98: POSITIVE
HYDROCODONE, 737854: NEGATIVE
HYDROMORPHONE, 737852: NEGATIVE
Lab: ABNORMAL PG/ML
Lab: POSITIVE
MEPERIDINE SCREEN, URINE, 761000: NORMAL NG/ML
MEPERIDINE UR QL: NEGATIVE NG/ML
METHADONE UR QL SCN: NEGATIVE NG/ML
MORPHINE CONFIRM, 737851: 105 NG/ML
MORPHINE, 737850: POSITIVE
NORFENTANYL CONFIRM, PMD101: ABNORMAL PG/ML
OPIATES UR QL SCN: NORMAL NG/ML
OPIATES, 737847: POSITIVE NG/ML
OXYCODONE+OXYMORPHONE UR QL SCN: NEGATIVE NG/ML
PCP UR QL: NEGATIVE NG/ML
PH UR: 8.2 [PH] (ref 4.5–8.9)
PLEASE NOTE:, 733157: NORMAL
PROPOXYPH UR QL SCN: NEGATIVE NG/ML
SP GR UR: 1.01
TRAMADOL UR QL SCN: NEGATIVE NG/ML

## 2021-05-28 ENCOUNTER — OFFICE VISIT (OUTPATIENT)
Dept: CARDIOLOGY CLINIC | Age: 81
End: 2021-05-28
Payer: MEDICARE

## 2021-05-28 VITALS
OXYGEN SATURATION: 97 % | HEIGHT: 65 IN | SYSTOLIC BLOOD PRESSURE: 120 MMHG | HEART RATE: 97 BPM | DIASTOLIC BLOOD PRESSURE: 70 MMHG | RESPIRATION RATE: 13 BRPM | WEIGHT: 105.6 LBS | BODY MASS INDEX: 17.59 KG/M2

## 2021-05-28 DIAGNOSIS — E78.5 DYSLIPIDEMIA: ICD-10-CM

## 2021-05-28 DIAGNOSIS — I48.91 ATRIAL FIBRILLATION, UNSPECIFIED TYPE (HCC): ICD-10-CM

## 2021-05-28 DIAGNOSIS — I05.9 MITRAL VALVE DISORDER: Primary | ICD-10-CM

## 2021-05-28 DIAGNOSIS — I10 ESSENTIAL HYPERTENSION, BENIGN: ICD-10-CM

## 2021-05-28 DIAGNOSIS — I48.92 ATRIAL FLUTTER WITH CONTROLLED RESPONSE (HCC): ICD-10-CM

## 2021-05-28 DIAGNOSIS — I09.9 RHEUMATIC HEART DISEASE: ICD-10-CM

## 2021-05-28 DIAGNOSIS — Z95.2 S/P MVR (MITRAL VALVE REPLACEMENT): ICD-10-CM

## 2021-05-28 DIAGNOSIS — I27.20 PULMONARY HYPERTENSION (HCC): ICD-10-CM

## 2021-05-28 DIAGNOSIS — I07.1 TRICUSPID VALVE INSUFFICIENCY, UNSPECIFIED ETIOLOGY: ICD-10-CM

## 2021-05-28 PROCEDURE — G8510 SCR DEP NEG, NO PLAN REQD: HCPCS | Performed by: INTERNAL MEDICINE

## 2021-05-28 PROCEDURE — G8418 CALC BMI BLW LOW PARAM F/U: HCPCS | Performed by: INTERNAL MEDICINE

## 2021-05-28 PROCEDURE — G8427 DOCREV CUR MEDS BY ELIG CLIN: HCPCS | Performed by: INTERNAL MEDICINE

## 2021-05-28 PROCEDURE — 1101F PT FALLS ASSESS-DOCD LE1/YR: CPT | Performed by: INTERNAL MEDICINE

## 2021-05-28 PROCEDURE — 1090F PRES/ABSN URINE INCON ASSESS: CPT | Performed by: INTERNAL MEDICINE

## 2021-05-28 PROCEDURE — G8754 DIAS BP LESS 90: HCPCS | Performed by: INTERNAL MEDICINE

## 2021-05-28 PROCEDURE — G8752 SYS BP LESS 140: HCPCS | Performed by: INTERNAL MEDICINE

## 2021-05-28 PROCEDURE — 99214 OFFICE O/P EST MOD 30 MIN: CPT | Performed by: INTERNAL MEDICINE

## 2021-05-28 PROCEDURE — G8399 PT W/DXA RESULTS DOCUMENT: HCPCS | Performed by: INTERNAL MEDICINE

## 2021-05-28 PROCEDURE — G8536 NO DOC ELDER MAL SCRN: HCPCS | Performed by: INTERNAL MEDICINE

## 2021-05-28 RX ORDER — MEGESTROL ACETATE 40 MG/ML
200 SUSPENSION ORAL 2 TIMES DAILY
Qty: 250 ML | Refills: 1 | Status: SHIPPED | OUTPATIENT
Start: 2021-05-28

## 2021-05-28 NOTE — Clinical Note
Hello there, I saw MS. Rekha Carter today and looked very unwell, has lost 15 pounds since you visit, down to 105, not eating, I started megace for appetite and asked her to start daily boost and see you in a month for further management!

## 2021-05-28 NOTE — PROGRESS NOTES
CHIEF COMPLAINT      Meenakshi Vasquez is a 79 y.o. female who was seen  today for afib, mvr. Has some foot pain, x few months. She is still having episodes of breathing trouble and like she cant breathe. Lasts a few seconds and then she recovers, Sometimes feels like she is going to pass out with it. No dizziness, no blood in the stool. Reviewed her echo results with her. She is staying alone and away from people. She isnt eating and is down another 15 pounds. She isnt sick, just no appetite. She looks ill. ONe month PCP follow-up and will rx with megace in the meantime. BNP up a bit on last labs, cw afib swelling and dyspnea. She just looks weak and debilitated  Needs to eat boost/ensure daily to help with calories. Limited exercise. Needs to get echo to look at her heart function. Not wearing compression hose today and needs to do that too- will get restarted     More edema bilateral LE, props them up as much as she can. Edema is 2+ better since taking the lasix 40mg once a day. But a lot today. She is eating more ice cream and was able to get back up those 4 pounds last times. Dane Anne note today to Dr. Aidee Lu and asked him to see her in a month to weigh her in  Echo to look at her valves and Ef with worsening fluid accumulation     ASSESSMENT      1. CAD 3/12- 100% distal RCA disease on cath 3/11 not amenable to PCI or bypass, no exertional chest pain, continue aspirin and pravastatin  -declines stress testing for now  2. Rheumatic heart disease/MV disease- s/p replacement 3/12 Dr. Edmund Arrieta, stable   3. Afib- s/p cryomaze and ALIYAH closure at time of MVR, on Coumadin, rate controlled with Metoprolol and Diltiazem  -Aflutter and afib returned post-op, seen by Dr. Ricarda Thomas and offered aflutter ablation if it persisted, but gone, NSR now. Occasional palpitations now.  Not bad, does not want/need further treatment at this time.    -always concern for sss with her hx, and could do monitor but acutely looks like FTT and weight loss causing debility and fatigue  4. HTN-  normal today, cont current meds  5. Thyroid- off meds now s/p methimazole  6. Aortic Atherosclerosis- Aspirin and Pravastatin   7. Mild PAHTN  8. Tobacco use- continue cessation, stopped completely  9. COPD - seen on CT chest, not on inhalers at this time, denies progression of dyspnea  10. Right sided chest pain/back pain/neck pain -  , as per Dr. David Weeks  11. Cachexia- severe, 15 pounds down, add megace today, fu with Dr. David Weeks  . Echo 5/20 EF 60-65% LAE  Echo 5/18 EF 60% LAE, MVR normal function, gradient 7mhg., mild Tr, mild PI  10/16 EF 60%, gr3 dd, NAVI mild TR/PI  2015 Echo EF 65%, mild TR, PAP 45, mild PI, gr4dd. Echo 10/14 EF 65%, NAVI, nl functioning MVR bioprosthesis, mild-mod TR PAP 45  12/13 EF 60% mildly increased MV gradient, no MR.    Echo 2/12: EF55% mildCLVH, mod MR, sev MS 4.5/15 peak gradient, LAE, mild AS, mild PAHTN, mod Tr  Echo 7/10 EF 55%, gr1DD, mod AS, JUAN 1.1, Mod MS, mild TR, mild paHTN PA 40  Severe MR cath 4/10, with mild CAD     Fhx 2 bro w CHF, +Dm +HTN  Soc quit tobacco          ACTIVE PROBLEM LIST     Patient Active Problem List    Diagnosis Date Noted    Neuroma 03/12/2021    CKD (chronic kidney disease) stage 3, GFR 30-59 ml/min ([facility]) 11/21/2019    Dyslipidemia     Meniscus degeneration, right     Fall 03/13/2017    Blunt head trauma 03/13/2017    Chronic deep vein thrombosis (DVT) of femoral vein of right lower extremity ([address] 03/02/2017    Venous insufficiency of both lower extremities 42/23/3569    Diastolic dysfunction     SOB (shortness of breath)     ACP (advance care planning) 02/11/2016    Shoulder pain, right     Hand pain, right     Chronic pain     H/O colonoscopy     IGT (impaired glucose tolerance)     Arrhythmia     PUD (peptic ulcer disease)     Anemia     Thyroid disease     Rheumatic fever     Arthritis     Warfarin-induced coagulopathy ([address]     Coronary atherosclerosis of native coronary artery 2013    Neck pain 2013    Aortic valve disorders 10/19/2012    Pulmonary hypertension ([address] 10/19/2012    S/P MVR (mitral valve replacement) 2012    S/P Maze operation for atrial fibrillation 2012    Anemia associated with acute blood loss 2012    Essential hypertension, benign 2012    Hypothyroid 2012    Tricuspid regurgitation 2012    Rheumatic heart disease 2012    Chest pain 2012    Other dyspnea and respiratory abnormality 2012    Mitral valve disorder 2012    Palpitations 2012    Atrial fibrillation ([address] 2012           PAST MEDICAL HISTORY     Past Medical History:   Diagnosis Date    AF (atrial fibrillation) (HCC)     RHEUMATIC FEVER AS CHILD, AFIB    Anemia     Anemia     Arthritis     Blunt head trauma 2017    Chronic pain     Chronically on opiate therapy     CKD (chronic kidney disease), stage III     Coagulation defects     COUMADIN    Diastolic dysfunction     grade 4    Dyslipidemia     Encounter for Hemoccult screening 2016    Fall 2017    H/O colonoscopy     Hand pain, right     Heart failure (HCC)     Meniscus degeneration, right 2017    Extensive patellar chondral derangement as well as chondral derangement     Neuroma 2021    Prediabetes     PUD (peptic ulcer disease)     Rheumatic fever     S/P MVR (mitral valve replacement)     Shoulder pain, right     SOB (shortness of breath)     Thyroid disease     Venous insufficiency of both lower extremities 2017    Warfarin-induced coagulopathy ([address]            PAST SURGICAL HISTORY     Past Surgical History:   Procedure Laterality Date    HX CATARACT REMOVAL      BILATERAL    HX  SECTION      HX COLONOSCOPY      HX HEART CATHETERIZATION      HX HYSTERECTOMY      HX MITRAL VALVE REPLACEMENT      HX LUIS ENRIQUE AND BSO ALLERGIES     No Known Allergies       FAMILY HISTORY     Family History   Problem Relation Age of Onset    Heart Attack Sister     Hypertension Sister     Kidney Disease Mother     Hypertension Mother     Hypertension Father     Cancer Brother     Heart Failure Brother     Arthritis-osteo Sister     Cancer Sister     Cancer Brother         PROSTATE    negative for cardiac disease       SOCIAL HISTORY     Social History     Socioeconomic History    Marital status: SINGLE     Spouse name: Not on file    Number of children: Not on file    Years of education: Not on file    Highest education level: Not on file   Tobacco Use    Smoking status: Former Smoker     Packs/day: 0.50     Years: 30.00     Pack years: 15.00     Quit date: 2012     Years since quittin.3    Smokeless tobacco: Never Used   Vaping Use    Vaping Use: Never used   Substance and Sexual Activity    Alcohol use: No     Alcohol/week: 0.0 standard drinks    Drug use: No    Sexual activity: Not Currently   Social History Narrative    Family History: Mother:  80 yrs, High Blood PressureFather:  52 yrs, High Blood PressureSister(s): alive, High Blood    Pressure, colon cancer, type II diabetesBrother(s): , High Blood Pressure, colon cancer, type II diabetesSon(s): alive 48 yrs1 sister(s)    . 1 son(s) . Social History: Alcohol Use Patient does not use alcohol. Smoking Status Patient is a former smoker        . Marital Status: Single. Lives w ith: alone. Children: sons. Occupation/W ork: retired - for [employer]. Education/School: has highschool diploma.     Episcopalian: [address] [employer].     Social Determinants of Health     Financial Resource Strain:     Difficulty of Paying Living Expenses:    Food Insecurity:     Worried About Running Out of Food in the Last Year:     [address] in the Last Year:    Transportation Needs:     Lack of Transportation (Medical):  Lack of Transportation (Non-Medical):    Physical Activity:     Days of Exercise per Week:     Minutes of Exercise per Session:    Stress:     Feeling of Stress :    Social Connections:     Frequency of Communication with Friends and Family:     Frequency of Social Gatherings with Friends and Family:     Attends Mandaen Services:     Active Member of Clubs or Organizations:     Attends Club or Organization Meetings:     Marital Status:          MEDICATIONS     Current Outpatient Medications   Medication Sig    metoprolol tartrate (LOPRESSOR) 25 mg tablet TAKE 1 TABLET BY MOUTH TWICE DAILY    warfarin (COUMADIN) 2 mg tablet TAKE 1/2 TABLET BY MOUTH MONDAY THROUGH SATURDAY AND 1 TABLET BY MOUTH DAILY ON SUNDAY ONLY    dilTIAZem ER (CARDIZEM CD) 120 mg capsule TAKE 1 CAPSULE BY MOUTH EVERY DAY    pravastatin (PRAVACHOL) 20 mg tablet TAKE 1 TABLET BY MOUTH EVERY EVENING    furosemide (LASIX) 40 mg tablet TAKE 1 TABLET BY MOUTH DAILY    losartan (COZAAR) 25 mg tablet TAKE 1 TABLET BY MOUTH DAILY    aspirin 81 mg chewable tablet Take 81 mg by mouth daily.  triamcinolone acetonide (KENALOG) 0.1 % topical cream Apply  to affected area two (2) times a day. (Patient not taking: Reported on 5/28/2021)     No current facility-administered medications for this visit. I have reviewed the nurses notes, vitals, problem list, allergy list, medical history, family, social history and medications. Psychiatric/Behavioral: Negative for memory loss. PHYSICAL EXAMINATION      Ill frail, cachexia  Leaning way over  back ishurting  Boil/large blackhead on low back  General- mental status is ok  Thought process logical  breathing is unlabored, no wheezing   Speech is clear. CV 3/6 agustina  Lungs clear no wheeze       DIAGNOSTIC DATA      No specialty comments available.        LABORATORY DATA      Lab Results   Component Value Date/Time    WBC 5.8 03/12/2021 11:32 AM    HGB 11.0 (L) 03/12/2021 11:32 AM HCT 33.5 (L) 03/12/2021 11:32 AM    PLATELET 650 51/75/5148 11:32 AM    MCV 86.8 03/12/2021 11:32 AM      Lab Results   Component Value Date/Time    Sodium 143 03/12/2021 11:32 AM    Potassium 4.2 03/12/2021 11:32 AM    Chloride 107 03/12/2021 11:32 AM    CO2 29 03/12/2021 11:32 AM    Anion gap 7 03/12/2021 11:32 AM    Glucose 97 03/12/2021 11:32 AM    BUN 22 (H) 03/12/2021 11:32 AM    Creatinine 1.28 (H) 03/12/2021 11:32 AM    BUN/Creatinine ratio 17 03/12/2021 11:32 AM    GFR est AA 48 (L) 03/12/2021 11:32 AM    GFR est non-AA 40 (L) 03/12/2021 11:32 AM    Calcium 9.4 03/12/2021 11:32 AM    Bilirubin, total 0.6 03/12/2021 11:32 AM    Alk. phosphatase 86 03/12/2021 11:32 AM    Protein, total 7.7 03/12/2021 11:32 AM    Albumin 3.8 03/12/2021 11:32 AM    Globulin 3.9 03/12/2021 11:32 AM    A-G Ratio 1.0 (L) 03/12/2021 11:32 AM    ALT (SGPT) 20 03/12/2021 11:32 AM             FOLLOW-UP         Thank you, Mauricio Robles MD for allowing me to participate in the care of Holly David. Please do not hesitate to contact me for further questions/concerns.        Jhoana Mancini MD    [address], [address] [place] [address]  [address]        [phone] / [phone] Fax       [facility], [address] [address]  [place] [place]  [phone] / [phone] Fax

## 2021-06-24 ENCOUNTER — APPOINTMENT (OUTPATIENT)
Dept: GENERAL RADIOLOGY | Age: 81
DRG: 309 | End: 2021-06-24
Attending: EMERGENCY MEDICINE
Payer: MEDICARE

## 2021-06-24 ENCOUNTER — HOSPITAL ENCOUNTER (INPATIENT)
Age: 81
LOS: 1 days | Discharge: HOME HEALTH | DRG: 309 | End: 2021-06-25
Attending: EMERGENCY MEDICINE | Admitting: STUDENT IN AN ORGANIZED HEALTH CARE EDUCATION/TRAINING PROGRAM
Payer: MEDICARE

## 2021-06-24 ENCOUNTER — APPOINTMENT (OUTPATIENT)
Dept: CT IMAGING | Age: 81
DRG: 309 | End: 2021-06-24
Attending: STUDENT IN AN ORGANIZED HEALTH CARE EDUCATION/TRAINING PROGRAM
Payer: MEDICARE

## 2021-06-24 DIAGNOSIS — R63.0 POOR APPETITE: ICD-10-CM

## 2021-06-24 DIAGNOSIS — Z71.89 GOALS OF CARE, COUNSELING/DISCUSSION: ICD-10-CM

## 2021-06-24 DIAGNOSIS — Z91.14 POOR COMPLIANCE WITH MEDICATION: ICD-10-CM

## 2021-06-24 DIAGNOSIS — R11.2 NAUSEA AND VOMITING, INTRACTABILITY OF VOMITING NOT SPECIFIED, UNSPECIFIED VOMITING TYPE: ICD-10-CM

## 2021-06-24 DIAGNOSIS — I48.11 LONGSTANDING PERSISTENT ATRIAL FIBRILLATION (HCC): Primary | ICD-10-CM

## 2021-06-24 DIAGNOSIS — R63.4 WEIGHT LOSS: ICD-10-CM

## 2021-06-24 PROBLEM — I48.91 ATRIAL FIBRILLATION WITH RAPID VENTRICULAR RESPONSE (HCC): Status: ACTIVE | Noted: 2021-06-24

## 2021-06-24 LAB
ALBUMIN SERPL-MCNC: 3.6 G/DL (ref 3.5–5)
ALBUMIN/GLOB SERPL: 0.8 {RATIO} (ref 1.1–2.2)
ALP SERPL-CCNC: 60 U/L (ref 45–117)
ALT SERPL-CCNC: 29 U/L (ref 12–78)
ANION GAP SERPL CALC-SCNC: 10 MMOL/L (ref 5–15)
AST SERPL-CCNC: 17 U/L (ref 15–37)
BASOPHILS # BLD: 0 K/UL (ref 0–0.1)
BASOPHILS NFR BLD: 1 % (ref 0–1)
BILIRUB SERPL-MCNC: 1.1 MG/DL (ref 0.2–1)
BNP SERPL-MCNC: 3769 PG/ML
BUN SERPL-MCNC: 27 MG/DL (ref 6–20)
BUN/CREAT SERPL: 23 (ref 12–20)
CALCIUM SERPL-MCNC: 9.7 MG/DL (ref 8.5–10.1)
CHLORIDE SERPL-SCNC: 104 MMOL/L (ref 97–108)
CO2 SERPL-SCNC: 20 MMOL/L (ref 21–32)
COMMENT, HOLDF: NORMAL
CREAT SERPL-MCNC: 1.18 MG/DL (ref 0.55–1.02)
DIFFERENTIAL METHOD BLD: NORMAL
EOSINOPHIL # BLD: 0.1 K/UL (ref 0–0.4)
EOSINOPHIL NFR BLD: 1 % (ref 0–7)
ERYTHROCYTE [DISTWIDTH] IN BLOOD BY AUTOMATED COUNT: 14.1 % (ref 11.5–14.5)
GLOBULIN SER CALC-MCNC: 4.7 G/DL (ref 2–4)
GLUCOSE SERPL-MCNC: 100 MG/DL (ref 65–100)
HCT VFR BLD AUTO: 39.2 % (ref 35–47)
HGB BLD-MCNC: 13.7 G/DL (ref 11.5–16)
IMM GRANULOCYTES # BLD AUTO: 0 K/UL (ref 0–0.04)
IMM GRANULOCYTES NFR BLD AUTO: 0 % (ref 0–0.5)
INR PPP: 1.1 (ref 0.9–1.1)
LYMPHOCYTES # BLD: 1.8 K/UL (ref 0.8–3.5)
LYMPHOCYTES NFR BLD: 28 % (ref 12–49)
MAGNESIUM SERPL-MCNC: 2.5 MG/DL (ref 1.6–2.4)
MCH RBC QN AUTO: 30 PG (ref 26–34)
MCHC RBC AUTO-ENTMCNC: 34.9 G/DL (ref 30–36.5)
MCV RBC AUTO: 85.8 FL (ref 80–99)
MONOCYTES # BLD: 0.5 K/UL (ref 0–1)
MONOCYTES NFR BLD: 7 % (ref 5–13)
NEUTS SEG # BLD: 4.2 K/UL (ref 1.8–8)
NEUTS SEG NFR BLD: 63 % (ref 32–75)
NRBC # BLD: 0 K/UL (ref 0–0.01)
NRBC BLD-RTO: 0 PER 100 WBC
PHOSPHATE SERPL-MCNC: 3.4 MG/DL (ref 2.6–4.7)
PLATELET # BLD AUTO: 258 K/UL (ref 150–400)
PMV BLD AUTO: 10.7 FL (ref 8.9–12.9)
POTASSIUM SERPL-SCNC: 3.7 MMOL/L (ref 3.5–5.1)
PROT SERPL-MCNC: 8.3 G/DL (ref 6.4–8.2)
PROTHROMBIN TIME: 11.9 SEC (ref 9–11.1)
RBC # BLD AUTO: 4.57 M/UL (ref 3.8–5.2)
SAMPLES BEING HELD,HOLD: NORMAL
SODIUM SERPL-SCNC: 134 MMOL/L (ref 136–145)
TROPONIN I SERPL-MCNC: <0.05 NG/ML
WBC # BLD AUTO: 6.6 K/UL (ref 3.6–11)

## 2021-06-24 PROCEDURE — 83735 ASSAY OF MAGNESIUM: CPT

## 2021-06-24 PROCEDURE — 65270000029 HC RM PRIVATE

## 2021-06-24 PROCEDURE — 84100 ASSAY OF PHOSPHORUS: CPT

## 2021-06-24 PROCEDURE — 74011250637 HC RX REV CODE- 250/637: Performed by: STUDENT IN AN ORGANIZED HEALTH CARE EDUCATION/TRAINING PROGRAM

## 2021-06-24 PROCEDURE — 96376 TX/PRO/DX INJ SAME DRUG ADON: CPT

## 2021-06-24 PROCEDURE — 85025 COMPLETE CBC W/AUTO DIFF WBC: CPT

## 2021-06-24 PROCEDURE — 96374 THER/PROPH/DIAG INJ IV PUSH: CPT

## 2021-06-24 PROCEDURE — 74011250636 HC RX REV CODE- 250/636: Performed by: STUDENT IN AN ORGANIZED HEALTH CARE EDUCATION/TRAINING PROGRAM

## 2021-06-24 PROCEDURE — 99223 1ST HOSP IP/OBS HIGH 75: CPT | Performed by: INTERNAL MEDICINE

## 2021-06-24 PROCEDURE — 36415 COLL VENOUS BLD VENIPUNCTURE: CPT

## 2021-06-24 PROCEDURE — 71045 X-RAY EXAM CHEST 1 VIEW: CPT

## 2021-06-24 PROCEDURE — 85610 PROTHROMBIN TIME: CPT

## 2021-06-24 PROCEDURE — 99285 EMERGENCY DEPT VISIT HI MDM: CPT

## 2021-06-24 PROCEDURE — 84484 ASSAY OF TROPONIN QUANT: CPT

## 2021-06-24 PROCEDURE — 80053 COMPREHEN METABOLIC PANEL: CPT

## 2021-06-24 PROCEDURE — 74011000250 HC RX REV CODE- 250: Performed by: EMERGENCY MEDICINE

## 2021-06-24 PROCEDURE — 74176 CT ABD & PELVIS W/O CONTRAST: CPT

## 2021-06-24 PROCEDURE — 93005 ELECTROCARDIOGRAM TRACING: CPT

## 2021-06-24 PROCEDURE — 74011000258 HC RX REV CODE- 258: Performed by: EMERGENCY MEDICINE

## 2021-06-24 PROCEDURE — 83880 ASSAY OF NATRIURETIC PEPTIDE: CPT

## 2021-06-24 RX ORDER — SODIUM CHLORIDE 0.9 % (FLUSH) 0.9 %
5-40 SYRINGE (ML) INJECTION AS NEEDED
Status: DISCONTINUED | OUTPATIENT
Start: 2021-06-24 | End: 2021-06-25 | Stop reason: HOSPADM

## 2021-06-24 RX ORDER — PRAVASTATIN SODIUM 20 MG/1
20 TABLET ORAL DAILY
Status: DISCONTINUED | OUTPATIENT
Start: 2021-06-25 | End: 2021-06-25 | Stop reason: HOSPADM

## 2021-06-24 RX ORDER — ONDANSETRON 4 MG/1
4 TABLET, ORALLY DISINTEGRATING ORAL
Status: DISCONTINUED | OUTPATIENT
Start: 2021-06-24 | End: 2021-06-25 | Stop reason: HOSPADM

## 2021-06-24 RX ORDER — METOPROLOL TARTRATE 5 MG/5ML
5 INJECTION INTRAVENOUS ONCE
Status: DISCONTINUED | OUTPATIENT
Start: 2021-06-24 | End: 2021-06-24

## 2021-06-24 RX ORDER — ACETAMINOPHEN 650 MG/1
650 SUPPOSITORY RECTAL
Status: DISCONTINUED | OUTPATIENT
Start: 2021-06-24 | End: 2021-06-25 | Stop reason: HOSPADM

## 2021-06-24 RX ORDER — POLYETHYLENE GLYCOL 3350 17 G/17G
17 POWDER, FOR SOLUTION ORAL DAILY PRN
Status: DISCONTINUED | OUTPATIENT
Start: 2021-06-24 | End: 2021-06-25 | Stop reason: HOSPADM

## 2021-06-24 RX ORDER — METOPROLOL TARTRATE 50 MG/1
50 TABLET ORAL 2 TIMES DAILY
Status: DISCONTINUED | OUTPATIENT
Start: 2021-06-24 | End: 2021-06-25 | Stop reason: HOSPADM

## 2021-06-24 RX ORDER — SODIUM CHLORIDE 0.9 % (FLUSH) 0.9 %
5-40 SYRINGE (ML) INJECTION EVERY 8 HOURS
Status: DISCONTINUED | OUTPATIENT
Start: 2021-06-24 | End: 2021-06-25 | Stop reason: HOSPADM

## 2021-06-24 RX ORDER — DILTIAZEM HYDROCHLORIDE 120 MG/1
120 CAPSULE, COATED, EXTENDED RELEASE ORAL DAILY
Status: DISCONTINUED | OUTPATIENT
Start: 2021-06-24 | End: 2021-06-25 | Stop reason: HOSPADM

## 2021-06-24 RX ORDER — SODIUM CHLORIDE 9 MG/ML
75 INJECTION, SOLUTION INTRAVENOUS CONTINUOUS
Status: DISCONTINUED | OUTPATIENT
Start: 2021-06-24 | End: 2021-06-25

## 2021-06-24 RX ORDER — ACETAMINOPHEN 325 MG/1
650 TABLET ORAL
Status: DISCONTINUED | OUTPATIENT
Start: 2021-06-24 | End: 2021-06-25 | Stop reason: HOSPADM

## 2021-06-24 RX ORDER — ACETAMINOPHEN 325 MG/1
650 TABLET ORAL
Status: COMPLETED | OUTPATIENT
Start: 2021-06-24 | End: 2021-06-24

## 2021-06-24 RX ORDER — HEPARIN SODIUM 5000 [USP'U]/ML
5000 INJECTION, SOLUTION INTRAVENOUS; SUBCUTANEOUS EVERY 8 HOURS
Status: DISCONTINUED | OUTPATIENT
Start: 2021-06-24 | End: 2021-06-25 | Stop reason: HOSPADM

## 2021-06-24 RX ORDER — DILTIAZEM HYDROCHLORIDE 5 MG/ML
10 INJECTION INTRAVENOUS
Status: COMPLETED | OUTPATIENT
Start: 2021-06-24 | End: 2021-06-24

## 2021-06-24 RX ORDER — ONDANSETRON 2 MG/ML
4 INJECTION INTRAMUSCULAR; INTRAVENOUS
Status: DISCONTINUED | OUTPATIENT
Start: 2021-06-24 | End: 2021-06-25 | Stop reason: HOSPADM

## 2021-06-24 RX ORDER — HYDROMORPHONE HYDROCHLORIDE 1 MG/ML
0.5 INJECTION, SOLUTION INTRAMUSCULAR; INTRAVENOUS; SUBCUTANEOUS ONCE
Status: DISCONTINUED | OUTPATIENT
Start: 2021-06-24 | End: 2021-06-24

## 2021-06-24 RX ADMIN — DILTIAZEM HYDROCHLORIDE 10 MG: 5 INJECTION INTRAVENOUS at 18:35

## 2021-06-24 RX ADMIN — DILTIAZEM HYDROCHLORIDE 120 MG: 120 CAPSULE, COATED, EXTENDED RELEASE ORAL at 23:50

## 2021-06-24 RX ADMIN — METOPROLOL TARTRATE 50 MG: 50 TABLET, FILM COATED ORAL at 23:50

## 2021-06-24 RX ADMIN — HEPARIN SODIUM 5000 UNITS: 5000 INJECTION INTRAVENOUS; SUBCUTANEOUS at 23:50

## 2021-06-24 RX ADMIN — SODIUM CHLORIDE 75 ML/HR: 9 INJECTION, SOLUTION INTRAVENOUS at 23:49

## 2021-06-24 RX ADMIN — DILTIAZEM HYDROCHLORIDE 2.5 MG/HR: 5 INJECTION INTRAVENOUS at 18:37

## 2021-06-24 RX ADMIN — ACETAMINOPHEN 650 MG: 325 TABLET ORAL at 23:50

## 2021-06-24 RX ADMIN — DILTIAZEM HYDROCHLORIDE 5 MG/HR: 5 INJECTION INTRAVENOUS at 20:39

## 2021-06-24 NOTE — ED PROVIDER NOTES
This is a 78 yo female with a history of chronic atrial fib. She has a history of mitral valve replacement and CKD State III . She is on coumadin for the same. She presents today with rapid heart beat and SOB for the past 2-3 days. Has also had some N/V over the past three days and has not been taking her medications as directed. Hasn't been able to keep them down. No chest pain and no abdominal pain. No diarrhea. No fever or chills. . States she has been getting somewhat SOB with exertion. Denies any cough or congestion.             Past Medical History:   Diagnosis Date    AF (atrial fibrillation) (HCC)     RHEUMATIC FEVER AS CHILD, AFIB    Anemia     Anemia     Arthritis     Blunt head trauma 2017    Chronic pain     Chronically on opiate therapy     CKD (chronic kidney disease), stage III (HCC)     Coagulation defects     COUMADIN    Diastolic dysfunction     grade 4    Dyslipidemia     Encounter for Hemoccult screening 2016    Fall 2017    H/O colonoscopy     Hand pain, right     Heart failure (HCC)     Meniscus degeneration, right 2017    Extensive patellar chondral derangement as well as chondral derangement     Neuroma 2021    Prediabetes     PUD (peptic ulcer disease)     Rheumatic fever     S/P MVR (mitral valve replacement)     Shoulder pain, right     SOB (shortness of breath)     Thyroid disease     Venous insufficiency of both lower extremities 2017    Warfarin-induced coagulopathy (HCC)        Past Surgical History:   Procedure Laterality Date    HX CATARACT REMOVAL      BILATERAL    HX  SECTION      HX COLONOSCOPY      HX HEART CATHETERIZATION      HX HYSTERECTOMY      HX MITRAL VALVE REPLACEMENT      HX LUIS ENRIQUE AND BSO           Family History:   Problem Relation Age of Onset    Heart Attack Sister     Hypertension Sister     Kidney Disease Mother     Hypertension Mother     Hypertension Father     Cancer Brother     Heart Failure Brother     Arthritis-osteo Sister     Cancer Sister     Cancer Brother         PROSTATE       Social History     Socioeconomic History    Marital status: SINGLE     Spouse name: Not on file    Number of children: Not on file    Years of education: Not on file    Highest education level: Not on file   Occupational History    Not on file   Tobacco Use    Smoking status: Former Smoker     Packs/day: 0.50     Years: 30.00     Pack years: 15.00     Quit date: 2012     Years since quittin.4    Smokeless tobacco: Never Used   Vaping Use    Vaping Use: Never used   Substance and Sexual Activity    Alcohol use: No     Alcohol/week: 0.0 standard drinks    Drug use: No    Sexual activity: Not Currently   Other Topics Concern    Not on file   Social History Narrative    Family History: Mother:  80 yrs, High Blood PressureFather:  52 yrs, High Blood PressureSister(s): alive, High Blood    Pressure, colon cancer, type II diabetesBrother(s): , High Blood Pressure, colon cancer, type II diabetesSon(s): alive 48 yrs1 sister(s)    . 1 son(s) . Social History: Alcohol Use Patient does not use alcohol. Smoking Status Patient is a former smoker        . Marital Status: Single. Lives w ith: alone. Children: sons. Occupation/W ork: retired - for [employer]. Education/School: has highschool diploma. Ep[employer]: [address] [employer].     Social Determinants of Health     Financial Resource Strain:     Difficulty of Paying Living Expenses:    Food Insecurity:     Worried About Running Out of Food in the Last Year:     [address] in the Last Year:    Transportation Needs:     Lack of Transportation (Medical):      Lack of Transportation (Non-Medical):    Physical Activity:     Days of Exercise per Week:     Minutes of Exercise per Session:    Stress:     Feeling of Stress :    Social Connections:  Frequency of Communication with Friends and Family:     Frequency of Social Gatherings with Friends and Family:     Attends Rastafari Services:     Active Member of Clubs or Organizations:     Attends Club or Organization Meetings:     Marital Status:    Intimate Partner Violence:     Fear of Current or Ex-Partner:     Emotionally Abused:     Physically Abused:     Sexually Abused: ALLERGIES: Patient has no known allergies. Review of Systems   Constitutional: Negative for activity change, appetite change, chills, fatigue and fever. HENT: Negative for ear pain, facial swelling, sore throat and trouble swallowing. Eyes: Negative for pain, discharge and visual disturbance. Respiratory: Positive for shortness of breath. Negative for chest tightness and wheezing. Cardiovascular: Positive for palpitations. Negative for chest pain. Gastrointestinal: Negative for abdominal pain, blood in stool, nausea and vomiting. Genitourinary: Negative for difficulty urinating, flank pain and hematuria. Musculoskeletal: Negative for arthralgias, joint swelling, myalgias and neck pain. Skin: Negative for color change and rash. Neurological: Negative for dizziness, weakness, numbness and headaches. Hematological: Negative for adenopathy. Does not bruise/bleed easily. Psychiatric/Behavioral: Negative for behavioral problems, confusion and sleep disturbance. All other systems reviewed and are negative. Vitals:    06/24/21 1739 06/24/21 1752   BP: (!) 123/105    Pulse: (!) 177 (!) 135   Resp: (!) 32 18   Temp: 98.3 °F (36.8 °C) 98.3 °F (36.8 °C)   SpO2: 99%    Weight:  42 kg (92 lb 9.5 oz)   Height: 5' 5\" (1.651 m) 5' 5\" (1.651 m)            Physical Exam  Vitals and nursing note reviewed. Constitutional:       General: She is not in acute distress. Appearance: She is well-developed. She is diaphoretic. She is not ill-appearing. Comments:  This is a 78 yo female who is speaking in full sentences and vs as noted. HENT:      Head: Normocephalic and atraumatic. Nose: Nose normal.   Eyes:      General: No scleral icterus. Conjunctiva/sclera: Conjunctivae normal.      Pupils: Pupils are equal, round, and reactive to light. Neck:      Thyroid: No thyromegaly. Vascular: No JVD. Trachea: No tracheal deviation. Comments: No carotid bruits noted. Cardiovascular:      Rate and Rhythm: Tachycardia present. Rhythm irregular. Heart sounds: Normal heart sounds. No murmur heard. No friction rub. No gallop. Pulmonary:      Effort: Pulmonary effort is normal. No respiratory distress. Breath sounds: Normal breath sounds. No wheezing or rales. Chest:      Chest wall: No tenderness. Abdominal:      General: Bowel sounds are normal. There is no distension. Palpations: Abdomen is soft. There is no mass. Tenderness: There is no abdominal tenderness. There is no guarding or rebound. Musculoskeletal:         General: No tenderness. Normal range of motion. Cervical back: Normal range of motion and neck supple. Lymphadenopathy:      Cervical: No cervical adenopathy. Skin:     General: Skin is warm. Findings: No erythema or rash. Neurological:      General: No focal deficit present. Mental Status: She is alert and oriented to person, place, and time. Cranial Nerves: No cranial nerve deficit. Coordination: Coordination normal.      Deep Tendon Reflexes: Reflexes are normal and symmetric. Psychiatric:         Behavior: Behavior normal.         Thought Content:  Thought content normal.         Judgment: Judgment normal.          MDM  Number of Diagnoses or Management Options  Longstanding persistent atrial fibrillation ([address]): new and requires workup  Nausea and vomiting, intractability of vomiting not specified, unspecified vomiting type: new and requires workup  Poor compliance with medication: new and requires workup     Amount and/or Complexity of Data Reviewed  Clinical lab tests: ordered and reviewed  Tests in the radiology section of CPT®: ordered and reviewed  Decide to obtain previous medical records or to obtain history from someone other than the patient: yes  Review and summarize past medical records: yes  Discuss the patient with other providers: yes  Independent visualization of images, tracings, or specimens: yes    Risk of Complications, Morbidity, and/or Mortality  Presenting problems: high  Diagnostic procedures: high  Management options: high    Patient Progress  Patient progress: stable         Procedures    ED MD EKG interpretation: Atrial fibrillation with rate 161 BPM  With RVR . Non specific ST T changes noted. Maxine Valdes MD      This is a patient with chronic a fib who has been off her coumadin and other meds for the past three days due to N/V. Unable to keep any meds down. No pain and no SOB laying in the bed. BP is 066 systolic. Will give lopressor and check INR. Check labs and x rays. 7:09 PM at 709, the patient is in an atrial fib is a continuing rhythm, her rate is about 109 which is markedly improved. Her CBC looks normal still waiting on the electrolytes. Suspect the patient will require admission because of the nausea and vomiting and inability to hold any fluids or foods down. We will consult cardiology and likely consult the hospitalist.  7:33 PM CMP is now back and a consult is placed with cardiology. Will likely consult the hospitalist to admit for recurrent nausea and vomiting and inability to hold medicines down. Perfect Serve Consult for Admission  7:34 PM    ED Room Number: ER15/15  Patient Name and age:  Dona Smith 79 y.o.  female  Working Diagnosis:   1. Longstanding persistent atrial fibrillation ([address]    2. Nausea and vomiting, intractability of vomiting not specified, unspecified vomiting type    3.  Poor compliance with medication        COVID-19 Suspicion:  no  Sepsis present: no  Reassessment needed: no  Code Status:  Full Code  Readmission: no  Isolation Requirements:  no  Recommended Level of Care:  telemetry  Department:[facility] Adult ED - [phone]  Other:      [address] for Admission  8:05 PM    ED Room Number: ER15/15  Patient Name and age:  Dakota Schwab 79 y.o.  female  Working Diagnosis:   1. Longstanding persistent atrial fibrillation ([address]    2. Nausea and vomiting, intractability of vomiting not specified, unspecified vomiting type    3.  Poor compliance with medication        COVID-19 Suspicion:  no  Sepsis present:  no  Reassessment needed: no  Code Status:  Full Code  Readmission: no  Isolation Requirements:  no  Recommended Level of Care:  telemetry  Department:[facility] Adult ED - 21   Other:  Dr. Inder Vidal aware and agrees with admission to medicine

## 2021-06-24 NOTE — ED NOTES
Verbal shift change report given to Petey Bates RN  (oncoming nurse) by Akiko Edwards RN (offgoing nurse). Report included the following information SBAR, ED Summary, MAR and Recent Results.

## 2021-06-24 NOTE — ED TRIAGE NOTES
Triage Note: Patient is coming in from home with a 2 day history of rapid heart rate.  upon arrival to the ED. Patient is also complaining of shortness of breath.

## 2021-06-25 VITALS
DIASTOLIC BLOOD PRESSURE: 75 MMHG | WEIGHT: 91.49 LBS | HEART RATE: 75 BPM | HEIGHT: 65 IN | RESPIRATION RATE: 18 BRPM | TEMPERATURE: 98.2 F | SYSTOLIC BLOOD PRESSURE: 120 MMHG | BODY MASS INDEX: 15.24 KG/M2 | OXYGEN SATURATION: 99 %

## 2021-06-25 LAB
ANION GAP SERPL CALC-SCNC: 7 MMOL/L (ref 5–15)
ATRIAL RATE: 150 BPM
BUN SERPL-MCNC: 24 MG/DL (ref 6–20)
BUN/CREAT SERPL: 24 (ref 12–20)
CALCIUM SERPL-MCNC: 8.6 MG/DL (ref 8.5–10.1)
CALCULATED R AXIS, ECG10: 82 DEGREES
CALCULATED T AXIS, ECG11: 5 DEGREES
CHLORIDE SERPL-SCNC: 110 MMOL/L (ref 97–108)
CO2 SERPL-SCNC: 18 MMOL/L (ref 21–32)
CREAT SERPL-MCNC: 1 MG/DL (ref 0.55–1.02)
DIAGNOSIS, 93000: NORMAL
ERYTHROCYTE [DISTWIDTH] IN BLOOD BY AUTOMATED COUNT: 14.4 % (ref 11.5–14.5)
GLUCOSE SERPL-MCNC: 103 MG/DL (ref 65–100)
HCT VFR BLD AUTO: 29.2 % (ref 35–47)
HGB BLD-MCNC: 10.1 G/DL (ref 11.5–16)
MCH RBC QN AUTO: 30.1 PG (ref 26–34)
MCHC RBC AUTO-ENTMCNC: 34.6 G/DL (ref 30–36.5)
MCV RBC AUTO: 86.9 FL (ref 80–99)
NRBC # BLD: 0 K/UL (ref 0–0.01)
NRBC BLD-RTO: 0 PER 100 WBC
PLATELET # BLD AUTO: 180 K/UL (ref 150–400)
PMV BLD AUTO: 10.5 FL (ref 8.9–12.9)
POTASSIUM SERPL-SCNC: 3.6 MMOL/L (ref 3.5–5.1)
Q-T INTERVAL, ECG07: 262 MS
QRS DURATION, ECG06: 76 MS
QTC CALCULATION (BEZET), ECG08: 428 MS
RBC # BLD AUTO: 3.36 M/UL (ref 3.8–5.2)
SODIUM SERPL-SCNC: 135 MMOL/L (ref 136–145)
VENTRICULAR RATE, ECG03: 161 BPM
WBC # BLD AUTO: 6.4 K/UL (ref 3.6–11)

## 2021-06-25 PROCEDURE — 97161 PT EVAL LOW COMPLEX 20 MIN: CPT

## 2021-06-25 PROCEDURE — 74011000258 HC RX REV CODE- 258: Performed by: STUDENT IN AN ORGANIZED HEALTH CARE EDUCATION/TRAINING PROGRAM

## 2021-06-25 PROCEDURE — 74011250637 HC RX REV CODE- 250/637: Performed by: STUDENT IN AN ORGANIZED HEALTH CARE EDUCATION/TRAINING PROGRAM

## 2021-06-25 PROCEDURE — 74011250636 HC RX REV CODE- 250/636: Performed by: STUDENT IN AN ORGANIZED HEALTH CARE EDUCATION/TRAINING PROGRAM

## 2021-06-25 PROCEDURE — 36415 COLL VENOUS BLD VENIPUNCTURE: CPT

## 2021-06-25 PROCEDURE — 85027 COMPLETE CBC AUTOMATED: CPT

## 2021-06-25 PROCEDURE — 99233 SBSQ HOSP IP/OBS HIGH 50: CPT | Performed by: SPECIALIST

## 2021-06-25 PROCEDURE — 80048 BASIC METABOLIC PNL TOTAL CA: CPT

## 2021-06-25 PROCEDURE — 97116 GAIT TRAINING THERAPY: CPT

## 2021-06-25 PROCEDURE — 74011250637 HC RX REV CODE- 250/637: Performed by: HOSPITALIST

## 2021-06-25 PROCEDURE — 74011000250 HC RX REV CODE- 250: Performed by: STUDENT IN AN ORGANIZED HEALTH CARE EDUCATION/TRAINING PROGRAM

## 2021-06-25 PROCEDURE — 99222 1ST HOSP IP/OBS MODERATE 55: CPT | Performed by: PHYSICAL MEDICINE & REHABILITATION

## 2021-06-25 PROCEDURE — 97530 THERAPEUTIC ACTIVITIES: CPT

## 2021-06-25 PROCEDURE — 97166 OT EVAL MOD COMPLEX 45 MIN: CPT

## 2021-06-25 RX ORDER — METOPROLOL TARTRATE 50 MG/1
TABLET ORAL
Qty: 60 TABLET | Refills: 1 | Status: SHIPPED | OUTPATIENT
Start: 2021-06-25

## 2021-06-25 RX ORDER — CHOLESTYRAMINE 4 G/4.8G
2 POWDER, FOR SUSPENSION ORAL
Status: DISCONTINUED | OUTPATIENT
Start: 2021-06-25 | End: 2021-06-25

## 2021-06-25 RX ORDER — PANTOPRAZOLE SODIUM 40 MG/1
40 TABLET, DELAYED RELEASE ORAL
Status: DISCONTINUED | OUTPATIENT
Start: 2021-06-26 | End: 2021-06-25 | Stop reason: HOSPADM

## 2021-06-25 RX ORDER — MEGESTROL ACETATE 40 MG/ML
200 SUSPENSION ORAL 2 TIMES DAILY
Status: DISCONTINUED | OUTPATIENT
Start: 2021-06-25 | End: 2021-06-25 | Stop reason: HOSPADM

## 2021-06-25 RX ADMIN — PRAVASTATIN SODIUM 20 MG: 20 TABLET ORAL at 08:52

## 2021-06-25 RX ADMIN — HEPARIN SODIUM 5000 UNITS: 5000 INJECTION INTRAVENOUS; SUBCUTANEOUS at 06:42

## 2021-06-25 RX ADMIN — METOPROLOL TARTRATE 50 MG: 50 TABLET, FILM COATED ORAL at 08:52

## 2021-06-25 RX ADMIN — METOPROLOL TARTRATE 50 MG: 50 TABLET, FILM COATED ORAL at 17:41

## 2021-06-25 RX ADMIN — ACETAMINOPHEN 650 MG: 325 TABLET ORAL at 14:23

## 2021-06-25 RX ADMIN — DILTIAZEM HYDROCHLORIDE 5 MG/HR: 5 INJECTION INTRAVENOUS at 08:52

## 2021-06-25 RX ADMIN — HEPARIN SODIUM 5000 UNITS: 5000 INJECTION INTRAVENOUS; SUBCUTANEOUS at 14:23

## 2021-06-25 RX ADMIN — MEGESTROL ACETATE 200 MG: 40 SUSPENSION ORAL at 17:41

## 2021-06-25 NOTE — PROGRESS NOTES
Problem: Falls - Risk of  Goal: *Absence of Falls  Description: Document Nicole Montero Fall Risk and appropriate interventions in the flowsheet. 6/25/2021 1911 by Marilin Curtis  Outcome: Resolved/Met  6/25/2021 1911 by Marilin Curtis  Outcome: Progressing Towards Goal  Note: Fall Risk Interventions:  Mobility Interventions: Patient to call before getting OOB         Medication Interventions: Patient to call before getting OOB    Elimination Interventions: Patient to call for help with toileting needs    History of Falls Interventions: Investigate reason for fall      6/25/2021 1255 by Marilin Curtis  Outcome: Progressing Towards Goal  Note: Fall Risk Interventions:  Mobility Interventions: Patient to call before getting OOB         Medication Interventions: Patient to call before getting OOB    Elimination Interventions: Patient to call for help with toileting needs    History of Falls Interventions: Investigate reason for fall         Problem: Patient Education: Go to Patient Education Activity  Goal: Patient/Family Education  6/25/2021 1911 by Marilin Curtis  Outcome: Resolved/Met  6/25/2021 1911 by Marilin Curtis  Outcome: Progressing Towards Goal  6/25/2021 1255 by Marilin Curtis  Outcome: Progressing Towards Goal     Problem: Pressure Injury - Risk of  Goal: *Prevention of pressure injury  Description: Document Prakash Scale and appropriate interventions in the flowsheet.   6/25/2021 1911 by Marilin Curtis  Outcome: Resolved/Met  6/25/2021 1911 by Marilin Curtis  Outcome: Progressing Towards Goal  Note: Pressure Injury Interventions:       Moisture Interventions: Minimize layers    Activity Interventions: Pressure redistribution bed/mattress(bed type)    Mobility Interventions: Pressure redistribution bed/mattress (bed type)    Nutrition Interventions: Document food/fluid/supplement intake    Friction and Shear Interventions: Minimize layers             6/25/2021 1255 by Marilin Curtis  Outcome: Progressing Towards Goal  Note: Pressure Injury Interventions:       Moisture Interventions: Minimize layers    Activity Interventions: Pressure redistribution bed/mattress(bed type)    Mobility Interventions: Pressure redistribution bed/mattress (bed type)    Nutrition Interventions: Document food/fluid/supplement intake    Friction and Shear Interventions: Minimize layers                Problem: Patient Education: Go to Patient Education Activity  Goal: Patient/Family Education  6/25/2021 1911 by Patricia Antonio  Outcome: Resolved/Met  6/25/2021 1911 by Patricia Antonio  Outcome: Progressing Towards Goal  6/25/2021 1255 by Patricia Antonio  Outcome: Progressing Towards Goal     Problem: Patient Education: Go to Patient Education Activity  Goal: Patient/Family Education  6/25/2021 1911 by Patricia Antonio  Outcome: Resolved/Met  6/25/2021 1911 by Patricia Antonio  Outcome: Progressing Towards Goal  6/25/2021 1255 by Patricia Antonio  Outcome: Progressing Towards Goal     Problem: Discharge Planning  Goal: *Discharge to safe environment  6/25/2021 1911 by Patricia Antonio  Outcome: Resolved/Met  6/25/2021 1911 by Patricia Antonio  Outcome: Progressing Towards Goal     Problem: Patient Education: Go to Patient Education Activity  Goal: Patient/Family Education  6/25/2021 1911 by Patricia Antonio  Outcome: Resolved/Met  6/25/2021 1911 by Patricia Antonio  Outcome: Progressing Towards Goal

## 2021-06-25 NOTE — PROGRESS NOTES
Cardiology Progress Note            Admit Date: 6/24/2021  Admit Diagnosis: Atrial fibrillation with rapid ventricular response ([facility] [I48.91]  Date: 6/25/2021     Time: 9:03 AM    Subjective:  Still with poor appetite. Nausea. Generally not feeling well. Afib, rate controlled. She stopped her Coumadin a few days ago. Feeling bad. No c/o CP. Some SOB and palpitations. Assessment and Plan     1. Afib   - rate controlled. Continue Cardizem CD and Lopressor. Dilt gtt to end at 10 am.   - s/p cryomaze and ALIYAH closure at time of MVR,    - Aflutter and afib returned post-op, seen by Dr. Suzie Vidal and offered aflutter ablation if it persisted, but gone,  - Occasional palpitations now. Not bad, does not want/need further treatment at this time.     - always concern for sss with her hx, and could do monitor but acutely looks like FTT and weight loss     causing debility and fatigue     2. CAD    - 3/12- 100% distal RCA disease on cath 3/11 not amenable to PCI or bypass   - no exertional chest pain, continue aspirin and pravastatin   - declines stress testing for now  3. Rheumatic heart disease/MV disease   - s/p replacement 3/12 Dr. Corrales Dear, stable    - repeat echo. 4. HTN   -  normal today, cont current meds  5. Thyroid   - off meds now s/p methimazole  6. Aortic Atherosclerosis   - Aspirin and Pravastatin   7. Mild PAHTN   - do not need to repeat echo  8. H/o Tobacco use   - continues cessation  9. COPD    - emphysema on CXR   - seen on CT chest, not on inhalers at this time, denies progression of dyspnea  10. Right sided chest pain/back pain/neck pain    - as per Dr. Sulema Dior  11. Cachexia   - severe, 15 pounds down,    - PTA megace - fu with Dr. Sulema Dior   - having Nausea and diarrhea   - Poor appetite. - per Primary team.    Afib, rate controlled. Resume PTA meds. Pharmacy to dose Coumadin. Looks sickly, tired. No appetite.   Losing weight. Have to consider cancer. H/o smoking, COPD/emphysema.     Patient seen on the day of progress note and examined  and agree with Advance Practice Provider (DELFINA, NP,PA)  assessment and plans  Hr seems better controlled   now off iv cardizem  We will hold off resuming coumadin for now since she has had ALIYAH ligation at the time of MVR and she does have poor nutrition which may effect coumadin therapy negatively    Main issues are diarrhea and weight loss at this time      No results found for: NINA   Past Medical History:   Diagnosis Date    AF (atrial fibrillation) ([address]     RHEUMATIC FEVER AS CHILD, AFIB    Anemia     Anemia     Arthritis     Blunt head trauma 2017    Chronic pain     Chronically on opiate therapy     CKD (chronic kidney disease), stage III (HCC)     Coagulation defects     COUMADIN    Diastolic dysfunction     grade 4    Dyslipidemia     Encounter for Hemoccult screening 2016    Fall 2017    H/O colonoscopy     Hand pain, right     Heart failure (HCC)     Meniscus degeneration, right 2017    Extensive patellar chondral derangement as well as chondral derangement     Neuroma 2021    Prediabetes     PUD (peptic ulcer disease)     Rheumatic fever     S/P MVR (mitral valve replacement)     Shoulder pain, right     SOB (shortness of breath)     Thyroid disease     Venous insufficiency of both lower extremities 2017    Warfarin-induced coagulopathy ([address]       Social History     Tobacco Use    Smoking status: Former Smoker     Packs/day: 0.50     Years: 30.00     Pack years: 15.00     Quit date: 2012     Years since quittin.4    Smokeless tobacco: Never Used   Vaping Use    Vaping Use: Never used   Substance Use Topics    Alcohol use: No     Alcohol/week: 0.0 standard drinks    Drug use: No           Review of Systems:    [] Patient unable to provide secondary to condition    [] All systems negative, except as checked below.  Constitutional:    [x]Weight Change  []Fever   []Chills   []Night Sweats  [x]Fatigue  [x]Malaise  [] ____  ENT/Mouth:     []Hearing Changes  []Ear Pain  []Nasal Congestion   []Sinus Pain  []Hoarseness   []Sore throat  []Rhinorrhea  []Swallowing Difficulty  [] ____  Eyes:    []Eye Pain  []Swelling  []Redness  []Foreign Body  []Discharge  []Vision Changes  [] ____  Cardiovascular:    []Chest Pain  []SOB  []PND  []DEMARCO  []Orthopnea  []Claudication  []Edema   []Palpitations  [] ____  Respiratory:    []Cough  []Sputum  []Wheezing,  []SOB  []Hemoptysis  [] ____  Gastrointestinal:    [x]Nausea  []Vomiting  [x]Diarrhea  []Constipation  []Pain  []Heartburn  []Anorexia  []Dysphagia  []Hematochezia  []Melena,  []Jaundice  [] ____  Genitourinary:    []Dysuria  []Urinary Frequency  []Hematuria  []Urinary Incontinence  []Urgency  []Flank Pain  []Hesitancy  [] ____  Musculoskeletal:    []Arthralgias  []Myalgias  []Joint Swelling  []Joint Stiffness  []Back Pain  []Neck Pain  [] ____  Skin:    []Skin Lesions  []Pruritis  []Hair Changes  []Skin rashes  [] ____  Neuro:    []Weakness  []Numbness  []Paresthesias  []Loss of Consciousness  []Syncope   []Dizziness    []Headache  []Coordination Changes  []Recent Falls  [] ____  Psych:    []Anxiety/Depression  []Insomnia  []Memory Changes  []Violence/Abuse Hx.  []_ ___  Heme/Lymph:    []Bruising  []Bleeding  []Lymphadenopathy  [] ____  Endocrine:    []Polyuria  []Polydipsia  []Temperature Intolerance  []  ____         Objective:     Physical Exam:                Visit Vitals  /70   Pulse 86   Temp 98.2 °F (36.8 °C)   Resp 18   Ht 5' 5\" (1.651 m)   Wt 91 lb 7.9 oz (41.5 kg)   SpO2 98%   BMI 15.22 kg/m²        General Appearance:   cachectic, frail appearing, alert and oriented x 3, and   individual in no acute distress. Ears/Nose/Mouth/Throat:    Hearing grossly normal.       Neck:  Supple. Chest:    Lungs diminished anteriorly to auscultation bilaterally.    Cardiovascular: Irregular rate and rhythm, S1, S2 normal, 3/6 SE murmur. Abdomen:    Soft, non-tender, bowel sounds are active. Extremities:  No edema bilaterally. Skin:  Warm and dry. Telemetry: AFIB     Data Review:    Labs:    Recent Results (from the past 24 hour(s))   EKG, 12 LEAD, INITIAL    Collection Time: 06/24/21  5:39 PM   Result Value Ref Range    Ventricular Rate 161 BPM    Atrial Rate 150 BPM    QRS Duration 76 ms    Q-T Interval 262 ms    QTC Calculation (Bezet) 428 ms    Calculated R Axis 82 degrees    Calculated T Axis 5 degrees    Diagnosis       Atrial fibrillation with premature ventricular or aberrantly conducted   complexes  Septal infarct , age undetermined  When compared with ECG of 02-MAR-2017 16:19,  Atrial fibrillation has replaced Sinus rhythm  Vent. rate has increased BY  96 BPM  Nonspecific T wave abnormality now evident in Inferior leads  Confirmed by Renee Martinez MD, Aamir Kirk ([id number] [facility]) on 6/25/2021 8:52:21 AM     CBC WITH AUTOMATED DIFF    Collection Time: 06/24/21  6:27 PM   Result Value Ref Range    WBC 6.6 3.6 - 11.0 K/uL    RBC 4.57 3.80 - 5.20 M/uL    HGB 13.7 11.5 - 16.0 g/dL    HCT 39.2 35.0 - 47.0 %    MCV 85.8 80.0 - 99.0 FL    MCH 30.0 26.0 - 34.0 PG    MCHC 34.9 30.0 - 36.5 g/dL    RDW 14.1 11.5 - 14.5 %    PLATELET 273 348 - 589 K/uL    MPV 10.7 8.9 - 12.9 FL    NRBC 0.0 0  WBC    ABSOLUTE NRBC 0.00 0.00 - 0.01 K/uL    NEUTROPHILS 63 32 - 75 %    LYMPHOCYTES 28 12 - 49 %    MONOCYTES 7 5 - 13 %    EOSINOPHILS 1 0 - 7 %    BASOPHILS 1 0 - 1 %    IMMATURE GRANULOCYTES 0 0.0 - 0.5 %    ABS. NEUTROPHILS 4.2 1.8 - 8.0 K/UL    ABS. LYMPHOCYTES 1.8 0.8 - 3.5 K/UL    ABS. MONOCYTES 0.5 0.0 - 1.0 K/UL    ABS. EOSINOPHILS 0.1 0.0 - 0.4 K/UL    ABS. BASOPHILS 0.0 0.0 - 0.1 K/UL    ABS. IMM.  GRANS. 0.0 0.00 - 0.04 K/UL    DF AUTOMATED     METABOLIC PANEL, COMPREHENSIVE    Collection Time: 06/24/21  6:27 PM   Result Value Ref Range    Sodium 134 (L) 136 - 145 mmol/L    Potassium 3.7 3.5 - 5.1 mmol/L    Chloride 104 97 - 108 mmol/L    CO2 20 (L) 21 - 32 mmol/L    Anion gap 10 5 - 15 mmol/L    Glucose 100 65 - 100 mg/dL    BUN 27 (H) 6 - 20 MG/DL    Creatinine 1.18 (H) 0.55 - 1.02 MG/DL    BUN/Creatinine ratio 23 (H) 12 - 20      GFR est AA 53 (L) >60 ml/min/1.73m2    GFR est non-AA 44 (L) >60 ml/min/1.73m2    Calcium 9.7 8.5 - 10.1 MG/DL    Bilirubin, total 1.1 (H) 0.2 - 1.0 MG/DL    ALT (SGPT) 29 12 - 78 U/L    AST (SGOT) 17 15 - 37 U/L    Alk. phosphatase 60 45 - 117 U/L    Protein, total 8.3 (H) 6.4 - 8.2 g/dL    Albumin 3.6 3.5 - 5.0 g/dL    Globulin 4.7 (H) 2.0 - 4.0 g/dL    A-G Ratio 0.8 (L) 1.1 - 2.2     TROPONIN I    Collection Time: 06/24/21  6:27 PM   Result Value Ref Range    Troponin-I, Qt. <0.05 <0.05 ng/mL   NT-PRO BNP    Collection Time: 06/24/21  6:27 PM   Result Value Ref Range    NT pro-BNP 3,769 (H) <450 PG/ML   MAGNESIUM    Collection Time: 06/24/21  6:27 PM   Result Value Ref Range    Magnesium 2.5 (H) 1.6 - 2.4 mg/dL   SAMPLES BEING HELD    Collection Time: 06/24/21  6:29 PM   Result Value Ref Range    SAMPLES BEING HELD 1RED     COMMENT        Add-on orders for these samples will be processed based on acceptable specimen integrity and analyte stability, which may vary by analyte.    PROTHROMBIN TIME + INR    Collection Time: 06/24/21  6:29 PM   Result Value Ref Range    INR 1.1 0.9 - 1.1      Prothrombin time 11.9 (H) 9.0 - 11.1 sec   PHOSPHORUS    Collection Time: 06/24/21  9:30 PM   Result Value Ref Range    Phosphorus 3.4 2.6 - 4.7 MG/DL   METABOLIC PANEL, BASIC    Collection Time: 06/25/21  4:42 AM   Result Value Ref Range    Sodium 135 (L) 136 - 145 mmol/L    Potassium 3.6 3.5 - 5.1 mmol/L    Chloride 110 (H) 97 - 108 mmol/L    CO2 18 (L) 21 - 32 mmol/L    Anion gap 7 5 - 15 mmol/L    Glucose 103 (H) 65 - 100 mg/dL    BUN 24 (H) 6 - 20 MG/DL    Creatinine 1.00 0.55 - 1.02 MG/DL    BUN/Creatinine ratio 24 (H) 12 - 20      GFR est AA >60 >60 ml/min/1.73m2    GFR est non-AA 53 (L) >60 ml/min/1.73m2    Calcium 8.6 8.5 - 10.1 MG/DL   CBC W/O DIFF    Collection Time: 06/25/21  4:42 AM   Result Value Ref Range    WBC 6.4 3.6 - 11.0 K/uL    RBC 3.36 (L) 3.80 - 5.20 M/uL    HGB 10.1 (L) 11.5 - 16.0 g/dL    HCT 29.2 (L) 35.0 - 47.0 %    MCV 86.9 80.0 - 99.0 FL    MCH 30.1 26.0 - 34.0 PG    MCHC 34.6 30.0 - 36.5 g/dL    RDW 14.4 11.5 - 14.5 %    PLATELET 259 707 - 931 K/uL    MPV 10.5 8.9 - 12.9 FL    NRBC 0.0 0  WBC    ABSOLUTE NRBC 0.00 0.00 - 0.01 K/uL          Radiology:        Current Facility-Administered Medications   Medication Dose Route Frequency    dilTIAZem (CARDIZEM) 125 mg in dextrose 5% 125 mL infusion  0-15 mg/hr IntraVENous TITRATE    dilTIAZem ER (CARDIZEM CD) capsule 120 mg  120 mg Oral DAILY    metoprolol tartrate (LOPRESSOR) tablet 50 mg  50 mg Oral BID    pravastatin (PRAVACHOL) tablet 20 mg  20 mg Oral DAILY    sodium chloride (NS) flush 5-40 mL  5-40 mL IntraVENous Q8H    sodium chloride (NS) flush 5-40 mL  5-40 mL IntraVENous PRN    acetaminophen (TYLENOL) tablet 650 mg  650 mg Oral Q6H PRN    Or    acetaminophen (TYLENOL) suppository 650 mg  650 mg Rectal Q6H PRN    polyethylene glycol (MIRALAX) packet 17 g  17 g Oral DAILY PRN    ondansetron (ZOFRAN ODT) tablet 4 mg  4 mg Oral Q8H PRN    Or    ondansetron (ZOFRAN) injection 4 mg  4 mg IntraVENous Q6H PRN    0.9% sodium chloride infusion  75 mL/hr IntraVENous CONTINUOUS    heparin (porcine) injection 5,000 Units  5,000 Units SubCUTAneous Q8H       Malvin Gudino MD     [facility] [address] [place] [address], [address] [place] [address] [address]   Reji Edward   [phone]

## 2021-06-25 NOTE — CONSULTS
[facility]  [address] [address] [address], [address]  [phone]                     GI CONSULTATION NOTE      NAME:  Brett Alfonso   :   1940   MRN:   [id number]   Consult Date: 2021     Chief Complaint: nausea and diarrhea    History of Present Illness:  Patient is a 79 y.o. who is seen in consultation at the request of Dr. Esha La for above mentioned problems. Ms. Alfredo Serrano has a past medical history significant for afib on chronic anticoagulation w/coumadin, s/p MVR replacement 3/2012, HFpEF, anemia, PUD, CAD, CKD, HTN, hyperthyroidism s/p methimazole, and poor appetite. She presented to [facility] ED on 21, after having 3 days of nausea, diarrhea and decreased po intake. Denies bloody stools, black stools, vomiting or hematuria. Reports increased palpitations, fatigue and DEMARCO. On admission was found to be in Afib with RVR rate 204-177bpm, started on Cardizem and metoprolol. Report having multiple 3-4 loose BM daily at home for past 3 days. Reports struggling with a poor appetite for years, states has lost 15lb in last month. Started on Megace in May for appetite stimulation, with little improvement. Drinks Boost, Ensure, and Ensure plus at home for meal supplementation. She is not interested in any invasive procedures or blood draws.       Last Colonoscopy 2007 w/ Dr. Linsey Purdy: Normal    advised to repeat colonoscopy in 5yrs () due to family hx of colon Ca    Family History of Colon Cancer: sister    PMH:  Past Medical History:   Diagnosis Date    AF (atrial fibrillation) (HCC)     RHEUMATIC FEVER AS CHILD, AFIB    Anemia     Anemia     Arthritis     Blunt head trauma 2017    Chronic pain     Chronically on opiate therapy     CKD (chronic kidney disease), stage III ([address]     Coagulation defects     COUMADIN    Diastolic dysfunction     grade 4    Dyslipidemia     Encounter for Hemoccult screening 2016    Fall 2017    H/O colonoscopy 2007    Hand pain, right     Heart failure (HCC)     Meniscus degeneration, right 2017    Extensive patellar chondral derangement as well as chondral derangement     Neuroma 2021    Prediabetes     PUD (peptic ulcer disease)     Rheumatic fever     S/P MVR (mitral valve replacement)     Shoulder pain, right     SOB (shortness of breath)     Thyroid disease     Venous insufficiency of both lower extremities 2017    Warfarin-induced coagulopathy (HCC)        PSH:  Past Surgical History:   Procedure Laterality Date    HX CATARACT REMOVAL      BILATERAL    HX  SECTION      HX COLONOSCOPY      HX HEART CATHETERIZATION      HX HYSTERECTOMY      HX MITRAL VALVE REPLACEMENT      HX LUIS ENRIQUE AND BSO         Allergies:  No Known Allergies    Home Medications:  Prior to Admission Medications   Prescriptions Last Dose Informant Patient Reported? Taking?   aspirin 81 mg chewable tablet   Yes No   Sig: Take 81 mg by mouth daily. dilTIAZem ER (CARDIZEM CD) 120 mg capsule   No No   Sig: TAKE 1 CAPSULE BY MOUTH EVERY DAY   furosemide (LASIX) 40 mg tablet   No No   Sig: TAKE 1 TABLET BY MOUTH DAILY   losartan (COZAAR) 25 mg tablet   No No   Sig: TAKE 1 TABLET BY MOUTH DAILY   megestroL (MEGACE) 400 mg/10 mL (10 mL) suspension   No No   Sig: Take 5 mL by mouth two (2) times a day. metoprolol tartrate (LOPRESSOR) 25 mg tablet   No No   Sig: TAKE 1 TABLET BY MOUTH TWICE DAILY   pravastatin (PRAVACHOL) 20 mg tablet   No No   Sig: TAKE 1 TABLET BY MOUTH EVERY EVENING   triamcinolone acetonide (KENALOG) 0.1 % topical cream   No No   Sig: Apply  to affected area two (2) times a day.    Patient not taking: Reported on 2021   warfarin (COUMADIN) 2 mg tablet   No No   Sig: TAKE 1/2 TABLET BY MOUTH MONDAY THROUGH SATURDAY AND 1 TABLET BY MOUTH DAILY ON  ONLY      Facility-Administered Medications: None       Hospital Medications:  Current Facility-Administered Medications   Medication Dose Route Frequency    cholestyramine-aspartame (QUESTRAN LIGHT) packet 2 g  2 g Oral DAILY WITH LUNCH    dilTIAZem ER (CARDIZEM CD) capsule 120 mg  120 mg Oral DAILY    metoprolol tartrate (LOPRESSOR) tablet 50 mg  50 mg Oral BID    pravastatin (PRAVACHOL) tablet 20 mg  20 mg Oral DAILY    sodium chloride (NS) flush 5-40 mL  5-40 mL IntraVENous Q8H    sodium chloride (NS) flush 5-40 mL  5-40 mL IntraVENous PRN    acetaminophen (TYLENOL) tablet 650 mg  650 mg Oral Q6H PRN    Or    acetaminophen (TYLENOL) suppository 650 mg  650 mg Rectal Q6H PRN    polyethylene glycol (MIRALAX) packet 17 g  17 g Oral DAILY PRN    ondansetron (ZOFRAN ODT) tablet 4 mg  4 mg Oral Q8H PRN    Or    ondansetron (ZOFRAN) injection 4 mg  4 mg IntraVENous Q6H PRN    0.9% sodium chloride infusion  75 mL/hr IntraVENous CONTINUOUS    heparin (porcine) injection 5,000 Units  5,000 Units SubCUTAneous Q8H       Social History:  Social History     Tobacco Use    Smoking status: Former Smoker     Packs/day: 0.50     Years: 30.00     Pack years: 15.00     Quit date: 2012     Years since quittin.4    Smokeless tobacco: Never Used   Substance Use Topics    Alcohol use: No     Alcohol/week: 0.0 standard drinks       Family History:  Family History   Problem Relation Age of Onset    Heart Attack Sister     Hypertension Sister     Kidney Disease Mother     Hypertension Mother     Hypertension Father     Cancer Brother     Heart Failure Brother     Arthritis-osteo Sister     Cancer Sister     Cancer Brother         PROSTATE       Review of Systems:    Constitutional: negative fever, negative chills, + weight loss  Eyes:   negative visual changes  ENT:   negative sore throat, tongue or lip swelling  Respiratory:  negative cough, negative dyspnea  Cards:  negative for chest pain, lower extremity edema,+palpitations,  GI:   See HPI  :  negative for frequency, dysuria  Integument:  negative for rash and pruritus  Heme:  negative for easy bruising and gum/nose bleeding  Musculoskel: negative for myalgias, back pain and muscle weakness  Neuro: negative for headaches, dizziness, vertigo  Psych:  negative for feelings of anxiety, depression      Objective:     Patient Vitals for the past 8 hrs:   BP Temp Pulse Resp SpO2   06/25/21 0952 106/73  85  99 %   06/25/21 0946 109/72  88  95 %   06/25/21 0940 (!) 87/58  (!) 111  99 %   06/25/21 0935 95/63  80  95 %   06/25/21 0900 99/61  87  100 %   06/25/21 0813 118/70 98.2 °F (36.8 °C) 86 18 98 %     No intake/output data recorded. No intake/output data recorded. PHYSICAL EXAM:  General appearance: cooperative, no distress, appears frail  Skin: Extremities and face reveal no rashes. HEENT: Sclerae anicteric. Cardiovascular: Iregular rate and rhythm. +murmurs, no gallops, or rubs. Respiratory: Comfortable breathing with no accessory muscle use. Clear breath sounds with no wheezes, rales, or rhonchi. GI: Abdomen nondistended, soft, and nontender. Normal active bowel sounds. No enlargement of the liver or spleen. No masses palpable. Rectal: Deferred   Musculoskeletal: No pitting edema of the lower legs. Neurological: Patient is alert and oriented. Psychiatric: No anxiety or agitation. Data Review     Recent Labs     06/25/21 0442 06/24/21 1827   WBC 6.4 6.6   HGB 10.1* 13.7   HCT 29.2* 39.2    258     Recent Labs     06/25/21 0442 06/24/21  2130 06/24/21 1827   *  --  134*   K 3.6  --  3.7   *  --  104   CO2 18*  --  20*   BUN 24*  --  27*   CREA 1.00  --  1.18*   *  --  100   PHOS  --  3.4  --    CA 8.6  --  9.7     Recent Labs     06/24/21 1827   AP 60   TP 8.3*   ALB 3.6   GLOB 4.7*     Recent Labs     06/24/21 1829   INR 1.1   PTP 11.9*        Imaging studies reviewed    Assessment / Plan :     Diarrhea  Patient reports having 3-4 loose brown BM after every meal for past 3 days. Has not had BM today.   Has lost 15lbs in last month with decreased appetite, on Megace. Drinks protein supplements for/with every meal.  Differential dx include; infectious, inflammatory, food intolerance, malabsorption, less likely IBD- crohns, UC, microscopic colitis, celiac     CT ABD/Pelvis 6/23/21:Small bilateral nonobstructing renal calculi. No urinary tract obstruction or  ureteral calculi. Small bilateral simple and complex renal cysts  Last colonoscopy 6/4/2007:- normal    Recommendations:   - Check stool studies: Cdiff, enteric panel, WBC, O&P  - Continue IVF  - Check TFT's  - Nutrition consult    PUD  - PPI po daily    Family history of colon cancer  - Recommendation for Colonoscopy was made due to family history of colon Ca (sister) with 15lb weight loss. Risks and benefits of colonsocopy discussed with patient.   Patient stated she is not interested in any invasive procedures        Patient Active Hospital Problem List:   Principal Problem:    Atrial fibrillation with rapid ventricular response ([address] (6/24/2021)

## 2021-06-25 NOTE — PROGRESS NOTES
Problem: Falls - Risk of  Goal: *Absence of Falls  Description: Document Kerimarycarmensultana Erasmowillie Fall Risk and appropriate interventions in the flowsheet. Outcome: Progressing Towards Goal  Note: Fall Risk Interventions:  Mobility Interventions: Patient to call before getting OOB         Medication Interventions: Patient to call before getting OOB    Elimination Interventions: Patient to call for help with toileting needs    History of Falls Interventions: Investigate reason for fall         Problem: Patient Education: Go to Patient Education Activity  Goal: Patient/Family Education  Outcome: Progressing Towards Goal     Problem: Pressure Injury - Risk of  Goal: *Prevention of pressure injury  Description: Document Prakash Scale and appropriate interventions in the flowsheet.   Outcome: Progressing Towards Goal  Note: Pressure Injury Interventions:       Moisture Interventions: Minimize layers    Activity Interventions: Pressure redistribution bed/mattress(bed type)    Mobility Interventions: Pressure redistribution bed/mattress (bed type)    Nutrition Interventions: Document food/fluid/supplement intake    Friction and Shear Interventions: Minimize layers                Problem: Patient Education: Go to Patient Education Activity  Goal: Patient/Family Education  Outcome: Progressing Towards Goal     Problem: Patient Education: Go to Patient Education Activity  Goal: Patient/Family Education  Outcome: Progressing Towards Goal

## 2021-06-25 NOTE — PROGRESS NOTES
Problem: Self Care Deficits Care Plan (Adult)  Goal: *Acute Goals and Plan of Care (Insert Text)  Description:   FUNCTIONAL STATUS PRIOR TO ADMISSION: Patient was modified independent using a cane for functional mobility. Patient was modified independent for basic and some assistance for instrumental ADLs. HOME SUPPORT: The patient lived with 2 nieces. Occupational Therapy Goals  Initiated 6/25/2021  1. Patient will perform bathing with modified independence within 7 day(s). 2.  Patient will perform standing ADLs 5 mins with RW with modified independence within 7 day(s). 3.  Patient will perform gathering ADL items high and low with RW with modified independence within 7 day(s). 4.  Patient will perform toilet transfers with modified independence within 7 day(s). 5.  Patient will perform all aspects of toileting with modified independence within 7 day(s). 6.  Patient will utilize energy conservation techniques during functional activities with verbal cues within 7 day(s). Outcome: Not Met   OCCUPATIONAL THERAPY EVALUATION  Patient: Shama Pearson (48 y.o. female)  Date: 6/25/2021  Primary Diagnosis: Atrial fibrillation with rapid ventricular response (HCC) [I48.91]        Precautions:        ASSESSMENT  Based on the objective data described below, the patient presents with ADLs impacted by overall activity tolerance and endurance. Current Level of Function Impacting Discharge (ADLs/self-care): moderate assistance lower body ADLs due to fatigue but states can complete with increased time and pacing throughout the day    Functional Outcome Measure: The patient scored Total: 65/100 on the Barthel Index outcome measure which is indicative of 35% impaired ability to care for basic self needs/dependency on others; inferred 100% dependency on others for instrumental ADLs.         Other factors to consider for discharge: 2 nieces can assist     Patient will benefit from skilled therapy intervention to address the above noted impairments. PLAN :  Recommendations and Planned Interventions: self care training, functional mobility training, therapeutic exercise, balance training, therapeutic activities, endurance activities, patient education, home safety training, and family training/education    Frequency/Duration: Patient will be followed by occupational therapy 3 times a week to address goals. Recommend with nursing, ADLs with supervision/setup, once Egress Test completed then OOB to chair 3x/day via HHA and toileting via functional mobility to and from bathroom. Thank you for completing as able in order to maintain patient strength, endurance and independence. Recommendation for discharge: (in order for the patient to meet his/her long term goals)  Occupational therapy at least 2 days/week in the home to address deficits. Cleared for discharge home from OT standpoint at this time.      This discharge recommendation:  Has not yet been discussed the attending provider and/or case management    IF patient discharges home will need the following DME: none       SUBJECTIVE:   Patient stated [facility] do I get a longer grab bar in?    OBJECTIVE DATA SUMMARY:   HISTORY:   Past Medical History:   Diagnosis Date    AF (atrial fibrillation) ([address]     RHEUMATIC FEVER AS CHILD, AFIB    Anemia     Anemia     Arthritis     Blunt head trauma 03/13/2017    Chronic pain     Chronically on opiate therapy     CKD (chronic kidney disease), stage III ([address]     Coagulation defects     COUMADIN    Diastolic dysfunction     grade 4    Dyslipidemia     Encounter for Hemoccult screening 06/22/2016    Fall 03/13/2017    H/O colonoscopy 2007    Hand pain, right     Heart failure ([address]     Meniscus degeneration, right 07/13/2017    Extensive patellar chondral derangement as well as chondral derangement     Neuroma 03/12/2021    Prediabetes     PUD (peptic ulcer disease)     Rheumatic fever     S/P MVR (mitral valve replacement) Shoulder pain, right     SOB (shortness of breath)     Thyroid disease     Venous insufficiency of both lower extremities 2017    Warfarin-induced coagulopathy ([facility]      Past Surgical History:   Procedure Laterality Date    HX CATARACT REMOVAL      BILATERAL    HX  SECTION      HX COLONOSCOPY      HX HEART CATHETERIZATION      HX HYSTERECTOMY      HX MITRAL VALVE REPLACEMENT      HX LUIS ENRIQUE AND BSO         Expanded or extensive additional review of patient history:     Home Situation  Home Environment: Private residence  One/Two Story Residence: One story  Living Alone: No  Support Systems: Family member(s) (nieces)  Patient Expects to be Discharged to[de-identified] House  Current DME Used/Available at Home: Cane, straight  Tub or Shower Type: Tub/Shower combination    Hand dominance: Right    EXAMINATION OF PERFORMANCE DEFICITS:  Cognitive/Behavioral Status:  Neurologic State: Alert; Appropriate for age  Orientation Level: Oriented X4  Cognition: Follows commands        Safety/Judgement: Insight into deficits    Skin: intact    Edema: intact    Hearing: Auditory  Auditory Impairment: None    Vision/Perceptual:                           Acuity: Within Defined Limits         Range of Motion:    AROM: Generally decreased, functional                         Strength:    Strength: Generally decreased, functional                Coordination:     Fine Motor Skills-Upper: Left Intact; Right Intact    Gross Motor Skills-Upper: Left Intact; Right Intact    Tone & Sensation:       Sensation: Intact                      Balance:  Sitting: Intact  Standing: Impaired; With support  Standing - Static: Constant support;Good  Standing - Dynamic : Fair;Constant support    Functional Mobility and Transfers for ADLs:  Bed Mobility:  Rolling: Stand-by assistance  Supine to Sit: Contact guard assistance  Sit to Supine: Contact guard assistance  Scooting: Contact guard assistance    Transfers:  Sit to Stand: Minimum assistance;Assist x1  Stand to Sit: Contact guard assistance    ADL Assessment:  Feeding: Independent increased time to open containers, dark in room due to neighbor preference    Oral Facial Hygiene/Grooming: Setup setup on beside tray, declined bathroom 2* neighbor disturbance    Bathing: Moderate assistance infer/based on report    Upper Body Dressing: setup    Lower Body Dressing: Moderate assistance infer    Toileting: Moderate assistance infer                ADL Intervention and task modifications:   Patient instructed and indicated understanding the benefits of maintaining activity tolerance, functional mobility, and independence with self care tasks during acute stay  to ensure safe return home and to baseline. Encouraged patient to increase frequency and duration OOB, be out of bed for all meals, perform daily ADLs (as approved by RN/MD regarding bathing etc), and performing functional mobility to/from bathroom. Cognitive Retraining  Safety/Judgement: Insight into deficits    Therapeutic Exercise:     Functional Measure:  Barthel Index:    Bathin  Bladder: 10  Bowels: 10  Groomin  Dressin  Feeding: 10  Mobility: 10  Stairs: 0  Toilet Use: 5  Transfer (Bed to Chair and Back): 10  Total: 65/100        The Barthel ADL Index: Guidelines  1. The index should be used as a record of what a patient does, not as a record of what a patient could do. 2. The main aim is to establish degree of independence from any help, physical or verbal, however minor and for whatever reason. 3. The need for supervision renders the patient not independent. 4. A patient's performance should be established using the best available evidence. Asking the patient, friends/relatives and nurses are the usual sources, but direct observation and common sense are also important. However direct testing is not needed.   5. Usually the patient's performance over the preceding 24-48 hours is important, but occasionally longer periods will be relevant. 6. Middle categories imply that the patient supplies over 50 per cent of the effort. 7. Use of aids to be independent is allowed. Theora Litter., Barthel, D.W. (0772). Functional evaluation: the Barthel Index. [facility] (14)2. KENNEDY Myles, Deyanira Horton, Vania, [address] (1999). Measuring the change indisability after inpatient rehabilitation; comparison of the responsiveness of the Barthel Index and Functional [place] Measure. Journal of Neurology, Neurosurgery, and Psychiatry, 66(4), 452-363. John Evans, N.J.A, YIN Fu, & Deann Boast, M.A. (2004.) Assessment of post-stroke quality of life in cost-effectiveness studies: The usefulness of the Barthel Index and the EuroQoL-5D. Quality of Life Research, 15, 242-51         Occupational Therapy Evaluation Charge Determination   History Examination Decision-Making           Based on the above components, the patient evaluation is determined to be of the following complexity level:   Pain Rating:      Activity Tolerance:   VSS    After treatment patient left in no apparent distress:    Supine in bed, Call bell within reach, and Side rails x 3    COMMUNICATION/EDUCATION:   Home safety education was provided and the patient/caregiver indicated understanding., Patient/family have participated as able in goal setting and plan of care. , and Patient/family agree to work toward stated goals and plan of care. This patients plan of care is appropriate for delegation to [facility].     Thank you for this referral.  Jabari Hernández  Time Calculation: 16 mins

## 2021-06-25 NOTE — CONSULTS
Cardiac Electrophysiology Hospital Consultation Note   REFERRING PROVIDER: Dr Suni Senior  Subjective:      Dyana Cornejo is a 79 y.o. patient who is seen for evaluation of atrial fibrillation with rapid ventricular rate  She also has diarrhea and nausea  No fever or cough  She denied black stool or hematemesis, chest pain  Rescue squad strip showed atrial fibrillation with RVR and aberrancy 204 bpm    When she saw Dr Dia Alford in May there has been weight loss, loss of appetite concern already  She admitted that she has not been eating or drinking well     05/07/20    ECHO ADULT COMPLETE 05/12/2020 5/12/2020    Interpretation Summary  · The underlying rhythm is atrial fibrillation with a ventricular response of 70-90 bpm.  · Normal left ventricular cavity size and systolic function (ejection fraction normal). Mildly increased wall thickness. Estimated left ventricular ejection fraction is 60 - 65%. Visually measured ejection fraction. No regional wall motion abnormality noted. · Severely dilated left atrium. Left atrium volume index is 48 mL/m2. · A bioprosthetic mitral valve is present. There is no regurgitation and the mean mitral valve gradient measures 8 mmHg. · Mild pulmonic valve regurgitation is present. · Mild tricuspid valve regurgitation is present. · Pulmonary arterial systolic pressure is 37 mmHg.     Signed by: Gus Clark MD on 5/12/2020  2:30 PM           Patient Active Problem List   Diagnosis Code    Chest pain R07.9    Other dyspnea and respiratory abnormality R06.09, R09.89    Mitral valve disorder I05.9    Palpitations R00.2    Atrial fibrillation (HCC) I48.91    Essential hypertension, benign I10    Hypothyroid E03.9    Tricuspid regurgitation I07.1    Rheumatic heart disease I09.9    S/P MVR (mitral valve replacement) Z95.2    S/P Maze operation for atrial fibrillation Z98.890, Z86.79    Anemia associated with acute blood loss D62    Aortic valve disorders I35.9  Pulmonary hypertension (HCC) I27.20    Neck pain M54.2    Coronary atherosclerosis of native coronary artery I25.10    Warfarin-induced coagulopathy ([facility]) D68.32, T45.515A    Arthritis M19.90    IGT (impaired glucose tolerance) R73.02    Arrhythmia I49.9    PUD (peptic ulcer disease) K27.9    Anemia D64.9    Thyroid disease E07.9    Rheumatic fever I00    H/O colonoscopy Z98.890    Chronic pain G89.29    Shoulder pain, right M25.511    Hand pain, right M79.641    ACP (advance care planning) Z71.89    SOB (shortness of breath) V05.70    Diastolic dysfunction V14.17    Venous insufficiency of both lower extremities I87.2    Chronic deep vein thrombosis (DVT) of femoral vein of right lower extremity ([facility]) I82.511    Fall W19. XXXA    Blunt head trauma S09. 8XXA    Dyslipidemia E78.5    Meniscus degeneration, right M23.306    CKD (chronic kidney disease) stage 3, GFR 30-59 ml/min ([facility]) N18.30    Neuroma D36.10     Current Facility-Administered Medications   Medication Dose Route Frequency Provider Last Rate Last Admin    dilTIAZem (CARDIZEM) 125 mg in dextrose 5% 125 mL infusion  0-15 mg/hr IntraVENous TITRATE Analisa Thomas MD 5 mL/hr at 06/24/21 2039 5 mg/hr at 06/24/21 2039     Current Outpatient Medications   Medication Sig Dispense Refill    megestroL (MEGACE) 400 mg/10 mL (10 mL) suspension Take 5 mL by mouth two (2) times a day.  250 mL 1    metoprolol tartrate (LOPRESSOR) 25 mg tablet TAKE 1 TABLET BY MOUTH TWICE DAILY 90 Tab 3    warfarin (COUMADIN) 2 mg tablet TAKE 1/2 TABLET BY MOUTH MONDAY THROUGH SATURDAY AND 1 TABLET BY MOUTH DAILY ON SUNDAY ONLY 90 Tab 3    dilTIAZem ER (CARDIZEM CD) 120 mg capsule TAKE 1 CAPSULE BY MOUTH EVERY DAY 90 Cap 3    pravastatin (PRAVACHOL) 20 mg tablet TAKE 1 TABLET BY MOUTH EVERY EVENING 90 Tab 3    furosemide (LASIX) 40 mg tablet TAKE 1 TABLET BY MOUTH DAILY 90 Tab 2    losartan (COZAAR) 25 mg tablet TAKE 1 TABLET BY MOUTH DAILY 90 Tab 3  triamcinolone acetonide (KENALOG) 0.1 % topical cream Apply  to affected area two (2) times a day. (Patient not taking: Reported on 2021) 45 g 11    aspirin 81 mg chewable tablet Take 81 mg by mouth daily.        No Known Allergies  Past Medical History:   Diagnosis Date    AF (atrial fibrillation) (HCC)     RHEUMATIC FEVER AS CHILD, AFIB    Anemia     Anemia     Arthritis     Blunt head trauma 2017    Chronic pain     Chronically on opiate therapy     CKD (chronic kidney disease), stage III ([address]     Coagulation defects     COUMADIN    Diastolic dysfunction     grade 4    Dyslipidemia     Encounter for Hemoccult screening 2016    Fall 2017    H/O colonoscopy     Hand pain, right     Heart failure ([address]     Meniscus degeneration, right 2017    Extensive patellar chondral derangement as well as chondral derangement     Neuroma 2021    Prediabetes     PUD (peptic ulcer disease)     Rheumatic fever     S/P MVR (mitral valve replacement)     Shoulder pain, right     SOB (shortness of breath)     Thyroid disease     Venous insufficiency of both lower extremities 2017    Warfarin-induced coagulopathy ([address]      Past Surgical History:   Procedure Laterality Date    HX CATARACT REMOVAL      BILATERAL    HX  SECTION      HX COLONOSCOPY      HX HEART CATHETERIZATION      HX HYSTERECTOMY      HX MITRAL VALVE REPLACEMENT      HX LUIS ENRIQUE AND BSO       Family History   Problem Relation Age of Onset    Heart Attack Sister     Hypertension Sister     Kidney Disease Mother     Hypertension Mother     Hypertension Father     Cancer Brother     Heart Failure Brother     Arthritis-osteo Sister     Cancer Sister     Cancer Brother         PROSTATE     Social History     Tobacco Use    Smoking status: Former Smoker     Packs/day: 0.50     Years: 30.00     Pack years: 15.00     Quit date: 2012     Years since quittin.4    Smokeless tobacco: Never Used   Substance Use Topics    Alcohol use: No     Alcohol/week: 0.0 standard drinks        Review of Systems:   Constitutional: Negative for fever, chills, + weight loss, malaise/fatigue. HEENT: Negative for nosebleeds, vision changes. Respiratory: Negative for cough, hemoptysis  Cardiovascular: Negative for chest pain, + palpitations, no orthopnea, claudication, leg swelling, syncope, and PND. Gastrointestinal: + for nausea, diarrhea, no blood in stool and melena. Genitourinary: Negative for dysuria, and hematuria. Musculoskeletal: Negative for myalgias, arthralgia. Skin: Negative for rash. Heme: Does not bleed or bruise easily. Neurological: Negative for speech change and focal weakness     Objective:     Visit Vitals  BP (!) 144/93   Pulse (!) 116   Temp 98.3 °F (36.8 °C)   Resp 27   Ht 5' 5\" (1.651 m)   Wt 92 lb 9.5 oz (42 kg)   SpO2 99%   BMI 15.41 kg/m²      Physical Exam:   Constitutional: thin. No respiratory distress. Head: Normocephalic and atraumatic. Eyes: Pupils are equal, round  ENT: hearing normal  Neck: supple. No JVD present. Cardiovascular: fast rate, irregular rhythm. Exam reveals no gallop and no friction rub. No murmur heard. Pulmonary/Chest: Effort normal and breath sounds normal. No wheezes. Abdominal: Soft, no tenderness. Musculoskeletal: no edema. Neurological: alert,oriented. Skin: Skin is warm and dry  Psychiatric: normal mood and affect.  Behavior is normal. Judgment and thought content normal.      CMP:   Lab Results   Component Value Date/Time     (L) 06/24/2021 06:27 PM    K 3.7 06/24/2021 06:27 PM     06/24/2021 06:27 PM    CO2 20 (L) 06/24/2021 06:27 PM    AGAP 10 06/24/2021 06:27 PM     06/24/2021 06:27 PM    BUN 27 (H) 06/24/2021 06:27 PM    CREA 1.18 (H) 06/24/2021 06:27 PM    GFRAA 53 (L) 06/24/2021 06:27 PM    GFRNA 44 (L) 06/24/2021 06:27 PM    CA 9.7 06/24/2021 06:27 PM    MG 2.5 (H) 06/24/2021 06:27 PM    ALB 3.6 06/24/2021 06:27 PM    TP 8.3 (H) 06/24/2021 06:27 PM    GLOB 4.7 (H) 06/24/2021 06:27 PM    AGRAT 0.8 (L) 06/24/2021 06:27 PM    ALT 29 06/24/2021 06:27 PM     CBC:   Lab Results   Component Value Date/Time    WBC 6.6 06/24/2021 06:27 PM    HGB 13.7 06/24/2021 06:27 PM    HCT 39.2 06/24/2021 06:27 PM     06/24/2021 06:27 PM     COAGS:   Lab Results   Component Value Date/Time    PTP 11.9 (H) 06/24/2021 06:29 PM    INR 1.1 06/24/2021 06:29 PM     BNP 3769 but this is chronically elevated in this range since 7/2020    Chest xray Emphysema is unchanged. No pneumonia. No pneumothorax. Bones are osteopenic  Assessment/Plan:      Permanent Atrial fibrillation with rapid ventricular rate. She has been better rate controlled with IV cardizem 2.5 mg/ hr  bp is high so there is room to increase dose to 5 mg/hr   This rapid ventricular rate appears to be related to hypovolumia. She appears dry to me on exam so I am ok with IV fluid hydration if she cannot tolerate po intake  s/p cryomaze and ALIYAH closure at time of MVR, on Coumadin but beside hx of DVT, I think coumadin may be stopped given hx of coagulopathy and poor GI intake now  I would consider pacemaker and av node ablation if medication cannot control rate    Her nausea, loss or appetite, weight loss and diarrhea are all concerning for pathologic GI problem. I recommend abdominal CT and GI evaluation with endoscopy    CAD  100% distal RCA disease on cath 3/11 not amenable to PCI or bypass   continue aspirin for now    Rheumatic heart disease/MV disease- s/p replacement 3/12 Dr. Viktoria Floyd      I will ask Dr Awais Jamil to continue to follow up tomorrow for Dr Gm Valderrama     Thank you for involving me in this patient's care and please call with further concerns or questions. Alvin Velez M.D.   Electrophysiology/Cardiology  [facility]  [address], [address] [place] [address] [phone]

## 2021-06-25 NOTE — ED NOTES
Pt asking for food and something for pain for a headache.  MD does not want her to eat at this time but verbal order for tylenol given

## 2021-06-25 NOTE — PROGRESS NOTES
1700: Nurse checked on patient during hourly rounds. Patient asking to go home and asked nursing if they could page the doctor. 1730: Nurse followed up with Dr. Kami Navarro and he was ok with patient being discharged since her Afib with RVR was resolved as long as she followed up with her PCP regarding her low caloric intake. 1800: Patient's nurse received a call from the patients sister. She is concerned that patient is being discharged too soon. Nurse tried to reassure family that patient's Afib with RVR was resolved, though the doctor noted she was malnourished the patient could be discharged with the understanding that the patient would follow up with her PCP following discharge. 1830: Patient adamant on being discharged despite family's call to nursing. Dr. Kami Navarro ordered a couple of labs to check prior to discharge, but patient refused the lab draws from nursing, stating that Samuel Ugalde is a hard stick\". 1845: Patient taken down for discharge. Niece at front entrance to pick patient up.

## 2021-06-25 NOTE — DISCHARGE INSTRUCTIONS
Discharge Instructions       PATIENT ID: Tristan Wang  MRN: [id number]   YOB: 1940    DATE OF ADMISSION: 6/24/2021  5:33 PM    DATE OF DISCHARGE: 6/25/2021    PRIMARY CARE PROVIDER: Lynda Rinaldi MD     ATTENDING PHYSICIAN: Ahsan Osorio MD  DISCHARGING PROVIDER: Luca Bundy MD    To contact this individual call [phone] and ask the  to page. If unavailable ask to be transferred the Adult Hospitalist Department. DISCHARGE DIAGNOSES   A fib with RVR      CONSULTATIONS: IP CONSULT TO CARDIOLOGY  IP CONSULT TO CARDIOLOGY  IP CONSULT TO GASTROENTEROLOGY  IP CONSULT TO PALLIATIVE CARE - PROVIDER    PROCEDURES/SURGERIES: * No surgery found *    PENDING TEST RESULTS:   At the time of discharge the following test results are still pending: none    FOLLOW UP APPOINTMENTS:   Follow-up Information     Follow up With Specialties Details Why Contact [address]. [address] [place], [place] [address] [place] [address]  [phone]    [facility]  [phone]    Lynda Rinaldi MD Internal Medicine In 1 week  [address] [address] [phone]             ADDITIONAL CARE RECOMMENDATIONS:   Follow up with PMD      DIET: Cardiac Diet    ACTIVITY: Activity as tolerated      DISCHARGE MEDICATIONS:   See Medication Reconciliation Form    · It is important that you take the medication exactly as they are prescribed. · Keep your medication in the bottles provided by the pharmacist and keep a list of the medication names, dosages, and times to be taken in your wallet. · Do not take other medications without consulting your doctor. NOTIFY YOUR PHYSICIAN FOR ANY OF THE FOLLOWING:   Fever over 101 degrees for 24 hours.    Chest pain, shortness of breath, fever, chills, nausea, vomiting, diarrhea, change in mentation, falling, weakness, bleeding. Severe pain or pain not relieved by medications. Or, any other signs or symptoms that you may have questions about.       DISPOSITION:  x  Home With:   OT  PT  HH  RN       SNF/Inpatient Rehab/LTAC    Independent/assisted living    Hospice    Other:     CDMP Checked:   Yes x     PROBLEM LIST Updated:  Yes x       Signed:   Francis Martinez MD  6/25/2021  5:40 PM

## 2021-06-25 NOTE — PROGRESS NOTES
[address] [facility] Adult  Hospitalist Group                                                                                          Hospitalist Progress Note  Jt Bautista MD  Answering service: [phone] from in house phone        Date of Service:  2021  NAME:  Brett Alfonso  :  1940  MRN:  [id number]      Admission Summary:   Brett Alfonso is a 79 y.o. female w/ pmh of afib on warfarin, dyslipidemia, anemia, HFpEF, CKD III, HTN who presented to hospital w/ cc of palpitations and a rapid heart rate. Over the last 3 days, she has had intermittent nausea, diarrhea and non-bloody emesis. As a result, she has had poor po intake and states she feels dehydrated. She noticed worsening palpitations with very minimal activity. The patient denies any fever, chills, chest pain, cough, congestion, recent illness, or dysuria.     Vitals on ER presentation remarkable for heart rate of 177, respiratory rate to 32. Labs remarkable for creatinine of 1.18, BNP of 3769  Chest x-ray showed no acute process.     Interval history / Subjective:     F/u A fib   Now rate controlled  No nausea or vomiting, diarrhea  Says has lost about 15 lbs in a month because of decreased appetite  Tolerating diet  Assessment & Plan:     aFIB W/ rvr  Rheumatic heart disease/MV disease  -Was on Dilt gtt now switched to oral med  -Resume metoprolol and dilt  -Monitor and replete lytes  -Defer AC given hx of coagulopathy  -Awaiting Echo  -Appreciate Cardiology input, coumadin held off sec to poor nutrition    CAD  -declines stress testing for now     HTN  -Continue dilt and metoprolol     Dyslipidemia  -Continue pravastatin     HFpEF / IVVD  -Not acutely decompensated  -Will stop IV fluids     Nausea / Vomiting / Diarrhea  -CT abd et Pelvis unremarkable  -urinalysis w/ culture  -MIVF, PRN Zofran    Moderate protein calorie malnutrition  -lost 15 lbs in last one month  -says her appetite is low  -Will check TFTs   -Follow GI     Regular diet    Code status: FULL CODE. Appreciate Palliative care  DVT prophylaxis: Heparin    Plan:Monitor, likely discharge tomorrow    Care Plan discussed with: Patient/Family  Anticipated Disposition: Home w/Family  Anticipated Discharge: 24 hours to 48 hours     Hospital Problems  Date Reviewed: 6/25/2021        Codes Class Noted POA    * (Principal) Atrial fibrillation with rapid ventricular response [facility]) ICD-10-CM: I48.91  ICD-9-CM: 427.31  6/24/2021 Yes                Review of Systems:   A comprehensive review of systems was negative except for that written in the HPI. Vital Signs:    Last 24hrs VS reviewed since prior progress note. Most recent are:  Visit Vitals  /73 (BP 1 Location: Right upper arm, BP Patient Position: Sitting)   Pulse 85   Temp 98.2 °F (36.8 °C)   Resp 18   Ht 5' 5\" (1.651 m)   Wt 41.5 kg (91 lb 7.9 oz)   SpO2 99%   BMI 15.22 kg/m²       No intake or output data in the 24 hours ending 06/25/21 1249     Physical Examination:     I had a face to face encounter with this patient and independently examined them on 6/25/2021 as outlined below:          Constitutional:  No acute distress   ENT:  Oral mucosa moist, oropharynx benign. Resp:  CTA bilaterally. No wheezing/rhonchi/rales. No accessory muscle use   CV:  Regular rhythm, normal rate, no murmurs, gallops, rubs    GI:  Soft, non distended, non tender. normoactive bowel sounds, no hepatosplenomegaly     Musculoskeletal:  No edema, warm, 2+ pulses throughout    Neurologic:  Moves all extremities.   AAOx3, CN II-XII reviewed            Data Review:    Review and/or order of clinical lab test      Labs:     Recent Labs     06/25/21  0442 06/24/21  1827   WBC 6.4 6.6   HGB 10.1* 13.7   HCT 29.2* 39.2    258     Recent Labs     06/25/21  0442 06/24/21  2130 06/24/21  1827   *  --  134*   K 3.6  --  3.7   *  --  104   CO2 18*  --  20*   BUN 24*  --  27*   CREA 1.00  --  1.18*   *  --  100   CA 8.6  --  9.7   MG  --   --  2.5*   PHOS  --  3.4  --      Recent Labs     06/24/21 1827   ALT 29   AP 60   TBILI 1.1*   TP 8.3*   ALB 3.6   GLOB 4.7*     Recent Labs     06/24/21 1829   INR 1.1   PTP 11.9*      No results for input(s): FE, TIBC, PSAT, FERR in the last 72 hours. Lab Results   Component Value Date/Time    Folate 8.1 01/28/2020 01:32 PM      No results for input(s): PH, PCO2, PO2 in the last 72 hours.   Recent Labs     06/24/21 1827   TROIQ <0.05     Lab Results   Component Value Date/Time    Cholesterol, total 129 03/12/2021 11:32 AM    HDL Cholesterol 61 03/12/2021 11:32 AM    LDL, calculated 50.8 03/12/2021 11:32 AM    Triglyceride 86 03/12/2021 11:32 AM    CHOL/HDL Ratio 2.1 03/12/2021 11:32 AM     Lab Results   Component Value Date/Time    Glucose (POC) 106 (H) 05/07/2012 06:33 AM    Glucose (POC) 146 (H) 05/06/2012 09:27 PM    Glucose (POC) 119 (H) 05/06/2012 05:19 PM    Glucose (POC) 185 (H) 05/06/2012 11:41 AM    Glucose (POC) 112 (H) 05/06/2012 07:12 AM     Lab Results   Component Value Date/Time    Color YELLOW/STRAW 03/12/2021 11:32 AM    Appearance CLEAR 03/12/2021 11:32 AM    Specific gravity 1.013 03/12/2021 11:32 AM    pH (UA) 7.0 03/12/2021 11:32 AM    Protein Negative 03/12/2021 11:32 AM    Glucose Negative 03/12/2021 11:32 AM    Ketone Negative 03/12/2021 11:32 AM    Bilirubin Negative 03/12/2021 11:32 AM    Urobilinogen 0.2 03/12/2021 11:32 AM    Nitrites Negative 03/12/2021 11:32 AM    Leukocyte Esterase Negative 03/12/2021 11:32 AM    Epithelial cells FEW 03/02/2017 05:33 PM    Bacteria NEGATIVE  03/02/2017 05:33 PM    WBC 0-4 03/02/2017 05:33 PM    RBC 0-5 03/02/2017 05:33 PM         Medications Reviewed:     Current Facility-Administered Medications   Medication Dose Route Frequency    cholestyramine-aspartame (QUESTRAN LIGHT) packet 2 g  2 g Oral DAILY WITH LUNCH    dilTIAZem ER (CARDIZEM CD) capsule 120 mg  120 mg Oral DAILY    metoprolol tartrate (LOPRESSOR) tablet 50 mg  50 mg Oral BID    pravastatin (PRAVACHOL) tablet 20 mg  20 mg Oral DAILY    sodium chloride (NS) flush 5-40 mL  5-40 mL IntraVENous Q8H    sodium chloride (NS) flush 5-40 mL  5-40 mL IntraVENous PRN    acetaminophen (TYLENOL) tablet 650 mg  650 mg Oral Q6H PRN    Or    acetaminophen (TYLENOL) suppository 650 mg  650 mg Rectal Q6H PRN    polyethylene glycol (MIRALAX) packet 17 g  17 g Oral DAILY PRN    ondansetron (ZOFRAN ODT) tablet 4 mg  4 mg Oral Q8H PRN    Or    ondansetron (ZOFRAN) injection 4 mg  4 mg IntraVENous Q6H PRN    0.9% sodium chloride infusion  75 mL/hr IntraVENous CONTINUOUS    heparin (porcine) injection 5,000 Units  5,000 Units SubCUTAneous Q8H     ______________________________________________________________________  EXPECTED LENGTH OF STAY: 2d 9h  ACTUAL LENGTH OF STAY:          1                 Shantell Gonzalez MD

## 2021-06-25 NOTE — ED NOTES
TRANSFER - OUT REPORT:    Verbal report given to Jacque Du RN(name) on Holly David  being transferred to (unit) for routine progression of care       Report consisted of patients Situation, Background, Assessment and   Recommendations(SBAR). Information from the following report(s) SBAR, ED Summary, [facility] and Recent Results was reviewed with the receiving nurse. Lines:   Peripheral IV 06/24/21 Left Forearm (Active)   Site Assessment Clean, dry, & intact 06/24/21 1738   Phlebitis Assessment 0 06/24/21 1738   Infiltration Assessment 0 06/24/21 1738   Dressing Status Clean, dry, & intact 06/24/21 1738   Dressing Type Transparent 06/24/21 1738   Hub Color/Line Status Pink 06/24/21 1738       Peripheral IV 06/24/21 Left;Upper Arm (Active)   Site Assessment Clean, dry, & intact 06/24/21 1830   Phlebitis Assessment 0 06/24/21 1830   Infiltration Assessment 0 06/24/21 1830   Dressing Status Clean, dry, & intact 06/24/21 1830   Dressing Type Transparent 06/24/21 1830   Hub Color/Line Status Pink;Flushed 06/24/21 1830   Action Taken Blood drawn 06/24/21 1830        Opportunity for questions and clarification was provided.       Patient transported with:   Registered Nurse

## 2021-06-25 NOTE — CONSULTS
Comprehensive Nutrition Assessment    Type and Reason for Visit: Initial, Consult    Nutrition Recommendations/Plan:    1. RD to change to Regular diet, Ensure Enlive to be added BID. 2. Meets risk criteria for refeeding syndrome given severity of wt loss, please monitor lytes daily with repletion as indicated. 3. Consider addition of daily MVI. 4. Continue appetite stimulant, often takes a few weeks to start working - do not discontinue if improvement in appetite noted. Nutrition Assessment:    PMH Afib, anemia, chronic pain, CKD Stage III, diastolic HF, dyslipidemia, PUD. Pt admitted for intermittent N/V, diarrhea and feeling dehydrated. Nutrition consult received for unintentional wt loss. Visited with pt this afternoon, she reports she has been able to tolerate some broths, jello, and tea but has had limited to no solid foods over the last 4-5 days. Relayed some feedings of fearfulness to try more solid foods with recent issues of N/V, diarrhea. Pt also reports drinking Ensure, as this was all she was able to take with no solid food intakes, estimates consuming 1.5 per day. Regarding wt status, pt reports UBW of 130# - unable to relay when this weight was taken but reports weighing this at one of her last MD appts. Bedscale reading error when attempted to weigh. This wt change represents a 39# (30%) wt loss over the last few months and is severe in nature. Pt meets moderate acute malnutrition. She has been started on an appetite stimulant as of today. Will remove 2g sodium diet restriction, add Ensure Enlive BID and continue to monitor. Pt does meet risk criteria for refeeding syndrome given severity of weight loss. Malnutrition Assessment:  Malnutrition Status:   Moderate malnutrition    Context:  Acute illness     Findings of the 6 clinical characteristics of malnutrition:   Energy Intake:  7 - 50% or less of est energy requirements for 5 or more days  Weight Loss:  7.0 - Greater than 5% over 1 month     Body Fat Loss:  7 - Moderate body fat loss, Triceps, Buccal region   Muscle Mass Loss:  7 - Moderate muscle mass loss, Temples (temporalis), Clavicles (pectoralis & deltoids)  Fluid Accumulation:  No significant fluid accumulation,     Strength:  Not performed         Nutritionally Significant Medications: Protonix, Megace    Estimated Daily Nutrient Needs:  Energy (kcal): 1375 (MSJ + 500 kcal); Weight Used for Energy Requirements: Current  Protein (g): 40-50 (1-1.2 g/kg); Weight Used for Protein Requirements: Current  Fluid (ml/day): 1375; Method Used for Fluid Requirements: 1 ml/kcal    Nutrition Related Findings:       BM: Abd WDL  Edema: None noted  Wounds:  None     Recent Labs     06/25/21  0442 06/24/21  2130 06/24/21  1827   *  --  100   BUN 24*  --  27*   CREA 1.00  --  1.18*   *  --  134*   K 3.6  --  3.7   *  --  104   CO2 18*  --  20*   CA 8.6  --  9.7   PHOS  --  3.4  --    MG  --   --  2.5*     Recent Labs     06/25/21  0442 06/24/21  1827   WBC 6.4 6.6   HGB 10.1* 13.7   HCT 29.2* 39.2    258     Lab Results   Component Value Date/Time    Hemoglobin A1c 5.5 03/12/2021 11:32 AM       Current Nutrition Therapies:   Diet: Low Sodium  Supplements: None  Additional Caloric Sources: None    Meal intake:   Patient Vitals for the past 168 hrs:   % Diet Eaten   06/25/21 0813 1 - 25%     Supplement Intake: No data found.     Anthropometric Measures:  · Height:  5' 5\" (165.1 cm)  · Current Body Wt:  41.3 kg (91 lb)   · Ideal Body Wt:  125:  72.8 %   · BMI Categories:  Underweight (BMI less than 22) age over 72     Wt Readings from Last 10 Encounters:   06/25/21 41.5 kg (91 lb 7.9 oz)   05/28/21 47.9 kg (105 lb 9.6 oz)   03/12/21 54.2 kg (119 lb 8 oz)   11/19/20 54.9 kg (121 lb)   07/07/20 53 kg (116 lb 12.8 oz)   05/08/20 54.4 kg (120 lb)   05/07/20 54.4 kg (120 lb)   01/28/20 54.5 kg (120 lb 1.6 oz)   11/21/19 54.4 kg (120 lb)   10/09/19 57.3 kg (126 lb 6.4 oz) Nutrition Diagnosis:   · Unintended weight loss related to poor appetite, N/V as evidenced by BMI, weight loss      Nutrition Interventions:   Food and/or Nutrient Delivery: Modify current diet, Start oral nutrition supplement  Nutrition Education and Counseling: No recommendations at this time  Coordination of Nutrition Care: Continue to monitor while inpatient    Goals:  PO diet tolerance with intakes 100% of supplements ordered.        Nutrition Monitoring and Evaluation:   Behavioral-Environmental Outcomes: None identified  Food/Nutrient Intake Outcomes: Food and nutrient intake, Supplement intake  Physical Signs/Symptoms Outcomes: Biochemical data, Weight, GI status    Discharge Planning:    Continue oral nutrition supplement     Albino SARI Menezes     Contact via [employer]

## 2021-06-25 NOTE — CONSULTS
Palliative Medicine Consult  Ash: [phone] ([phone])    Patient Name: Dona Smith  YOB: 1940    Date of Initial Consult: 6/25/21  Reason for Consult: Care decisions   Requesting Provider: Harmony Collins   Primary Care Physician: Chidi Turpin MD     SUMMARY:   Dona Smith is a 79 y.o. with a past history of a fib on coumadin, MVR due to rheumatic heart disease s/p replacement 3/2012, CAD,  anemia, HFpEF, CKD, HTN who was admitted on 6/24/2021 with palpitations and tachycardia. A fib w/ RVR- . Poor po intake, fatigue- this was present when saw Dr Duane Ramírez in May 2021 as well. Cardiology adjusting medications, considering pacemaker and AV node ablation if cannot rate control. Holding coumadin. CT A/P 6/24/21 neg for acute findings such as malignancy. GI consulted for nausea, poor appetite. Current medical issues leading to Palliative Medicine involvement include: care decisions. Social: Pt lives alone but has support from her sister Elena Fontanez and her nieces [facility] and [facility]. Has one sonJyothi Manriquez in [place] who she talks to often. PALLIATIVE DIAGNOSES:   1. Fatigue   2. Poor appetite and weight loss  3. SOB  4. Goals of care     PLAN:   1. Introduce palliative medicine, here for extra support. 2. Meet w/ pt who confirms that she has been feeling badly w/ poor appetite, weight loss and overall failure to thrive. 3. Pt has declined stress test and is hopeful that no further procedures/invasive testing needs to be done although would not be completely opposed to such testing if could help her sx. Does wish to be thoughtful of anything done- taking into account her age/fraility. 4. Her sister and nieces are local and help support her, however her legal NOK is her son who lives in [place]. He is very involved and supportive by phone.  Pt says she worries about him traveling to see her too much especially when the pandemic was occurring and does not want him to [place] his vacation visiting her. 5. Offer AMD and discuss its components. Pt confirms full code status (CPR/shock/vent) as she did upon admission however do not get a chance to explain this in detail today and will revisit this - as based on previous conversation, I get the sense that she would be better served w/ a DNR status (and this would be medically indicated as well). 6. Following. 7. Initial consult note routed to primary continuity provider and/or primary health care team members  8. Communicated plan of care with: Palliative IDTMiriam 192 Team      GOALS OF CARE / TREATMENT PREFERENCES:     GOALS OF CARE:  Patient/Health Care Proxy Stated Goals: Prolong life    TREATMENT PREFERENCES:   Code Status: Full Code    Advance Care Planning:  [x] The [facility] Interdisciplinary Team has updated the ACP Navigator with [address] and Patient Capacity    Need to assess mPOA. Advance Care Planning 6/25/2021   Patient's Healthcare Decision Maker is: -   Confirm Advance Directive Yes, on file       Medical Interventions: Full interventions             Other:    As far as possible, the palliative care team has discussed with patient / health care proxy about goals of care / treatment preferences for patient. HISTORY:     History obtained from: Pt, chart, staff    CHIEF COMPLAINT: weakness     HPI/SUBJECTIVE:    The patient is:   [x] Verbal and participatory  [] Non-participatory due to:     Pt in NAD, feeling weak and cold. No appetite.      Clinical Pain Assessment (nonverbal scale for severity on nonverbal patients):   Clinical Pain Assessment  Severity: 0          Duration: for how long has pt been experiencing pain (e.g., 2 days, 1 month, years)  Frequency: how often pain is an issue (e.g., several times per day, once every few days, constant)     FUNCTIONAL ASSESSMENT:     Palliative Performance Scale (PPS):  PPS: 60       PSYCHOSOCIAL/SPIRITUAL SCREENING:     Palliative IDT has assessed this patient for cultural preferences / practices and a referral made as appropriate to needs (Cultural Services, Patient Advocacy, Ethics, etc.)    Any spiritual / Jehovah's witness concerns:  [] Yes /  [x] No    Caregiver Burnout:  [] Yes /  [x] No /  [] No Caregiver Present      Anticipatory grief assessment:   [x] Normal  / [] Maladaptive       ESAS Anxiety: Anxiety: 0    ESAS Depression: Depression: 0        REVIEW OF SYSTEMS:     Positive and pertinent negative findings in ROS are noted above in HPI. The following systems were [x] reviewed / [] unable to be reviewed as noted in HPI  Other findings are noted below. Systems: constitutional, ears/nose/mouth/throat, respiratory, gastrointestinal, genitourinary, musculoskeletal, integumentary, neurologic, psychiatric, endocrine. Positive findings noted below. Modified ESAS Completed by: provider   Fatigue: 5 Drowsiness: 0   Depression: 0 Pain: 0   Anxiety: 0 Nausea: 2   Anorexia: 8 Dyspnea: 2                    PHYSICAL EXAM:     From RN flowsheet:  Wt Readings from Last 3 Encounters:   06/25/21 91 lb 7.9 oz (41.5 kg)   05/28/21 105 lb 9.6 oz (47.9 kg)   03/12/21 119 lb 8 oz (54.2 kg)     Blood pressure 106/73, pulse 85, temperature 98.2 °F (36.8 °C), resp. rate 18, height 5' 5\" (1.651 m), weight 91 lb 7.9 oz (41.5 kg), SpO2 99 %.     Pain Scale 1: Numeric (0 - 10)  Pain Intensity 1: 0                  Constitutional: awake, alert, oriented, thin  Eyes: pupils equal, anicteric  ENMT: no nasal discharge  Respiratory: breathing not labored  Neurologic: following commands, moving all extremities  Psychiatric: full affect, no hallucinations       HISTORY:     Principal Problem:    Atrial fibrillation with rapid ventricular response ([address] (6/24/2021)      Past Medical History:   Diagnosis Date    AF (atrial fibrillation) (HCC)     RHEUMATIC FEVER AS CHILD, AFIB    Anemia     Anemia     Arthritis     Blunt head trauma 03/13/2017    Chronic pain     Chronically on opiate therapy     CKD (chronic kidney disease), stage III ([address]     Coagulation defects     COUMADIN    Diastolic dysfunction     grade 4    Dyslipidemia     Encounter for Hemoccult screening 2016    Fall 2017    H/O colonoscopy     Hand pain, right     Heart failure ([address]     Meniscus degeneration, right 2017    Extensive patellar chondral derangement as well as chondral derangement     Neuroma 2021    Prediabetes     PUD (peptic ulcer disease)     Rheumatic fever     S/P MVR (mitral valve replacement)     Shoulder pain, right     SOB (shortness of breath)     Thyroid disease     Venous insufficiency of both lower extremities 2017    Warfarin-induced coagulopathy ([address]       Past Surgical History:   Procedure Laterality Date    HX CATARACT REMOVAL      BILATERAL    HX  SECTION      HX COLONOSCOPY      HX HEART CATHETERIZATION      HX HYSTERECTOMY      HX MITRAL VALVE REPLACEMENT      HX LUIS ENRIQUE AND BSO        Family History   Problem Relation Age of Onset    Heart Attack Sister     Hypertension Sister     Kidney Disease Mother     Hypertension Mother     Hypertension Father     Cancer Brother     Heart Failure Brother     Arthritis-osteo Sister     Cancer Sister     Cancer Brother         PROSTATE      History reviewed, no pertinent family history.   Social History     Tobacco Use    Smoking status: Former Smoker     Packs/day: 0.50     Years: 30.00     Pack years: 15.00     Quit date: 2012     Years since quittin.4    Smokeless tobacco: Never Used   Substance Use Topics    Alcohol use: No     Alcohol/week: 0.0 standard drinks     No Known Allergies   Current Facility-Administered Medications   Medication Dose Route Frequency    dilTIAZem ER (CARDIZEM CD) capsule 120 mg  120 mg Oral DAILY    metoprolol tartrate (LOPRESSOR) tablet 50 mg  50 mg Oral BID    pravastatin (PRAVACHOL) tablet 20 mg  20 mg Oral DAILY    sodium chloride (NS) flush 5-40 mL  5-40 mL IntraVENous Q8H    sodium chloride (NS) flush 5-40 mL  5-40 mL IntraVENous PRN    acetaminophen (TYLENOL) tablet 650 mg  650 mg Oral Q6H PRN    Or    acetaminophen (TYLENOL) suppository 650 mg  650 mg Rectal Q6H PRN    polyethylene glycol (MIRALAX) packet 17 g  17 g Oral DAILY PRN    ondansetron (ZOFRAN ODT) tablet 4 mg  4 mg Oral Q8H PRN    Or    ondansetron (ZOFRAN) injection 4 mg  4 mg IntraVENous Q6H PRN    0.9% sodium chloride infusion  75 mL/hr IntraVENous CONTINUOUS    heparin (porcine) injection 5,000 Units  5,000 Units SubCUTAneous Q8H          LAB AND IMAGING FINDINGS:     Lab Results   Component Value Date/Time    WBC 6.4 06/25/2021 04:42 AM    HGB 10.1 (L) 06/25/2021 04:42 AM    PLATELET 076 24/66/7660 04:42 AM     Lab Results   Component Value Date/Time    Sodium 135 (L) 06/25/2021 04:42 AM    Potassium 3.6 06/25/2021 04:42 AM    Chloride 110 (H) 06/25/2021 04:42 AM    CO2 18 (L) 06/25/2021 04:42 AM    BUN 24 (H) 06/25/2021 04:42 AM    Creatinine 1.00 06/25/2021 04:42 AM    Calcium 8.6 06/25/2021 04:42 AM    Magnesium 2.5 (H) 06/24/2021 06:27 PM    Phosphorus 3.4 06/24/2021 09:30 PM      Lab Results   Component Value Date/Time    Alk.  phosphatase 60 06/24/2021 06:27 PM    Protein, total 8.3 (H) 06/24/2021 06:27 PM    Albumin 3.6 06/24/2021 06:27 PM    Globulin 4.7 (H) 06/24/2021 06:27 PM     Lab Results   Component Value Date/Time    INR 1.1 06/24/2021 06:29 PM    Prothrombin time 11.9 (H) 06/24/2021 06:29 PM    aPTT 86.4 (H) 11/03/2014 12:33 PM      Lab Results   Component Value Date/Time    Iron 85 01/28/2020 01:32 PM    TIBC 281 01/28/2020 01:32 PM    Iron % saturation 30 01/28/2020 01:32 PM    Ferritin 104 01/28/2020 01:32 PM      No results found for: PH, PCO2, PO2  No components found for: GLPOC   No results found for: CPK, CKMB             Total time: 50 min   Counseling / coordination time, spent as noted above: 35 min   > 50% counseling / coordination?: yes    Prolonged service was provided for  []30 min   []75 min in face to face time in the presence of the patient, spent as noted above. Time Start:   Time End:   Note: this can only be billed with 37230 (initial) or 06137 (follow up). If multiple start / stop times, list each separately.

## 2021-06-25 NOTE — H&P
History & Physical    Primary Care Provider: Remi Baldwin MD  Source of Information: Patient and chart review    History of Presenting Illness:   Moriah Crowley is a 79 y.o. female w/ pmh of afib on warfarin, dyslipidemia, anemia, HFpEF, CKD III, HTN who presented to hospital w/ cc of palpitations and a rapid heart rate. Over the last 3 days, she has had intermittent nausea, diarrhea and non-bloody emesis. As a result, she has had poor po intake and states she feels dehydrated. She noticed worsening palpitations with very minimal activity. The patient denies any fever, chills, chest pain, cough, congestion, recent illness, or dysuria. Vitals on ER presentation remarkable for heart rate of 177, respiratory rate to 32. Labs remarkable for creatinine of 1.18, BNP of 3769  Chest x-ray showed no acute process. Review of Systems:  A comprehensive review of systems was negative except for that written in the History of Present Illness.      Past Medical History:   Diagnosis Date    AF (atrial fibrillation) (HCC)     RHEUMATIC FEVER AS CHILD, AFIB    Anemia     Anemia     Arthritis     Blunt head trauma 03/13/2017    Chronic pain     Chronically on opiate therapy     CKD (chronic kidney disease), stage III [facility]     Coagulation defects     COUMADIN    Diastolic dysfunction     grade 4    Dyslipidemia     Encounter for Hemoccult screening 06/22/2016    Fall 03/13/2017    H/O colonoscopy 2007    Hand pain, right     Heart failure (HCC)     Meniscus degeneration, right 07/13/2017    Extensive patellar chondral derangement as well as chondral derangement     Neuroma 03/12/2021    Prediabetes     PUD (peptic ulcer disease)     Rheumatic fever     S/P MVR (mitral valve replacement)     Shoulder pain, right     SOB (shortness of breath)     Thyroid disease     Venous insufficiency of both lower extremities 01/17/2017    Warfarin-induced coagulopathy [facility])       Past Surgical History:   Procedure Laterality Date    HX CATARACT REMOVAL      BILATERAL    HX  SECTION      HX COLONOSCOPY      HX HEART CATHETERIZATION      HX HYSTERECTOMY      HX MITRAL VALVE REPLACEMENT      HX LUIS ENRIQUE AND BSO       Prior to Admission medications    Medication Sig Start Date End Date Taking? Authorizing Provider   megestroL (MEGACE) 400 mg/10 mL (10 mL) suspension Take 5 mL by mouth two (2) times a day. 21   Mariluz Todd MD   metoprolol tartrate (LOPRESSOR) 25 mg tablet TAKE 1 TABLET BY MOUTH TWICE DAILY 3/16/21   Mariluz Todd MD   warfarin (COUMADIN) 2 mg tablet TAKE 1/2 TABLET BY MOUTH MONDAY THROUGH SATURDAY AND 1 TABLET BY MOUTH DAILY ON  ONLY 20   Maximo Delong MD   dilTIAZem ER (CARDIZEM CD) 120 mg capsule TAKE 1 CAPSULE BY MOUTH EVERY DAY 20   Maximo Delong MD   pravastatin (PRAVACHOL) 20 mg tablet TAKE 1 TABLET BY MOUTH EVERY EVENING 20   Maximo Delong MD   furosemide (LASIX) 40 mg tablet TAKE 1 TABLET BY MOUTH DAILY 10/6/20   Maximo Delong MD   losartan (COZAAR) 25 mg tablet TAKE 1 TABLET BY MOUTH DAILY 20   Timothy Gutierrez, ELMO   triamcinolone acetonide (KENALOG) 0.1 % topical cream Apply  to affected area two (2) times a day. Patient not taking: Reported on 2021   Chapis Gottlieb MD   aspirin 81 mg chewable tablet Take 81 mg by mouth daily.     Provider, Historical     No Known Allergies   Family History   Problem Relation Age of Onset    Heart Attack Sister     Hypertension Sister     Kidney Disease Mother     Hypertension Mother     Hypertension Father     Cancer Brother     Heart Failure Brother     Arthritis-osteo Sister     Cancer Sister     Cancer Brother         PROSTATE        SOCIAL HISTORY:  Patient resides:  Independently x   Assisted Living    SNF    With family care x      Smoking history:   None x   Former    Chronic Alcohol history:   None x   Social    Chronic      Ambulates:   Independently x   w/cane    w/walker    w/wc    CODE STATUS:  DNR    Full x   Other      Objective:     Physical Exam:     Visit Vitals  BP (!) 144/93   Pulse (!) 116   Temp 98.3 °F (36.8 °C)   Resp 27   Ht 5' 5\" (1.651 m)   Wt 42 kg (92 lb 9.5 oz)   SpO2 99%   BMI 15.41 kg/m²      O2 Device: None    General:  Alert, cooperative, no distress, appears stated age. Head:  Normocephalic, without obvious abnormality, atraumatic. Eyes:  Conjunctivae/corneas clear. PERRL, EOMs intact. Nose: Nares normal. Septum midline. Mucosa normal.        Neck: Supple, symmetrical, trachea midline, no carotid bruit and no JVD. Lungs:   Clear to auscultation bilaterally. Chest wall:  No tenderness or deformity. Heart:  Regular Irregular, S1, S2 normal, no murmur, click, rub or gallop. Abdomen:   Soft, non-tender. Bowel sounds normal. No masses,  No organomegaly. Extremities: Extremities normal, atraumatic, no cyanosis or edema. Pulses: 2+ and symmetric all extremities. Skin: Skin color, texture, turgor normal. No rashes or lesions   Neurologic: CNII-XII intact. EKG:  Rapid Afib    Data Review:     Recent Days:  Recent Labs     06/24/21 1827   WBC 6.6   HGB 13.7   HCT 39.2        Recent Labs     06/24/21  1829 06/24/21  1827   NA  --  134*   K  --  3.7   CL  --  104   CO2  --  20*   GLU  --  100   BUN  --  27*   CREA  --  1.18*   CA  --  9.7   MG  --  2.5*   ALB  --  3.6   ALT  --  29   INR 1.1  --      No results for input(s): PH, PCO2, PO2, HCO3, FIO2 in the last 72 hours.     24 Hour Results:  Recent Results (from the past 24 hour(s))   EKG, 12 LEAD, INITIAL    Collection Time: 06/24/21  5:39 PM   Result Value Ref Range    Ventricular Rate 161 BPM    Atrial Rate 150 BPM    QRS Duration 76 ms    Q-T Interval 262 ms    QTC Calculation (Bezet) 428 ms    Calculated R Axis 82 degrees    Calculated T Axis 5 degrees    Diagnosis Atrial fibrillation with premature ventricular or aberrantly conducted   complexes  Septal infarct , age undetermined  When compared with ECG of 02-MAR-2017 16:19,  Atrial fibrillation has replaced Sinus rhythm  Vent. rate has increased BY  96 BPM  Nonspecific T wave abnormality now evident in Inferior leads     CBC WITH AUTOMATED DIFF    Collection Time: 06/24/21  6:27 PM   Result Value Ref Range    WBC 6.6 3.6 - 11.0 K/uL    RBC 4.57 3.80 - 5.20 M/uL    HGB 13.7 11.5 - 16.0 g/dL    HCT 39.2 35.0 - 47.0 %    MCV 85.8 80.0 - 99.0 FL    MCH 30.0 26.0 - 34.0 PG    MCHC 34.9 30.0 - 36.5 g/dL    RDW 14.1 11.5 - 14.5 %    PLATELET 992 190 - 037 K/uL    MPV 10.7 8.9 - 12.9 FL    NRBC 0.0 0  WBC    ABSOLUTE NRBC 0.00 0.00 - 0.01 K/uL    NEUTROPHILS 63 32 - 75 %    LYMPHOCYTES 28 12 - 49 %    MONOCYTES 7 5 - 13 %    EOSINOPHILS 1 0 - 7 %    BASOPHILS 1 0 - 1 %    IMMATURE GRANULOCYTES 0 0.0 - 0.5 %    ABS. NEUTROPHILS 4.2 1.8 - 8.0 K/UL    ABS. LYMPHOCYTES 1.8 0.8 - 3.5 K/UL    ABS. MONOCYTES 0.5 0.0 - 1.0 K/UL    ABS. EOSINOPHILS 0.1 0.0 - 0.4 K/UL    ABS. BASOPHILS 0.0 0.0 - 0.1 K/UL    ABS. IMM. GRANS. 0.0 0.00 - 0.04 K/UL    DF AUTOMATED     METABOLIC PANEL, COMPREHENSIVE    Collection Time: 06/24/21  6:27 PM   Result Value Ref Range    Sodium 134 (L) 136 - 145 mmol/L    Potassium 3.7 3.5 - 5.1 mmol/L    Chloride 104 97 - 108 mmol/L    CO2 20 (L) 21 - 32 mmol/L    Anion gap 10 5 - 15 mmol/L    Glucose 100 65 - 100 mg/dL    BUN 27 (H) 6 - 20 MG/DL    Creatinine 1.18 (H) 0.55 - 1.02 MG/DL    BUN/Creatinine ratio 23 (H) 12 - 20      GFR est AA 53 (L) >60 ml/min/1.73m2    GFR est non-AA 44 (L) >60 ml/min/1.73m2    Calcium 9.7 8.5 - 10.1 MG/DL    Bilirubin, total 1.1 (H) 0.2 - 1.0 MG/DL    ALT (SGPT) 29 12 - 78 U/L    AST (SGOT) 17 15 - 37 U/L    Alk.  phosphatase 60 45 - 117 U/L    Protein, total 8.3 (H) 6.4 - 8.2 g/dL    Albumin 3.6 3.5 - 5.0 g/dL    Globulin 4.7 (H) 2.0 - 4.0 g/dL    A-G Ratio 0.8 (L) 1.1 - 2.2 TROPONIN I    Collection Time: 06/24/21  6:27 PM   Result Value Ref Range    Troponin-I, Qt. <0.05 <0.05 ng/mL   NT-PRO BNP    Collection Time: 06/24/21  6:27 PM   Result Value Ref Range    NT pro-BNP 3,769 (H) <450 PG/ML   MAGNESIUM    Collection Time: 06/24/21  6:27 PM   Result Value Ref Range    Magnesium 2.5 (H) 1.6 - 2.4 mg/dL   SAMPLES BEING HELD    Collection Time: 06/24/21  6:29 PM   Result Value Ref Range    SAMPLES BEING HELD 1RED     COMMENT        Add-on orders for these samples will be processed based on acceptable specimen integrity and analyte stability, which may vary by analyte. PROTHROMBIN TIME + INR    Collection Time: 06/24/21  6:29 PM   Result Value Ref Range    INR 1.1 0.9 - 1.1      Prothrombin time 11.9 (H) 9.0 - 11.1 sec         Imaging:     Assessment:     Susie Benson is a 79 y.o. female w/ pmh of afib on warfarin, dyslipidemia, anemia, HFpEF, CKD III, HTN who is admitted for afib w/ RVR.        Plan:       aFIB W/ rvr  -Continue Dilt gtt  -Resume metoprolol and dilt  -Monitor and replete lytes  -Defer AC given hx of coagulopathy  -ECHO in AM  -Cardiology consult     HTN  -Continue dilt and metoprolol    Dyslipidemia  -Continue pravastatin    HFpEF / IVVD  -Not acutely decompensated  -Cautious volume repletion    Nausea / Vomiting / Diarrhea  -CT abd et Pelvis  -urinalysis w/ culture  -MIVF, PRN Zofran                    FEN/GI -  NS @ 75 ml/hr  Activity - As tolerated  DVT prophylaxis - SCDs  GI prophylaxis -  NI  Disposition - Home    CODE STATUS:  Full code       Signed By: Meera Olivares MD     June 24, 2021

## 2021-06-25 NOTE — DISCHARGE SUMMARY
Discharge Summary       PATIENT ID: Kwasi Villalobos  MRN: [id number]   YOB: 1940    DATE OF ADMISSION: 6/24/2021  5:33 PM    DATE OF DISCHARGE: 6/25/2021   PRIMARY CARE PROVIDER: Ruth Driscoll MD     ATTENDING PHYSICIAN: Dr Bronson Mcclellan  DISCHARGING PROVIDER: Bronson Mcclellan MD    To contact this individual call [phone] and ask the  to page. If unavailable ask to be transferred the Adult Hospitalist Department. CONSULTATIONS: IP CONSULT TO CARDIOLOGY  IP CONSULT TO CARDIOLOGY  IP CONSULT TO GASTROENTEROLOGY  IP CONSULT TO PALLIATIVE CARE - PROVIDER    PROCEDURES/SURGERIES: * No surgery found *    ADMITTING DIAGNOSES & HOSPITAL COURSE:   aFIB W/ rvr  Rheumatic heart disease/MV disease  -Was on Dilt gtt now switched to oral med  -Resume metoprolol and dilt  -Monitor and replete lytes  -Defer AC given hx of coagulopathy  -Awaiting Echo, outpatient echo, the patient wants to get discharged  -Appreciate Cardiology input, coumadin held off sec to poor nutrition     CAD  -declines stress testing for now     HTN  -Continue dilt and metoprolol     Dyslipidemia  -Continue pravastatin     HFpEF / IVVD  -Not acutely decompensated  -Will stop IV fluids     Nausea / Vomiting / Diarrhea  -CT abd et Pelvis unremarkable  -urinalysis w/ culture  -MIVF, PRN Zofran     Moderate protein calorie malnutrition  -lost 15 lbs in last one month  -says her appetite is low  -Will check TFTs   -Wanted to do the work up for unintentional weight loss as inpatient but she wants to get discharged and follow up with her PMD. She has a fantastic PMD who already knows about this issues and is working on this. I will do TFTs on her before she gets discharged and those results will be followed by him        DISCHARGE DIAGNOSES / PLAN:      1. A fib with RVR  2.  Moderate protein calorie malnutrition     ADDITIONAL CARE RECOMMENDATIONS:   Follow up with PMD     PENDING TEST RESULTS:   At the time of discharge the following test results are still pending: TSH, Free T4    FOLLOW UP APPOINTMENTS:    Follow-up Information     Follow up With Specialties Details Why Contact Info    [address] Blake Cartwright. [facility],[address] [place] [address]  [phone]    [address] 8    Roland Denver, MD Internal Medicine In 1 week  [address] [phone]               DIET: Regular Diet    ACTIVITY: Activity as tolerated      DISCHARGE MEDICATIONS:  Current Discharge Medication List      CONTINUE these medications which have CHANGED    Details   metoprolol tartrate (LOPRESSOR) 50 mg tablet 1 tab PO bID  Qty: 60 Tablet, Refills: 1  Start date: 6/25/2021         CONTINUE these medications which have NOT CHANGED    Details   megestroL (MEGACE) 400 mg/10 mL (10 mL) suspension Take 5 mL by mouth two (2) times a day. Qty: 250 mL, Refills: 1      dilTIAZem ER (CARDIZEM CD) 120 mg capsule TAKE 1 CAPSULE BY MOUTH EVERY DAY  Qty: 90 Cap, Refills: 3      pravastatin (PRAVACHOL) 20 mg tablet TAKE 1 TABLET BY MOUTH EVERY EVENING  Qty: 90 Tab, Refills: 3      triamcinolone acetonide (KENALOG) 0.1 % topical cream Apply  to affected area two (2) times a day. Qty: 45 g, Refills: 11      aspirin 81 mg chewable tablet Take 81 mg by mouth daily. STOP taking these medications       warfarin (COUMADIN) 2 mg tablet Comments:   Reason for Stopping:         furosemide (LASIX) 40 mg tablet Comments:   Reason for Stopping:         losartan (COZAAR) 25 mg tablet Comments:   Reason for Stopping:                 NOTIFY YOUR PHYSICIAN FOR ANY OF THE FOLLOWING:   Fever over 101 degrees for 24 hours. Chest pain, shortness of breath, fever, chills, nausea, vomiting, diarrhea, change in mentation, falling, weakness, bleeding.  Severe pain or pain not relieved by medications. Or, any other signs or symptoms that you may have questions about. DISPOSITION:   x Home With:   OT  PT  HH  RN       Long term SNF/Inpatient Rehab    Independent/assisted living    Hospice    Other:       PATIENT CONDITION AT DISCHARGE:     Functional status    Poor     Deconditioned    x Independent      Cognition    x Lucid     Forgetful     Dementia      Catheters/lines (plus indication)    Gomez     PICC     PEG    x None      Code status    x Full code     DNR      PHYSICAL EXAMINATION AT DISCHARGE:  Please see progress notes       CHRONIC MEDICAL DIAGNOSES:  Problem List as of 6/25/2021 Date Reviewed: 6/25/2021        Codes Class Noted - Resolved    * (Principal) Atrial fibrillation with rapid ventricular response ([facility] [address] ICD-10-CM: I48.91  ICD-9-CM: 427.31  6/24/2021 - Present        Neuroma ICD-10-CM: D36.10  ICD-9-CM: 215.9  3/12/2021 - Present        CKD (chronic kidney disease) stage 3, GFR 30-59 ml/min ([facility]) ICD-10-CM: N18.30  ICD-9-CM: 585.3  11/21/2019 - Present        Dyslipidemia ICD-10-CM: E78.5  ICD-9-CM: 272.4  Unknown - Present        Meniscus degeneration, right ICD-10-CM: M23.306  ICD-9-CM: 717.5  Unknown - Present        Fall ICD-10-CM: W19. Evan Melendez  ICD-9-CM: W582.6  3/13/2017 - Present        Blunt head trauma ICD-10-CM: S09. 8XXA  ICD-9-CM: 959.01  3/13/2017 - Present        Chronic deep vein thrombosis (DVT) of femoral vein of right lower extremity (HCC) ICD-10-CM: I82.511  ICD-9-CM: 453.51  3/2/2017 - Present        Venous insufficiency of both lower extremities ICD-10-CM: I87.2  ICD-9-CM: 459.81  1/17/2017 - Present        Diastolic dysfunction MERRY-76-CU: I51.89  ICD-9-CM: 429.9  Unknown - Present    Overview Signed 4/1/2016 11:16 AM by Jacy Gill MD     grade 4             SOB (shortness of breath) ICD-10-CM: R06.02  ICD-9-CM: 786.05  Unknown - Present        ACP (advance care planning) ICD-10-CM: Z71.89  ICD-9-CM: V65.49  2/11/2016 - Present Shoulder pain, right ICD-10-CM: M25.511  ICD-9-CM: 719.41  Unknown - Present        Hand pain, right ICD-10-CM: M79.641  ICD-9-CM: 729.5  Unknown - Present        Chronic pain ICD-10-CM: G89.29  ICD-9-CM: 338.29  Unknown - Present        H/O colonoscopy ICD-10-CM: Z98.890  ICD-9-CM: V45.89  Unknown - Present        IGT (impaired glucose tolerance) ICD-10-CM: R73.02  ICD-9-CM: 790.22  Unknown - Present        Arrhythmia ICD-10-CM: I49.9  ICD-9-CM: 427.9  Unknown - Present    Overview Signed 2/20/2015 10:53 AM by Jose Manuel Dyson MD     RHEUMATIC FEVER AS CHILD, AFIB             PUD (peptic ulcer disease) ICD-10-CM: K27.9  ICD-9-CM: 533.90  Unknown - Present        Anemia ICD-10-CM: D64.9  ICD-9-CM: 120. 9  Unknown - Present        Thyroid disease ICD-10-CM: E07.9  ICD-9-CM: 246. 9  Unknown - Present        Rheumatic fever ICD-10-CM: I00  ICD-9-CM: 46  Unknown - Present        Arthritis ICD-10-CM: M19.90  ICD-9-CM: 716.90  Unknown - Present        Warfarin-induced coagulopathy ([facility] [address] ICD-10-CM: B71.19, T45.515A  ICD-9-CM: 286.7, E934.2  Unknown - Present        Coronary atherosclerosis of native coronary artery ICD-10-CM: I25.10  ICD-9-CM: 414.01  12/2/2013 - Present        Neck pain ICD-10-CM: M54.2  ICD-9-CM: 723.1  8/12/2013 - Present        Aortic valve disorders ICD-10-CM: I35.9  ICD-9-CM: 424.1  10/19/2012 - Present        Pulmonary hypertension (HCC) ICD-10-CM: I27.20  ICD-9-CM: 416.8  10/19/2012 - Present        S/P MVR (mitral valve replacement) ICD-10-CM: Z95.2  ICD-9-CM: V43.3  5/4/2012 - Present    Overview Signed 5/4/2012  7:44 AM by Alejandro Benavides NP     Tissue valve             S/P Maze operation for atrial fibrillation ICD-10-CM: Z98.890, Z86.79  ICD-9-CM: V45.89  5/4/2012 - Present        Anemia associated with acute blood loss ICD-10-CM: D62  ICD-9-CM: 285.1  5/4/2012 - Present        Essential hypertension, benign ICD-10-CM: I10  ICD-9-CM: 401.1  2/25/2012 - Present        Hypothyroid ICD-10-CM: E03.9  ICD-9-CM: 244.9  2/25/2012 - Present        Tricuspid regurgitation ICD-10-CM: I07.1  ICD-9-CM: 397.0  2/25/2012 - Present        Rheumatic heart disease ICD-10-CM: I09.9  ICD-9-CM: 398.90  2/25/2012 - Present        Chest pain ICD-10-CM: R07.9  ICD-9-CM: 786.50  2/23/2012 - Present        Other dyspnea and respiratory abnormality ICD-10-CM: R06.09, R09.89  ICD-9-CM: 786.09  2/23/2012 - Present        Mitral valve disorder ICD-10-CM: I05.9  ICD-9-CM: 394.9  2/23/2012 - Present        Palpitations ICD-10-CM: R00.2  ICD-9-CM: 785.1  2/23/2012 - Present        Atrial fibrillation ([address] ICD-10-CM: I48.91  ICD-9-CM: 427.31  2/23/2012 - Present              Greater than 41 minutes were spent with the patient on counseling and coordination of care    Signed:   Анна Gordon MD  6/25/2021  5:42 PM   .

## 2021-06-25 NOTE — PROGRESS NOTES
Problem: Mobility Impaired (Adult and Pediatric)  Goal: *Acute Goals and Plan of Care (Insert Text)  Outcome: Progressing Towards Goal  Note: FUNCTIONAL STATUS PRIOR TO ADMISSION: Patient was modified independent using a single point cane for functional mobility. HOME SUPPORT PRIOR TO ADMISSION: The patient lived with two nieces but did not require assist.    Physical Therapy Goals  Initiated 6/25/2021  1. Patient will move from supine to sit and sit to supine , scoot up and down, and roll side to side in bed with modified independence within 7 day(s). 2.  Patient will transfer from bed to chair and chair to bed with modified independence using the least restrictive device within 7 day(s). 3.  Patient will perform sit to stand with modified independence within 7 day(s). 4.  Patient will ambulate with modified independence for 150 feet with the least restrictive device within 7 day(s). PHYSICAL THERAPY EVALUATION  Patient: Antonino George (63 y.o. female)  Date: 6/25/2021  Primary Diagnosis: Atrial fibrillation with rapid ventricular response (HCC) [I48.91]        Precautions: moderate fall risk, chair alarm        ASSESSMENT  Based on the objective data described below, the patient presents with generalized weakness, impaired standing balance, and increased fatigue with exercise. Monitored orthostatics and HR with position changes, vitals remained stable throughout the visit. Patient reported a sedentary PLOF, using a cane for household ambulation and rarely going out into the community. Ambulated 20 feet with unilateral handhold assist to simulate cane use, patient gait was a little unsteady and shuffling. After rest break in chair, ambulated 30 ft with RW for comparison and patient exhibited improved stability and gait speed despite need for verbal cues for technique and object negotiation. Placed order for RW with care management for short-term use, nurse notified to use RW when OOB at this time. Vitals:       06/25/21 0935 06/25/21 0940 06/25/21 0946 06/25/21 0952   BP: 99/61 95/63 (!) 87/58 109/72 106/73   BP 1 Location: right arm  Right upper arm Right upper arm Right upper arm Right upper arm   BP Patient Position: Supine at rest Sitting; At rest Standing Standing Sitting   Pulse: 87 80 (!) 111 88 85   Temp:             Resp:             Height:             Weight:             SpO2:on room air 100% 95% 99% 95% 99%     Recommend continued use of RW and anticipate steady gains. Recommend discharge to home with HHPT/constant family support at this time, if not then SNF for rehab    Current Level of Function Impacting Discharge (mobility/balance): CGA for transfers and ambulation    Functional Outcome Measure: The patient scored Total: 65/100 on the Barthel Index which is indicative of moderate fall risk    Other factors to consider for discharge: lives with nieces, but often alone for extended periods, need for RW      Patient will benefit from skilled therapy intervention to address the above noted impairments. PLAN :  Recommendations and Planned Interventions: bed mobility training, transfer training, gait training, therapeutic exercises, and patient and family training/education      Frequency/Duration: Patient will be followed by physical therapy:  3 times a week to address goals. Recommendation for discharge: (in order for the patient to meet his/her long term goals)  Physical therapy at least 2 days/week in the home AND ensure assist and/or supervision for safety with mobility if not, then SNF for rehab    This discharge recommendation:  Has not yet been discussed the attending provider and/or case management    IF patient discharges home will need the following DME: rolling walker         SUBJECTIVE:   Patient stated i'm not in pain and I'm not tired; I'm just not used to doing exercise.     OBJECTIVE DATA SUMMARY:   Chart checked, approved by nursing  HISTORY:    Past Medical History: Diagnosis Date    AF (atrial fibrillation) (HCC)     RHEUMATIC FEVER AS CHILD, AFIB    Anemia     Anemia     Arthritis     Blunt head trauma 2017    Chronic pain     Chronically on opiate therapy     CKD (chronic kidney disease), stage III (HCC)     Coagulation defects     COUMADIN    Diastolic dysfunction     grade 4    Dyslipidemia     Encounter for Hemoccult screening 2016    Fall 2017    H/O colonoscopy     Hand pain, right     Heart failure (HCC)     Meniscus degeneration, right 2017    Extensive patellar chondral derangement as well as chondral derangement     Neuroma 2021    Prediabetes     PUD (peptic ulcer disease)     Rheumatic fever     S/P MVR (mitral valve replacement)     Shoulder pain, right     SOB (shortness of breath)     Thyroid disease     Venous insufficiency of both lower extremities 2017    Warfarin-induced coagulopathy ([facility] [address]      Past Surgical History:   Procedure Laterality Date    HX CATARACT REMOVAL      BILATERAL    HX  SECTION      HX COLONOSCOPY      HX HEART CATHETERIZATION      HX HYSTERECTOMY      HX MITRAL VALVE REPLACEMENT      HX LUIS ENRIQUE AND BSO         Personal factors and/or comorbidities impacting plan of care: lives with nieces, but often alone for extended periods, need for RW        Home Situation  Home Environment: Private residence  One/Two Story Residence: One story  Living Alone: No  Support Systems: Family member(s) (nieces)  Patient Expects to be Discharged to[de-identified] House  Current DME Used/Available at Home: Cane, straight  Tub or Shower Type: Tub/Shower combination    EXAMINATION/PRESENTATION/DECISION MAKING:   Critical Behavior:  Neurologic State: Alert, Appropriate for age  Orientation Level: Oriented X4  Cognition: Follows commands  Safety/Judgement: Insight into deficits  Hearing:   Auditory  Auditory Impairment: None  Skin:  see MD notes   Edema: no edema noticed  Range Of Motion:  AROM: Generally decreased, functional                       Strength:    Strength: Generally decreased, functional                    Tone & Sensation:                  Sensation: Intact               Coordination:     Vision:      Functional Mobility:  Bed Mobility:  Rolling: Stand-by assistance  Supine to Sit: Contact guard assistance  Sit to Supine: Contact guard assistance  Scooting: Contact guard assistance  Transfers:  Sit to Stand: Minimum assistance;Assist x1  Stand to Sit: Contact guard assistance                       Balance:   Sitting: Intact  Standing: Impaired; With support  Standing - Static: Constant support;Good  Standing - Dynamic : Fair;Constant support  Ambulation/Gait Training:  Distance (ft): 50 Feet (ft)  Assistive Device: Walker, rolling;Gait belt (20 ft with handhold, 30 ft with RW)  Ambulation - Level of Assistance: Contact guard assistance (unilateral handhold (to simulate cane))        Gait Abnormalities: Shuffling gait        Base of Support: Widened     Speed/Tram: Slow  Step Length: Right shortened;Left shortened                     Stairs: Therapeutic Exercises:       Functional Measure:  Barthel Index:    Bathin  Bladder: 10  Bowels: 10  Groomin  Dressin  Feeding: 10  Mobility: 10  Stairs: 0  Toilet Use: 5  Transfer (Bed to Chair and Back): 10  Total: 65/100       The Barthel ADL Index: Guidelines  1. The index should be used as a record of what a patient does, not as a record of what a patient could do. 2. The main aim is to establish degree of independence from any help, physical or verbal, however minor and for whatever reason. 3. The need for supervision renders the patient not independent. 4. A patient's performance should be established using the best available evidence. Asking the patient, friends/relatives and nurses are the usual sources, but direct observation and common sense are also important. However direct testing is not needed.   5. Usually the patient's performance over the preceding 24-48 hours is important, but occasionally longer periods will be relevant. 6. Middle categories imply that the patient supplies over 50 per cent of the effort. 7. Use of aids to be independent is allowed. Leo Collado, Barthel, D.W. (9774). Functional evaluation: the Barthel Index. [address] [facility] (14)2. KENNEDY Peña, Sona Crook., Kaitlynn Cobos., Vania, [address] [place] [address] Ave (). Measuring the change indisability after inpatient rehabilitation; comparison of the responsiveness of the Barthel Index and Functional [place] Measure. Journal of Neurology, Neurosurgery, and Psychiatry, 66(4), 529-201. MARTÍNEZ Hurst, YIN Fu, & Manjula Trinidad M.A. (2004.) Assessment of post-stroke quality of life in cost-effectiveness studies: The usefulness of the Barthel Index and the EuroQoL-5D. Quality of Life Research, 15, 324-76        Physical Therapy Evaluation Charge Determination   History Examination Presentation Decision-Making   MEDIUM  Complexity : 1-2 comorbidities / personal factors will impact the outcome/ POC  LOW Complexity : 1-2 Standardized tests and measures addressing body structure, function, activity limitation and / or participation in recreation  LOW Complexity : Stable, uncomplicated  LOW Complexity : FOTO score of       Based on the above components, the patient evaluation is determined to be of the following complexity level: LOW     Pain Ratin/10    Activity Tolerance:   Good and SpO2 stable on RA    After treatment patient left in no apparent distress:   Sitting in chair, Call bell within reach, and Bed / chair alarm activated    COMMUNICATION/EDUCATION:   The patients plan of care was discussed with: Physical therapist and Registered nurse. Fall prevention education was provided and the patient/caregiver indicated understanding., Patient/family have participated as able in goal setting and plan of care. , and Patient/family agree to work toward stated goals and plan of care. Thank you for this referral.  Louisa Ordonez, HOLLY   Time Calculation: 49 mins    Regarding student involvement in patient care:  A student participated in this treatment session. Per CMS Medicare statements and APTA guidelines I certify that the following was true:  1. I was present and directly observed the entire session. 2. I made all skilled judgments and clinical decisions regarding care. 3. I am the practitioner responsible for assessment, treatment, and documentation.

## 2021-06-25 NOTE — PROGRESS NOTES
WILLEM: anticipate d/c home with [facility]; RW to be delivered to pt's home by [facility]; [facility] & DME provider placed in AVS; Family Transport; IMM letter provided 6/24    RUR: 14%  Reason for Admission:  Palpitations and Rapid heart rate  AFIB/RVR                   RUR Score:  14%                   Plan for utilizing home health:     TBD     PCP: First and Last name:  Hattie Simpson MD     Name of Practice:    Are you a current patient: Yes/No:YES    Approximate date of last visit: three months   Can you participate in a virtual visit with your PCP:                   YES  Current Advanced Directive/Advance Care Plan: Full Code      Healthcare Decision Maker:   Click here to complete [place] including selection of the Healthcare Decision Maker Relationship (ie \"Primary\")                             Transition of Care Plan:                    1330-CM reviewed pt chart & met with pt at bedside to discuss transitional planning. Pt resides alone in one story home with no steps to entrance. Prior to admission, pt stated she was independent with adls and iadls. Pt uses [facility] for meds with no copay concerns. CM confirmed pcp and demographics. As per patient, niece to provide d/c transport. CM discussed HH and pt is agreeable and stated she has no preference of [facility] provider. CM to follow. 1500-CM noted that pt does not have an accepting DME provider for RW. Referral sent to [facility] via fax, [phone]. CM contacted [facility] to inform of referral, [phone]. CM noted that [address] has accepted and H&P was uploaded to all scripts as requested. CM to follow. Cherelle Price RN BSN CCM  Transition of Care Plan:     The Plan for Transition of Care is related to the following treatment goals: home health    The Patient and/or patient representative  was provided with a choice of provider and agrees  with the discharge plan.       Yes [x] No []    A Freedom of choice list was provided with basic dialogue that supports the patient's individualized plan of care/goals and shares the quality data associated with the providers. Yes [x] No []  Care Management Interventions  PCP Verified by CM:  Yes  Palliative Care Criteria Met (RRAT>21 & CHF Dx)?: No  Mode of Transport at Discharge:  (family)  Transition of Care Consult (CM Consult): Discharge Planning, [address]: No  Reason Outside [place]: Managed care specific requirement  MyChart Signup: No  Discharge Durable Medical Equipment: Yes (pt owns a cane, RW ordered )  Health Maintenance Reviewed: Yes  Physical Therapy Consult: Yes  Occupational Therapy Consult: Yes  Current Support Network: Own Home, Lives Alone, Family Lives Nearby  Confirm Follow Up Transport: Family  The Plan for Transition of Care is Related to the Following Treatment Goals : medical clearance   The Patient and/or Patient Representative was Provided with a Choice of Provider and Agrees with the Discharge Plan?: Yes  Name of the Patient Representative Who was Provided with a Choice of Provider and Agrees with the Discharge Plan: patient   Freedom of Choice List was Provided with Basic Dialogue that Supports the Patient's Individualized Plan of Care/Goals, Treatment Preferences and Shares the Quality Data Associated with the Providers?: Yes  Discharge Location  Discharge Placement: Home with home health  Christiana Samuels RN BSN CCM

## 2021-06-25 NOTE — PROGRESS NOTES
Orders received, chart reviewed and patient evaluated by physical therapy. Pending progression with skilled acute physical therapy, recommend:  Physical therapy at least 2 days/week in the home AND ensure assist and/or supervision for safety with mobility, if not, then SNF for rehab    Recommend with nursing OOB to chair 3x/day and walking daily with one person assist and rolling walker. Thank you for completing as able in order to maintain patient strength, endurance and independence. Full evaluation to follow. Maira Watts, PT     Vitals:     06/25/21 0935 06/25/21 0940 06/25/21 0946 06/25/21 0952   BP: 99/61 95/63 (!) 87/58 109/72 106/73   BP 1 Location: right arm  Right upper arm Right upper arm Right upper arm Right upper arm   BP Patient Position: Supine at rest Sitting; At rest Standing Standing Sitting   Pulse: 87 80 (!) 111 88 85   Temp:        Resp:        Height:        Weight:        SpO2:on room air 100% 95% 99% 95% 99%

## 2021-06-25 NOTE — PROGRESS NOTES
Spiritual Care Assessment/Progress Note  [facility]      NAME: Nba Fernandez      MRN: [id number]  AGE: 79 y.o. SEX: female  Gnosticism Affiliation: Congregation   Language: English     6/25/2021     Total Time (in minutes): 10     Spiritual [facility] through conversation with:         []Patient        [] Family    [] Friend(s)        Reason for Consult: Palliative Care, Initial/Spiritual Assessment     Spiritual beliefs: (Please include comment if needed)     [] Identifies with a samira tradition:         [] Supported by a samira community:            [] Claims no spiritual orientation:           [] Seeking spiritual identity:                [] Adheres to an individual form of spirituality:           [x] Not able to assess:                           Identified resources for coping:      [] Prayer                               [] Music                  [] Guided Imagery     [] Family/friends                 [] Pet visits     [] Devotional reading                         [x] Unknown     [] Other:                                           Interventions offered during this visit: (See comments for more details)    Patient Interventions: Initial visit           Plan of Care:     [] Support spiritual and/or cultural needs    [] Support AMD and/or advance care planning process      [] Support grieving process   [] Coordinate Rites and/or Rituals    [] Coordination with community clergy   [] No spiritual needs identified at this time   [] Detailed Plan of Care below (See Comments)  [] Make referral to Music Therapy  [] Make referral to Pet Therapy     [] Make referral to Addiction services  [] Make referral to [facility]  [] Make referral to Spiritual Care Partner  [] No future visits requested        [x] Follow up upon further referrals       Attempted to visit pt for initial spiritual assessment. Unable to complete assessment at this time, pt sleeping and did not awake.  Pt's chart was consulted.   Chaplain Kraus MDiv, MS, [facility]  [phone])

## 2021-06-26 ENCOUNTER — HOSPITAL ENCOUNTER (EMERGENCY)
Age: 81
Discharge: HOME | End: 2021-06-27
Attending: EMERGENCY MEDICINE
Payer: MEDICARE

## 2021-06-26 ENCOUNTER — APPOINTMENT (OUTPATIENT)
Dept: CT IMAGING | Age: 81
End: 2021-06-26
Attending: EMERGENCY MEDICINE
Payer: MEDICARE

## 2021-06-26 ENCOUNTER — APPOINTMENT (OUTPATIENT)
Dept: GENERAL RADIOLOGY | Age: 81
End: 2021-06-26
Attending: EMERGENCY MEDICINE
Payer: MEDICARE

## 2021-06-26 ENCOUNTER — HOSPITAL ENCOUNTER (OUTPATIENT)
Dept: NON INVASIVE DIAGNOSTICS | Age: 81
Discharge: HOME | End: 2021-06-26
Attending: PHYSICIAN ASSISTANT

## 2021-06-26 VITALS
DIASTOLIC BLOOD PRESSURE: 75 MMHG | BODY MASS INDEX: 15.24 KG/M2 | SYSTOLIC BLOOD PRESSURE: 120 MMHG | HEIGHT: 65 IN | WEIGHT: 91.49 LBS

## 2021-06-26 DIAGNOSIS — R63.4 WEIGHT LOSS: ICD-10-CM

## 2021-06-26 DIAGNOSIS — I48.91 ATRIAL FIBRILLATION, UNSPECIFIED TYPE (HCC): Primary | ICD-10-CM

## 2021-06-26 DIAGNOSIS — R19.5 LOOSE STOOLS: ICD-10-CM

## 2021-06-26 LAB
ALBUMIN SERPL-MCNC: 3.4 G/DL (ref 3.5–5)
ALBUMIN/GLOB SERPL: 0.8 {RATIO} (ref 1.1–2.2)
ALP SERPL-CCNC: 57 U/L (ref 45–117)
ALT SERPL-CCNC: 26 U/L (ref 12–78)
ANION GAP SERPL CALC-SCNC: 8 MMOL/L (ref 5–15)
AST SERPL-CCNC: 16 U/L (ref 15–37)
BASE DEFICIT BLD-SCNC: 6 MMOL/L
BASOPHILS # BLD: 0 K/UL (ref 0–0.1)
BASOPHILS NFR BLD: 1 % (ref 0–1)
BILIRUB SERPL-MCNC: 0.7 MG/DL (ref 0.2–1)
BNP SERPL-MCNC: 3663 PG/ML
BUN SERPL-MCNC: 20 MG/DL (ref 6–20)
BUN/CREAT SERPL: 14 (ref 12–20)
CA-I BLD-MCNC: 1.23 MMOL/L (ref 1.12–1.32)
CALCIUM SERPL-MCNC: 9 MG/DL (ref 8.5–10.1)
CHLORIDE BLD-SCNC: 109 MMOL/L (ref 100–108)
CHLORIDE SERPL-SCNC: 109 MMOL/L (ref 97–108)
CO2 BLD-SCNC: 21 MMOL/L (ref 19–24)
CO2 SERPL-SCNC: 21 MMOL/L (ref 21–32)
CREAT SERPL-MCNC: 1.44 MG/DL (ref 0.55–1.02)
CREAT UR-MCNC: 1.4 MG/DL (ref 0.6–1.3)
DIFFERENTIAL METHOD BLD: NORMAL
EOSINOPHIL # BLD: 0.1 K/UL (ref 0–0.4)
EOSINOPHIL NFR BLD: 1 % (ref 0–7)
ERYTHROCYTE [DISTWIDTH] IN BLOOD BY AUTOMATED COUNT: 14.3 % (ref 11.5–14.5)
GLOBULIN SER CALC-MCNC: 4.3 G/DL (ref 2–4)
GLUCOSE BLD STRIP.AUTO-MCNC: 99 MG/DL (ref 74–106)
GLUCOSE SERPL-MCNC: 110 MG/DL (ref 65–100)
HCO3 BLDA-SCNC: 20 MMOL/L
HCT VFR BLD AUTO: 35.9 % (ref 35–47)
HGB BLD-MCNC: 12.8 G/DL (ref 11.5–16)
IMM GRANULOCYTES # BLD AUTO: 0 K/UL (ref 0–0.04)
IMM GRANULOCYTES NFR BLD AUTO: 0 % (ref 0–0.5)
INR PPP: 1.2 (ref 0.9–1.1)
LACTATE BLD-SCNC: 2 MMOL/L (ref 0.4–2)
LYMPHOCYTES # BLD: 2 K/UL (ref 0.8–3.5)
LYMPHOCYTES NFR BLD: 33 % (ref 12–49)
MCH RBC QN AUTO: 30.8 PG (ref 26–34)
MCHC RBC AUTO-ENTMCNC: 35.7 G/DL (ref 30–36.5)
MCV RBC AUTO: 86.3 FL (ref 80–99)
MONOCYTES # BLD: 0.5 K/UL (ref 0–1)
MONOCYTES NFR BLD: 8 % (ref 5–13)
NEUTS SEG # BLD: 3.4 K/UL (ref 1.8–8)
NEUTS SEG NFR BLD: 57 % (ref 32–75)
NRBC # BLD: 0 K/UL (ref 0–0.01)
NRBC BLD-RTO: 0 PER 100 WBC
PCO2 BLDV: 40.7 MMHG (ref 41–51)
PH BLDV: 7.3 [PH] (ref 7.32–7.42)
PLATELET # BLD AUTO: 206 K/UL (ref 150–400)
PMV BLD AUTO: 10.3 FL (ref 8.9–12.9)
PO2 BLDV: 23 MMHG (ref 25–40)
POTASSIUM BLD-SCNC: 4.1 MMOL/L (ref 3.5–5.5)
POTASSIUM SERPL-SCNC: 4.3 MMOL/L (ref 3.5–5.1)
PROT SERPL-MCNC: 7.7 G/DL (ref 6.4–8.2)
PROTHROMBIN TIME: 12.1 SEC (ref 9–11.1)
RBC # BLD AUTO: 4.16 M/UL (ref 3.8–5.2)
SODIUM BLD-SCNC: 141 MMOL/L (ref 136–145)
SODIUM SERPL-SCNC: 138 MMOL/L (ref 136–145)
SPECIMEN SITE: ABNORMAL
TROPONIN I SERPL-MCNC: <0.05 NG/ML
WBC # BLD AUTO: 6 K/UL (ref 3.6–11)

## 2021-06-26 PROCEDURE — 99285 EMERGENCY DEPT VISIT HI MDM: CPT

## 2021-06-26 PROCEDURE — 74011250636 HC RX REV CODE- 250/636: Performed by: EMERGENCY MEDICINE

## 2021-06-26 PROCEDURE — 83880 ASSAY OF NATRIURETIC PEPTIDE: CPT

## 2021-06-26 PROCEDURE — 93005 ELECTROCARDIOGRAM TRACING: CPT

## 2021-06-26 PROCEDURE — 82947 ASSAY GLUCOSE BLOOD QUANT: CPT

## 2021-06-26 PROCEDURE — 84484 ASSAY OF TROPONIN QUANT: CPT

## 2021-06-26 PROCEDURE — 85610 PROTHROMBIN TIME: CPT

## 2021-06-26 PROCEDURE — 71045 X-RAY EXAM CHEST 1 VIEW: CPT

## 2021-06-26 PROCEDURE — 70450 CT HEAD/BRAIN W/O DYE: CPT

## 2021-06-26 PROCEDURE — 80053 COMPREHEN METABOLIC PANEL: CPT

## 2021-06-26 PROCEDURE — 96374 THER/PROPH/DIAG INJ IV PUSH: CPT

## 2021-06-26 PROCEDURE — 85025 COMPLETE CBC W/AUTO DIFF WBC: CPT

## 2021-06-26 PROCEDURE — 36415 COLL VENOUS BLD VENIPUNCTURE: CPT

## 2021-06-26 PROCEDURE — 96361 HYDRATE IV INFUSION ADD-ON: CPT

## 2021-06-26 PROCEDURE — 74011250637 HC RX REV CODE- 250/637: Performed by: EMERGENCY MEDICINE

## 2021-06-26 RX ORDER — LOPERAMIDE HYDROCHLORIDE 2 MG/1
2 CAPSULE ORAL
Status: COMPLETED | OUTPATIENT
Start: 2021-06-26 | End: 2021-06-26

## 2021-06-26 RX ORDER — DILTIAZEM HYDROCHLORIDE 30 MG/1
30 TABLET, FILM COATED ORAL
Status: COMPLETED | OUTPATIENT
Start: 2021-06-26 | End: 2021-06-26

## 2021-06-26 RX ORDER — FUROSEMIDE 10 MG/ML
20 INJECTION INTRAMUSCULAR; INTRAVENOUS
Status: COMPLETED | OUTPATIENT
Start: 2021-06-26 | End: 2021-06-26

## 2021-06-26 RX ADMIN — SODIUM CHLORIDE 1000 ML: 9 INJECTION, SOLUTION INTRAVENOUS at 19:52

## 2021-06-26 RX ADMIN — DILTIAZEM HYDROCHLORIDE 30 MG: 30 TABLET, FILM COATED ORAL at 20:43

## 2021-06-26 RX ADMIN — FUROSEMIDE 20 MG: 10 INJECTION, SOLUTION INTRAMUSCULAR; INTRAVENOUS at 20:43

## 2021-06-26 RX ADMIN — LOPERAMIDE HYDROCHLORIDE 2 MG: 2 CAPSULE ORAL at 20:43

## 2021-06-27 VITALS
RESPIRATION RATE: 19 BRPM | DIASTOLIC BLOOD PRESSURE: 71 MMHG | SYSTOLIC BLOOD PRESSURE: 113 MMHG | TEMPERATURE: 98.7 F | OXYGEN SATURATION: 99 % | HEART RATE: 112 BPM

## 2021-06-27 LAB
ATRIAL RATE: 131 BPM
CALCULATED R AXIS, ECG10: 91 DEGREES
CALCULATED T AXIS, ECG11: 59 DEGREES
DIAGNOSIS, 93000: NORMAL
Q-T INTERVAL, ECG07: 368 MS
QRS DURATION, ECG06: 74 MS
QTC CALCULATION (BEZET), ECG08: 511 MS
VENTRICULAR RATE, ECG03: 116 BPM

## 2021-06-27 NOTE — ED NOTES
Bedside and Verbal shift change report given to Itz García RN (oncoming nurse) by Ayaan Oliver RN (offgoing nurse). Report included the following information SBAR, ED Summary, MAR and Recent Results.

## 2021-06-27 NOTE — ED NOTES
79 Bella Batista MD at bedside for reeval;;    MD with bedside US for imaging;;     Family remains at bedside for comfort and care;;     0035 - Patient discharge by Xin Banda MD - pt sent to the [facility], via wheelchair for safety -  Discharge information / home RX / and reasons to return to the ED were reviewed by the doctor.

## 2021-06-27 NOTE — ED PROVIDER NOTES
EMERGENCY DEPARTMENT HISTORY AND PHYSICAL EXAM      Date: 6/26/2021  Patient Name: Patrick Garcia    History of Presenting Illness     Chief Complaint   Patient presents with    Altered mental status       History Provided By: Patient    HPI: Patrick Garcia, 79 y.o. female  With past medical history of CKD, peptic ulcer disease, A. fib and heart failure presenting today with generalized malaise. She says that he has been having loose days for the past couple days. Family is concerned because she seemed like she was confused earlier today and they came to her house and she was not answering questions normally. The patient says she is been having low appetite because she is getting older. She is not having any severe pain. No chest pain, but she does note shortness of breath. She has not had any fevers or chills. The patient is compliant with medications. She does have a appoint with her family doctor scheduled for next week. There are no other complaints, changes, or physical findings at this time. PCP: Mojagn Ge MD    No current facility-administered medications on file prior to encounter. Current Outpatient Medications on File Prior to Encounter   Medication Sig Dispense Refill    metoprolol tartrate (LOPRESSOR) 50 mg tablet 1 tab PO bID 60 Tablet 1    megestroL (MEGACE) 400 mg/10 mL (10 mL) suspension Take 5 mL by mouth two (2) times a day. 250 mL 1    dilTIAZem ER (CARDIZEM CD) 120 mg capsule TAKE 1 CAPSULE BY MOUTH EVERY DAY 90 Cap 3    pravastatin (PRAVACHOL) 20 mg tablet TAKE 1 TABLET BY MOUTH EVERY EVENING 90 Tab 3    triamcinolone acetonide (KENALOG) 0.1 % topical cream Apply  to affected area two (2) times a day. (Patient not taking: Reported on 5/28/2021) 45 g 11    aspirin 81 mg chewable tablet Take 81 mg by mouth daily.          Past History     Past Medical History:  Past Medical History:   Diagnosis Date    AF (atrial fibrillation) (HCC)     RHEUMATIC FEVER AS CHILD, AFIB    Anemia     Anemia     Arthritis     Blunt head trauma 2017    Chronic pain     Chronically on opiate therapy     CKD (chronic kidney disease), stage III (HCC)     Coagulation defects     COUMADIN    Diastolic dysfunction     grade 4    Dyslipidemia     Encounter for Hemoccult screening 2016    Fall 2017    H/O colonoscopy 2007    Hand pain, right     Heart failure ([facility]     Meniscus degeneration, right 2017    Extensive patellar chondral derangement as well as chondral derangement     Neuroma 2021    Prediabetes     PUD (peptic ulcer disease)     Rheumatic fever     S/P MVR (mitral valve replacement)     Shoulder pain, right     SOB (shortness of breath)     Thyroid disease     Venous insufficiency of both lower extremities 2017    Warfarin-induced coagulopathy ([facility]        Past Surgical History:  Past Surgical History:   Procedure Laterality Date    HX CATARACT REMOVAL      BILATERAL    HX  SECTION      HX COLONOSCOPY      HX HEART CATHETERIZATION      HX HYSTERECTOMY      HX MITRAL VALVE REPLACEMENT      HX LUIS ENRIQUE AND BSO         Family History:  Family History   Problem Relation Age of Onset    Heart Attack Sister     Hypertension Sister     Kidney Disease Mother     Hypertension Mother     Hypertension Father     Cancer Brother     Heart Failure Brother     Arthritis-osteo Sister     Cancer Sister     Cancer Brother         PROSTATE       Social History:  Social History     Tobacco Use    Smoking status: Former Smoker     Packs/day: 0.50     Years: 30.00     Pack years: 15.00     Quit date: 2012     Years since quittin.4    Smokeless tobacco: Never Used   Vaping Use    Vaping Use: Never used   Substance Use Topics    Alcohol use: No     Alcohol/week: 0.0 standard drinks    Drug use: No       Allergies:  No Known Allergies      Review of Systems   Constitutional: No  fever  Skin: No  rash  HEENT: No nasal congestion  Resp: No cough  CV: No chest pain  GI: No vomiting  : No dysuria  MSK: No joint pain  Neuro: No numbness  Psych: No anxiety      Physical Exam     Patient Vitals for the past 12 hrs:   Temp Pulse Resp BP SpO2   06/26/21 2345 98.7 °F (37.1 °C) (!) 112 19 113/71 99 %   06/26/21 2315  100 15 (!) 98/47 100 %   06/26/21 2308  97 16 (!) 102/56 100 %   06/26/21 2217  98 19 94/63 99 %   06/26/21 2202  100 21 100/62 94 %   06/26/21 2130  (!) 105 23 99/61 95 %   06/26/21 2123  (!) 108 18 (!) 94/56 100 %   06/26/21 2115  (!) 110 19 (!) 79/63 98 %   06/26/21 2100  (!) 115 18 (!) 88/66 95 %   06/26/21 2045  (!) 112 25 (!) 92/48 98 %   06/26/21 2043    104/72    06/26/21 2030  (!) 117 16 104/72 91 %   06/26/21 2015  (!) 117 15 101/70    06/26/21 2000  (!) 115 18 117/71 94 %   06/26/21 1959     95 %   06/26/21 1945  (!) 122 22 93/65    06/26/21 1943 97.5 °F (36.4 °C) (!) 117 14 93/65 90 %     General: alert, No acute distress  Eyes: EOMI, normal conjunctiva  ENT: dry mucous membranes. Neck: Active, full ROM of neck. Skin: No rashes. no jaundice              Lungs: Equal chest expansion. no respiratory distress. clear to auscultation bilaterally No accessory muscle usage  Heart: tachycardic with an irregularly irregular rhythm     no peripheral edema   2+ radial pulses and DPs bilaterally  Abd:  non distended soft, nontender. No rebound tenderness. No guarding  Back: Full ROM  MSK: Full, active ROM in all 4 extremities.    Neuro: Alert and oriented to Person, Place, Time and Situation; normal speech;   Psych: Cooperative with exam; Appropriate mood and affect             Diagnostic Study Results     Labs -     Recent Results (from the past 12 hour(s))   EKG, 12 LEAD, INITIAL    Collection Time: 06/26/21  7:47 PM   Result Value Ref Range    Ventricular Rate 116 BPM    Atrial Rate 131 BPM    QRS Duration 74 ms    Q-T Interval 368 ms    QTC Calculation (Bezet) 511 ms    Calculated R Axis 91 degrees    Calculated T Axis 59 degrees    Diagnosis       Atrial fibrillation with rapid ventricular response with premature   ventricular or aberrantly conducted complexes  Rightward axis  Anterior infarct (cited on or before 24-JUN-2021)  When compared with ECG of 24-JUN-2021 17:39,  ST elevation now present in Inferior leads  Nonspecific T wave abnormality no longer evident in Inferior leads     CBC WITH AUTOMATED DIFF    Collection Time: 06/26/21  7:49 PM   Result Value Ref Range    WBC 6.0 3.6 - 11.0 K/uL    RBC 4.16 3.80 - 5.20 M/uL    HGB 12.8 11.5 - 16.0 g/dL    HCT 35.9 35.0 - 47.0 %    MCV 86.3 80.0 - 99.0 FL    MCH 30.8 26.0 - 34.0 PG    MCHC 35.7 30.0 - 36.5 g/dL    RDW 14.3 11.5 - 14.5 %    PLATELET 666 332 - 090 K/uL    MPV 10.3 8.9 - 12.9 FL    NRBC 0.0 0  WBC    ABSOLUTE NRBC 0.00 0.00 - 0.01 K/uL    NEUTROPHILS 57 32 - 75 %    LYMPHOCYTES 33 12 - 49 %    MONOCYTES 8 5 - 13 %    EOSINOPHILS 1 0 - 7 %    BASOPHILS 1 0 - 1 %    IMMATURE GRANULOCYTES 0 0.0 - 0.5 %    ABS. NEUTROPHILS 3.4 1.8 - 8.0 K/UL    ABS. LYMPHOCYTES 2.0 0.8 - 3.5 K/UL    ABS. MONOCYTES 0.5 0.0 - 1.0 K/UL    ABS. EOSINOPHILS 0.1 0.0 - 0.4 K/UL    ABS. BASOPHILS 0.0 0.0 - 0.1 K/UL    ABS. IMM. GRANS. 0.0 0.00 - 0.04 K/UL    DF AUTOMATED     METABOLIC PANEL, COMPREHENSIVE    Collection Time: 06/26/21  7:49 PM   Result Value Ref Range    Sodium 138 136 - 145 mmol/L    Potassium 4.3 3.5 - 5.1 mmol/L    Chloride 109 (H) 97 - 108 mmol/L    CO2 21 21 - 32 mmol/L    Anion gap 8 5 - 15 mmol/L    Glucose 110 (H) 65 - 100 mg/dL    BUN 20 6 - 20 MG/DL    Creatinine 1.44 (H) 0.55 - 1.02 MG/DL    BUN/Creatinine ratio 14 12 - 20      GFR est AA 42 (L) >60 ml/min/1.73m2    GFR est non-AA 35 (L) >60 ml/min/1.73m2    Calcium 9.0 8.5 - 10.1 MG/DL    Bilirubin, total 0.7 0.2 - 1.0 MG/DL    ALT (SGPT) 26 12 - 78 U/L    AST (SGOT) 16 15 - 37 U/L    Alk.  phosphatase 57 45 - 117 U/L    Protein, total 7.7 6.4 - 8.2 g/dL    Albumin 3.4 (L) 3.5 - 5.0 g/dL    Globulin 4.3 (H) 2.0 - 4.0 g/dL    A-G Ratio 0.8 (L) 1.1 - 2.2     TROPONIN I    Collection Time: 06/26/21  7:49 PM   Result Value Ref Range    Troponin-I, Qt. <0.05 <0.05 ng/mL   PROTHROMBIN TIME + INR    Collection Time: 06/26/21  7:49 PM   Result Value Ref Range    INR 1.2 (H) 0.9 - 1.1      Prothrombin time 12.1 (H) 9.0 - 11.1 sec   NT-PRO BNP    Collection Time: 06/26/21  7:49 PM   Result Value Ref Range    NT pro-BNP 3,663 (H) <450 PG/ML   BLOOD GAS,CHEM8,LACTIC ACID POC    Collection Time: 06/26/21  9:37 PM   Result Value Ref Range    Calcium, ionized (POC) 1.23 1.12 - 1.32 mmol/L    BICARBONATE 20 mmol/L    Base deficit (POC) 6.0 mmol/L    Sample source VENOUS BLOOD      CO2, POC 21 19 - 24 MMOL/L    Sodium,  136 - 145 MMOL/L    Potassium, POC 4.1 3.5 - 5.5 MMOL/L    Chloride,  (H) 100 - 108 MMOL/L    Glucose, POC 99 74 - 106 MG/DL    Creatinine, POC 1.4 (H) 0.6 - 1.3 MG/DL    Lactic Acid (POC) 2.00 0.40 - 2.00 mmol/L    pH, venous (POC) 7.30 (L) 7.32 - 7.42      pCO2, venous (POC) 40.7 (L) 41 - 51 MMHG    pO2, venous (POC) 23 (L) 25 - 40 mmHg       Radiologic Studies -   CT HEAD WO CONT   Final Result   No evidence of acute process            XR CHEST PORT   Final Result   No acute cardiopulmonary process        CT Results  (Last 48 hours)               06/26/21 2159  CT HEAD WO CONT Final result    Impression:  No evidence of acute process               Narrative:  EXAM: CT HEAD WO CONT       INDICATION: AMS       COMPARISON: 3/13/2017. CONTRAST: None. TECHNIQUE: Unenhanced CT of the head was performed using 5 mm images. Brain and   bone windows were generated. Coronal and sagittal reformats. CT dose reduction   was achieved through use of a standardized protocol tailored for this   examination and automatic exposure control for dose modulation. FINDINGS:   There is mild atrophy. There is mild white matter disease likely to chronic   small vessel ischemic disease. Lizbeth Lowery There is no intracranial hemorrhage, extra-axial   collection, or mass effect. The basilar cisterns are open. No CT evidence of   acute infarct. The bone windows demonstrate no abnormalities. There is complete opacification   of the sphenoid sinus with wall thickening indicating that this is chronic. The   patient is status post bilateral lens surgery. CXR Results  (Last 48 hours)               06/26/21 2054  XR CHEST PORT Final result    Impression:  No acute cardiopulmonary process       Narrative:  EXAM: XR CHEST PORT       INDICATION: chest pain       COMPARISON: 6/24/2021       FINDINGS: A portable AP radiograph of the chest was obtained at hours. The   patient is on a cardiac monitor. The lungs are clear. The cardiac and   mediastinal contours and pulmonary vascularity are normal.  The bones and soft   tissues are grossly within normal limits. Medical Decision Making   I am the first provider for this patient. I reviewed the vital signs, available nursing notes, past medical history, past surgical history, family history and social history. Vital Signs-Reviewed the patient's vital signs.   Patient Vitals for the past 12 hrs:   Temp Pulse Resp BP SpO2   06/26/21 2345 98.7 °F (37.1 °C) (!) 112 19 113/71 99 %   06/26/21 2315  100 15 (!) 98/47 100 %   06/26/21 2308  97 16 (!) 102/56 100 %   06/26/21 2217  98 19 94/63 99 %   06/26/21 2202  100 21 100/62 94 %   06/26/21 2130  (!) 105 23 99/61 95 %   06/26/21 2123  (!) 108 18 (!) 94/56 100 %   06/26/21 2115  (!) 110 19 (!) 79/63 98 %   06/26/21 2100  (!) 115 18 (!) 88/66 95 %   06/26/21 2045  (!) 112 25 (!) 92/48 98 %   06/26/21 2043    104/72    06/26/21 2030  (!) 117 16 104/72 91 %   06/26/21 2015  (!) 117 15 101/70    06/26/21 2000  (!) 115 18 117/71 94 %   06/26/21 1959     95 %   06/26/21 1945  (!) 122 22 93/65    06/26/21 1943 97.5 °F (36.4 °C) (!) 117 14 93/65 90 %       Provider Notes (Medical Decision Making):     Differential Diagnosis: Dehydration, electrolyte abnormality, atrial fibrillation, syncope, stroke    Initial Plan: We will obtain laboratory studies, chest x-ray, CT the head, treat the patient with IV fluids and an additional dose of immediate release diltiazem, and reassess. ED Course:   Initial assessment performed. The patients presenting problems have been discussed, and they are in agreement with the care plan formulated and outlined with them. I have encouraged them to ask questions as they arise throughout their visit. ED Course as of Jun 27 0343   Sat Jun 26, 2021   2131 On my interpretation of the patient's EKG atrial fibrillation at a rate of 160, QTc is 511, no ST elevation. [NW]      ED Course User Index  [NW] Wilfred Grove MD   Patient has largely unremarkable work-up. She did have improvement of her heart rate after treatment with diltiazem. She was ambulated around the emergency department without any difficulty. Family is concerned because she has been having some abdominal status and also some falls at home, but the patient is adamant that she does not want to stay in the hospital.  We discussed possible admission and she still does not wish to stay. Ultimately patient is discharged with return precautions. Fiorella Scott MD, am the attending of record for this patient encounter. Dispo: Discharged. The patient has been re-evaluated and is ready for discharge. Reviewed available results with patient. Counseled patient on diagnosis and care plan. Patient has expressed understanding, and all questions have been answered. Patient agrees with plan and agrees to follow up as recommended, or to return to the ED if their symptoms worsen. Discharge instructions have been provided and explained to the patient, along with reasons to return to the ED. PLAN:  Discharge Medication List as of 6/27/2021 12:27 AM        2.      Follow-up Information Follow up With Specialties Details Why Contact Info    Rosalina Sahni MD Internal Medicine   [facility]  [address]  [address] [phone]          3. Return to ED if worse       Diagnosis     Clinical Impression:   1. Atrial fibrillation, unspecified type ([address]    2. Loose stools    3. Weight loss        Attestations:    Eduar Mabry MD    Please note that this dictation was completed with [employer], the computer voice recognition software. Quite often unanticipated grammatical, syntax, homophones, and other interpretive errors are inadvertently transcribed by the computer software. Please disregard these errors. Please excuse any errors that have escaped final proofreading. Thank you.

## 2021-06-28 ENCOUNTER — PATIENT OUTREACH (OUTPATIENT)
Dept: CASE MANAGEMENT | Age: 81
End: 2021-06-28

## 2021-06-28 NOTE — PROGRESS NOTES
Care Transitions Initial Call    Call within 2 business days of discharge: Yes     Patient: Dyana Cornejo Patient : 1940 MRN: [id number]    Last Discharge [address]       Complaint Diagnosis Description Type Department Provider    21 Altered mental status Atrial fibrillation, unspecified type ([facility] . .. ED (DISCHARGE) Kaitlynn Saenz MD        Was this an external facility discharge? No Discharge Facility: n/a    Inpatient Readmission Risk score: Unplanned Readmit Risk Score: 13    Was this a readmission? no   Patient stated reason for the admission: weakness    Patients top risk factors for readmission: medical condition-failure to thrive-Afib w/RVR   Interventions to address risk factors: Obtained and reviewed discharge summary and/or continuity of care documents    Care Transition Nurse (CTN) contacted the patient by telephone to perform post hospital discharge assessment. Verified name and  with patient as identifiers. Provided introduction to self, and explanation of the CTN role. CTN reviewed discharge instructions, medical action plan and red flags with patient who verbalized understanding. Were discharge instructions available to patient? Yes. Patient denied any concerns at this time. She reviewed medication with CTN and confirmed [address] saw her today. She then stated she was doing fine and did not want to answer any more questions and declined any future contact. Medication reconciliation was performed with patient, who verbalizes understanding of administration of home medications. Advised obtaining a 90-day supply of all daily and as-needed medications. New mediation- metoprolol will be picked up from her pharmacy today.   Referral to Pharm D needed: no     Home Health/Outpatient orders at discharge: home health care  [address] [place] [address] Way: Antonieta Stapleton  Date of initial visit: 2021    Durable Medical Equipment ordered at discharge: Cane/Walker/Crutches  Durable [address]: [facility]  Durable Medical Equipment received: delivered to patient's home    Discussed follow-up appointments. If no appointment was previously scheduled, appointment scheduling offered: n/a. Is follow up appointment scheduled within 7 days of discharge? yes. [facility] follow up appointment(s):   Future Appointments   Date Time Provider Shalini Wick   6/30/2021 10:45 AM NURSE NIKKO VADIM MAIN BS [facility]   7/1/2021 11:00 AM THAI PATINO [facility]   12/2/2021 11:40 AM MD LOGAN Pearce [facility]     Non-[facility] follow up appointment(s):    none    patient requested no further WILLEM follow up based on severity of symptoms and risk factors. CTN provided contact information for future needs.

## 2021-07-26 ENCOUNTER — PATIENT OUTREACH (OUTPATIENT)
Dept: CASE MANAGEMENT | Age: 81
End: 2021-07-26

## 2021-07-26 NOTE — PROGRESS NOTES
Patient has graduated from the Transitions of Care Coordination  program on 7/26/2021. Patient requested no CTN follow up. Patient was not referred to the [facility] team for further management. Goals Addressed                 This Visit's Progress     COMPLETED: Attends follow-up appointments as directed. 06/28/21  [address] follow up appointment(s):   Future Appointments   Date Time Provider Shalini Wick   6/30/2021 10:45 AM NURSE NIKKO EL MAIN BS AMB   7/1/2021 11:00 AM VASCULARTHAI AMB   12/2/2021 11:40 AM Severa Forehand, MD [facility] AMB     Non-[facility] follow up appointment(s):    None    07/26/21  Per EMR, patient was \"no show\" for hospital follow up appts. Patient has Care Transition Nurse's contact information for any further questions, concerns, or needs.   Patients upcoming visits:    Future Appointments   Date Time Provider Shalini Wick   12/2/2021 11:40 AM Severa Forehand, MD [facility] AMB

## 2022-03-18 PROBLEM — S09.8XXA BLUNT HEAD TRAUMA: Status: ACTIVE | Noted: 2017-03-13
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
## 2022-03-19 PROBLEM — D36.10 NEUROMA: Status: ACTIVE | Noted: 2021-03-12

## 2022-03-19 PROBLEM — I82.511 CHRONIC DEEP VEIN THROMBOSIS (DVT) OF FEMORAL VEIN OF RIGHT LOWER EXTREMITY (HCC): Status: ACTIVE | Noted: 2017-03-02

## 2022-03-19 PROBLEM — I48.91 ATRIAL FIBRILLATION WITH RAPID VENTRICULAR RESPONSE (HCC): Status: ACTIVE | Noted: 2021-06-24

## 2022-03-19 PROBLEM — I87.2 VENOUS INSUFFICIENCY OF BOTH LOWER EXTREMITIES: Status: ACTIVE | Noted: 2017-01-17

## 2022-03-20 PROBLEM — N18.30 CKD (CHRONIC KIDNEY DISEASE) STAGE 3, GFR 30-59 ML/MIN (HCC): Status: ACTIVE | Noted: 2019-11-21

## 2023-05-10 RX ORDER — TRIAMCINOLONE ACETONIDE 1 MG/G
CREAM TOPICAL 2 TIMES DAILY
COMMUNITY
Start: 2018-07-24

## 2023-05-10 RX ORDER — MEGESTROL ACETATE 40 MG/ML
SUSPENSION ORAL 2 TIMES DAILY
COMMUNITY
Start: 2021-05-28

## 2023-05-10 RX ORDER — ASPIRIN 81 MG/1
81 TABLET, CHEWABLE ORAL DAILY
COMMUNITY

## 2023-05-10 RX ORDER — DILTIAZEM HYDROCHLORIDE 120 MG/1
1 CAPSULE, EXTENDED RELEASE ORAL DAILY
COMMUNITY
Start: 2020-12-06

## 2023-05-10 RX ORDER — PRAVASTATIN SODIUM 20 MG
1 TABLET ORAL NIGHTLY
COMMUNITY
Start: 2020-12-06

## 2023-05-10 RX ORDER — METOPROLOL TARTRATE 50 MG/1
TABLET, FILM COATED ORAL
COMMUNITY
Start: 2021-06-25